# Patient Record
Sex: FEMALE | Race: WHITE | NOT HISPANIC OR LATINO | Employment: OTHER | URBAN - NONMETROPOLITAN AREA
[De-identification: names, ages, dates, MRNs, and addresses within clinical notes are randomized per-mention and may not be internally consistent; named-entity substitution may affect disease eponyms.]

---

## 2017-03-02 DIAGNOSIS — I27.29 OTHER SECONDARY PULMONARY HYPERTENSION (HCC): ICD-10-CM

## 2017-03-02 DIAGNOSIS — I50.30 DIASTOLIC CONGESTIVE HEART FAILURE (HCC): ICD-10-CM

## 2017-03-02 DIAGNOSIS — R06.02 SHORTNESS OF BREATH: ICD-10-CM

## 2017-03-02 DIAGNOSIS — R60.9 EDEMA: ICD-10-CM

## 2017-03-07 ENCOUNTER — HOSPITAL ENCOUNTER (OUTPATIENT)
Dept: RADIOLOGY | Facility: HOSPITAL | Age: 82
Discharge: HOME | DRG: 314 | End: 2017-03-07
Attending: INTERNAL MEDICINE
Payer: MEDICARE

## 2017-03-07 ENCOUNTER — HOSPITAL ENCOUNTER (OUTPATIENT)
Dept: NON INVASIVE DIAGNOSTICS | Facility: HOSPITAL | Age: 82
Discharge: HOME | DRG: 314 | End: 2017-03-07
Payer: MEDICARE

## 2017-03-07 ENCOUNTER — APPOINTMENT (OUTPATIENT)
Dept: LAB | Facility: HOSPITAL | Age: 82
DRG: 314 | End: 2017-03-07
Attending: INTERNAL MEDICINE
Payer: MEDICARE

## 2017-03-07 ENCOUNTER — TRANSCRIBE ORDERS (OUTPATIENT)
Dept: ADMINISTRATIVE | Facility: HOSPITAL | Age: 82
End: 2017-03-07

## 2017-03-07 ENCOUNTER — HOSPITAL ENCOUNTER (OUTPATIENT)
Dept: NON INVASIVE DIAGNOSTICS | Facility: HOSPITAL | Age: 82
Discharge: HOME | DRG: 314 | End: 2017-03-07
Attending: INTERNAL MEDICINE
Payer: MEDICARE

## 2017-03-07 DIAGNOSIS — R06.02 SHORTNESS OF BREATH: ICD-10-CM

## 2017-03-07 DIAGNOSIS — R60.9 EDEMA: ICD-10-CM

## 2017-03-07 DIAGNOSIS — R06.02 SHORTNESS OF BREATH: Primary | ICD-10-CM

## 2017-03-07 LAB
ANION GAP SERPL CALCULATED.3IONS-SCNC: 6 MMOL/L (ref 4–13)
ATRIAL RATE: 75 BPM
BUN SERPL-MCNC: 12 MG/DL (ref 5–25)
CALCIUM SERPL-MCNC: 9.6 MG/DL (ref 8.3–10.1)
CHLORIDE SERPL-SCNC: 93 MMOL/L (ref 100–108)
CO2 SERPL-SCNC: 34 MMOL/L (ref 21–32)
CREAT SERPL-MCNC: 0.63 MG/DL (ref 0.6–1.3)
GFR SERPL CREATININE-BSD FRML MDRD: >60 ML/MIN/1.73SQ M
GLUCOSE SERPL-MCNC: 111 MG/DL (ref 65–140)
NT-PROBNP SERPL-MCNC: 301 PG/ML
P AXIS: 49 DEGREES
POTASSIUM SERPL-SCNC: 3.9 MMOL/L (ref 3.5–5.3)
PR INTERVAL: 144 MS
QRS AXIS: 107 DEGREES
QRSD INTERVAL: 130 MS
QT INTERVAL: 406 MS
QTC INTERVAL: 453 MS
SODIUM SERPL-SCNC: 133 MMOL/L (ref 136–145)
T WAVE AXIS: 10 DEGREES
VENTRICULAR RATE: 75 BPM

## 2017-03-07 PROCEDURE — 80048 BASIC METABOLIC PNL TOTAL CA: CPT

## 2017-03-07 PROCEDURE — 83880 ASSAY OF NATRIURETIC PEPTIDE: CPT

## 2017-03-07 PROCEDURE — 71020 HB CHEST X-RAY 2VW FRONTAL&LATL: CPT

## 2017-03-07 PROCEDURE — 93306 TTE W/DOPPLER COMPLETE: CPT

## 2017-03-07 PROCEDURE — 93005 ELECTROCARDIOGRAM TRACING: CPT

## 2017-03-07 PROCEDURE — 36415 COLL VENOUS BLD VENIPUNCTURE: CPT

## 2017-03-09 ENCOUNTER — HOSPITAL ENCOUNTER (INPATIENT)
Facility: HOSPITAL | Age: 82
LOS: 12 days | Discharge: HOME HEALTH | DRG: 314 | End: 2017-03-21
Attending: EMERGENCY MEDICINE | Admitting: INTERNAL MEDICINE
Payer: MEDICARE

## 2017-03-09 ENCOUNTER — ALLSCRIPTS OFFICE VISIT (OUTPATIENT)
Dept: OTHER | Facility: OTHER | Age: 82
End: 2017-03-09

## 2017-03-09 DIAGNOSIS — R06.02 SHORTNESS OF BREATH: ICD-10-CM

## 2017-03-09 DIAGNOSIS — I50.33 ACUTE ON CHRONIC DIASTOLIC CHF (CONGESTIVE HEART FAILURE) (HCC): ICD-10-CM

## 2017-03-09 DIAGNOSIS — R06.00 ACUTE DYSPNEA: Primary | ICD-10-CM

## 2017-03-09 DIAGNOSIS — J44.1 COPD WITH ACUTE EXACERBATION (HCC): ICD-10-CM

## 2017-03-09 DIAGNOSIS — E87.1 ACUTE HYPONATREMIA: ICD-10-CM

## 2017-03-09 DIAGNOSIS — J90 PLEURAL EFFUSION, BACTERIAL: ICD-10-CM

## 2017-03-09 DIAGNOSIS — I50.32 DIASTOLIC CHF, CHRONIC (HCC): Chronic | ICD-10-CM

## 2017-03-09 DIAGNOSIS — K74.60 HEPATIC CIRRHOSIS (HCC): ICD-10-CM

## 2017-03-09 DIAGNOSIS — I38 VALVULAR HEART DISEASE: ICD-10-CM

## 2017-03-09 LAB
ALBUMIN SERPL BCP-MCNC: 2.6 G/DL (ref 3.5–5)
ALP SERPL-CCNC: 95 U/L (ref 46–116)
ALT SERPL W P-5'-P-CCNC: 31 U/L (ref 12–78)
ANION GAP SERPL CALCULATED.3IONS-SCNC: 12 MMOL/L (ref 4–13)
AST SERPL W P-5'-P-CCNC: 41 U/L (ref 5–45)
BASOPHILS # BLD AUTO: 0.02 THOUSANDS/ΜL (ref 0–0.1)
BASOPHILS NFR BLD AUTO: 0 % (ref 0–1)
BILIRUB SERPL-MCNC: 1.02 MG/DL (ref 0.2–1)
BUN SERPL-MCNC: 12 MG/DL (ref 5–25)
CALCIUM SERPL-MCNC: 9.2 MG/DL (ref 8.3–10.1)
CHLORIDE SERPL-SCNC: 94 MMOL/L (ref 100–108)
CO2 SERPL-SCNC: 23 MMOL/L (ref 21–32)
CREAT SERPL-MCNC: 0.62 MG/DL (ref 0.6–1.3)
EOSINOPHIL # BLD AUTO: 0.1 THOUSAND/ΜL (ref 0–0.61)
EOSINOPHIL NFR BLD AUTO: 2 % (ref 0–6)
ERYTHROCYTE [DISTWIDTH] IN BLOOD BY AUTOMATED COUNT: 13.4 % (ref 11.6–15.1)
GFR SERPL CREATININE-BSD FRML MDRD: >60 ML/MIN/1.73SQ M
GLUCOSE SERPL-MCNC: 121 MG/DL (ref 65–140)
HCT VFR BLD AUTO: 41.4 % (ref 34.8–46.1)
HGB BLD-MCNC: 14 G/DL (ref 11.5–15.4)
LYMPHOCYTES # BLD AUTO: 1.91 THOUSANDS/ΜL (ref 0.6–4.47)
LYMPHOCYTES NFR BLD AUTO: 36 % (ref 14–44)
MCH RBC QN AUTO: 30.6 PG (ref 26.8–34.3)
MCHC RBC AUTO-ENTMCNC: 33.8 G/DL (ref 31.4–37.4)
MCV RBC AUTO: 91 FL (ref 82–98)
MONOCYTES # BLD AUTO: 0.66 THOUSAND/ΜL (ref 0.17–1.22)
MONOCYTES NFR BLD AUTO: 13 % (ref 4–12)
NEUTROPHILS # BLD AUTO: 2.61 THOUSANDS/ΜL (ref 1.85–7.62)
NEUTS SEG NFR BLD AUTO: 49 % (ref 43–75)
NT-PROBNP SERPL-MCNC: 341 PG/ML
PLATELET # BLD AUTO: 188 THOUSANDS/UL (ref 149–390)
PMV BLD AUTO: 10.4 FL (ref 8.9–12.7)
POTASSIUM SERPL-SCNC: 4.2 MMOL/L (ref 3.5–5.3)
PROT SERPL-MCNC: 7.3 G/DL (ref 6.4–8.2)
RBC # BLD AUTO: 4.57 MILLION/UL (ref 3.81–5.12)
SODIUM SERPL-SCNC: 129 MMOL/L (ref 136–145)
TROPONIN I SERPL-MCNC: <0.04 NG/ML
WBC # BLD AUTO: 5.3 THOUSAND/UL (ref 4.31–10.16)

## 2017-03-09 PROCEDURE — 85025 COMPLETE CBC W/AUTO DIFF WBC: CPT | Performed by: EMERGENCY MEDICINE

## 2017-03-09 PROCEDURE — 83880 ASSAY OF NATRIURETIC PEPTIDE: CPT | Performed by: EMERGENCY MEDICINE

## 2017-03-09 PROCEDURE — 96361 HYDRATE IV INFUSION ADD-ON: CPT

## 2017-03-09 PROCEDURE — 80053 COMPREHEN METABOLIC PANEL: CPT | Performed by: EMERGENCY MEDICINE

## 2017-03-09 PROCEDURE — 36415 COLL VENOUS BLD VENIPUNCTURE: CPT | Performed by: EMERGENCY MEDICINE

## 2017-03-09 PROCEDURE — 96360 HYDRATION IV INFUSION INIT: CPT

## 2017-03-09 PROCEDURE — 93005 ELECTROCARDIOGRAM TRACING: CPT | Performed by: EMERGENCY MEDICINE

## 2017-03-09 PROCEDURE — 87798 DETECT AGENT NOS DNA AMP: CPT | Performed by: HOSPITALIST

## 2017-03-09 PROCEDURE — 84484 ASSAY OF TROPONIN QUANT: CPT | Performed by: EMERGENCY MEDICINE

## 2017-03-09 RX ORDER — FUROSEMIDE 10 MG/ML
80 INJECTION INTRAMUSCULAR; INTRAVENOUS ONCE
Status: DISCONTINUED | OUTPATIENT
Start: 2017-03-09 | End: 2017-03-10

## 2017-03-09 RX ORDER — ONDANSETRON 2 MG/ML
INJECTION INTRAMUSCULAR; INTRAVENOUS
Status: DISPENSED
Start: 2017-03-09 | End: 2017-03-10

## 2017-03-09 RX ORDER — ONDANSETRON 2 MG/ML
4 INJECTION INTRAMUSCULAR; INTRAVENOUS ONCE
Status: DISCONTINUED | OUTPATIENT
Start: 2017-03-09 | End: 2017-03-10

## 2017-03-09 RX ORDER — FUROSEMIDE 10 MG/ML
INJECTION INTRAMUSCULAR; INTRAVENOUS
Status: DISPENSED
Start: 2017-03-09 | End: 2017-03-10

## 2017-03-09 RX ORDER — METOCLOPRAMIDE HYDROCHLORIDE 5 MG/ML
INJECTION INTRAMUSCULAR; INTRAVENOUS
Status: DISPENSED
Start: 2017-03-09 | End: 2017-03-10

## 2017-03-09 RX ADMIN — SODIUM CHLORIDE 500 ML: 0.9 INJECTION, SOLUTION INTRAVENOUS at 17:05

## 2017-03-10 ENCOUNTER — APPOINTMENT (INPATIENT)
Dept: CT IMAGING | Facility: HOSPITAL | Age: 82
DRG: 314 | End: 2017-03-10
Payer: MEDICARE

## 2017-03-10 ENCOUNTER — APPOINTMENT (INPATIENT)
Dept: ULTRASOUND IMAGING | Facility: HOSPITAL | Age: 82
DRG: 314 | End: 2017-03-10
Payer: MEDICARE

## 2017-03-10 ENCOUNTER — GENERIC CONVERSION - ENCOUNTER (OUTPATIENT)
Dept: OTHER | Facility: OTHER | Age: 82
End: 2017-03-10

## 2017-03-10 PROBLEM — R06.02 SHORTNESS OF BREATH: Status: ACTIVE | Noted: 2017-03-10

## 2017-03-10 PROBLEM — K21.9 GERD (GASTROESOPHAGEAL REFLUX DISEASE): Chronic | Status: ACTIVE | Noted: 2017-03-10

## 2017-03-10 PROBLEM — I10 HTN (HYPERTENSION): Chronic | Status: ACTIVE | Noted: 2017-03-10

## 2017-03-10 PROBLEM — K29.70 GASTRITIS: Status: ACTIVE | Noted: 2017-03-10

## 2017-03-10 PROBLEM — J90 PLEURAL EFFUSION: Status: ACTIVE | Noted: 2017-03-10

## 2017-03-10 LAB
APPEARANCE FLD: CLEAR
ATRIAL RATE: 74 BPM
COLOR FLD: YELLOW
FLUAV AG SPEC QL: NORMAL
FLUBV AG SPEC QL: NORMAL
GLUCOSE FLD-MCNC: 115 MG/DL
LDH SERPL-CCNC: 63 U/L (ref 81–234)
LYMPHOCYTES NFR BLD AUTO: 95 %
MONOCYTES NFR BLD AUTO: 2 %
NEUTS SEG NFR BLD AUTO: 3 %
P AXIS: 51 DEGREES
PH BODY FLUID: 7.6
PR INTERVAL: 150 MS
PROT SERPL-MCNC: <2 G/DL (ref 6.4–8.2)
QRS AXIS: 78 DEGREES
QRSD INTERVAL: 130 MS
QT INTERVAL: 414 MS
QTC INTERVAL: 459 MS
RSV B RNA SPEC QL NAA+PROBE: NORMAL
SITE: NORMAL
T WAVE AXIS: -1 DEGREES
TOTAL CELLS COUNTED SPEC: 100
VENTRICULAR RATE: 74 BPM
WBC # FLD MANUAL: 438 /UL

## 2017-03-10 PROCEDURE — C9113 INJ PANTOPRAZOLE SODIUM, VIA: HCPCS | Performed by: PHYSICIAN ASSISTANT

## 2017-03-10 PROCEDURE — 99285 EMERGENCY DEPT VISIT HI MDM: CPT

## 2017-03-10 PROCEDURE — 82945 GLUCOSE OTHER FLUID: CPT | Performed by: INTERNAL MEDICINE

## 2017-03-10 PROCEDURE — 0W993ZX DRAINAGE OF RIGHT PLEURAL CAVITY, PERCUTANEOUS APPROACH, DIAGNOSTIC: ICD-10-PCS | Performed by: INTERNAL MEDICINE

## 2017-03-10 PROCEDURE — 83615 LACTATE (LD) (LDH) ENZYME: CPT | Performed by: INTERNAL MEDICINE

## 2017-03-10 PROCEDURE — 76604 US EXAM CHEST: CPT

## 2017-03-10 PROCEDURE — 0W993ZZ DRAINAGE OF RIGHT PLEURAL CAVITY, PERCUTANEOUS APPROACH: ICD-10-PCS | Performed by: INTERNAL MEDICINE

## 2017-03-10 PROCEDURE — 87205 SMEAR GRAM STAIN: CPT | Performed by: INTERNAL MEDICINE

## 2017-03-10 PROCEDURE — 87070 CULTURE OTHR SPECIMN AEROBIC: CPT | Performed by: INTERNAL MEDICINE

## 2017-03-10 PROCEDURE — 89051 BODY FLUID CELL COUNT: CPT | Performed by: INTERNAL MEDICINE

## 2017-03-10 PROCEDURE — 84155 ASSAY OF PROTEIN SERUM: CPT | Performed by: INTERNAL MEDICINE

## 2017-03-10 PROCEDURE — 71275 CT ANGIOGRAPHY CHEST: CPT

## 2017-03-10 PROCEDURE — 83986 ASSAY PH BODY FLUID NOS: CPT | Performed by: INTERNAL MEDICINE

## 2017-03-10 RX ORDER — SODIUM CHLORIDE 9 MG/ML
50 INJECTION, SOLUTION INTRAVENOUS CONTINUOUS
Status: DISCONTINUED | OUTPATIENT
Start: 2017-03-10 | End: 2017-03-11

## 2017-03-10 RX ORDER — PANTOPRAZOLE SODIUM 40 MG/1
40 INJECTION, POWDER, FOR SOLUTION INTRAVENOUS EVERY 12 HOURS SCHEDULED
Status: DISCONTINUED | OUTPATIENT
Start: 2017-03-10 | End: 2017-03-10

## 2017-03-10 RX ORDER — VALSARTAN 80 MG/1
80 TABLET ORAL DAILY
Status: ON HOLD | COMMUNITY
End: 2017-03-21

## 2017-03-10 RX ORDER — POTASSIUM CHLORIDE 1.5 G/1.77G
20 POWDER, FOR SOLUTION ORAL DAILY
COMMUNITY
End: 2018-09-11 | Stop reason: SDUPTHER

## 2017-03-10 RX ORDER — PANTOPRAZOLE SODIUM 40 MG/1
INJECTION, POWDER, FOR SOLUTION INTRAVENOUS
Status: DISPENSED
Start: 2017-03-10 | End: 2017-03-10

## 2017-03-10 RX ORDER — LACTULOSE 10 G/10G
20 SOLUTION ORAL 2 TIMES DAILY
COMMUNITY
End: 2017-03-21 | Stop reason: HOSPADM

## 2017-03-10 RX ORDER — ACETAMINOPHEN 325 MG/1
650 TABLET ORAL EVERY 6 HOURS PRN
Status: DISCONTINUED | OUTPATIENT
Start: 2017-03-10 | End: 2017-03-18

## 2017-03-10 RX ORDER — LACTULOSE 10 G/15ML
20 SOLUTION ORAL 2 TIMES DAILY
Status: DISCONTINUED | OUTPATIENT
Start: 2017-03-10 | End: 2017-03-17

## 2017-03-10 RX ORDER — METOCLOPRAMIDE HYDROCHLORIDE 5 MG/ML
5 INJECTION INTRAMUSCULAR; INTRAVENOUS EVERY 6 HOURS PRN
Status: DISCONTINUED | OUTPATIENT
Start: 2017-03-10 | End: 2017-03-11

## 2017-03-10 RX ORDER — ASPIRIN 81 MG/1
81 TABLET, CHEWABLE ORAL DAILY
Status: DISCONTINUED | OUTPATIENT
Start: 2017-03-10 | End: 2017-03-21 | Stop reason: HOSPADM

## 2017-03-10 RX ORDER — CHOLECALCIFEROL (VITAMIN D3) 25 MCG
1 CAPSULE ORAL DAILY
COMMUNITY

## 2017-03-10 RX ORDER — HEPARIN SODIUM 5000 [USP'U]/ML
INJECTION, SOLUTION INTRAVENOUS; SUBCUTANEOUS
Status: DISPENSED
Start: 2017-03-10 | End: 2017-03-10

## 2017-03-10 RX ORDER — PANTOPRAZOLE SODIUM 20 MG/1
20 TABLET, DELAYED RELEASE ORAL
Status: DISCONTINUED | OUTPATIENT
Start: 2017-03-11 | End: 2017-03-21 | Stop reason: HOSPADM

## 2017-03-10 RX ORDER — HYDROCHLOROTHIAZIDE 25 MG/1
25 TABLET ORAL DAILY
COMMUNITY
End: 2017-03-21 | Stop reason: HOSPADM

## 2017-03-10 RX ORDER — MELATONIN
1000 DAILY
Status: DISCONTINUED | OUTPATIENT
Start: 2017-03-10 | End: 2017-03-21 | Stop reason: HOSPADM

## 2017-03-10 RX ORDER — POLYETHYLENE GLYCOL 3350 17 G/17G
17 POWDER, FOR SOLUTION ORAL DAILY
Status: DISCONTINUED | OUTPATIENT
Start: 2017-03-10 | End: 2017-03-21 | Stop reason: HOSPADM

## 2017-03-10 RX ORDER — VALSARTAN 80 MG/1
80 TABLET ORAL DAILY
Status: DISCONTINUED | OUTPATIENT
Start: 2017-03-10 | End: 2017-03-21 | Stop reason: HOSPADM

## 2017-03-10 RX ORDER — HEPARIN SODIUM 5000 [USP'U]/ML
5000 INJECTION, SOLUTION INTRAVENOUS; SUBCUTANEOUS EVERY 8 HOURS SCHEDULED
Status: DISCONTINUED | OUTPATIENT
Start: 2017-03-10 | End: 2017-03-21 | Stop reason: HOSPADM

## 2017-03-10 RX ORDER — POLYETHYLENE GLYCOL 3350 17 G/17G
17 POWDER, FOR SOLUTION ORAL DAILY
COMMUNITY
End: 2017-03-21 | Stop reason: HOSPADM

## 2017-03-10 RX ORDER — MULTIVITAMIN
1 TABLET ORAL DAILY
COMMUNITY

## 2017-03-10 RX ORDER — MORPHINE SULFATE 2 MG/ML
2 INJECTION, SOLUTION INTRAMUSCULAR; INTRAVENOUS EVERY 6 HOURS PRN
Status: DISCONTINUED | OUTPATIENT
Start: 2017-03-10 | End: 2017-03-11

## 2017-03-10 RX ORDER — ESOMEPRAZOLE MAGNESIUM 40 MG/1
40 CAPSULE, DELAYED RELEASE ORAL
COMMUNITY

## 2017-03-10 RX ADMIN — HEPARIN SODIUM 5000 UNITS: 5000 INJECTION, SOLUTION INTRAVENOUS; SUBCUTANEOUS at 06:09

## 2017-03-10 RX ADMIN — Medication 1 TABLET: at 17:04

## 2017-03-10 RX ADMIN — SODIUM CHLORIDE 50 ML/HR: 0.9 INJECTION, SOLUTION INTRAVENOUS at 19:50

## 2017-03-10 RX ADMIN — HEPARIN SODIUM 5000 UNITS: 5000 INJECTION, SOLUTION INTRAVENOUS; SUBCUTANEOUS at 13:28

## 2017-03-10 RX ADMIN — ASPIRIN 81 MG 81 MG: 81 TABLET ORAL at 17:03

## 2017-03-10 RX ADMIN — FAMOTIDINE 20 MG: 10 INJECTION, SOLUTION INTRAVENOUS at 22:18

## 2017-03-10 RX ADMIN — PANTOPRAZOLE SODIUM 40 MG: 40 INJECTION, POWDER, FOR SOLUTION INTRAVENOUS at 08:41

## 2017-03-10 RX ADMIN — FAMOTIDINE 20 MG: 10 INJECTION, SOLUTION INTRAVENOUS at 01:47

## 2017-03-10 RX ADMIN — LACTULOSE 20 G: 10 SOLUTION ORAL at 17:03

## 2017-03-10 RX ADMIN — HEPARIN SODIUM 5000 UNITS: 5000 INJECTION, SOLUTION INTRAVENOUS; SUBCUTANEOUS at 01:47

## 2017-03-10 RX ADMIN — PANTOPRAZOLE SODIUM 40 MG: 40 INJECTION, POWDER, FOR SOLUTION INTRAVENOUS at 02:23

## 2017-03-10 RX ADMIN — SODIUM CHLORIDE 50 ML/HR: 0.9 INJECTION, SOLUTION INTRAVENOUS at 02:31

## 2017-03-10 RX ADMIN — VITAMIN D, TAB 1000IU (100/BT) 1000 UNITS: 25 TAB at 17:04

## 2017-03-10 RX ADMIN — IOHEXOL 85 ML: 350 INJECTION, SOLUTION INTRAVENOUS at 05:45

## 2017-03-10 RX ADMIN — HEPARIN SODIUM 5000 UNITS: 5000 INJECTION, SOLUTION INTRAVENOUS; SUBCUTANEOUS at 22:17

## 2017-03-10 RX ADMIN — FAMOTIDINE 20 MG: 10 INJECTION, SOLUTION INTRAVENOUS at 08:41

## 2017-03-10 RX ADMIN — VALSARTAN 80 MG: 80 TABLET, FILM COATED ORAL at 17:08

## 2017-03-11 ENCOUNTER — APPOINTMENT (INPATIENT)
Dept: RADIOLOGY | Facility: HOSPITAL | Age: 82
DRG: 314 | End: 2017-03-11
Payer: MEDICARE

## 2017-03-11 PROBLEM — I38 VALVULAR HEART DISEASE: Status: ACTIVE | Noted: 2017-03-11

## 2017-03-11 PROBLEM — I50.32 DIASTOLIC CHF, CHRONIC (HCC): Chronic | Status: ACTIVE | Noted: 2017-03-11

## 2017-03-11 LAB
ALBUMIN SERPL BCP-MCNC: 2.3 G/DL (ref 3.5–5)
ALP SERPL-CCNC: 78 U/L (ref 46–116)
ALT SERPL W P-5'-P-CCNC: 26 U/L (ref 12–78)
ANION GAP SERPL CALCULATED.3IONS-SCNC: 3 MMOL/L (ref 4–13)
AST SERPL W P-5'-P-CCNC: 39 U/L (ref 5–45)
BASOPHILS # BLD AUTO: 0.03 THOUSANDS/ΜL (ref 0–0.1)
BASOPHILS NFR BLD AUTO: 1 % (ref 0–1)
BILIRUB SERPL-MCNC: 1 MG/DL (ref 0.2–1)
BUN SERPL-MCNC: 11 MG/DL (ref 5–25)
CALCIUM SERPL-MCNC: 8.4 MG/DL (ref 8.3–10.1)
CHLORIDE SERPL-SCNC: 98 MMOL/L (ref 100–108)
CO2 SERPL-SCNC: 32 MMOL/L (ref 21–32)
CREAT SERPL-MCNC: 0.64 MG/DL (ref 0.6–1.3)
EOSINOPHIL # BLD AUTO: 0.12 THOUSAND/ΜL (ref 0–0.61)
EOSINOPHIL NFR BLD AUTO: 3 % (ref 0–6)
ERYTHROCYTE [DISTWIDTH] IN BLOOD BY AUTOMATED COUNT: 13.6 % (ref 11.6–15.1)
GFR SERPL CREATININE-BSD FRML MDRD: >60 ML/MIN/1.73SQ M
GLUCOSE SERPL-MCNC: 80 MG/DL (ref 65–140)
HCT VFR BLD AUTO: 39.6 % (ref 34.8–46.1)
HGB BLD-MCNC: 12.9 G/DL (ref 11.5–15.4)
LYMPHOCYTES # BLD AUTO: 1.41 THOUSANDS/ΜL (ref 0.6–4.47)
LYMPHOCYTES NFR BLD AUTO: 32 % (ref 14–44)
MAGNESIUM SERPL-MCNC: 1.8 MG/DL (ref 1.6–2.6)
MCH RBC QN AUTO: 30.4 PG (ref 26.8–34.3)
MCHC RBC AUTO-ENTMCNC: 32.6 G/DL (ref 31.4–37.4)
MCV RBC AUTO: 93 FL (ref 82–98)
MONOCYTES # BLD AUTO: 0.65 THOUSAND/ΜL (ref 0.17–1.22)
MONOCYTES NFR BLD AUTO: 15 % (ref 4–12)
NEUTROPHILS # BLD AUTO: 2.22 THOUSANDS/ΜL (ref 1.85–7.62)
NEUTS SEG NFR BLD AUTO: 49 % (ref 43–75)
PLATELET # BLD AUTO: 166 THOUSANDS/UL (ref 149–390)
PMV BLD AUTO: 11.5 FL (ref 8.9–12.7)
POTASSIUM SERPL-SCNC: 4.5 MMOL/L (ref 3.5–5.3)
PROT SERPL-MCNC: 6.5 G/DL (ref 6.4–8.2)
RBC # BLD AUTO: 4.25 MILLION/UL (ref 3.81–5.12)
SODIUM SERPL-SCNC: 133 MMOL/L (ref 136–145)
WBC # BLD AUTO: 4.43 THOUSAND/UL (ref 4.31–10.16)

## 2017-03-11 PROCEDURE — 71020 HB CHEST X-RAY 2VW FRONTAL&LATL: CPT

## 2017-03-11 PROCEDURE — 88112 CYTOPATH CELL ENHANCE TECH: CPT | Performed by: INTERNAL MEDICINE

## 2017-03-11 PROCEDURE — 85025 COMPLETE CBC W/AUTO DIFF WBC: CPT | Performed by: INTERNAL MEDICINE

## 2017-03-11 PROCEDURE — 80053 COMPREHEN METABOLIC PANEL: CPT | Performed by: INTERNAL MEDICINE

## 2017-03-11 PROCEDURE — 83735 ASSAY OF MAGNESIUM: CPT | Performed by: INTERNAL MEDICINE

## 2017-03-11 PROCEDURE — 94761 N-INVAS EAR/PLS OXIMETRY MLT: CPT

## 2017-03-11 PROCEDURE — 88305 TISSUE EXAM BY PATHOLOGIST: CPT | Performed by: INTERNAL MEDICINE

## 2017-03-11 PROCEDURE — 94760 N-INVAS EAR/PLS OXIMETRY 1: CPT

## 2017-03-11 RX ADMIN — PANTOPRAZOLE SODIUM 20 MG: 20 TABLET, DELAYED RELEASE ORAL at 05:35

## 2017-03-11 RX ADMIN — Medication 1 TABLET: at 09:55

## 2017-03-11 RX ADMIN — ASPIRIN 81 MG 81 MG: 81 TABLET ORAL at 09:53

## 2017-03-11 RX ADMIN — VITAMIN D, TAB 1000IU (100/BT) 1000 UNITS: 25 TAB at 09:53

## 2017-03-11 RX ADMIN — HEPARIN SODIUM 5000 UNITS: 5000 INJECTION, SOLUTION INTRAVENOUS; SUBCUTANEOUS at 13:48

## 2017-03-11 RX ADMIN — VALSARTAN 80 MG: 80 TABLET, FILM COATED ORAL at 10:02

## 2017-03-11 RX ADMIN — HEPARIN SODIUM 5000 UNITS: 5000 INJECTION, SOLUTION INTRAVENOUS; SUBCUTANEOUS at 21:51

## 2017-03-11 RX ADMIN — FAMOTIDINE 20 MG: 10 INJECTION, SOLUTION INTRAVENOUS at 21:50

## 2017-03-11 RX ADMIN — FAMOTIDINE 20 MG: 10 INJECTION, SOLUTION INTRAVENOUS at 09:53

## 2017-03-11 RX ADMIN — HEPARIN SODIUM 5000 UNITS: 5000 INJECTION, SOLUTION INTRAVENOUS; SUBCUTANEOUS at 05:36

## 2017-03-11 RX ADMIN — LACTULOSE 20 G: 10 SOLUTION ORAL at 09:54

## 2017-03-11 RX ADMIN — LACTULOSE 20 G: 10 SOLUTION ORAL at 18:32

## 2017-03-12 LAB
ALBUMIN SERPL BCP-MCNC: 2.2 G/DL (ref 3.5–5)
ALP SERPL-CCNC: 92 U/L (ref 46–116)
ALT SERPL W P-5'-P-CCNC: 27 U/L (ref 12–78)
ANION GAP SERPL CALCULATED.3IONS-SCNC: 4 MMOL/L (ref 4–13)
AST SERPL W P-5'-P-CCNC: 45 U/L (ref 5–45)
BASOPHILS # BLD AUTO: 0.03 THOUSANDS/ΜL (ref 0–0.1)
BASOPHILS NFR BLD AUTO: 1 % (ref 0–1)
BILIRUB SERPL-MCNC: 1 MG/DL (ref 0.2–1)
BUN SERPL-MCNC: 8 MG/DL (ref 5–25)
CALCIUM SERPL-MCNC: 8.5 MG/DL (ref 8.3–10.1)
CHLORIDE SERPL-SCNC: 97 MMOL/L (ref 100–108)
CO2 SERPL-SCNC: 31 MMOL/L (ref 21–32)
CREAT SERPL-MCNC: 0.63 MG/DL (ref 0.6–1.3)
EOSINOPHIL # BLD AUTO: 0.21 THOUSAND/ΜL (ref 0–0.61)
EOSINOPHIL NFR BLD AUTO: 5 % (ref 0–6)
ERYTHROCYTE [DISTWIDTH] IN BLOOD BY AUTOMATED COUNT: 13.2 % (ref 11.6–15.1)
GFR SERPL CREATININE-BSD FRML MDRD: >60 ML/MIN/1.73SQ M
GLUCOSE SERPL-MCNC: 88 MG/DL (ref 65–140)
HCT VFR BLD AUTO: 39.2 % (ref 34.8–46.1)
HGB BLD-MCNC: 12.7 G/DL (ref 11.5–15.4)
INR PPP: 1.31 (ref 0.86–1.16)
LYMPHOCYTES # BLD AUTO: 1.6 THOUSANDS/ΜL (ref 0.6–4.47)
LYMPHOCYTES NFR BLD AUTO: 36 % (ref 14–44)
MAGNESIUM SERPL-MCNC: 1.8 MG/DL (ref 1.6–2.6)
MCH RBC QN AUTO: 30.2 PG (ref 26.8–34.3)
MCHC RBC AUTO-ENTMCNC: 32.4 G/DL (ref 31.4–37.4)
MCV RBC AUTO: 93 FL (ref 82–98)
MONOCYTES # BLD AUTO: 0.69 THOUSAND/ΜL (ref 0.17–1.22)
MONOCYTES NFR BLD AUTO: 15 % (ref 4–12)
NEUTROPHILS # BLD AUTO: 1.95 THOUSANDS/ΜL (ref 1.85–7.62)
NEUTS SEG NFR BLD AUTO: 43 % (ref 43–75)
PHOSPHATE SERPL-MCNC: 2.8 MG/DL (ref 2.3–4.1)
PLATELET # BLD AUTO: 159 THOUSANDS/UL (ref 149–390)
PMV BLD AUTO: 11.2 FL (ref 8.9–12.7)
POTASSIUM SERPL-SCNC: 4.5 MMOL/L (ref 3.5–5.3)
PROT SERPL-MCNC: 6.4 G/DL (ref 6.4–8.2)
PROTHROMBIN TIME: 15.9 SECONDS (ref 12–14.3)
RBC # BLD AUTO: 4.2 MILLION/UL (ref 3.81–5.12)
SODIUM SERPL-SCNC: 132 MMOL/L (ref 136–145)
WBC # BLD AUTO: 4.48 THOUSAND/UL (ref 4.31–10.16)

## 2017-03-12 PROCEDURE — 83735 ASSAY OF MAGNESIUM: CPT | Performed by: INTERNAL MEDICINE

## 2017-03-12 PROCEDURE — 85025 COMPLETE CBC W/AUTO DIFF WBC: CPT | Performed by: INTERNAL MEDICINE

## 2017-03-12 PROCEDURE — 85610 PROTHROMBIN TIME: CPT | Performed by: INTERNAL MEDICINE

## 2017-03-12 PROCEDURE — 84100 ASSAY OF PHOSPHORUS: CPT | Performed by: INTERNAL MEDICINE

## 2017-03-12 PROCEDURE — 80053 COMPREHEN METABOLIC PANEL: CPT | Performed by: INTERNAL MEDICINE

## 2017-03-12 RX ORDER — FUROSEMIDE 10 MG/ML
40 INJECTION INTRAMUSCULAR; INTRAVENOUS
Status: DISCONTINUED | OUTPATIENT
Start: 2017-03-12 | End: 2017-03-15

## 2017-03-12 RX ADMIN — HEPARIN SODIUM 5000 UNITS: 5000 INJECTION, SOLUTION INTRAVENOUS; SUBCUTANEOUS at 05:37

## 2017-03-12 RX ADMIN — FAMOTIDINE 20 MG: 10 INJECTION, SOLUTION INTRAVENOUS at 09:52

## 2017-03-12 RX ADMIN — PANTOPRAZOLE SODIUM 20 MG: 20 TABLET, DELAYED RELEASE ORAL at 05:37

## 2017-03-12 RX ADMIN — LACTULOSE 20 G: 10 SOLUTION ORAL at 09:52

## 2017-03-12 RX ADMIN — VITAMIN D, TAB 1000IU (100/BT) 1000 UNITS: 25 TAB at 09:52

## 2017-03-12 RX ADMIN — Medication 1 TABLET: at 09:52

## 2017-03-12 RX ADMIN — VALSARTAN 80 MG: 80 TABLET, FILM COATED ORAL at 09:52

## 2017-03-12 RX ADMIN — FUROSEMIDE 40 MG: 10 INJECTION, SOLUTION INTRAMUSCULAR; INTRAVENOUS at 14:27

## 2017-03-12 RX ADMIN — LACTULOSE 20 G: 10 SOLUTION ORAL at 18:10

## 2017-03-12 RX ADMIN — ASPIRIN 81 MG 81 MG: 81 TABLET ORAL at 09:52

## 2017-03-12 RX ADMIN — HEPARIN SODIUM 5000 UNITS: 5000 INJECTION, SOLUTION INTRAVENOUS; SUBCUTANEOUS at 21:21

## 2017-03-12 RX ADMIN — HEPARIN SODIUM 5000 UNITS: 5000 INJECTION, SOLUTION INTRAVENOUS; SUBCUTANEOUS at 14:28

## 2017-03-12 RX ADMIN — FAMOTIDINE 20 MG: 10 INJECTION, SOLUTION INTRAVENOUS at 20:03

## 2017-03-13 LAB
ALBUMIN SERPL BCP-MCNC: 2.2 G/DL (ref 3.5–5)
ALP SERPL-CCNC: 90 U/L (ref 46–116)
ALT SERPL W P-5'-P-CCNC: 31 U/L (ref 12–78)
ANION GAP SERPL CALCULATED.3IONS-SCNC: 1 MMOL/L (ref 4–13)
AST SERPL W P-5'-P-CCNC: 41 U/L (ref 5–45)
BASOPHILS # BLD AUTO: 0.02 THOUSANDS/ΜL (ref 0–0.1)
BASOPHILS NFR BLD AUTO: 1 % (ref 0–1)
BILIRUB SERPL-MCNC: 1.1 MG/DL (ref 0.2–1)
BUN SERPL-MCNC: 6 MG/DL (ref 5–25)
CALCIUM SERPL-MCNC: 8.5 MG/DL (ref 8.3–10.1)
CHLORIDE SERPL-SCNC: 98 MMOL/L (ref 100–108)
CO2 SERPL-SCNC: 35 MMOL/L (ref 21–32)
CREAT SERPL-MCNC: 0.65 MG/DL (ref 0.6–1.3)
EOSINOPHIL # BLD AUTO: 0.19 THOUSAND/ΜL (ref 0–0.61)
EOSINOPHIL NFR BLD AUTO: 5 % (ref 0–6)
ERYTHROCYTE [DISTWIDTH] IN BLOOD BY AUTOMATED COUNT: 13.1 % (ref 11.6–15.1)
GFR SERPL CREATININE-BSD FRML MDRD: >60 ML/MIN/1.73SQ M
GLUCOSE SERPL-MCNC: 84 MG/DL (ref 65–140)
HCT VFR BLD AUTO: 42 % (ref 34.8–46.1)
HGB BLD-MCNC: 13.8 G/DL (ref 11.5–15.4)
INR PPP: 1.46 (ref 0.86–1.16)
LYMPHOCYTES # BLD AUTO: 1.71 THOUSANDS/ΜL (ref 0.6–4.47)
LYMPHOCYTES NFR BLD AUTO: 40 % (ref 14–44)
MAGNESIUM SERPL-MCNC: 1.8 MG/DL (ref 1.6–2.6)
MCH RBC QN AUTO: 30.3 PG (ref 26.8–34.3)
MCHC RBC AUTO-ENTMCNC: 32.9 G/DL (ref 31.4–37.4)
MCV RBC AUTO: 92 FL (ref 82–98)
MONOCYTES # BLD AUTO: 0.68 THOUSAND/ΜL (ref 0.17–1.22)
MONOCYTES NFR BLD AUTO: 17 % (ref 4–12)
NEUTROPHILS # BLD AUTO: 1.53 THOUSANDS/ΜL (ref 1.85–7.62)
NEUTS SEG NFR BLD AUTO: 37 % (ref 43–75)
PLATELET # BLD AUTO: 162 THOUSANDS/UL (ref 149–390)
PMV BLD AUTO: 11.3 FL (ref 8.9–12.7)
POTASSIUM SERPL-SCNC: 3.9 MMOL/L (ref 3.5–5.3)
PROT SERPL-MCNC: 6.5 G/DL (ref 6.4–8.2)
PROTHROMBIN TIME: 17.2 SECONDS (ref 12–14.3)
RBC # BLD AUTO: 4.55 MILLION/UL (ref 3.81–5.12)
SODIUM SERPL-SCNC: 134 MMOL/L (ref 136–145)
WBC # BLD AUTO: 4.13 THOUSAND/UL (ref 4.31–10.16)

## 2017-03-13 PROCEDURE — 85025 COMPLETE CBC W/AUTO DIFF WBC: CPT | Performed by: INTERNAL MEDICINE

## 2017-03-13 PROCEDURE — 80053 COMPREHEN METABOLIC PANEL: CPT | Performed by: INTERNAL MEDICINE

## 2017-03-13 PROCEDURE — 83735 ASSAY OF MAGNESIUM: CPT | Performed by: INTERNAL MEDICINE

## 2017-03-13 PROCEDURE — 85610 PROTHROMBIN TIME: CPT | Performed by: INTERNAL MEDICINE

## 2017-03-13 RX ADMIN — VALSARTAN 80 MG: 80 TABLET, FILM COATED ORAL at 09:18

## 2017-03-13 RX ADMIN — ASPIRIN 81 MG 81 MG: 81 TABLET ORAL at 09:18

## 2017-03-13 RX ADMIN — VITAMIN D, TAB 1000IU (100/BT) 1000 UNITS: 25 TAB at 09:18

## 2017-03-13 RX ADMIN — HEPARIN SODIUM 5000 UNITS: 5000 INJECTION, SOLUTION INTRAVENOUS; SUBCUTANEOUS at 14:51

## 2017-03-13 RX ADMIN — LACTULOSE 20 G: 10 SOLUTION ORAL at 09:18

## 2017-03-13 RX ADMIN — FUROSEMIDE 40 MG: 10 INJECTION, SOLUTION INTRAMUSCULAR; INTRAVENOUS at 16:25

## 2017-03-13 RX ADMIN — HEPARIN SODIUM 5000 UNITS: 5000 INJECTION, SOLUTION INTRAVENOUS; SUBCUTANEOUS at 05:07

## 2017-03-13 RX ADMIN — Medication 1 TABLET: at 09:18

## 2017-03-13 RX ADMIN — FUROSEMIDE 40 MG: 10 INJECTION, SOLUTION INTRAMUSCULAR; INTRAVENOUS at 09:18

## 2017-03-13 RX ADMIN — HEPARIN SODIUM 5000 UNITS: 5000 INJECTION, SOLUTION INTRAVENOUS; SUBCUTANEOUS at 22:03

## 2017-03-13 RX ADMIN — FAMOTIDINE 20 MG: 10 INJECTION, SOLUTION INTRAVENOUS at 09:18

## 2017-03-13 RX ADMIN — LACTULOSE 20 G: 10 SOLUTION ORAL at 17:50

## 2017-03-13 RX ADMIN — PANTOPRAZOLE SODIUM 20 MG: 20 TABLET, DELAYED RELEASE ORAL at 05:07

## 2017-03-14 LAB
ALBUMIN SERPL BCP-MCNC: 2 G/DL (ref 3.5–5)
ALP SERPL-CCNC: 84 U/L (ref 46–116)
ALT SERPL W P-5'-P-CCNC: 26 U/L (ref 12–78)
ANION GAP SERPL CALCULATED.3IONS-SCNC: 2 MMOL/L (ref 4–13)
AST SERPL W P-5'-P-CCNC: 34 U/L (ref 5–45)
BACTERIA SPEC BFLD CULT: NO GROWTH
BASOPHILS # BLD AUTO: 0.02 THOUSANDS/ΜL (ref 0–0.1)
BASOPHILS NFR BLD AUTO: 1 % (ref 0–1)
BILIRUB SERPL-MCNC: 1.1 MG/DL (ref 0.2–1)
BUN SERPL-MCNC: 6 MG/DL (ref 5–25)
CALCIUM SERPL-MCNC: 8.3 MG/DL (ref 8.3–10.1)
CHLORIDE SERPL-SCNC: 96 MMOL/L (ref 100–108)
CO2 SERPL-SCNC: 36 MMOL/L (ref 21–32)
CREAT SERPL-MCNC: 0.61 MG/DL (ref 0.6–1.3)
EOSINOPHIL # BLD AUTO: 0.16 THOUSAND/ΜL (ref 0–0.61)
EOSINOPHIL NFR BLD AUTO: 4 % (ref 0–6)
ERYTHROCYTE [DISTWIDTH] IN BLOOD BY AUTOMATED COUNT: 13.1 % (ref 11.6–15.1)
GFR SERPL CREATININE-BSD FRML MDRD: >60 ML/MIN/1.73SQ M
GLUCOSE SERPL-MCNC: 83 MG/DL (ref 65–140)
GRAM STN SPEC: NORMAL
GRAM STN SPEC: NORMAL
HCT VFR BLD AUTO: 41.4 % (ref 34.8–46.1)
HGB BLD-MCNC: 13.7 G/DL (ref 11.5–15.4)
LYMPHOCYTES # BLD AUTO: 1.82 THOUSANDS/ΜL (ref 0.6–4.47)
LYMPHOCYTES NFR BLD AUTO: 44 % (ref 14–44)
MAGNESIUM SERPL-MCNC: 1.5 MG/DL (ref 1.6–2.6)
MCH RBC QN AUTO: 30.3 PG (ref 26.8–34.3)
MCHC RBC AUTO-ENTMCNC: 33.1 G/DL (ref 31.4–37.4)
MCV RBC AUTO: 92 FL (ref 82–98)
MONOCYTES # BLD AUTO: 0.66 THOUSAND/ΜL (ref 0.17–1.22)
MONOCYTES NFR BLD AUTO: 16 % (ref 4–12)
NEUTROPHILS # BLD AUTO: 1.41 THOUSANDS/ΜL (ref 1.85–7.62)
NEUTS SEG NFR BLD AUTO: 35 % (ref 43–75)
PLATELET # BLD AUTO: 155 THOUSANDS/UL (ref 149–390)
PMV BLD AUTO: 10.7 FL (ref 8.9–12.7)
POTASSIUM SERPL-SCNC: 3.4 MMOL/L (ref 3.5–5.3)
PROT SERPL-MCNC: 6 G/DL (ref 6.4–8.2)
RBC # BLD AUTO: 4.52 MILLION/UL (ref 3.81–5.12)
SODIUM SERPL-SCNC: 134 MMOL/L (ref 136–145)
WBC # BLD AUTO: 4.07 THOUSAND/UL (ref 4.31–10.16)

## 2017-03-14 PROCEDURE — 83735 ASSAY OF MAGNESIUM: CPT | Performed by: INTERNAL MEDICINE

## 2017-03-14 PROCEDURE — 80053 COMPREHEN METABOLIC PANEL: CPT | Performed by: INTERNAL MEDICINE

## 2017-03-14 PROCEDURE — 85025 COMPLETE CBC W/AUTO DIFF WBC: CPT | Performed by: INTERNAL MEDICINE

## 2017-03-14 RX ORDER — POTASSIUM CHLORIDE 20 MEQ/1
40 TABLET, EXTENDED RELEASE ORAL ONCE
Status: COMPLETED | OUTPATIENT
Start: 2017-03-14 | End: 2017-03-14

## 2017-03-14 RX ORDER — MIRTAZAPINE 15 MG/1
15 TABLET, FILM COATED ORAL
Status: DISCONTINUED | OUTPATIENT
Start: 2017-03-14 | End: 2017-03-21 | Stop reason: HOSPADM

## 2017-03-14 RX ADMIN — PANTOPRAZOLE SODIUM 20 MG: 20 TABLET, DELAYED RELEASE ORAL at 05:04

## 2017-03-14 RX ADMIN — Medication 400 MG: at 17:58

## 2017-03-14 RX ADMIN — POTASSIUM CHLORIDE 40 MEQ: 1500 TABLET, EXTENDED RELEASE ORAL at 17:58

## 2017-03-14 RX ADMIN — FUROSEMIDE 40 MG: 10 INJECTION, SOLUTION INTRAMUSCULAR; INTRAVENOUS at 09:21

## 2017-03-14 RX ADMIN — LACTULOSE 20 G: 10 SOLUTION ORAL at 17:58

## 2017-03-14 RX ADMIN — VITAMIN D, TAB 1000IU (100/BT) 1000 UNITS: 25 TAB at 09:21

## 2017-03-14 RX ADMIN — HEPARIN SODIUM 5000 UNITS: 5000 INJECTION, SOLUTION INTRAVENOUS; SUBCUTANEOUS at 05:04

## 2017-03-14 RX ADMIN — HEPARIN SODIUM 5000 UNITS: 5000 INJECTION, SOLUTION INTRAVENOUS; SUBCUTANEOUS at 13:48

## 2017-03-14 RX ADMIN — HEPARIN SODIUM 5000 UNITS: 5000 INJECTION, SOLUTION INTRAVENOUS; SUBCUTANEOUS at 22:18

## 2017-03-14 RX ADMIN — FUROSEMIDE 40 MG: 10 INJECTION, SOLUTION INTRAMUSCULAR; INTRAVENOUS at 16:36

## 2017-03-14 RX ADMIN — ASPIRIN 81 MG 81 MG: 81 TABLET ORAL at 09:21

## 2017-03-14 RX ADMIN — VALSARTAN 80 MG: 80 TABLET, FILM COATED ORAL at 09:21

## 2017-03-14 RX ADMIN — LACTULOSE 20 G: 10 SOLUTION ORAL at 09:21

## 2017-03-14 RX ADMIN — MIRTAZAPINE 15 MG: 15 TABLET, FILM COATED ORAL at 22:18

## 2017-03-14 RX ADMIN — Medication 1 TABLET: at 09:21

## 2017-03-15 ENCOUNTER — APPOINTMENT (INPATIENT)
Dept: PHYSICAL THERAPY | Facility: HOSPITAL | Age: 82
DRG: 314 | End: 2017-03-15
Payer: MEDICARE

## 2017-03-15 ENCOUNTER — APPOINTMENT (INPATIENT)
Dept: OCCUPATIONAL THERAPY | Facility: HOSPITAL | Age: 82
DRG: 314 | End: 2017-03-15
Payer: MEDICARE

## 2017-03-15 ENCOUNTER — APPOINTMENT (INPATIENT)
Dept: RADIOLOGY | Facility: HOSPITAL | Age: 82
DRG: 314 | End: 2017-03-15
Payer: MEDICARE

## 2017-03-15 LAB
ALBUMIN SERPL BCP-MCNC: 1.9 G/DL (ref 3.5–5)
ALP SERPL-CCNC: 83 U/L (ref 46–116)
ALT SERPL W P-5'-P-CCNC: 26 U/L (ref 12–78)
ANION GAP SERPL CALCULATED.3IONS-SCNC: 3 MMOL/L (ref 4–13)
AST SERPL W P-5'-P-CCNC: 37 U/L (ref 5–45)
BASOPHILS # BLD AUTO: 0.03 THOUSANDS/ΜL (ref 0–0.1)
BASOPHILS NFR BLD AUTO: 1 % (ref 0–1)
BILIRUB SERPL-MCNC: 1.1 MG/DL (ref 0.2–1)
BUN SERPL-MCNC: 6 MG/DL (ref 5–25)
CALCIUM SERPL-MCNC: 8.4 MG/DL (ref 8.3–10.1)
CHLORIDE SERPL-SCNC: 97 MMOL/L (ref 100–108)
CO2 SERPL-SCNC: 34 MMOL/L (ref 21–32)
CREAT SERPL-MCNC: 0.58 MG/DL (ref 0.6–1.3)
EOSINOPHIL # BLD AUTO: 0.15 THOUSAND/ΜL (ref 0–0.61)
EOSINOPHIL NFR BLD AUTO: 3 % (ref 0–6)
ERYTHROCYTE [DISTWIDTH] IN BLOOD BY AUTOMATED COUNT: 13.3 % (ref 11.6–15.1)
GFR SERPL CREATININE-BSD FRML MDRD: >60 ML/MIN/1.73SQ M
GLUCOSE SERPL-MCNC: 80 MG/DL (ref 65–140)
HCT VFR BLD AUTO: 42.8 % (ref 34.8–46.1)
HGB BLD-MCNC: 14.2 G/DL (ref 11.5–15.4)
INR PPP: 1.43 (ref 0.86–1.16)
LYMPHOCYTES # BLD AUTO: 2.37 THOUSANDS/ΜL (ref 0.6–4.47)
LYMPHOCYTES NFR BLD AUTO: 50 % (ref 14–44)
MAGNESIUM SERPL-MCNC: 1.5 MG/DL (ref 1.6–2.6)
MCH RBC QN AUTO: 30.2 PG (ref 26.8–34.3)
MCHC RBC AUTO-ENTMCNC: 33.2 G/DL (ref 31.4–37.4)
MCV RBC AUTO: 91 FL (ref 82–98)
MONOCYTES # BLD AUTO: 0.64 THOUSAND/ΜL (ref 0.17–1.22)
MONOCYTES NFR BLD AUTO: 14 % (ref 4–12)
NEUTROPHILS # BLD AUTO: 1.47 THOUSANDS/ΜL (ref 1.85–7.62)
NEUTS SEG NFR BLD AUTO: 32 % (ref 43–75)
PLATELET # BLD AUTO: 173 THOUSANDS/UL (ref 149–390)
PMV BLD AUTO: 10.8 FL (ref 8.9–12.7)
POTASSIUM SERPL-SCNC: 3.8 MMOL/L (ref 3.5–5.3)
PROT SERPL-MCNC: 6.2 G/DL (ref 6.4–8.2)
PROTHROMBIN TIME: 16.9 SECONDS (ref 12–14.3)
RBC # BLD AUTO: 4.7 MILLION/UL (ref 3.81–5.12)
SODIUM SERPL-SCNC: 134 MMOL/L (ref 136–145)
WBC # BLD AUTO: 4.66 THOUSAND/UL (ref 4.31–10.16)

## 2017-03-15 PROCEDURE — G8988 SELF CARE GOAL STATUS: HCPCS

## 2017-03-15 PROCEDURE — 97116 GAIT TRAINING THERAPY: CPT

## 2017-03-15 PROCEDURE — G8987 SELF CARE CURRENT STATUS: HCPCS

## 2017-03-15 PROCEDURE — 85025 COMPLETE CBC W/AUTO DIFF WBC: CPT | Performed by: INTERNAL MEDICINE

## 2017-03-15 PROCEDURE — 97162 PT EVAL MOD COMPLEX 30 MIN: CPT | Performed by: PHYSICAL THERAPIST

## 2017-03-15 PROCEDURE — G8979 MOBILITY GOAL STATUS: HCPCS | Performed by: PHYSICAL THERAPIST

## 2017-03-15 PROCEDURE — G8978 MOBILITY CURRENT STATUS: HCPCS | Performed by: PHYSICAL THERAPIST

## 2017-03-15 PROCEDURE — 83735 ASSAY OF MAGNESIUM: CPT | Performed by: INTERNAL MEDICINE

## 2017-03-15 PROCEDURE — 97116 GAIT TRAINING THERAPY: CPT | Performed by: PHYSICAL THERAPIST

## 2017-03-15 PROCEDURE — 97167 OT EVAL HIGH COMPLEX 60 MIN: CPT

## 2017-03-15 PROCEDURE — 97530 THERAPEUTIC ACTIVITIES: CPT

## 2017-03-15 PROCEDURE — 80053 COMPREHEN METABOLIC PANEL: CPT | Performed by: INTERNAL MEDICINE

## 2017-03-15 PROCEDURE — 97535 SELF CARE MNGMENT TRAINING: CPT

## 2017-03-15 PROCEDURE — 85610 PROTHROMBIN TIME: CPT | Performed by: INTERNAL MEDICINE

## 2017-03-15 PROCEDURE — 71020 HB CHEST X-RAY 2VW FRONTAL&LATL: CPT

## 2017-03-15 RX ORDER — FUROSEMIDE 40 MG/1
40 TABLET ORAL DAILY
Status: DISCONTINUED | OUTPATIENT
Start: 2017-03-16 | End: 2017-03-17

## 2017-03-15 RX ORDER — MAGNESIUM SULFATE HEPTAHYDRATE 40 MG/ML
2 INJECTION, SOLUTION INTRAVENOUS ONCE
Status: COMPLETED | OUTPATIENT
Start: 2017-03-15 | End: 2017-03-15

## 2017-03-15 RX ADMIN — Medication 400 MG: at 17:33

## 2017-03-15 RX ADMIN — LACTULOSE 20 G: 10 SOLUTION ORAL at 17:33

## 2017-03-15 RX ADMIN — Medication 1 TABLET: at 09:41

## 2017-03-15 RX ADMIN — Medication 400 MG: at 09:41

## 2017-03-15 RX ADMIN — FUROSEMIDE 40 MG: 10 INJECTION, SOLUTION INTRAMUSCULAR; INTRAVENOUS at 08:03

## 2017-03-15 RX ADMIN — ASPIRIN 81 MG 81 MG: 81 TABLET ORAL at 09:41

## 2017-03-15 RX ADMIN — PANTOPRAZOLE SODIUM 20 MG: 20 TABLET, DELAYED RELEASE ORAL at 05:06

## 2017-03-15 RX ADMIN — VITAMIN D, TAB 1000IU (100/BT) 1000 UNITS: 25 TAB at 09:41

## 2017-03-15 RX ADMIN — MIRTAZAPINE 15 MG: 15 TABLET, FILM COATED ORAL at 21:23

## 2017-03-15 RX ADMIN — MAGNESIUM SULFATE HEPTAHYDRATE 2 G: 40 INJECTION, SOLUTION INTRAVENOUS at 17:34

## 2017-03-15 RX ADMIN — VALSARTAN 80 MG: 80 TABLET, FILM COATED ORAL at 09:41

## 2017-03-15 RX ADMIN — HEPARIN SODIUM 5000 UNITS: 5000 INJECTION, SOLUTION INTRAVENOUS; SUBCUTANEOUS at 13:17

## 2017-03-15 RX ADMIN — HEPARIN SODIUM 5000 UNITS: 5000 INJECTION, SOLUTION INTRAVENOUS; SUBCUTANEOUS at 21:23

## 2017-03-15 RX ADMIN — LACTULOSE 20 G: 10 SOLUTION ORAL at 09:41

## 2017-03-15 RX ADMIN — HEPARIN SODIUM 5000 UNITS: 5000 INJECTION, SOLUTION INTRAVENOUS; SUBCUTANEOUS at 05:08

## 2017-03-16 ENCOUNTER — APPOINTMENT (INPATIENT)
Dept: PHYSICAL THERAPY | Facility: HOSPITAL | Age: 82
DRG: 314 | End: 2017-03-16
Payer: MEDICARE

## 2017-03-16 ENCOUNTER — APPOINTMENT (INPATIENT)
Dept: OCCUPATIONAL THERAPY | Facility: HOSPITAL | Age: 82
DRG: 314 | End: 2017-03-16
Payer: MEDICARE

## 2017-03-16 LAB
ALBUMIN SERPL BCP-MCNC: 2 G/DL (ref 3.5–5)
ALP SERPL-CCNC: 88 U/L (ref 46–116)
ALT SERPL W P-5'-P-CCNC: 25 U/L (ref 12–78)
ANION GAP SERPL CALCULATED.3IONS-SCNC: 3 MMOL/L (ref 4–13)
AST SERPL W P-5'-P-CCNC: 34 U/L (ref 5–45)
BASOPHILS # BLD AUTO: 0.03 THOUSANDS/ΜL (ref 0–0.1)
BASOPHILS NFR BLD AUTO: 1 % (ref 0–1)
BILIRUB SERPL-MCNC: 1.1 MG/DL (ref 0.2–1)
BUN SERPL-MCNC: 8 MG/DL (ref 5–25)
CALCIUM SERPL-MCNC: 8.7 MG/DL (ref 8.3–10.1)
CHLORIDE SERPL-SCNC: 98 MMOL/L (ref 100–108)
CO2 SERPL-SCNC: 35 MMOL/L (ref 21–32)
CREAT SERPL-MCNC: 0.64 MG/DL (ref 0.6–1.3)
EOSINOPHIL # BLD AUTO: 0.17 THOUSAND/ΜL (ref 0–0.61)
EOSINOPHIL NFR BLD AUTO: 4 % (ref 0–6)
ERYTHROCYTE [DISTWIDTH] IN BLOOD BY AUTOMATED COUNT: 13.5 % (ref 11.6–15.1)
GFR SERPL CREATININE-BSD FRML MDRD: >60 ML/MIN/1.73SQ M
GLUCOSE SERPL-MCNC: 86 MG/DL (ref 65–140)
HCT VFR BLD AUTO: 42.6 % (ref 34.8–46.1)
HGB BLD-MCNC: 14.2 G/DL (ref 11.5–15.4)
LYMPHOCYTES # BLD AUTO: 2.43 THOUSANDS/ΜL (ref 0.6–4.47)
LYMPHOCYTES NFR BLD AUTO: 49 % (ref 14–44)
MAGNESIUM SERPL-MCNC: 2 MG/DL (ref 1.6–2.6)
MCH RBC QN AUTO: 30.2 PG (ref 26.8–34.3)
MCHC RBC AUTO-ENTMCNC: 33.3 G/DL (ref 31.4–37.4)
MCV RBC AUTO: 91 FL (ref 82–98)
MONOCYTES # BLD AUTO: 0.68 THOUSAND/ΜL (ref 0.17–1.22)
MONOCYTES NFR BLD AUTO: 14 % (ref 4–12)
NEUTROPHILS # BLD AUTO: 1.54 THOUSANDS/ΜL (ref 1.85–7.62)
NEUTS SEG NFR BLD AUTO: 32 % (ref 43–75)
PLATELET # BLD AUTO: 163 THOUSANDS/UL (ref 149–390)
PMV BLD AUTO: 10.4 FL (ref 8.9–12.7)
POTASSIUM SERPL-SCNC: 3.5 MMOL/L (ref 3.5–5.3)
PROT SERPL-MCNC: 6.2 G/DL (ref 6.4–8.2)
RBC # BLD AUTO: 4.7 MILLION/UL (ref 3.81–5.12)
SODIUM SERPL-SCNC: 136 MMOL/L (ref 136–145)
WBC # BLD AUTO: 4.85 THOUSAND/UL (ref 4.31–10.16)

## 2017-03-16 PROCEDURE — 97535 SELF CARE MNGMENT TRAINING: CPT

## 2017-03-16 PROCEDURE — 83735 ASSAY OF MAGNESIUM: CPT | Performed by: INTERNAL MEDICINE

## 2017-03-16 PROCEDURE — 97116 GAIT TRAINING THERAPY: CPT

## 2017-03-16 PROCEDURE — 97530 THERAPEUTIC ACTIVITIES: CPT

## 2017-03-16 PROCEDURE — 80053 COMPREHEN METABOLIC PANEL: CPT | Performed by: INTERNAL MEDICINE

## 2017-03-16 PROCEDURE — 97110 THERAPEUTIC EXERCISES: CPT

## 2017-03-16 PROCEDURE — 85025 COMPLETE CBC W/AUTO DIFF WBC: CPT | Performed by: INTERNAL MEDICINE

## 2017-03-16 RX ADMIN — ASPIRIN 81 MG 81 MG: 81 TABLET ORAL at 10:13

## 2017-03-16 RX ADMIN — HEPARIN SODIUM 5000 UNITS: 5000 INJECTION, SOLUTION INTRAVENOUS; SUBCUTANEOUS at 13:46

## 2017-03-16 RX ADMIN — FUROSEMIDE 40 MG: 40 TABLET ORAL at 10:14

## 2017-03-16 RX ADMIN — VALSARTAN 80 MG: 80 TABLET, FILM COATED ORAL at 10:13

## 2017-03-16 RX ADMIN — Medication 1 TABLET: at 10:13

## 2017-03-16 RX ADMIN — HEPARIN SODIUM 5000 UNITS: 5000 INJECTION, SOLUTION INTRAVENOUS; SUBCUTANEOUS at 05:18

## 2017-03-16 RX ADMIN — PANTOPRAZOLE SODIUM 20 MG: 20 TABLET, DELAYED RELEASE ORAL at 05:18

## 2017-03-16 RX ADMIN — LACTULOSE 20 G: 10 SOLUTION ORAL at 10:13

## 2017-03-16 RX ADMIN — Medication 400 MG: at 18:17

## 2017-03-16 RX ADMIN — HEPARIN SODIUM 5000 UNITS: 5000 INJECTION, SOLUTION INTRAVENOUS; SUBCUTANEOUS at 21:56

## 2017-03-16 RX ADMIN — LACTULOSE 20 G: 10 SOLUTION ORAL at 18:17

## 2017-03-16 RX ADMIN — Medication 400 MG: at 10:13

## 2017-03-16 RX ADMIN — MIRTAZAPINE 15 MG: 15 TABLET, FILM COATED ORAL at 21:56

## 2017-03-16 RX ADMIN — VITAMIN D, TAB 1000IU (100/BT) 1000 UNITS: 25 TAB at 10:13

## 2017-03-17 ENCOUNTER — APPOINTMENT (INPATIENT)
Dept: PHYSICAL THERAPY | Facility: HOSPITAL | Age: 82
DRG: 314 | End: 2017-03-17
Payer: MEDICARE

## 2017-03-17 ENCOUNTER — APPOINTMENT (INPATIENT)
Dept: RADIOLOGY | Facility: HOSPITAL | Age: 82
DRG: 314 | End: 2017-03-17
Attending: INTERNAL MEDICINE
Payer: MEDICARE

## 2017-03-17 ENCOUNTER — APPOINTMENT (INPATIENT)
Dept: CT IMAGING | Facility: HOSPITAL | Age: 82
DRG: 314 | End: 2017-03-17
Payer: MEDICARE

## 2017-03-17 PROBLEM — G93.40 ACUTE ENCEPHALOPATHY: Status: ACTIVE | Noted: 2017-03-17

## 2017-03-17 LAB
ALBUMIN SERPL BCP-MCNC: 2.1 G/DL (ref 3.5–5)
ALP SERPL-CCNC: 84 U/L (ref 46–116)
ALT SERPL W P-5'-P-CCNC: 30 U/L (ref 12–78)
AMMONIA PLAS-SCNC: 65 UMOL/L (ref 11–35)
ANION GAP SERPL CALCULATED.3IONS-SCNC: 2 MMOL/L (ref 4–13)
ARTERIAL PATENCY WRIST A: YES
AST SERPL W P-5'-P-CCNC: 33 U/L (ref 5–45)
BACTERIA UR QL AUTO: ABNORMAL /HPF
BASE EXCESS BLDA CALC-SCNC: 6.8 MMOL/L
BASOPHILS # BLD AUTO: 0.02 THOUSANDS/ΜL (ref 0–0.1)
BASOPHILS NFR BLD AUTO: 0 % (ref 0–1)
BILIRUB SERPL-MCNC: 1.2 MG/DL (ref 0.2–1)
BILIRUB UR QL STRIP: NEGATIVE
BUN SERPL-MCNC: 9 MG/DL (ref 5–25)
CALCIUM SERPL-MCNC: 8.3 MG/DL (ref 8.3–10.1)
CHLORIDE SERPL-SCNC: 98 MMOL/L (ref 100–108)
CLARITY UR: CLEAR
CO2 SERPL-SCNC: 35 MMOL/L (ref 21–32)
COLOR UR: YELLOW
CREAT SERPL-MCNC: 0.74 MG/DL (ref 0.6–1.3)
EOSINOPHIL # BLD AUTO: 0.1 THOUSAND/ΜL (ref 0–0.61)
EOSINOPHIL NFR BLD AUTO: 2 % (ref 0–6)
ERYTHROCYTE [DISTWIDTH] IN BLOOD BY AUTOMATED COUNT: 13.4 % (ref 11.6–15.1)
GFR SERPL CREATININE-BSD FRML MDRD: >60 ML/MIN/1.73SQ M
GLUCOSE SERPL-MCNC: 92 MG/DL (ref 65–140)
GLUCOSE UR STRIP-MCNC: NEGATIVE MG/DL
HCO3 BLDA-SCNC: 30.2 MMOL/L (ref 22–28)
HCT VFR BLD AUTO: 40.8 % (ref 34.8–46.1)
HGB BLD-MCNC: 13.9 G/DL (ref 11.5–15.4)
HGB UR QL STRIP.AUTO: NORMAL
KETONES UR STRIP-MCNC: NEGATIVE MG/DL
LEUKOCYTE ESTERASE UR QL STRIP: NEGATIVE
LYMPHOCYTES # BLD AUTO: 1.76 THOUSANDS/ΜL (ref 0.6–4.47)
LYMPHOCYTES NFR BLD AUTO: 36 % (ref 14–44)
MAGNESIUM SERPL-MCNC: 1.7 MG/DL (ref 1.6–2.6)
MCH RBC QN AUTO: 30.8 PG (ref 26.8–34.3)
MCHC RBC AUTO-ENTMCNC: 34.1 G/DL (ref 31.4–37.4)
MCV RBC AUTO: 90 FL (ref 82–98)
MONOCYTES # BLD AUTO: 0.62 THOUSAND/ΜL (ref 0.17–1.22)
MONOCYTES NFR BLD AUTO: 13 % (ref 4–12)
NASAL CANNULA: 1
NEUTROPHILS # BLD AUTO: 2.37 THOUSANDS/ΜL (ref 1.85–7.62)
NEUTS SEG NFR BLD AUTO: 49 % (ref 43–75)
NITRITE UR QL STRIP: NEGATIVE
NON-SQ EPI CELLS URNS QL MICRO: ABNORMAL /HPF
O2 CT BLDA-SCNC: 19.5 ML/DL (ref 16–23)
OXYHGB MFR BLDA: 95.9 % (ref 94–97)
PCO2 BLDA: 38.6 MM HG (ref 36–44)
PH BLDA: 7.51 [PH] (ref 7.35–7.45)
PH UR STRIP.AUTO: 7 [PH] (ref 4.5–8)
PHOSPHATE SERPL-MCNC: 3.2 MG/DL (ref 2.3–4.1)
PLATELET # BLD AUTO: 168 THOUSANDS/UL (ref 149–390)
PMV BLD AUTO: 10.9 FL (ref 8.9–12.7)
PO2 BLDA: 96 MM HG (ref 75–129)
POTASSIUM SERPL-SCNC: 3.7 MMOL/L (ref 3.5–5.3)
PROT SERPL-MCNC: 6.1 G/DL (ref 6.4–8.2)
PROT UR STRIP-MCNC: NEGATIVE MG/DL
RBC # BLD AUTO: 4.52 MILLION/UL (ref 3.81–5.12)
RBC #/AREA URNS AUTO: ABNORMAL /HPF
SODIUM SERPL-SCNC: 135 MMOL/L (ref 136–145)
SP GR UR STRIP.AUTO: 1.01 (ref 1–1.03)
SPECIMEN SOURCE: ABNORMAL
UROBILINOGEN UR QL STRIP.AUTO: 0.2 E.U./DL
WBC # BLD AUTO: 4.87 THOUSAND/UL (ref 4.31–10.16)
WBC #/AREA URNS AUTO: ABNORMAL /HPF

## 2017-03-17 PROCEDURE — 82805 BLOOD GASES W/O2 SATURATION: CPT | Performed by: INTERNAL MEDICINE

## 2017-03-17 PROCEDURE — 81001 URINALYSIS AUTO W/SCOPE: CPT | Performed by: PHYSICIAN ASSISTANT

## 2017-03-17 PROCEDURE — 87086 URINE CULTURE/COLONY COUNT: CPT | Performed by: PHYSICIAN ASSISTANT

## 2017-03-17 PROCEDURE — 82140 ASSAY OF AMMONIA: CPT | Performed by: INTERNAL MEDICINE

## 2017-03-17 PROCEDURE — 87040 BLOOD CULTURE FOR BACTERIA: CPT | Performed by: INTERNAL MEDICINE

## 2017-03-17 PROCEDURE — 36600 WITHDRAWAL OF ARTERIAL BLOOD: CPT

## 2017-03-17 PROCEDURE — 83735 ASSAY OF MAGNESIUM: CPT | Performed by: INTERNAL MEDICINE

## 2017-03-17 PROCEDURE — 71010 HB CHEST X-RAY 1 VIEW FRONTAL (PORTABLE): CPT

## 2017-03-17 PROCEDURE — 84100 ASSAY OF PHOSPHORUS: CPT | Performed by: INTERNAL MEDICINE

## 2017-03-17 PROCEDURE — 80053 COMPREHEN METABOLIC PANEL: CPT | Performed by: INTERNAL MEDICINE

## 2017-03-17 PROCEDURE — 71250 CT THORAX DX C-: CPT

## 2017-03-17 PROCEDURE — 85025 COMPLETE CBC W/AUTO DIFF WBC: CPT | Performed by: INTERNAL MEDICINE

## 2017-03-17 RX ORDER — ONDANSETRON 2 MG/ML
4 INJECTION INTRAMUSCULAR; INTRAVENOUS EVERY 6 HOURS PRN
Status: DISCONTINUED | OUTPATIENT
Start: 2017-03-17 | End: 2017-03-21 | Stop reason: HOSPADM

## 2017-03-17 RX ORDER — FUROSEMIDE 10 MG/ML
40 INJECTION INTRAMUSCULAR; INTRAVENOUS
Status: DISCONTINUED | OUTPATIENT
Start: 2017-03-17 | End: 2017-03-21 | Stop reason: HOSPADM

## 2017-03-17 RX ORDER — LACTULOSE 10 G/15ML
30 SOLUTION ORAL 3 TIMES DAILY
Status: DISCONTINUED | OUTPATIENT
Start: 2017-03-17 | End: 2017-03-19

## 2017-03-17 RX ADMIN — PANTOPRAZOLE SODIUM 20 MG: 20 TABLET, DELAYED RELEASE ORAL at 05:00

## 2017-03-17 RX ADMIN — FUROSEMIDE 40 MG: 40 TABLET ORAL at 09:22

## 2017-03-17 RX ADMIN — ASPIRIN 81 MG 81 MG: 81 TABLET ORAL at 09:22

## 2017-03-17 RX ADMIN — HEPARIN SODIUM 5000 UNITS: 5000 INJECTION, SOLUTION INTRAVENOUS; SUBCUTANEOUS at 05:00

## 2017-03-17 RX ADMIN — HEPARIN SODIUM 5000 UNITS: 5000 INJECTION, SOLUTION INTRAVENOUS; SUBCUTANEOUS at 22:29

## 2017-03-17 RX ADMIN — VITAMIN D, TAB 1000IU (100/BT) 1000 UNITS: 25 TAB at 09:22

## 2017-03-17 RX ADMIN — VALSARTAN 80 MG: 80 TABLET, FILM COATED ORAL at 09:22

## 2017-03-17 RX ADMIN — HEPARIN SODIUM 5000 UNITS: 5000 INJECTION, SOLUTION INTRAVENOUS; SUBCUTANEOUS at 14:00

## 2017-03-17 RX ADMIN — Medication 400 MG: at 19:06

## 2017-03-17 RX ADMIN — LACTULOSE 30 G: 10 SOLUTION ORAL at 19:06

## 2017-03-17 RX ADMIN — LACTULOSE 20 G: 10 SOLUTION ORAL at 09:22

## 2017-03-17 RX ADMIN — FUROSEMIDE 40 MG: 10 INJECTION, SOLUTION INTRAMUSCULAR; INTRAVENOUS at 19:04

## 2017-03-17 RX ADMIN — CEFEPIME HYDROCHLORIDE 2000 MG: 2 INJECTION, POWDER, FOR SOLUTION INTRAVENOUS at 19:03

## 2017-03-17 RX ADMIN — Medication 400 MG: at 09:22

## 2017-03-17 RX ADMIN — Medication 1 TABLET: at 09:22

## 2017-03-17 RX ADMIN — MIRTAZAPINE 15 MG: 15 TABLET, FILM COATED ORAL at 22:30

## 2017-03-18 PROBLEM — E80.6 HYPERBILIRUBINEMIA: Status: ACTIVE | Noted: 2017-03-18

## 2017-03-18 PROBLEM — G93.40 ACUTE ENCEPHALOPATHY: Status: RESOLVED | Noted: 2017-03-17 | Resolved: 2017-03-18

## 2017-03-18 PROBLEM — E88.09 HYPOALBUMINEMIA: Status: ACTIVE | Noted: 2017-03-18

## 2017-03-18 PROBLEM — I10 ESSENTIAL HYPERTENSION: Status: ACTIVE | Noted: 2017-03-10

## 2017-03-18 PROBLEM — I35.0 AORTIC STENOSIS: Status: ACTIVE | Noted: 2017-03-18

## 2017-03-18 PROBLEM — I05.0 SEVERE MITRAL VALVE STENOSIS: Status: ACTIVE | Noted: 2017-03-18

## 2017-03-18 PROBLEM — I07.1 TRICUSPID REGURGITATION: Status: ACTIVE | Noted: 2017-03-18

## 2017-03-18 PROBLEM — I27.20 PULMONARY HYPERTENSION (HCC): Status: ACTIVE | Noted: 2017-03-18

## 2017-03-18 PROBLEM — K74.60 LIVER CIRRHOSIS (HCC): Status: ACTIVE | Noted: 2017-03-18

## 2017-03-18 PROBLEM — I50.33 ACUTE ON CHRONIC DIASTOLIC CHF (CONGESTIVE HEART FAILURE) (HCC): Status: ACTIVE | Noted: 2017-03-11

## 2017-03-18 PROBLEM — J18.9 HEALTHCARE-ASSOCIATED PNEUMONIA: Status: ACTIVE | Noted: 2017-03-18

## 2017-03-18 PROBLEM — D72.819 LEUKOPENIA: Status: ACTIVE | Noted: 2017-03-18

## 2017-03-18 PROBLEM — D70.9 NEUTROPENIA (HCC): Status: ACTIVE | Noted: 2017-03-18

## 2017-03-18 PROBLEM — R74.01 TRANSAMINITIS: Status: ACTIVE | Noted: 2017-03-18

## 2017-03-18 PROBLEM — K72.90 HEPATIC ENCEPHALOPATHY (HCC): Status: ACTIVE | Noted: 2017-03-18

## 2017-03-18 LAB
ALBUMIN SERPL BCP-MCNC: 2.1 G/DL (ref 3.5–5)
ALP SERPL-CCNC: 91 U/L (ref 46–116)
ALT SERPL W P-5'-P-CCNC: 31 U/L (ref 12–78)
AMMONIA PLAS-SCNC: 66 UMOL/L (ref 11–35)
ANION GAP SERPL CALCULATED.3IONS-SCNC: 7 MMOL/L (ref 4–13)
AST SERPL W P-5'-P-CCNC: 47 U/L (ref 5–45)
BASOPHILS # BLD AUTO: 0.03 THOUSANDS/ΜL (ref 0–0.1)
BASOPHILS NFR BLD AUTO: 1 % (ref 0–1)
BILIRUB SERPL-MCNC: 1.5 MG/DL (ref 0.2–1)
BUN SERPL-MCNC: 9 MG/DL (ref 5–25)
CALCIUM SERPL-MCNC: 8.8 MG/DL (ref 8.3–10.1)
CHLORIDE SERPL-SCNC: 101 MMOL/L (ref 100–108)
CO2 SERPL-SCNC: 29 MMOL/L (ref 21–32)
CREAT SERPL-MCNC: 0.67 MG/DL (ref 0.6–1.3)
EOSINOPHIL # BLD AUTO: 0.15 THOUSAND/ΜL (ref 0–0.61)
EOSINOPHIL NFR BLD AUTO: 4 % (ref 0–6)
ERYTHROCYTE [DISTWIDTH] IN BLOOD BY AUTOMATED COUNT: 13.7 % (ref 11.6–15.1)
GFR SERPL CREATININE-BSD FRML MDRD: >60 ML/MIN/1.73SQ M
GLUCOSE SERPL-MCNC: 89 MG/DL (ref 65–140)
HCT VFR BLD AUTO: 42.4 % (ref 34.8–46.1)
HGB BLD-MCNC: 14.3 G/DL (ref 11.5–15.4)
INR PPP: 1.35 (ref 0.86–1.16)
LYMPHOCYTES # BLD AUTO: 1.67 THOUSANDS/ΜL (ref 0.6–4.47)
LYMPHOCYTES NFR BLD AUTO: 45 % (ref 14–44)
MAGNESIUM SERPL-MCNC: 1.8 MG/DL (ref 1.6–2.6)
MCH RBC QN AUTO: 30.6 PG (ref 26.8–34.3)
MCHC RBC AUTO-ENTMCNC: 33.7 G/DL (ref 31.4–37.4)
MCV RBC AUTO: 91 FL (ref 82–98)
MONOCYTES # BLD AUTO: 0.58 THOUSAND/ΜL (ref 0.17–1.22)
MONOCYTES NFR BLD AUTO: 15 % (ref 4–12)
NEUTROPHILS # BLD AUTO: 1.33 THOUSANDS/ΜL (ref 1.85–7.62)
NEUTS SEG NFR BLD AUTO: 35 % (ref 43–75)
PLATELET # BLD AUTO: 162 THOUSANDS/UL (ref 149–390)
PMV BLD AUTO: 10.9 FL (ref 8.9–12.7)
POTASSIUM SERPL-SCNC: 3.8 MMOL/L (ref 3.5–5.3)
PROT SERPL-MCNC: 6.7 G/DL (ref 6.4–8.2)
PROTHROMBIN TIME: 16.2 SECONDS (ref 12–14.3)
RBC # BLD AUTO: 4.68 MILLION/UL (ref 3.81–5.12)
SODIUM SERPL-SCNC: 137 MMOL/L (ref 136–145)
WBC # BLD AUTO: 3.76 THOUSAND/UL (ref 4.31–10.16)

## 2017-03-18 PROCEDURE — 83735 ASSAY OF MAGNESIUM: CPT | Performed by: INTERNAL MEDICINE

## 2017-03-18 PROCEDURE — 80053 COMPREHEN METABOLIC PANEL: CPT | Performed by: INTERNAL MEDICINE

## 2017-03-18 PROCEDURE — 85610 PROTHROMBIN TIME: CPT | Performed by: INTERNAL MEDICINE

## 2017-03-18 PROCEDURE — 85025 COMPLETE CBC W/AUTO DIFF WBC: CPT | Performed by: INTERNAL MEDICINE

## 2017-03-18 PROCEDURE — 82140 ASSAY OF AMMONIA: CPT | Performed by: INTERNAL MEDICINE

## 2017-03-18 RX ORDER — ACETAMINOPHEN 325 MG/1
650 TABLET ORAL EVERY 6 HOURS PRN
Status: DISCONTINUED | OUTPATIENT
Start: 2017-03-18 | End: 2017-03-18

## 2017-03-18 RX ADMIN — Medication 400 MG: at 08:03

## 2017-03-18 RX ADMIN — LACTULOSE 30 G: 10 SOLUTION ORAL at 00:35

## 2017-03-18 RX ADMIN — ASPIRIN 81 MG 81 MG: 81 TABLET ORAL at 08:03

## 2017-03-18 RX ADMIN — LACTULOSE 30 G: 10 SOLUTION ORAL at 21:21

## 2017-03-18 RX ADMIN — MIRTAZAPINE 15 MG: 15 TABLET, FILM COATED ORAL at 21:21

## 2017-03-18 RX ADMIN — HEPARIN SODIUM 5000 UNITS: 5000 INJECTION, SOLUTION INTRAVENOUS; SUBCUTANEOUS at 21:27

## 2017-03-18 RX ADMIN — Medication 1 TABLET: at 08:03

## 2017-03-18 RX ADMIN — FUROSEMIDE 40 MG: 10 INJECTION, SOLUTION INTRAMUSCULAR; INTRAVENOUS at 15:38

## 2017-03-18 RX ADMIN — VITAMIN D, TAB 1000IU (100/BT) 1000 UNITS: 25 TAB at 08:04

## 2017-03-18 RX ADMIN — RIFAXIMIN 550 MG: 550 TABLET ORAL at 21:21

## 2017-03-18 RX ADMIN — CEFEPIME HYDROCHLORIDE 2000 MG: 2 INJECTION, POWDER, FOR SOLUTION INTRAVENOUS at 12:57

## 2017-03-18 RX ADMIN — VALSARTAN 80 MG: 80 TABLET, FILM COATED ORAL at 08:03

## 2017-03-18 RX ADMIN — RIFAXIMIN 550 MG: 550 TABLET ORAL at 12:57

## 2017-03-18 RX ADMIN — PANTOPRAZOLE SODIUM 20 MG: 20 TABLET, DELAYED RELEASE ORAL at 06:08

## 2017-03-18 RX ADMIN — FUROSEMIDE 40 MG: 10 INJECTION, SOLUTION INTRAMUSCULAR; INTRAVENOUS at 08:03

## 2017-03-18 RX ADMIN — Medication 400 MG: at 17:10

## 2017-03-18 RX ADMIN — LACTULOSE 30 G: 10 SOLUTION ORAL at 15:39

## 2017-03-18 RX ADMIN — HEPARIN SODIUM 5000 UNITS: 5000 INJECTION, SOLUTION INTRAVENOUS; SUBCUTANEOUS at 12:56

## 2017-03-18 RX ADMIN — LACTULOSE 30 G: 10 SOLUTION ORAL at 08:03

## 2017-03-18 RX ADMIN — HEPARIN SODIUM 5000 UNITS: 5000 INJECTION, SOLUTION INTRAVENOUS; SUBCUTANEOUS at 06:08

## 2017-03-19 ENCOUNTER — APPOINTMENT (INPATIENT)
Dept: RADIOLOGY | Facility: HOSPITAL | Age: 82
DRG: 314 | End: 2017-03-19
Payer: MEDICARE

## 2017-03-19 PROBLEM — K31.89 MUCOSAL ABNORMALITY OF STOMACH: Status: ACTIVE | Noted: 2017-03-19

## 2017-03-19 LAB
25(OH)D3 SERPL-MCNC: 41.8 NG/ML (ref 30–100)
ALBUMIN SERPL BCP-MCNC: 2.1 G/DL (ref 3.5–5)
ALP SERPL-CCNC: 95 U/L (ref 46–116)
ALT SERPL W P-5'-P-CCNC: 33 U/L (ref 12–78)
ANION GAP SERPL CALCULATED.3IONS-SCNC: 5 MMOL/L (ref 4–13)
AST SERPL W P-5'-P-CCNC: 39 U/L (ref 5–45)
BACTERIA UR CULT: NORMAL
BASOPHILS # BLD AUTO: 0.02 THOUSANDS/ΜL (ref 0–0.1)
BASOPHILS NFR BLD AUTO: 0 % (ref 0–1)
BILIRUB SERPL-MCNC: 1.6 MG/DL (ref 0.2–1)
BUN SERPL-MCNC: 10 MG/DL (ref 5–25)
CALCIUM SERPL-MCNC: 8.4 MG/DL (ref 8.3–10.1)
CHLORIDE SERPL-SCNC: 99 MMOL/L (ref 100–108)
CO2 SERPL-SCNC: 32 MMOL/L (ref 21–32)
CREAT SERPL-MCNC: 0.76 MG/DL (ref 0.6–1.3)
EOSINOPHIL # BLD AUTO: 0.18 THOUSAND/ΜL (ref 0–0.61)
EOSINOPHIL NFR BLD AUTO: 3 % (ref 0–6)
ERYTHROCYTE [DISTWIDTH] IN BLOOD BY AUTOMATED COUNT: 13.8 % (ref 11.6–15.1)
GFR SERPL CREATININE-BSD FRML MDRD: >60 ML/MIN/1.73SQ M
GLUCOSE SERPL-MCNC: 92 MG/DL (ref 65–140)
HCT VFR BLD AUTO: 44.8 % (ref 34.8–46.1)
HGB BLD-MCNC: 14.6 G/DL (ref 11.5–15.4)
LACTATE SERPL-SCNC: 1.4 MMOL/L (ref 0.5–2)
LYMPHOCYTES # BLD AUTO: 2.07 THOUSANDS/ΜL (ref 0.6–4.47)
LYMPHOCYTES NFR BLD AUTO: 39 % (ref 14–44)
MAGNESIUM SERPL-MCNC: 1.9 MG/DL (ref 1.6–2.6)
MCH RBC QN AUTO: 29.9 PG (ref 26.8–34.3)
MCHC RBC AUTO-ENTMCNC: 32.6 G/DL (ref 31.4–37.4)
MCV RBC AUTO: 92 FL (ref 82–98)
MONOCYTES # BLD AUTO: 0.99 THOUSAND/ΜL (ref 0.17–1.22)
MONOCYTES NFR BLD AUTO: 19 % (ref 4–12)
NEUTROPHILS # BLD AUTO: 2.06 THOUSANDS/ΜL (ref 1.85–7.62)
NEUTS SEG NFR BLD AUTO: 39 % (ref 43–75)
NT-PROBNP SERPL-MCNC: 190 PG/ML
PHOSPHATE SERPL-MCNC: 3.1 MG/DL (ref 2.3–4.1)
PLATELET # BLD AUTO: 152 THOUSANDS/UL (ref 149–390)
PMV BLD AUTO: 10.6 FL (ref 8.9–12.7)
POTASSIUM SERPL-SCNC: 3.4 MMOL/L (ref 3.5–5.3)
PROT SERPL-MCNC: 6.6 G/DL (ref 6.4–8.2)
RBC # BLD AUTO: 4.88 MILLION/UL (ref 3.81–5.12)
SODIUM SERPL-SCNC: 136 MMOL/L (ref 136–145)
TSH SERPL DL<=0.05 MIU/L-ACNC: 2.67 UIU/ML (ref 0.36–3.74)
WBC # BLD AUTO: 5.32 THOUSAND/UL (ref 4.31–10.16)

## 2017-03-19 PROCEDURE — 84443 ASSAY THYROID STIM HORMONE: CPT | Performed by: INTERNAL MEDICINE

## 2017-03-19 PROCEDURE — 82306 VITAMIN D 25 HYDROXY: CPT | Performed by: INTERNAL MEDICINE

## 2017-03-19 PROCEDURE — 83605 ASSAY OF LACTIC ACID: CPT | Performed by: INTERNAL MEDICINE

## 2017-03-19 PROCEDURE — 84100 ASSAY OF PHOSPHORUS: CPT | Performed by: INTERNAL MEDICINE

## 2017-03-19 PROCEDURE — 94760 N-INVAS EAR/PLS OXIMETRY 1: CPT

## 2017-03-19 PROCEDURE — 80074 ACUTE HEPATITIS PANEL: CPT | Performed by: INTERNAL MEDICINE

## 2017-03-19 PROCEDURE — 85025 COMPLETE CBC W/AUTO DIFF WBC: CPT | Performed by: INTERNAL MEDICINE

## 2017-03-19 PROCEDURE — 94761 N-INVAS EAR/PLS OXIMETRY MLT: CPT

## 2017-03-19 PROCEDURE — 83735 ASSAY OF MAGNESIUM: CPT | Performed by: INTERNAL MEDICINE

## 2017-03-19 PROCEDURE — 71010 HB CHEST X-RAY 1 VIEW FRONTAL (PORTABLE): CPT

## 2017-03-19 PROCEDURE — 80053 COMPREHEN METABOLIC PANEL: CPT | Performed by: INTERNAL MEDICINE

## 2017-03-19 PROCEDURE — 83880 ASSAY OF NATRIURETIC PEPTIDE: CPT | Performed by: INTERNAL MEDICINE

## 2017-03-19 RX ORDER — LACTULOSE 10 G/15ML
20 SOLUTION ORAL 3 TIMES DAILY
Status: DISCONTINUED | OUTPATIENT
Start: 2017-03-19 | End: 2017-03-21 | Stop reason: HOSPADM

## 2017-03-19 RX ORDER — POTASSIUM CHLORIDE 20 MEQ/1
40 TABLET, EXTENDED RELEASE ORAL ONCE
Status: COMPLETED | OUTPATIENT
Start: 2017-03-19 | End: 2017-03-19

## 2017-03-19 RX ADMIN — VALSARTAN 80 MG: 80 TABLET, FILM COATED ORAL at 08:05

## 2017-03-19 RX ADMIN — Medication 1 TABLET: at 08:05

## 2017-03-19 RX ADMIN — CEFEPIME HYDROCHLORIDE 2000 MG: 2 INJECTION, POWDER, FOR SOLUTION INTRAVENOUS at 13:36

## 2017-03-19 RX ADMIN — VITAMIN D, TAB 1000IU (100/BT) 1000 UNITS: 25 TAB at 08:05

## 2017-03-19 RX ADMIN — HEPARIN SODIUM 5000 UNITS: 5000 INJECTION, SOLUTION INTRAVENOUS; SUBCUTANEOUS at 05:40

## 2017-03-19 RX ADMIN — FUROSEMIDE 40 MG: 10 INJECTION, SOLUTION INTRAMUSCULAR; INTRAVENOUS at 17:10

## 2017-03-19 RX ADMIN — PANTOPRAZOLE SODIUM 20 MG: 20 TABLET, DELAYED RELEASE ORAL at 05:40

## 2017-03-19 RX ADMIN — MIRTAZAPINE 15 MG: 15 TABLET, FILM COATED ORAL at 21:30

## 2017-03-19 RX ADMIN — RIFAXIMIN 550 MG: 550 TABLET ORAL at 21:30

## 2017-03-19 RX ADMIN — RIFAXIMIN 550 MG: 550 TABLET ORAL at 08:05

## 2017-03-19 RX ADMIN — POTASSIUM CHLORIDE 40 MEQ: 1500 TABLET, EXTENDED RELEASE ORAL at 11:51

## 2017-03-19 RX ADMIN — LACTULOSE 20 G: 20 SOLUTION ORAL at 21:30

## 2017-03-19 RX ADMIN — HEPARIN SODIUM 5000 UNITS: 5000 INJECTION, SOLUTION INTRAVENOUS; SUBCUTANEOUS at 13:36

## 2017-03-19 RX ADMIN — FUROSEMIDE 40 MG: 10 INJECTION, SOLUTION INTRAMUSCULAR; INTRAVENOUS at 08:06

## 2017-03-19 RX ADMIN — ASPIRIN 81 MG 81 MG: 81 TABLET ORAL at 08:05

## 2017-03-19 RX ADMIN — HEPARIN SODIUM 5000 UNITS: 5000 INJECTION, SOLUTION INTRAVENOUS; SUBCUTANEOUS at 21:30

## 2017-03-19 RX ADMIN — Medication 400 MG: at 17:10

## 2017-03-19 RX ADMIN — Medication 400 MG: at 08:05

## 2017-03-19 RX ADMIN — LACTULOSE 20 G: 20 SOLUTION ORAL at 17:10

## 2017-03-19 RX ADMIN — LACTULOSE 30 G: 10 SOLUTION ORAL at 08:04

## 2017-03-20 ENCOUNTER — APPOINTMENT (INPATIENT)
Dept: OCCUPATIONAL THERAPY | Facility: HOSPITAL | Age: 82
DRG: 314 | End: 2017-03-20
Payer: MEDICARE

## 2017-03-20 ENCOUNTER — APPOINTMENT (INPATIENT)
Dept: PHYSICAL THERAPY | Facility: HOSPITAL | Age: 82
DRG: 314 | End: 2017-03-20
Payer: MEDICARE

## 2017-03-20 PROBLEM — K76.6 PORTAL HYPERTENSION (HCC): Status: ACTIVE | Noted: 2017-03-20

## 2017-03-20 LAB
ALBUMIN SERPL BCP-MCNC: 2 G/DL (ref 3.5–5)
ALP SERPL-CCNC: 87 U/L (ref 46–116)
ALT SERPL W P-5'-P-CCNC: 29 U/L (ref 12–78)
ANION GAP SERPL CALCULATED.3IONS-SCNC: 3 MMOL/L (ref 4–13)
AST SERPL W P-5'-P-CCNC: 38 U/L (ref 5–45)
BASOPHILS # BLD AUTO: 0.03 THOUSANDS/ΜL (ref 0–0.1)
BASOPHILS NFR BLD AUTO: 1 % (ref 0–1)
BILIRUB SERPL-MCNC: 1.8 MG/DL (ref 0.2–1)
BUN SERPL-MCNC: 11 MG/DL (ref 5–25)
CALCIUM SERPL-MCNC: 8.3 MG/DL (ref 8.3–10.1)
CHLORIDE SERPL-SCNC: 97 MMOL/L (ref 100–108)
CO2 SERPL-SCNC: 34 MMOL/L (ref 21–32)
CREAT SERPL-MCNC: 0.76 MG/DL (ref 0.6–1.3)
EOSINOPHIL # BLD AUTO: 0.19 THOUSAND/ΜL (ref 0–0.61)
EOSINOPHIL NFR BLD AUTO: 4 % (ref 0–6)
ERYTHROCYTE [DISTWIDTH] IN BLOOD BY AUTOMATED COUNT: 13.5 % (ref 11.6–15.1)
GFR SERPL CREATININE-BSD FRML MDRD: >60 ML/MIN/1.73SQ M
GLUCOSE SERPL-MCNC: 87 MG/DL (ref 65–140)
HCT VFR BLD AUTO: 42.2 % (ref 34.8–46.1)
HGB BLD-MCNC: 14 G/DL (ref 11.5–15.4)
LYMPHOCYTES # BLD AUTO: 2.3 THOUSANDS/ΜL (ref 0.6–4.47)
LYMPHOCYTES NFR BLD AUTO: 48 % (ref 14–44)
MAGNESIUM SERPL-MCNC: 1.9 MG/DL (ref 1.6–2.6)
MCH RBC QN AUTO: 30.3 PG (ref 26.8–34.3)
MCHC RBC AUTO-ENTMCNC: 33.2 G/DL (ref 31.4–37.4)
MCV RBC AUTO: 91 FL (ref 82–98)
MONOCYTES # BLD AUTO: 0.74 THOUSAND/ΜL (ref 0.17–1.22)
MONOCYTES NFR BLD AUTO: 16 % (ref 4–12)
NEUTROPHILS # BLD AUTO: 1.45 THOUSANDS/ΜL (ref 1.85–7.62)
NEUTS SEG NFR BLD AUTO: 31 % (ref 43–75)
PLATELET # BLD AUTO: 149 THOUSANDS/UL (ref 149–390)
PMV BLD AUTO: 11.4 FL (ref 8.9–12.7)
POTASSIUM SERPL-SCNC: 3.8 MMOL/L (ref 3.5–5.3)
PREALB SERPL-MCNC: 3.6 MG/DL (ref 18–40)
PROT SERPL-MCNC: 6 G/DL (ref 6.4–8.2)
RBC # BLD AUTO: 4.62 MILLION/UL (ref 3.81–5.12)
SODIUM SERPL-SCNC: 134 MMOL/L (ref 136–145)
WBC # BLD AUTO: 4.71 THOUSAND/UL (ref 4.31–10.16)

## 2017-03-20 PROCEDURE — 83735 ASSAY OF MAGNESIUM: CPT | Performed by: INTERNAL MEDICINE

## 2017-03-20 PROCEDURE — 97110 THERAPEUTIC EXERCISES: CPT

## 2017-03-20 PROCEDURE — 80053 COMPREHEN METABOLIC PANEL: CPT | Performed by: INTERNAL MEDICINE

## 2017-03-20 PROCEDURE — 85025 COMPLETE CBC W/AUTO DIFF WBC: CPT | Performed by: INTERNAL MEDICINE

## 2017-03-20 PROCEDURE — 97116 GAIT TRAINING THERAPY: CPT

## 2017-03-20 PROCEDURE — 97535 SELF CARE MNGMENT TRAINING: CPT

## 2017-03-20 PROCEDURE — 97530 THERAPEUTIC ACTIVITIES: CPT

## 2017-03-20 PROCEDURE — 84134 ASSAY OF PREALBUMIN: CPT | Performed by: INTERNAL MEDICINE

## 2017-03-20 RX ORDER — NADOLOL 40 MG/1
40 TABLET ORAL DAILY
Status: DISCONTINUED | OUTPATIENT
Start: 2017-03-20 | End: 2017-03-21 | Stop reason: HOSPADM

## 2017-03-20 RX ADMIN — Medication 400 MG: at 13:22

## 2017-03-20 RX ADMIN — HEPARIN SODIUM 5000 UNITS: 5000 INJECTION, SOLUTION INTRAVENOUS; SUBCUTANEOUS at 21:40

## 2017-03-20 RX ADMIN — LACTULOSE 20 G: 20 SOLUTION ORAL at 17:25

## 2017-03-20 RX ADMIN — VITAMIN D, TAB 1000IU (100/BT) 1000 UNITS: 25 TAB at 08:11

## 2017-03-20 RX ADMIN — HEPARIN SODIUM 5000 UNITS: 5000 INJECTION, SOLUTION INTRAVENOUS; SUBCUTANEOUS at 05:20

## 2017-03-20 RX ADMIN — VALSARTAN 80 MG: 80 TABLET, FILM COATED ORAL at 08:11

## 2017-03-20 RX ADMIN — NADOLOL 40 MG: 40 TABLET ORAL at 13:36

## 2017-03-20 RX ADMIN — LACTULOSE 20 G: 20 SOLUTION ORAL at 21:40

## 2017-03-20 RX ADMIN — Medication 1 TABLET: at 08:11

## 2017-03-20 RX ADMIN — HEPARIN SODIUM 5000 UNITS: 5000 INJECTION, SOLUTION INTRAVENOUS; SUBCUTANEOUS at 13:22

## 2017-03-20 RX ADMIN — FUROSEMIDE 40 MG: 10 INJECTION, SOLUTION INTRAMUSCULAR; INTRAVENOUS at 08:11

## 2017-03-20 RX ADMIN — RIFAXIMIN 550 MG: 550 TABLET ORAL at 21:40

## 2017-03-20 RX ADMIN — RIFAXIMIN 550 MG: 550 TABLET ORAL at 08:11

## 2017-03-20 RX ADMIN — Medication 400 MG: at 17:25

## 2017-03-20 RX ADMIN — PANTOPRAZOLE SODIUM 20 MG: 20 TABLET, DELAYED RELEASE ORAL at 05:20

## 2017-03-20 RX ADMIN — FUROSEMIDE 40 MG: 10 INJECTION, SOLUTION INTRAMUSCULAR; INTRAVENOUS at 17:25

## 2017-03-20 RX ADMIN — CEFEPIME HYDROCHLORIDE 2000 MG: 2 INJECTION, POWDER, FOR SOLUTION INTRAVENOUS at 14:11

## 2017-03-20 RX ADMIN — LACTULOSE 20 G: 20 SOLUTION ORAL at 08:10

## 2017-03-20 RX ADMIN — MIRTAZAPINE 15 MG: 15 TABLET, FILM COATED ORAL at 21:40

## 2017-03-20 RX ADMIN — Medication 400 MG: at 08:11

## 2017-03-20 RX ADMIN — ASPIRIN 81 MG 81 MG: 81 TABLET ORAL at 08:11

## 2017-03-21 ENCOUNTER — APPOINTMENT (INPATIENT)
Dept: OCCUPATIONAL THERAPY | Facility: HOSPITAL | Age: 82
DRG: 314 | End: 2017-03-21
Payer: MEDICARE

## 2017-03-21 ENCOUNTER — APPOINTMENT (INPATIENT)
Dept: PHYSICAL THERAPY | Facility: HOSPITAL | Age: 82
DRG: 314 | End: 2017-03-21
Payer: MEDICARE

## 2017-03-21 VITALS
BODY MASS INDEX: 26.86 KG/M2 | HEIGHT: 62 IN | SYSTOLIC BLOOD PRESSURE: 104 MMHG | HEART RATE: 58 BPM | DIASTOLIC BLOOD PRESSURE: 50 MMHG | TEMPERATURE: 98.4 F | RESPIRATION RATE: 18 BRPM | WEIGHT: 145.94 LBS | OXYGEN SATURATION: 94 %

## 2017-03-21 PROBLEM — E27.8 LEFT ADRENAL MASS (HCC): Status: ACTIVE | Noted: 2017-03-21

## 2017-03-21 LAB
ALBUMIN SERPL BCP-MCNC: 1.9 G/DL (ref 3.5–5)
ALP SERPL-CCNC: 91 U/L (ref 46–116)
ALT SERPL W P-5'-P-CCNC: 30 U/L (ref 12–78)
ANION GAP SERPL CALCULATED.3IONS-SCNC: 3 MMOL/L (ref 4–13)
AST SERPL W P-5'-P-CCNC: 39 U/L (ref 5–45)
BILIRUB SERPL-MCNC: 1.8 MG/DL (ref 0.2–1)
BUN SERPL-MCNC: 13 MG/DL (ref 5–25)
CALCIUM SERPL-MCNC: 8.3 MG/DL (ref 8.3–10.1)
CHLORIDE SERPL-SCNC: 96 MMOL/L (ref 100–108)
CK MB SERPL-MCNC: 0.8 NG/ML (ref 0–5)
CK MB SERPL-MCNC: <1 % (ref 0–2.5)
CK SERPL-CCNC: 181 U/L (ref 26–192)
CO2 SERPL-SCNC: 32 MMOL/L (ref 21–32)
CREAT SERPL-MCNC: 0.78 MG/DL (ref 0.6–1.3)
GFR SERPL CREATININE-BSD FRML MDRD: >60 ML/MIN/1.73SQ M
GLUCOSE SERPL-MCNC: 86 MG/DL (ref 65–140)
HAV IGM SER QL: NORMAL
HBV CORE IGM SER QL: NORMAL
HBV SURFACE AG SER QL: NORMAL
HCV AB SER QL: NORMAL
LACTATE SERPL-SCNC: 0.9 MMOL/L (ref 0.5–2)
MAGNESIUM SERPL-MCNC: 1.9 MG/DL (ref 1.6–2.6)
POTASSIUM SERPL-SCNC: 4.1 MMOL/L (ref 3.5–5.3)
PROT SERPL-MCNC: 6.1 G/DL (ref 6.4–8.2)
SODIUM SERPL-SCNC: 131 MMOL/L (ref 136–145)

## 2017-03-21 PROCEDURE — 82550 ASSAY OF CK (CPK): CPT | Performed by: INTERNAL MEDICINE

## 2017-03-21 PROCEDURE — 97530 THERAPEUTIC ACTIVITIES: CPT

## 2017-03-21 PROCEDURE — 80053 COMPREHEN METABOLIC PANEL: CPT | Performed by: INTERNAL MEDICINE

## 2017-03-21 PROCEDURE — 97116 GAIT TRAINING THERAPY: CPT

## 2017-03-21 PROCEDURE — 83735 ASSAY OF MAGNESIUM: CPT | Performed by: INTERNAL MEDICINE

## 2017-03-21 PROCEDURE — 82553 CREATINE MB FRACTION: CPT | Performed by: INTERNAL MEDICINE

## 2017-03-21 PROCEDURE — 83605 ASSAY OF LACTIC ACID: CPT | Performed by: INTERNAL MEDICINE

## 2017-03-21 PROCEDURE — 97535 SELF CARE MNGMENT TRAINING: CPT

## 2017-03-21 RX ORDER — LEVOFLOXACIN 750 MG/1
750 TABLET ORAL EVERY OTHER DAY
Qty: 2 TABLET | Refills: 0
Start: 2017-03-23 | End: 2017-03-26

## 2017-03-21 RX ORDER — NADOLOL 40 MG/1
40 TABLET ORAL DAILY
Refills: 0
Start: 2017-03-22 | End: 2018-07-09 | Stop reason: SDUPTHER

## 2017-03-21 RX ORDER — LEVOFLOXACIN 750 MG/1
750 TABLET ORAL EVERY OTHER DAY
Status: DISCONTINUED | OUTPATIENT
Start: 2017-03-21 | End: 2017-03-21 | Stop reason: HOSPADM

## 2017-03-21 RX ORDER — LACTULOSE 10 G/15ML
20 SOLUTION ORAL 3 TIMES DAILY
Qty: 2700 ML | Refills: 0
Start: 2017-03-21 | End: 2018-03-06 | Stop reason: SDUPTHER

## 2017-03-21 RX ORDER — FUROSEMIDE 40 MG/1
40 TABLET ORAL DAILY
Refills: 0 | Status: ON HOLD
Start: 2017-03-22 | End: 2018-08-01

## 2017-03-21 RX ORDER — VALSARTAN 80 MG/1
80 TABLET ORAL DAILY
Qty: 30 TABLET | Refills: 0
Start: 2017-03-22 | End: 2018-06-29 | Stop reason: ALTCHOICE

## 2017-03-21 RX ADMIN — FUROSEMIDE 40 MG: 10 INJECTION, SOLUTION INTRAMUSCULAR; INTRAVENOUS at 09:48

## 2017-03-21 RX ADMIN — LACTULOSE 20 G: 20 SOLUTION ORAL at 09:48

## 2017-03-21 RX ADMIN — RIFAXIMIN 550 MG: 550 TABLET ORAL at 09:48

## 2017-03-21 RX ADMIN — Medication 400 MG: at 09:49

## 2017-03-21 RX ADMIN — PANTOPRAZOLE SODIUM 20 MG: 20 TABLET, DELAYED RELEASE ORAL at 05:52

## 2017-03-21 RX ADMIN — VITAMIN D, TAB 1000IU (100/BT) 1000 UNITS: 25 TAB at 09:49

## 2017-03-21 RX ADMIN — Medication 1 TABLET: at 09:49

## 2017-03-21 RX ADMIN — HEPARIN SODIUM 5000 UNITS: 5000 INJECTION, SOLUTION INTRAVENOUS; SUBCUTANEOUS at 05:53

## 2017-03-21 RX ADMIN — LEVOFLOXACIN 750 MG: 750 TABLET, FILM COATED ORAL at 11:35

## 2017-03-21 RX ADMIN — ASPIRIN 81 MG 81 MG: 81 TABLET ORAL at 09:49

## 2017-03-22 LAB
BACTERIA BLD CULT: NORMAL
BACTERIA BLD CULT: NORMAL

## 2017-03-24 ENCOUNTER — GENERIC CONVERSION - ENCOUNTER (OUTPATIENT)
Dept: OTHER | Facility: OTHER | Age: 82
End: 2017-03-24

## 2017-03-24 ENCOUNTER — ALLSCRIPTS OFFICE VISIT (OUTPATIENT)
Dept: OTHER | Facility: OTHER | Age: 82
End: 2017-03-24

## 2017-03-24 ENCOUNTER — GENERIC CONVERSION - ENCOUNTER (OUTPATIENT)
Dept: INTERNAL MEDICINE CLINIC | Facility: CLINIC | Age: 82
End: 2017-03-24

## 2017-04-03 ENCOUNTER — LAB REQUISITION (OUTPATIENT)
Dept: LAB | Facility: HOSPITAL | Age: 82
End: 2017-04-03
Payer: MEDICARE

## 2017-04-03 DIAGNOSIS — I50.9 HEART FAILURE (HCC): ICD-10-CM

## 2017-04-03 LAB
ALBUMIN SERPL BCP-MCNC: 2.5 G/DL (ref 3.5–5)
ALP SERPL-CCNC: 98 U/L (ref 46–116)
ALT SERPL W P-5'-P-CCNC: 30 U/L (ref 12–78)
ANION GAP SERPL CALCULATED.3IONS-SCNC: 3 MMOL/L (ref 4–13)
AST SERPL W P-5'-P-CCNC: 44 U/L (ref 5–45)
BILIRUB SERPL-MCNC: 1.3 MG/DL (ref 0.2–1)
BUN SERPL-MCNC: 15 MG/DL (ref 5–25)
CALCIUM SERPL-MCNC: 9 MG/DL (ref 8.3–10.1)
CHLORIDE SERPL-SCNC: 99 MMOL/L (ref 100–108)
CO2 SERPL-SCNC: 33 MMOL/L (ref 21–32)
CREAT SERPL-MCNC: 0.88 MG/DL (ref 0.6–1.3)
GFR SERPL CREATININE-BSD FRML MDRD: >60 ML/MIN/1.73SQ M
GLUCOSE SERPL-MCNC: 80 MG/DL (ref 65–140)
NT-PROBNP SERPL-MCNC: 1636 PG/ML
POTASSIUM SERPL-SCNC: 4.9 MMOL/L (ref 3.5–5.3)
PROT SERPL-MCNC: 6.7 G/DL (ref 6.4–8.2)
SODIUM SERPL-SCNC: 135 MMOL/L (ref 136–145)

## 2017-04-03 PROCEDURE — 80053 COMPREHEN METABOLIC PANEL: CPT | Performed by: INTERNAL MEDICINE

## 2017-04-03 PROCEDURE — 83880 ASSAY OF NATRIURETIC PEPTIDE: CPT | Performed by: INTERNAL MEDICINE

## 2017-04-04 ENCOUNTER — GENERIC CONVERSION - ENCOUNTER (OUTPATIENT)
Dept: OTHER | Facility: OTHER | Age: 82
End: 2017-04-04

## 2017-04-05 ENCOUNTER — ALLSCRIPTS OFFICE VISIT (OUTPATIENT)
Dept: OTHER | Facility: OTHER | Age: 82
End: 2017-04-05

## 2017-04-11 ENCOUNTER — ALLSCRIPTS OFFICE VISIT (OUTPATIENT)
Dept: OTHER | Facility: OTHER | Age: 82
End: 2017-04-11

## 2017-05-01 ENCOUNTER — ALLSCRIPTS OFFICE VISIT (OUTPATIENT)
Dept: OTHER | Facility: OTHER | Age: 82
End: 2017-05-01

## 2017-05-01 DIAGNOSIS — K74.60 CIRRHOSIS OF LIVER (HCC): ICD-10-CM

## 2017-05-01 DIAGNOSIS — I50.30 DIASTOLIC CONGESTIVE HEART FAILURE (HCC): ICD-10-CM

## 2017-05-01 DIAGNOSIS — I27.29 OTHER SECONDARY PULMONARY HYPERTENSION (HCC): ICD-10-CM

## 2017-05-09 ENCOUNTER — LAB REQUISITION (OUTPATIENT)
Dept: LAB | Facility: HOSPITAL | Age: 82
End: 2017-05-09
Payer: MEDICARE

## 2017-05-09 DIAGNOSIS — I50.9 HEART FAILURE (HCC): ICD-10-CM

## 2017-05-09 LAB
ALBUMIN SERPL BCP-MCNC: 2.4 G/DL (ref 3.5–5)
ALP SERPL-CCNC: 107 U/L (ref 46–116)
ALT SERPL W P-5'-P-CCNC: 27 U/L (ref 12–78)
ANION GAP SERPL CALCULATED.3IONS-SCNC: 6 MMOL/L (ref 4–13)
AST SERPL W P-5'-P-CCNC: 40 U/L (ref 5–45)
BILIRUB SERPL-MCNC: 1.1 MG/DL (ref 0.2–1)
BUN SERPL-MCNC: 13 MG/DL (ref 5–25)
CALCIUM SERPL-MCNC: 8.7 MG/DL (ref 8.3–10.1)
CHLORIDE SERPL-SCNC: 102 MMOL/L (ref 100–108)
CO2 SERPL-SCNC: 28 MMOL/L (ref 21–32)
CREAT SERPL-MCNC: 0.73 MG/DL (ref 0.6–1.3)
GFR SERPL CREATININE-BSD FRML MDRD: >60 ML/MIN/1.73SQ M
GLUCOSE P FAST SERPL-MCNC: 84 MG/DL (ref 65–99)
POTASSIUM SERPL-SCNC: 4.7 MMOL/L (ref 3.5–5.3)
PROT SERPL-MCNC: 6.8 G/DL (ref 6.4–8.2)
SODIUM SERPL-SCNC: 136 MMOL/L (ref 136–145)

## 2017-05-09 PROCEDURE — 80053 COMPREHEN METABOLIC PANEL: CPT | Performed by: INTERNAL MEDICINE

## 2017-05-17 ENCOUNTER — ALLSCRIPTS OFFICE VISIT (OUTPATIENT)
Dept: OTHER | Facility: OTHER | Age: 82
End: 2017-05-17

## 2017-05-23 ENCOUNTER — HOSPITAL ENCOUNTER (OUTPATIENT)
Dept: ULTRASOUND IMAGING | Facility: HOSPITAL | Age: 82
Discharge: HOME | End: 2017-05-23
Attending: INTERNAL MEDICINE
Payer: MEDICARE

## 2017-05-23 DIAGNOSIS — K74.60 CIRRHOSIS OF LIVER (HCC): ICD-10-CM

## 2017-05-23 PROCEDURE — 76705 ECHO EXAM OF ABDOMEN: CPT

## 2017-05-24 ENCOUNTER — TRANSCRIBE ORDERS (OUTPATIENT)
Dept: LAB | Facility: HOSPITAL | Age: 82
End: 2017-05-24

## 2017-05-24 ENCOUNTER — APPOINTMENT (OUTPATIENT)
Dept: LAB | Facility: HOSPITAL | Age: 82
End: 2017-05-24
Attending: INTERNAL MEDICINE
Payer: MEDICARE

## 2017-05-24 DIAGNOSIS — K74.60 CIRRHOSIS OF LIVER (HCC): ICD-10-CM

## 2017-05-24 LAB
INR PPP: 1.26 (ref 0.86–1.16)
PROTHROMBIN TIME: 15.7 SECONDS (ref 12.1–14.4)

## 2017-05-24 PROCEDURE — 82105 ALPHA-FETOPROTEIN SERUM: CPT

## 2017-05-24 PROCEDURE — 85610 PROTHROMBIN TIME: CPT

## 2017-05-24 PROCEDURE — 36415 COLL VENOUS BLD VENIPUNCTURE: CPT

## 2017-05-25 LAB — AFP-TM SERPL-MCNC: 2.7 NG/ML (ref 0–8.3)

## 2017-06-02 ENCOUNTER — GENERIC CONVERSION - ENCOUNTER (OUTPATIENT)
Dept: OTHER | Facility: OTHER | Age: 82
End: 2017-06-02

## 2017-06-22 ENCOUNTER — ALLSCRIPTS OFFICE VISIT (OUTPATIENT)
Dept: OTHER | Facility: OTHER | Age: 82
End: 2017-06-22

## 2017-07-06 ENCOUNTER — ALLSCRIPTS OFFICE VISIT (OUTPATIENT)
Dept: OTHER | Facility: OTHER | Age: 82
End: 2017-07-06

## 2017-07-06 ENCOUNTER — GENERIC CONVERSION - ENCOUNTER (OUTPATIENT)
Dept: OTHER | Facility: OTHER | Age: 82
End: 2017-07-06

## 2017-07-06 ENCOUNTER — GENERIC CONVERSION - ENCOUNTER (OUTPATIENT)
Dept: INTERNAL MEDICINE CLINIC | Facility: CLINIC | Age: 82
End: 2017-07-06

## 2017-07-06 DIAGNOSIS — K74.60 CIRRHOSIS OF LIVER (HCC): ICD-10-CM

## 2017-07-06 DIAGNOSIS — I50.30 DIASTOLIC CONGESTIVE HEART FAILURE (HCC): ICD-10-CM

## 2017-07-06 DIAGNOSIS — K21.9 GASTRO-ESOPHAGEAL REFLUX DISEASE WITHOUT ESOPHAGITIS: ICD-10-CM

## 2017-07-06 DIAGNOSIS — E78.5 HYPERLIPIDEMIA: ICD-10-CM

## 2017-07-06 DIAGNOSIS — R06.09 OTHER FORMS OF DYSPNEA: ICD-10-CM

## 2017-07-06 DIAGNOSIS — I27.29 OTHER SECONDARY PULMONARY HYPERTENSION (HCC): ICD-10-CM

## 2017-07-24 ENCOUNTER — APPOINTMENT (OUTPATIENT)
Dept: LAB | Facility: HOSPITAL | Age: 82
End: 2017-07-24
Attending: INTERNAL MEDICINE
Payer: MEDICARE

## 2017-07-24 ENCOUNTER — TRANSCRIBE ORDERS (OUTPATIENT)
Dept: ADMINISTRATIVE | Facility: HOSPITAL | Age: 82
End: 2017-07-24

## 2017-07-24 DIAGNOSIS — K21.9 GASTRO-ESOPHAGEAL REFLUX DISEASE WITHOUT ESOPHAGITIS: ICD-10-CM

## 2017-07-24 DIAGNOSIS — K74.60 CIRRHOSIS OF LIVER (HCC): ICD-10-CM

## 2017-07-24 DIAGNOSIS — I50.30 DIASTOLIC CONGESTIVE HEART FAILURE (HCC): ICD-10-CM

## 2017-07-24 DIAGNOSIS — I27.29 OTHER SECONDARY PULMONARY HYPERTENSION (HCC): ICD-10-CM

## 2017-07-24 DIAGNOSIS — E78.5 HYPERLIPIDEMIA: ICD-10-CM

## 2017-07-24 DIAGNOSIS — R06.09 OTHER FORMS OF DYSPNEA: ICD-10-CM

## 2017-07-24 LAB
ALBUMIN SERPL BCP-MCNC: 2.6 G/DL (ref 3.5–5)
ALP SERPL-CCNC: 134 U/L (ref 46–116)
ALT SERPL W P-5'-P-CCNC: 29 U/L (ref 12–78)
AMMONIA PLAS-SCNC: 63 UMOL/L (ref 11–35)
ANION GAP SERPL CALCULATED.3IONS-SCNC: 6 MMOL/L (ref 4–13)
AST SERPL W P-5'-P-CCNC: 44 U/L (ref 5–45)
BILIRUB SERPL-MCNC: 0.9 MG/DL (ref 0.2–1)
BUN SERPL-MCNC: 16 MG/DL (ref 5–25)
CALCIUM SERPL-MCNC: 9 MG/DL (ref 8.3–10.1)
CHLORIDE SERPL-SCNC: 99 MMOL/L (ref 100–108)
CO2 SERPL-SCNC: 29 MMOL/L (ref 21–32)
CREAT SERPL-MCNC: 0.68 MG/DL (ref 0.6–1.3)
ERYTHROCYTE [DISTWIDTH] IN BLOOD BY AUTOMATED COUNT: 14 % (ref 11.6–15.1)
GFR SERPL CREATININE-BSD FRML MDRD: 79 ML/MIN/1.73SQ M
GLUCOSE P FAST SERPL-MCNC: 88 MG/DL (ref 65–99)
HCT VFR BLD AUTO: 42.1 % (ref 34.8–46.1)
HGB BLD-MCNC: 13.7 G/DL (ref 11.5–15.4)
LDLC SERPL DIRECT ASSAY-MCNC: 81 MG/DL (ref 0–100)
MCH RBC QN AUTO: 29.9 PG (ref 26.8–34.3)
MCHC RBC AUTO-ENTMCNC: 32.5 G/DL (ref 31.4–37.4)
MCV RBC AUTO: 92 FL (ref 82–98)
PLATELET # BLD AUTO: 161 THOUSANDS/UL (ref 149–390)
PMV BLD AUTO: 11.4 FL (ref 8.9–12.7)
POTASSIUM SERPL-SCNC: 5 MMOL/L (ref 3.5–5.3)
PROT SERPL-MCNC: 7.6 G/DL (ref 6.4–8.2)
RBC # BLD AUTO: 4.58 MILLION/UL (ref 3.81–5.12)
SODIUM SERPL-SCNC: 134 MMOL/L (ref 136–145)
WBC # BLD AUTO: 4.19 THOUSAND/UL (ref 4.31–10.16)

## 2017-07-24 PROCEDURE — 80053 COMPREHEN METABOLIC PANEL: CPT

## 2017-07-24 PROCEDURE — 85027 COMPLETE CBC AUTOMATED: CPT

## 2017-07-24 PROCEDURE — 36415 COLL VENOUS BLD VENIPUNCTURE: CPT

## 2017-07-24 PROCEDURE — 82140 ASSAY OF AMMONIA: CPT

## 2017-07-24 PROCEDURE — 83721 ASSAY OF BLOOD LIPOPROTEIN: CPT

## 2017-08-01 ENCOUNTER — ALLSCRIPTS OFFICE VISIT (OUTPATIENT)
Dept: OTHER | Facility: OTHER | Age: 82
End: 2017-08-01

## 2017-10-05 ENCOUNTER — ALLSCRIPTS OFFICE VISIT (OUTPATIENT)
Dept: OTHER | Facility: OTHER | Age: 82
End: 2017-10-05

## 2017-10-05 DIAGNOSIS — E78.5 HYPERLIPIDEMIA: ICD-10-CM

## 2017-10-05 DIAGNOSIS — I50.30 DIASTOLIC CONGESTIVE HEART FAILURE (HCC): ICD-10-CM

## 2017-10-05 DIAGNOSIS — M81.0 AGE-RELATED OSTEOPOROSIS WITHOUT CURRENT PATHOLOGICAL FRACTURE: ICD-10-CM

## 2017-10-05 DIAGNOSIS — K74.60 CIRRHOSIS OF LIVER (HCC): ICD-10-CM

## 2017-10-05 DIAGNOSIS — L89.151 STAGE I PRESSURE ULCER OF SACRAL REGION: ICD-10-CM

## 2017-10-06 NOTE — PROGRESS NOTES
Assessment  1  Never a smoker   2  Decubitus ulcer of sacral region, stage 1 (707 03,707 21) (L89 151)   3  Diastolic CHF (721 58,037 4) (I50 30)   4  Hepatic cirrhosis (571 5) (K74 60)   5  Hypertension (401 9) (I10)    Plan  Decubitus ulcer of sacral region, stage 1, Diastolic CHF    · (1) CBC/PLT/DIFF; Status:Active; Requested ZBY:[id number];   Diastolic CHF, Hepatic cirrhosis    · (1) AMMONIA; Status:Active; Requested LKV:[id number];    · (1) COMPREHENSIVE METABOLIC PANEL; Status:Active; Requested DIT:[id number];   Diastolic CHF, Wound of right buttock    · Follow-up visit in 1 month Evaluation and Treatment  Follow-up  Status: Complete  Done: 05Oct2017  Hepatic cirrhosis, Need for immunization against influenza    · Fluzone High-Dose 0 5 ML Intramuscular Suspension Prefilled Syringe  Hepatic cirrhosis, Post-menopausal osteoporosis    · (1) VITAMIN D 25-HYDROXY; Status:Active; Requested NNF:[id number];   Hyperlipidemia    · (1) LIPID PANEL, FASTING; Status:Active; Requested GHZ:[id number]; Wound of right buttock    · Calmoseptine 0 44-20 6 % External Ointment; APPLY 1 INCH Twice daily    Discussion/Summary  Discussion Summary:   Calmoseptine to sacral area  Use soft cushion and change positions  Se prn lasiX,chart weights  Rx fbw  Continue present rx  Wound check one month  Medication SE Review and Pt Understands Tx: Possible side effects of new medications were reviewed with the patient/guardian today  The treatment plan was reviewed with the patient/guardian  The patient/guardian understands and agrees with the treatment plan       Self Referrals:   Self Referrals: No      Chief Complaint  Chief Complaint Free Text Note Form: Pt presents for routine f/up exam  Pt feels well today and without complaint  No falls and she does use her cane  Appetite is normal per patient  She would like her flu vaccine today     Chief Complaint Chronic Condition [facility]: Patient is here today for follow up of chronic conditions described in HPI  Advance Directives  Advance Directive [facility]:   The patient is in agreement to receive the [address] - Patient has an advance health care directive  The patient has a living will located  in patient's home  Capacity/Competence: This patient has full decision making capacity for discussion of advance care planning-and-This patient has full decision making competency for discussion of advance care planning  History of Present Illness  HPI: Pt doing ok  She tires more easily  Took extra water pill today due to weight gain  No falls  No cp or sob  Appetite,bowels stable  Review of Systems  Complete-Female:   Constitutional: No fever, no chills, feels well, no tiredness, no recent weight gain or weight loss  Eyes: No complaints of eye pain, no red eyes, no eyesight problems, no discharge, no dry eyes, no itching of eyes  ENT: no complaints of earache, no loss of hearing, no nose bleeds, no nasal discharge, no sore throat, no hoarseness  Cardiovascular: No complaints of slow heart rate, no fast heart rate, no chest pain, no palpitations, no leg claudication, no lower extremity edema  Respiratory: No complaints of shortness of breath, no wheezing, no cough, no SOB on exertion, no orthopnea, no PND  Gastrointestinal: No complaints of abdominal pain, no constipation, no nausea or vomiting, no diarrhea, no bloody stools  Genitourinary: No complaints of dysuria, no incontinence, no pelvic pain, no dysmenorrhea, no vaginal discharge or bleeding  Musculoskeletal: No complaints of arthralgias, no myalgias, no joint swelling or stiffness, no limb pain or swelling  Integumentary: No complaints of skin rash or lesions, no itching, no skin wounds, no breast pain or lump  Neurological: No complaints of headache, no confusion, no convulsions, no numbness, no dizziness or fainting, no tingling, no limb weakness, no difficulty walking     Psychiatric: Not suicidal, no sleep disturbance, no anxiety or depression, no change in personality, no emotional problems  Endocrine: No complaints of proptosis, no hot flashes, no muscle weakness, no deepening of the voice, no feelings of weakness  Hematologic/Lymphatic: No complaints of swollen glands, no swollen glands in the neck, does not bleed easily, does not bruise easily  Active Problems  1  Advance directive discussed with patient (V65 49) (Z71 89)   2  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)   3  Aortic stenosis (424 1) (I35 0)   4  Diastolic CHF (402 46,662 0) (I50 30)   5  Dyspnea on exertion (786 09) (R06 09)   6  Edema (782 3) (R60 9)   7  GERD without esophagitis (530 81) (K21 9)   8  Hepatic cirrhosis (571 5) (K74 60)   9  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)   10  History of mastectomy (V45 71) (Z90 10)   11  Hyperlipidemia (272 4) (E78 5)   12  Hypertension (401 9) (I10)   13  Mitral valve stenosis (394 0) (I05 0)   14  Pancreatic cyst (577 2) (K86 2)   15  Pleural effusion (511 9) (J90)   16  Post-menopausal osteoporosis (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)   17  Pulmonary hypertension (416 8) (I27 20)   18  Pyloric stenosis (537 0) (K31 1)   19  Right shoulder pain (719 41) (M25 511)   20  Shortness of breath on exertion (786 05) (R06 02)   21  Wound of right buttock (877 0) (S31 819A)    Past Medical History  1  History of Breast Cancer (V10 3)   2  Hypertension (401 9) (I10)   3  History of Polyp of sigmoid colon (211 3) (D12 5)  Active Problems And Past Medical History Reviewed: The active problems and past medical history were reviewed and updated today  Surgical History  1  History of Breast Surgery Mastectomy   2  History of Hysterectomy  Surgical History Reviewed: The surgical history was reviewed and updated today  Family History  Son    1  Family history of High blood cholesterol  Sibling    2  Family history of High blood cholesterol  Family History    3   Family history of Coronary Artery Disease (V17 49)   4  Denied: Family history of Drug abuse   5  Family history of depression (V17 0) (Z81 8)   6  Family history of diabetes mellitus (V18 0) (Z83 3)   7  Family history of hypertension (V17 49) (Z82 49)   8  Family history of High blood cholesterol  Family History Reviewed: The family history was reviewed and updated today  Social History   · Dental care, regularly   · Denied: Drug use (305 90) (F19 90)   · Former smoker (V15 82) (Z87 891)   · Good dental hygiene   · Never a smoker   · Non drinker / no alcohol use   · Sun Protection Sunscreen   · Uses Safety Equipment - Seatbelts   ·   Social History Reviewed: The social history was reviewed and updated today  The social history was reviewed and is unchanged  Current Meds   1  Aspirin 81 MG TABS; TAKE 1 TABLET DAILY; Therapy: (Recorded:22Mar2017) to Recorded   2  Claritin 10 MG Oral Tablet; TAKE 1 TABLET DAILY; Therapy: 71CJL2729 to [phone]  Requested for: 99 760684; Last Rx:06Apr2015   Ordered   3  CoQ-10 10 MG CAPS; TAKE 1 CAPSULE 2-3 TIMES DAILY AS DIRECTED; Therapy: 09THS1279 to Recorded   4  Effer-K 20 MEQ Oral Tablet Effervescent; DISSOLVE AND DRINK ONE PACKET IN 8OZ FLUID ONCE A   DAY; Therapy: 69BOL9462 to (Last Rx:08Jun2017)  Requested for: 39EJT0991 Ordered   5  Furosemide 40 MG Oral Tablet; TAKE 1 TABLET DAILY AS DIRECTED; Therapy: (Sho Chamberlain) to Recorded   6  Furosemide 40 MG Oral Tablet; TAKE 1 TABLET DAILY; Therapy: 32FWX2048 to (Evaluate:62Tgo6800)  Requested for: 01Cst9977; Last Rx:54Fme0083   Ordered   7  GNP Loratadine 10 MG Oral Tablet; TAKE ONE TABLET BY MOUTH ONCE DAILY; Therapy: 78CLR7119 to (Last Rx:95Prb1744)  Requested for: 23WGB4069 Ordered   8  Lactulose 10 GM/15ML Oral Solution; TAKE 2 TABLESPOONS (30ML) BY MOUTH TWICE A DAY; Therapy: 58TGV5338 to (Last Rx:08Gth9502)  Requested for: 00Iel2316 Ordered   9   Magnesium Oxide 400 MG Oral Tablet; TAKE 1 TABLET DAILY; Therapy: (Lima Singh) to Recorded   10  Multiple Vitamin TABS; TAKE 1 TABLET DAILY; Therapy: (Lima Singh) to Recorded   11  Nadolol 20 MG Oral Tablet; TAKE 1/2 OF A TABLET DAILY  Requested for: 51Xqr8506; Last    Rx:37Gmb8095 Ordered   12  NexIUM 40 MG Oral Capsule Delayed Release; take 1 capsule once daily; Therapy: 28IQF1657 to (Last Rx:60Pix4797)  Requested for: 21OAM6579 Ordered   13  Potassium Chloride 20 MEQ Oral Packet; DISSOLVE AND DRINK ONE PACKET INSERT 8OZ FLUID    ONCE A DAY; Therapy: 90TWV0908 to (Last Rx:34Ltp5754)  Requested for: 63Kwn1271 Ordered   14  Valsartan 80 MG Oral Tablet; Take 1 tablet once daily; Therapy: 79Qpc2525 to (Evaluate:10Fzj0879)  Requested for: 08HZJ8395; Last Rx:50Hgq1492    Ordered   15  Vitamin D-3 1000 UNIT Oral Capsule; TAKE AS DIRECTED; Therapy: (Lima Singh) to Recorded   16  Xifaxan 550 MG Oral Tablet; 1 TABLET Bid; Therapy: 11MMN2833 to ([phone])  Requested for: 39Zzl5828; Last Rx:05Huy5210    Ordered  Medication List Reviewed: The medication list was reviewed and updated today  Allergies  1  Amoxicillin TABS    Vitals  Vital Signs    Recorded: 44VKA1276 09:19PM Recorded: 99MRX3161 01:54PM   Temperature  97 1 F, Tympanic   Heart Rate  77   Systolic 925    Diastolic 70    Height  5 ft 2 in   Weight  156 lb 7 oz   BMI Calculated  28 61   BSA Calculated  1 72   O2 Saturation  97, RA     Physical Exam    Constitutional   General appearance: No acute distress, well appearing and well nourished  -nad, alert  Eyes   Conjunctiva and lids: No swelling, erythema or discharge  Pupils and irises: Equal, round and reactive to light  Ears, Nose, Mouth, and Throat   External inspection of ears and nose: Normal     Otoscopic examination: Tympanic membranes translucent with normal light reflex  Canals patent without erythema  Nasal mucosa, septum, and turbinates: Normal without edema or erythema  Oropharynx: Normal with no erythema, edema, exudate or lesions  Pulmonary   Respiratory effort: No increased work of breathing or signs of respiratory distress  Auscultation of lungs: Abnormal  -decrease bs  Cardiovascular   Palpation of heart: Normal PMI, no thrills  Auscultation of heart: Normal rate and rhythm, normal S1 and S2, without murmurs  Examination of extremities for edema and/or varicosities: Normal     Carotid pulses: Normal     Abdomen   Abdomen: Non-tender, no masses  Liver and spleen: No hepatomegaly or splenomegaly  Lymphatic   Palpation of lymph nodes in neck: No lymphadenopathy  Musculoskeletal   Gait and station: Normal     Digits and nails: Normal without clubbing or cyanosis  Inspection/palpation of joints, bones, and muscles: Normal     Skin   Skin and subcutaneous tissue: Abnormal  -small stage 1 sacral decub right buttock  Neurologic   Cranial nerves: Cranial nerves 2-12 intact  Reflexes: 2+ and symmetric  Sensation: No sensory loss  Psychiatric   Orientation to person, place, and time: Normal     Mood and affect: Normal          Health Management  Health Maintenance   Medicare Annual Wellness Visit; every 1 year; Last 71AYP3878; Next Due: 48TJL9680;  Active    Future Appointments    Date/Time Provider Specialty Site   11/07/2017 11:30 AM Rakel Munoz DO [facility]     Signatures   Electronically signed by : Hermelindo Watts DO; Oct  5 2017  9:24PM EST                       (Author)

## 2017-11-03 ENCOUNTER — TRANSCRIBE ORDERS (OUTPATIENT)
Dept: ADMINISTRATIVE | Facility: HOSPITAL | Age: 82
End: 2017-11-03

## 2017-11-03 ENCOUNTER — APPOINTMENT (OUTPATIENT)
Dept: LAB | Facility: HOSPITAL | Age: 82
End: 2017-11-03
Attending: INTERNAL MEDICINE
Payer: MEDICARE

## 2017-11-03 ENCOUNTER — TRANSCRIBE ORDERS (OUTPATIENT)
Dept: LAB | Facility: MEDICAL CENTER | Age: 82
End: 2017-11-03

## 2017-11-03 DIAGNOSIS — E78.5 HYPERLIPIDEMIA: ICD-10-CM

## 2017-11-03 DIAGNOSIS — L89.151 STAGE I PRESSURE ULCER OF SACRAL REGION: ICD-10-CM

## 2017-11-03 DIAGNOSIS — K74.60 CIRRHOSIS OF LIVER (HCC): ICD-10-CM

## 2017-11-03 DIAGNOSIS — I50.30 DIASTOLIC CONGESTIVE HEART FAILURE (HCC): ICD-10-CM

## 2017-11-03 DIAGNOSIS — M81.0 AGE-RELATED OSTEOPOROSIS WITHOUT CURRENT PATHOLOGICAL FRACTURE: ICD-10-CM

## 2017-11-03 LAB
25(OH)D3 SERPL-MCNC: 45.5 NG/ML (ref 30–100)
ALBUMIN SERPL BCP-MCNC: 2.6 G/DL (ref 3.5–5)
ALP SERPL-CCNC: 128 U/L (ref 46–116)
ALT SERPL W P-5'-P-CCNC: 28 U/L (ref 12–78)
AMMONIA PLAS-SCNC: 31 UMOL/L (ref 11–35)
ANION GAP SERPL CALCULATED.3IONS-SCNC: 0 MMOL/L (ref 4–13)
AST SERPL W P-5'-P-CCNC: 41 U/L (ref 5–45)
BASOPHILS # BLD AUTO: 0.03 THOUSANDS/ΜL (ref 0–0.1)
BASOPHILS NFR BLD AUTO: 1 % (ref 0–1)
BILIRUB SERPL-MCNC: 1.3 MG/DL (ref 0.2–1)
BUN SERPL-MCNC: 10 MG/DL (ref 5–25)
CALCIUM SERPL-MCNC: 9.5 MG/DL (ref 8.3–10.1)
CHLORIDE SERPL-SCNC: 87 MMOL/L (ref 100–108)
CHOLEST SERPL-MCNC: 130 MG/DL (ref 50–200)
CO2 SERPL-SCNC: 32 MMOL/L (ref 21–32)
CREAT SERPL-MCNC: 0.65 MG/DL (ref 0.6–1.3)
EOSINOPHIL # BLD AUTO: 0.24 THOUSAND/ΜL (ref 0–0.61)
EOSINOPHIL NFR BLD AUTO: 4 % (ref 0–6)
ERYTHROCYTE [DISTWIDTH] IN BLOOD BY AUTOMATED COUNT: 14.1 % (ref 11.6–15.1)
GFR SERPL CREATININE-BSD FRML MDRD: 80 ML/MIN/1.73SQ M
GLUCOSE P FAST SERPL-MCNC: 81 MG/DL (ref 65–99)
HCT VFR BLD AUTO: 42.8 % (ref 34.8–46.1)
HDLC SERPL-MCNC: 42 MG/DL (ref 40–60)
HGB BLD-MCNC: 14.2 G/DL (ref 11.5–15.4)
LDLC SERPL CALC-MCNC: 78 MG/DL (ref 0–100)
LYMPHOCYTES # BLD AUTO: 1.85 THOUSANDS/ΜL (ref 0.6–4.47)
LYMPHOCYTES NFR BLD AUTO: 34 % (ref 14–44)
MCH RBC QN AUTO: 30.2 PG (ref 26.8–34.3)
MCHC RBC AUTO-ENTMCNC: 33.2 G/DL (ref 31.4–37.4)
MCV RBC AUTO: 91 FL (ref 82–98)
MONOCYTES # BLD AUTO: 0.7 THOUSAND/ΜL (ref 0.17–1.22)
MONOCYTES NFR BLD AUTO: 13 % (ref 4–12)
NEUTROPHILS # BLD AUTO: 2.59 THOUSANDS/ΜL (ref 1.85–7.62)
NEUTS SEG NFR BLD AUTO: 48 % (ref 43–75)
PLATELET # BLD AUTO: 201 THOUSANDS/UL (ref 149–390)
PMV BLD AUTO: 11.1 FL (ref 8.9–12.7)
POTASSIUM SERPL-SCNC: 4.2 MMOL/L (ref 3.5–5.3)
PROT SERPL-MCNC: 7.4 G/DL (ref 6.4–8.2)
RBC # BLD AUTO: 4.7 MILLION/UL (ref 3.81–5.12)
SODIUM SERPL-SCNC: 119 MMOL/L (ref 136–145)
TRIGL SERPL-MCNC: 51 MG/DL
WBC # BLD AUTO: 5.41 THOUSAND/UL (ref 4.31–10.16)

## 2017-11-03 PROCEDURE — 85025 COMPLETE CBC W/AUTO DIFF WBC: CPT

## 2017-11-03 PROCEDURE — 82140 ASSAY OF AMMONIA: CPT

## 2017-11-03 PROCEDURE — 80061 LIPID PANEL: CPT

## 2017-11-03 PROCEDURE — 82306 VITAMIN D 25 HYDROXY: CPT

## 2017-11-03 PROCEDURE — 36415 COLL VENOUS BLD VENIPUNCTURE: CPT

## 2017-11-03 PROCEDURE — 80053 COMPREHEN METABOLIC PANEL: CPT

## 2017-11-04 ENCOUNTER — APPOINTMENT (EMERGENCY)
Dept: RADIOLOGY | Facility: HOSPITAL | Age: 82
End: 2017-11-04
Payer: MEDICARE

## 2017-11-04 ENCOUNTER — HOSPITAL ENCOUNTER (OUTPATIENT)
Facility: HOSPITAL | Age: 82
Setting detail: OBSERVATION
Discharge: HOME HEALTH | End: 2017-11-05
Attending: EMERGENCY MEDICINE | Admitting: INTERNAL MEDICINE
Payer: MEDICARE

## 2017-11-04 DIAGNOSIS — J90 PLEURAL EFFUSION: ICD-10-CM

## 2017-11-04 DIAGNOSIS — E87.1 HYPONATREMIA: ICD-10-CM

## 2017-11-04 DIAGNOSIS — R06.02 SHORTNESS OF BREATH: Primary | ICD-10-CM

## 2017-11-04 DIAGNOSIS — I50.33 ACUTE ON CHRONIC DIASTOLIC CONGESTIVE HEART FAILURE (HCC): ICD-10-CM

## 2017-11-04 LAB
ALBUMIN SERPL BCP-MCNC: 2.6 G/DL (ref 3.5–5)
ALP SERPL-CCNC: 124 U/L (ref 46–116)
ALT SERPL W P-5'-P-CCNC: 30 U/L (ref 12–78)
ANION GAP SERPL CALCULATED.3IONS-SCNC: 3 MMOL/L (ref 4–13)
APTT PPP: 44 SECONDS (ref 23–35)
AST SERPL W P-5'-P-CCNC: 49 U/L (ref 5–45)
BACTERIA UR QL AUTO: ABNORMAL /HPF
BASOPHILS # BLD AUTO: 0.02 THOUSANDS/ΜL (ref 0–0.1)
BASOPHILS NFR BLD AUTO: 0 % (ref 0–1)
BILIRUB SERPL-MCNC: 1 MG/DL (ref 0.2–1)
BILIRUB UR QL STRIP: NEGATIVE
BUN SERPL-MCNC: 11 MG/DL (ref 5–25)
CALCIUM SERPL-MCNC: 9.8 MG/DL (ref 8.3–10.1)
CHLORIDE SERPL-SCNC: 98 MMOL/L (ref 100–108)
CLARITY UR: CLEAR
CO2 SERPL-SCNC: 32 MMOL/L (ref 21–32)
COLOR UR: YELLOW
CREAT SERPL-MCNC: 0.6 MG/DL (ref 0.6–1.3)
EOSINOPHIL # BLD AUTO: 0.1 THOUSAND/ΜL (ref 0–0.61)
EOSINOPHIL NFR BLD AUTO: 2 % (ref 0–6)
ERYTHROCYTE [DISTWIDTH] IN BLOOD BY AUTOMATED COUNT: 14.1 % (ref 11.6–15.1)
GFR SERPL CREATININE-BSD FRML MDRD: 82 ML/MIN/1.73SQ M
GLUCOSE SERPL-MCNC: 80 MG/DL (ref 65–140)
GLUCOSE UR STRIP-MCNC: NEGATIVE MG/DL
HCT VFR BLD AUTO: 44 % (ref 34.8–46.1)
HGB BLD-MCNC: 14.5 G/DL (ref 11.5–15.4)
HGB UR QL STRIP.AUTO: NORMAL
INR PPP: 1.22 (ref 0.86–1.16)
KETONES UR STRIP-MCNC: NEGATIVE MG/DL
LEUKOCYTE ESTERASE UR QL STRIP: NEGATIVE
LYMPHOCYTES # BLD AUTO: 1.48 THOUSANDS/ΜL (ref 0.6–4.47)
LYMPHOCYTES NFR BLD AUTO: 29 % (ref 14–44)
MCH RBC QN AUTO: 29.9 PG (ref 26.8–34.3)
MCHC RBC AUTO-ENTMCNC: 33 G/DL (ref 31.4–37.4)
MCV RBC AUTO: 91 FL (ref 82–98)
MONOCYTES # BLD AUTO: 0.73 THOUSAND/ΜL (ref 0.17–1.22)
MONOCYTES NFR BLD AUTO: 14 % (ref 4–12)
NEUTROPHILS # BLD AUTO: 2.73 THOUSANDS/ΜL (ref 1.85–7.62)
NEUTS SEG NFR BLD AUTO: 55 % (ref 43–75)
NITRITE UR QL STRIP: NEGATIVE
NON-SQ EPI CELLS URNS QL MICRO: ABNORMAL /HPF
NT-PROBNP SERPL-MCNC: 490 PG/ML
NT-PROBNP SERPL-MCNC: 664 PG/ML
PH UR STRIP.AUTO: 7.5 [PH] (ref 4.5–8)
PLATELET # BLD AUTO: 195 THOUSANDS/UL (ref 149–390)
PMV BLD AUTO: 11 FL (ref 8.9–12.7)
POTASSIUM SERPL-SCNC: 4.6 MMOL/L (ref 3.5–5.3)
PROT SERPL-MCNC: 8 G/DL (ref 6.4–8.2)
PROT UR STRIP-MCNC: NEGATIVE MG/DL
PROTHROMBIN TIME: 15.3 SECONDS (ref 12.1–14.4)
RBC # BLD AUTO: 4.85 MILLION/UL (ref 3.81–5.12)
RBC #/AREA URNS AUTO: ABNORMAL /HPF
SODIUM SERPL-SCNC: 133 MMOL/L (ref 136–145)
SP GR UR STRIP.AUTO: 1.01 (ref 1–1.03)
TROPONIN I SERPL-MCNC: 0.02 NG/ML
UROBILINOGEN UR QL STRIP.AUTO: 0.2 E.U./DL
WBC # BLD AUTO: 5.06 THOUSAND/UL (ref 4.31–10.16)
WBC #/AREA URNS AUTO: ABNORMAL /HPF

## 2017-11-04 PROCEDURE — 84484 ASSAY OF TROPONIN QUANT: CPT | Performed by: EMERGENCY MEDICINE

## 2017-11-04 PROCEDURE — 85025 COMPLETE CBC W/AUTO DIFF WBC: CPT | Performed by: EMERGENCY MEDICINE

## 2017-11-04 PROCEDURE — 81001 URINALYSIS AUTO W/SCOPE: CPT | Performed by: EMERGENCY MEDICINE

## 2017-11-04 PROCEDURE — 85730 THROMBOPLASTIN TIME PARTIAL: CPT | Performed by: EMERGENCY MEDICINE

## 2017-11-04 PROCEDURE — 93005 ELECTROCARDIOGRAM TRACING: CPT | Performed by: EMERGENCY MEDICINE

## 2017-11-04 PROCEDURE — 36415 COLL VENOUS BLD VENIPUNCTURE: CPT | Performed by: EMERGENCY MEDICINE

## 2017-11-04 PROCEDURE — 85610 PROTHROMBIN TIME: CPT | Performed by: EMERGENCY MEDICINE

## 2017-11-04 PROCEDURE — 99285 EMERGENCY DEPT VISIT HI MDM: CPT

## 2017-11-04 PROCEDURE — 94640 AIRWAY INHALATION TREATMENT: CPT

## 2017-11-04 PROCEDURE — 94760 N-INVAS EAR/PLS OXIMETRY 1: CPT

## 2017-11-04 PROCEDURE — 71020 HB CHEST X-RAY 2VW FRONTAL&LATL: CPT

## 2017-11-04 PROCEDURE — 83880 ASSAY OF NATRIURETIC PEPTIDE: CPT | Performed by: EMERGENCY MEDICINE

## 2017-11-04 PROCEDURE — 80053 COMPREHEN METABOLIC PANEL: CPT | Performed by: EMERGENCY MEDICINE

## 2017-11-04 RX ORDER — MULTIVITAMIN
1 TABLET ORAL DAILY
Status: DISCONTINUED | OUTPATIENT
Start: 2017-11-05 | End: 2017-11-04

## 2017-11-04 RX ORDER — DOCUSATE SODIUM 100 MG/1
100 CAPSULE, LIQUID FILLED ORAL 2 TIMES DAILY
Status: DISCONTINUED | OUTPATIENT
Start: 2017-11-04 | End: 2017-11-05 | Stop reason: HOSPADM

## 2017-11-04 RX ORDER — POTASSIUM CHLORIDE 1.5 G/1.77G
20 POWDER, FOR SOLUTION ORAL DAILY
Status: DISCONTINUED | OUTPATIENT
Start: 2017-11-05 | End: 2017-11-04

## 2017-11-04 RX ORDER — ASPIRIN 81 MG/1
81 TABLET, CHEWABLE ORAL DAILY
Status: DISCONTINUED | OUTPATIENT
Start: 2017-11-05 | End: 2017-11-05 | Stop reason: HOSPADM

## 2017-11-04 RX ORDER — MAGNESIUM HYDROXIDE/ALUMINUM HYDROXICE/SIMETHICONE 120; 1200; 1200 MG/30ML; MG/30ML; MG/30ML
30 SUSPENSION ORAL EVERY 6 HOURS PRN
Status: DISCONTINUED | OUTPATIENT
Start: 2017-11-04 | End: 2017-11-05 | Stop reason: HOSPADM

## 2017-11-04 RX ORDER — NADOLOL 40 MG/1
40 TABLET ORAL DAILY
Status: DISCONTINUED | OUTPATIENT
Start: 2017-11-05 | End: 2017-11-05 | Stop reason: HOSPADM

## 2017-11-04 RX ORDER — VALSARTAN 80 MG/1
80 TABLET ORAL DAILY
Status: DISCONTINUED | OUTPATIENT
Start: 2017-11-05 | End: 2017-11-05 | Stop reason: HOSPADM

## 2017-11-04 RX ORDER — CALCIUM CARBONATE 500(1250)
1 TABLET ORAL
Status: DISCONTINUED | OUTPATIENT
Start: 2017-11-05 | End: 2017-11-05 | Stop reason: HOSPADM

## 2017-11-04 RX ORDER — CHOLECALCIFEROL (VITAMIN D3) 25 MCG
1 CAPSULE ORAL DAILY
Status: DISCONTINUED | OUTPATIENT
Start: 2017-11-05 | End: 2017-11-04

## 2017-11-04 RX ORDER — CALCIUM CARBONATE 200(500)MG
1000 TABLET,CHEWABLE ORAL DAILY PRN
Status: DISCONTINUED | OUTPATIENT
Start: 2017-11-04 | End: 2017-11-05 | Stop reason: HOSPADM

## 2017-11-04 RX ORDER — POTASSIUM CHLORIDE 20 MEQ/1
20 TABLET, EXTENDED RELEASE ORAL DAILY
Status: DISCONTINUED | OUTPATIENT
Start: 2017-11-05 | End: 2017-11-05 | Stop reason: HOSPADM

## 2017-11-04 RX ORDER — LACTULOSE 20 G/30ML
20 SOLUTION ORAL 3 TIMES DAILY
Status: DISCONTINUED | OUTPATIENT
Start: 2017-11-04 | End: 2017-11-05 | Stop reason: HOSPADM

## 2017-11-04 RX ORDER — ONDANSETRON 2 MG/ML
4 INJECTION INTRAMUSCULAR; INTRAVENOUS EVERY 6 HOURS PRN
Status: DISCONTINUED | OUTPATIENT
Start: 2017-11-04 | End: 2017-11-05 | Stop reason: HOSPADM

## 2017-11-04 RX ORDER — LACTULOSE 10 G/15ML
20 SOLUTION ORAL 3 TIMES DAILY
Status: DISCONTINUED | OUTPATIENT
Start: 2017-11-04 | End: 2017-11-04

## 2017-11-04 RX ORDER — PANTOPRAZOLE SODIUM 40 MG/1
40 TABLET, DELAYED RELEASE ORAL
Status: DISCONTINUED | OUTPATIENT
Start: 2017-11-05 | End: 2017-11-05 | Stop reason: HOSPADM

## 2017-11-04 RX ORDER — SODIUM CHLORIDE FOR INHALATION 0.9 %
3 VIAL, NEBULIZER (ML) INHALATION
Status: DISCONTINUED | OUTPATIENT
Start: 2017-11-04 | End: 2017-11-05 | Stop reason: HOSPADM

## 2017-11-04 RX ORDER — SENNOSIDES 8.6 MG
1 TABLET ORAL DAILY
Status: DISCONTINUED | OUTPATIENT
Start: 2017-11-05 | End: 2017-11-05 | Stop reason: HOSPADM

## 2017-11-04 RX ORDER — LEVALBUTEROL 1.25 MG/.5ML
1.25 SOLUTION, CONCENTRATE RESPIRATORY (INHALATION)
Status: DISCONTINUED | OUTPATIENT
Start: 2017-11-04 | End: 2017-11-05 | Stop reason: HOSPADM

## 2017-11-04 RX ORDER — FUROSEMIDE 10 MG/ML
40 INJECTION INTRAMUSCULAR; INTRAVENOUS DAILY
Status: DISCONTINUED | OUTPATIENT
Start: 2017-11-04 | End: 2017-11-05 | Stop reason: HOSPADM

## 2017-11-04 RX ADMIN — ISODIUM CHLORIDE 3 ML: 0.03 SOLUTION RESPIRATORY (INHALATION) at 20:45

## 2017-11-04 RX ADMIN — FUROSEMIDE 40 MG: 10 INJECTION, SOLUTION INTRAMUSCULAR; INTRAVENOUS at 19:16

## 2017-11-04 RX ADMIN — DOCUSATE SODIUM 100 MG: 100 CAPSULE, LIQUID FILLED ORAL at 19:16

## 2017-11-04 RX ADMIN — LACTULOSE 20 G: 20 SOLUTION ORAL at 22:05

## 2017-11-04 RX ADMIN — LEVALBUTEROL 1.25 MG: 1.25 SOLUTION, CONCENTRATE RESPIRATORY (INHALATION) at 20:45

## 2017-11-04 RX ADMIN — RIFAXIMIN 550 MG: 550 TABLET ORAL at 22:05

## 2017-11-04 NOTE — H&P
History and Physical - [facility] Internal Medicine    Patient Information: Sofía Jackman 80 y o  female MRN: [id number]  Unit/Bed#: 943-69 Encounter: [id number]  Admitting Physician: Alisha Heaton MD  PCP: Arun [facility] DO  Date of Admission:  11/04/17    Assessment/Plan:    Hospital Problem List:     Principal Problem:    Hyponatremia  Active Problems:    Bilateral pleural effusion    Essential hypertension    Gastritis    Valvular heart disease    Acute on chronic diastolic congestive heart failure (HCC)    Pulmonary hypertension    Aortic stenosis    Shortness of breath      Plan for the Primary Problem(s):  · Hyponatremia  · Admitted to telemetry  · Vitals as per floor protocol  · Cardiac diet  · Labs:  CBC, BMP, BNP, LFTs, INR, PTT  · Chest x-ray  · Resume home medications  · Switch p o  Lasix to IV Lasix 40 mg daily in the hospital  · Daily weights  · I/O  · Xopenex 1 25 mg inhalation t i d   · DVT prophylaxis:  Lovenox 40 mg subcu daily    Plan for Additional Problems:   · Acute on chronic diastolic congestive heart failure  · Chest x-ray showed vascular congestion  · BNP pending  · Doppler venous ultrasound bilateral lower limbs  · History of valvular disease  · History of pulmonary hypertension  · History of artery stenosis    VTE Prophylaxis: Enoxaparin (Lovenox)  / sequential compression device   Code Status:  Full code  POLST: POLST is not applicable to this patient    Anticipated Length of Stay:  Patient will be admitted on an Observation basis with an anticipated length of stay of  < 2 midnights  Justification for Hospital Stay:  Shortness of breath    Total Time for Visit, including Counseling / Coordination of Care: 45 minutes  Greater than 50% of this total time spent on direct patient counseling and coordination of care      Chief Complaint:   Abnormal lab and shortness of breath    History of Present Illness:    Sofía Jackman is a 80 y o  female who presents with abnormal lab results an shortness of breath  Patient did get lab workup done by her primary care physician where her sodium showed 119 and her primary care refer her to the emergency room  Repeat labs in the emergency room showed a sodium is 131  Patient is asymptomatic  Patient did complain of shortness of breath and a chest x-ray done in the ED showed pulmonary vascular congestion  Patient does have bilateral pedal edema  Patient has a history of chronic diastolic congestive heart failure and is on 40 mg of p o  Lasix at home    Patient denies fever, chills, nausea, vomiting, diarrhea, abdominal pain, chest pain, palpitations  Review of Systems:    Review of Systems   Respiratory: Positive for shortness of breath  Cardiovascular: Positive for leg swelling  All other systems reviewed and are negative  Past Medical and Surgical History:     Past Medical History:   Diagnosis Date    Cancer ([facility] [address] )     breast    GERD (gastroesophageal reflux disease)     Hypertension        Past Surgical History:   Procedure Laterality Date    COMBINED HYSTEROSCOPY DIAGNOSTIC / D&C      HYSTERECTOMY      MASTECTOMY Right     MASTECTOMY         Meds/Allergies:    Prior to Admission medications    Medication Sig Start Date End Date Taking?  Authorizing Provider   aspirin 81 MG tablet Take 81 mg by mouth daily   Yes Historical Provider, MD   Cholecalciferol (VITAMIN D-3) 1000 units CAPS Take 1 capsule by mouth daily   Yes Historical Provider, MD   CORAL CALCIUM PO Take 1 tablet by mouth daily   Yes Historical Provider, MD   esomeprazole (NexIUM) 40 MG capsule Take 40 mg by mouth every morning before breakfast   Yes Historical Provider, MD   furosemide (LASIX) 40 mg tablet Take 1 tablet by mouth daily  Patient taking differently: Take 40 mg by mouth daily Take an extra dose for weight increase  Of 3 lbs   3/22/17  Yes Rico Venegas,    lactulose (CHRONULAC) 10 g/15 mL solution Take 30 mL by mouth 3 (three) times a day 3/21/17  Yes Donna John DO   magnesium oxide (MAG-OX) 400 mg Take 1 tablet by mouth daily 3/21/17  Yes Donna John DO   Multiple Vitamin (MULTIVITAMIN) tablet Take 1 tablet by mouth daily   Yes Historical Provider, MD   potassium chloride (KLOR-CON) 20 mEq packet Take 20 mEq by mouth daily   Yes Historical Provider, MD   rifaximin (XIFAXAN) 550 mg tablet Take 1 tablet by mouth every 12 (twelve) hours 3/21/17  Yes Donna John DO   valsartan (DIOVAN) 80 mg tablet Take 1 tablet by mouth daily 3/22/17  Yes Donna John DO   co-enzyme Q-10 30 MG capsule Take 30 mg by mouth daily    Historical Provider, MD   nadolol (CORGARD) 40 mg tablet Take 1 tablet by mouth daily  Patient taking differently: Take 20 mg by mouth daily   3/22/17   Donna John DO     I have reviewed home medications with patient personally  Allergies: No Known Allergies    Social History:     Marital Status:    Occupation:  Retired  Patient Pre-hospital Living Situation:  Home  Patient Pre-hospital Level of Mobility:  Okay  Patient Pre-hospital Diet Restrictions:  Cardiac diet  Substance Use History:   History   Alcohol Use No     History   Smoking Status    Former Smoker   Smokeless Tobacco    Never Used     History   Drug Use No       Family History:    History reviewed  No pertinent family history  Physical Exam:     Vitals:   Blood Pressure: 140/63 (11/04/17 1740)  Pulse: 91 (11/04/17 1740)  Temperature: 98 5 °F (36 9 °C) (11/04/17 1740)  Temp Source: Temporal (11/04/17 1740)  Respirations: 20 (11/04/17 1740)  Height: 5' 2" (157 5 cm) (11/04/17 1740)  Weight - Scale: 68 4 kg (150 lb 12 7 oz) (11/04/17 1740)  SpO2: (!) 89 % (11/04/17 1740)    Physical Exam   Constitutional: She is oriented to person, place, and time  She appears well-developed and well-nourished  HENT:   Head: Normocephalic and atraumatic  Eyes: Conjunctivae and EOM are normal  Pupils are equal, round, and reactive to light  Neck: Normal range of motion  Neck supple  Cardiovascular: Normal rate, regular rhythm, normal heart sounds and intact distal pulses  Pulmonary/Chest: Effort normal and breath sounds normal    Abdominal: Soft  Bowel sounds are normal    Musculoskeletal: Normal range of motion  Neurological: She is alert and oriented to person, place, and time  She has normal reflexes  Skin: Skin is warm and dry  Nursing note and vitals reviewed  Additional Data:     Lab Results: I have personally reviewed pertinent reports  Results from last 7 days  Lab Units 11/04/17  1419   WBC Thousand/uL 5 06   HEMOGLOBIN g/dL 14 5   HEMATOCRIT % 44 0   PLATELETS Thousands/uL 195   NEUTROS PCT % 55   LYMPHS PCT % 29   MONOS PCT % 14*   EOS PCT % 2       Results from last 7 days  Lab Units 11/04/17  1419   SODIUM mmol/L 133*   POTASSIUM mmol/L 4 6   CHLORIDE mmol/L 98*   CO2 mmol/L 32   BUN mg/dL 11   CREATININE mg/dL 0 60   CALCIUM mg/dL 9 8   TOTAL PROTEIN g/dL 8 0   BILIRUBIN TOTAL mg/dL 1 00   ALK PHOS U/L 124*   ALT U/L 30   AST U/L 49*   GLUCOSE RANDOM mg/dL 80       Results from last 7 days  Lab Units 11/04/17  1419   INR  1 22*       Imaging: I have personally reviewed pertinent reports  Xr Chest 2 Views    Result Date: 11/4/2017  Narrative: CHEST INDICATION:  shortness of breath  History taken directly from the electronic ordering system  COMPARISON:  3/19/2017 VIEWS:  Frontal and lateral projections IMAGES:  2 FINDINGS:     Enlarged cardiac silhouette  Atherosclerotic aorta  Interstitial and vascular congestion with bibasilar airspace opacities and moderate pleural effusions  No pneumothorax  Stable osseous structures  Impression: Interstitial and vascular congestion with bibasilar airspace opacities and moderate pleural effusions   Workstation performed: [id number]       EKG, Pathology, and Other Studies Reviewed on Admission:   · EKG:     Allscripts / Epic Records Reviewed: Yes     ** Please Note: This note has been constructed using a voice recognition system   **

## 2017-11-04 NOTE — PLAN OF CARE
Problem: Prexisting or High Potential for Compromised Skin Integrity  Goal: Skin integrity is maintained or improved  INTERVENTIONS:  - Identify patients at risk for skin breakdown  - Assess and monitor skin integrity  - Assess and monitor nutrition and hydration status  - Monitor labs (i e  albumin)  - Assess for incontinence   - Turn and reposition patient  - Assist with mobility/ambulation  - Relieve pressure over bony prominences  - Avoid friction and shearing  - Provide appropriate hygiene as needed including keeping skin clean and dry  - Evaluate need for skin moisturizer/barrier cream  - Collaborate with interdisciplinary team (i e  Nutrition, Rehabilitation, etc )   - Patient/family teaching   Outcome: Progressing      Problem: Nutrition/Hydration-ADULT  Goal: Nutrient/Hydration intake appropriate for improving, restoring or maintaining nutritional needs  Monitor and assess patient's nutrition/hydration status for malnutrition (ex- brittle hair, bruises, dry skin, pale skin and conjunctiva, muscle wasting, smooth red tongue, and disorientation)  Collaborate with interdisciplinary team and initiate plan and interventions as ordered  Monitor patient's weight and dietary intake as ordered or per policy  Utilize nutrition screening tool and intervene per policy  Determine patient's food preferences and provide high-protein, high-caloric foods as appropriate       INTERVENTIONS:  - Monitor oral intake, urinary output, labs, and treatment plans  - Assess nutrition and hydration status and recommend course of action  - Evaluate amount of meals eaten  - Assist patient with eating if necessary   - Allow adequate time for meals  - Recommend/ encourage appropriate diets, oral nutritional supplements, and vitamin/mineral supplements  - Order, calculate, and assess calorie counts as needed  - Recommend, monitor, and adjust tube feedings and TPN/PPN based on assessed needs  - Assess need for intravenous fluids  - Provide specific nutrition/hydration education as appropriate  - Include patient/family/caregiver in decisions related to nutrition   Outcome: Progressing      Problem: INFECTION - ADULT  Goal: Absence or prevention of progression during hospitalization  INTERVENTIONS:  - Assess and monitor for signs and symptoms of infection  - Monitor lab/diagnostic results  - Monitor all insertion sites, i e  indwelling lines, tubes, and drains  - Monitor endotracheal (as able) and nasal secretions for changes in amount and color  - [place] appropriate cooling/warming therapies per order  - Administer medications as ordered  - Instruct and encourage patient and family to use good hand hygiene technique  - Identify and instruct in appropriate isolation precautions for identified infection/condition  Outcome: Progressing    Goal: Absence of fever/infection during neutropenic period  INTERVENTIONS:  - Monitor WBC  - Implement neutropenic guidelines  Outcome: Progressing      Problem: SAFETY ADULT  Goal: Maintain or return to baseline ADL function  INTERVENTIONS:  -  Assess patient's ability to carry out ADLs; assess patient's baseline for ADL function and identify physical deficits which impact ability to perform ADLs (bathing, care of mouth/teeth, toileting, grooming, dressing, etc )  - Assess/evaluate cause of self-care deficits   - Assess range of motion  - Assess patient's mobility; develop plan if impaired  - Assess patient's need for assistive devices and provide as appropriate  - Encourage maximum independence but intervene and supervise when necessary  ¯ Involve family in performance of ADLs  ¯ Assess for home care needs following discharge   ¯ Request OT consult to assist with ADL evaluation and planning for discharge  ¯ Provide patient education as appropriate  Outcome: Progressing    Goal: Maintain or return mobility status to optimal level  INTERVENTIONS:  - Assess patient's baseline mobility status (ambulation, transfers, stairs, etc )    - Identify cognitive and physical deficits and behaviors that affect mobility  - Identify mobility aids required to assist with transfers and/or ambulation (gait belt, sit-to-stand, lift, walker, cane, etc )  - [place] fall precautions as indicated by assessment  - Record patient progress and toleration of activity level on Mobility SBAR; progress patient to next Phase/Stage  - Instruct patient to call for assistance with activity based on assessment  - Request Rehabilitation consult to assist with strengthening/weightbearing, etc   Outcome: Progressing      Problem: DISCHARGE PLANNING  Goal: Discharge to home or other facility with appropriate resources  INTERVENTIONS:  - Identify barriers to discharge w/patient and caregiver  - Arrange for needed discharge resources and transportation as appropriate  - Identify discharge learning needs (meds, wound care, etc )  - Arrange for interpretive services to assist at discharge as needed  - Refer to Case Management Department for coordinating discharge planning if the patient needs post-hospital services based on physician/advanced practitioner order or complex needs related to functional status, cognitive ability, or social support system  Outcome: Progressing      Problem: Knowledge Deficit  Goal: Patient/family/caregiver demonstrates understanding of disease process, treatment plan, medications, and discharge instructions  Complete learning assessment and assess knowledge base    Interventions:  - Provide teaching at level of understanding  - Provide teaching via preferred learning methods  Outcome: Progressing      Problem: RESPIRATORY - ADULT  Goal: Achieves optimal ventilation and oxygenation  INTERVENTIONS:  - Assess for changes in respiratory status  - Assess for changes in mentation and behavior  - Position to facilitate oxygenation and minimize respiratory effort  - Oxygen administration by appropriate delivery method based on oxygen saturation (per order) or ABGs  - Initiate smoking cessation education as indicated  - Encourage broncho-pulmonary hygiene including cough, deep breathe, Incentive Spirometry  - Assess the need for suctioning and aspirate as needed  - Assess and instruct to report SOB or any respiratory difficulty  - Respiratory Therapy support as indicated  Outcome: Progressing

## 2017-11-05 ENCOUNTER — APPOINTMENT (OUTPATIENT)
Dept: ULTRASOUND IMAGING | Facility: HOSPITAL | Age: 82
End: 2017-11-05
Payer: MEDICARE

## 2017-11-05 VITALS
TEMPERATURE: 98.2 F | WEIGHT: 147.05 LBS | HEIGHT: 62 IN | RESPIRATION RATE: 18 BRPM | HEART RATE: 72 BPM | BODY MASS INDEX: 27.06 KG/M2 | OXYGEN SATURATION: 92 % | SYSTOLIC BLOOD PRESSURE: 114 MMHG | DIASTOLIC BLOOD PRESSURE: 56 MMHG

## 2017-11-05 PROBLEM — E87.1 HYPONATREMIA: Status: RESOLVED | Noted: 2017-11-04 | Resolved: 2017-11-05

## 2017-11-05 PROBLEM — R06.02 SHORTNESS OF BREATH: Status: RESOLVED | Noted: 2017-11-04 | Resolved: 2017-11-05

## 2017-11-05 LAB
ANION GAP SERPL CALCULATED.3IONS-SCNC: 6 MMOL/L (ref 4–13)
APTT PPP: 46 SECONDS (ref 23–35)
BUN SERPL-MCNC: 10 MG/DL (ref 5–25)
CALCIUM SERPL-MCNC: 9.3 MG/DL (ref 8.3–10.1)
CHLORIDE SERPL-SCNC: 100 MMOL/L (ref 100–108)
CO2 SERPL-SCNC: 29 MMOL/L (ref 21–32)
CREAT SERPL-MCNC: 0.61 MG/DL (ref 0.6–1.3)
ERYTHROCYTE [DISTWIDTH] IN BLOOD BY AUTOMATED COUNT: 14.1 % (ref 11.6–15.1)
GFR SERPL CREATININE-BSD FRML MDRD: 82 ML/MIN/1.73SQ M
GLUCOSE SERPL-MCNC: 82 MG/DL (ref 65–140)
HCT VFR BLD AUTO: 39.4 % (ref 34.8–46.1)
HGB BLD-MCNC: 13.2 G/DL (ref 11.5–15.4)
INR PPP: 1.24 (ref 0.86–1.16)
MAGNESIUM SERPL-MCNC: 1.8 MG/DL (ref 1.6–2.6)
MCH RBC QN AUTO: 30.3 PG (ref 26.8–34.3)
MCHC RBC AUTO-ENTMCNC: 33.5 G/DL (ref 31.4–37.4)
MCV RBC AUTO: 90 FL (ref 82–98)
PHOSPHATE SERPL-MCNC: 4.3 MG/DL (ref 2.3–4.1)
PLATELET # BLD AUTO: 193 THOUSANDS/UL (ref 149–390)
PMV BLD AUTO: 10.7 FL (ref 8.9–12.7)
POTASSIUM SERPL-SCNC: 3.6 MMOL/L (ref 3.5–5.3)
PROTHROMBIN TIME: 15.5 SECONDS (ref 12.1–14.4)
RBC # BLD AUTO: 4.36 MILLION/UL (ref 3.81–5.12)
SODIUM SERPL-SCNC: 135 MMOL/L (ref 136–145)
WBC # BLD AUTO: 4.8 THOUSAND/UL (ref 4.31–10.16)

## 2017-11-05 PROCEDURE — 97162 PT EVAL MOD COMPLEX 30 MIN: CPT | Performed by: PHYSICAL THERAPIST

## 2017-11-05 PROCEDURE — 85730 THROMBOPLASTIN TIME PARTIAL: CPT | Performed by: INTERNAL MEDICINE

## 2017-11-05 PROCEDURE — 85027 COMPLETE CBC AUTOMATED: CPT | Performed by: INTERNAL MEDICINE

## 2017-11-05 PROCEDURE — G8979 MOBILITY GOAL STATUS: HCPCS | Performed by: PHYSICAL THERAPIST

## 2017-11-05 PROCEDURE — 84100 ASSAY OF PHOSPHORUS: CPT | Performed by: INTERNAL MEDICINE

## 2017-11-05 PROCEDURE — 93970 EXTREMITY STUDY: CPT

## 2017-11-05 PROCEDURE — 83735 ASSAY OF MAGNESIUM: CPT | Performed by: INTERNAL MEDICINE

## 2017-11-05 PROCEDURE — 85610 PROTHROMBIN TIME: CPT | Performed by: INTERNAL MEDICINE

## 2017-11-05 PROCEDURE — 80048 BASIC METABOLIC PNL TOTAL CA: CPT | Performed by: INTERNAL MEDICINE

## 2017-11-05 PROCEDURE — G8978 MOBILITY CURRENT STATUS: HCPCS | Performed by: PHYSICAL THERAPIST

## 2017-11-05 RX ADMIN — VALSARTAN 80 MG: 80 TABLET, FILM COATED ORAL at 09:32

## 2017-11-05 RX ADMIN — RIFAXIMIN 550 MG: 550 TABLET ORAL at 09:32

## 2017-11-05 RX ADMIN — NADOLOL 40 MG: 40 TABLET ORAL at 09:32

## 2017-11-05 RX ADMIN — Medication 400 MG: at 09:32

## 2017-11-05 RX ADMIN — PANTOPRAZOLE SODIUM 40 MG: 40 TABLET, DELAYED RELEASE ORAL at 05:01

## 2017-11-05 RX ADMIN — ASPIRIN 81 MG CHEWABLE TABLET 81 MG: 81 TABLET CHEWABLE at 09:32

## 2017-11-05 RX ADMIN — SENNOSIDES 8.6 MG: 8.6 TABLET, FILM COATED ORAL at 09:32

## 2017-11-05 RX ADMIN — LACTULOSE 20 G: 20 SOLUTION ORAL at 09:32

## 2017-11-05 RX ADMIN — Medication 30 MG: at 12:04

## 2017-11-05 RX ADMIN — FUROSEMIDE 40 MG: 10 INJECTION, SOLUTION INTRAMUSCULAR; INTRAVENOUS at 09:32

## 2017-11-05 RX ADMIN — POTASSIUM CHLORIDE 20 MEQ: 1500 TABLET, EXTENDED RELEASE ORAL at 09:32

## 2017-11-05 RX ADMIN — Medication 1 TABLET: at 07:15

## 2017-11-05 RX ADMIN — ENOXAPARIN SODIUM 40 MG: 40 INJECTION SUBCUTANEOUS at 09:33

## 2017-11-05 RX ADMIN — DOCUSATE SODIUM 100 MG: 100 CAPSULE, LIQUID FILLED ORAL at 09:32

## 2017-11-05 NOTE — PLAN OF CARE
Problem: DISCHARGE PLANNING - CARE MANAGEMENT  Goal: Discharge to post-acute care or home with appropriate resources  INTERVENTIONS:  - Conduct assessment to determine patient/family and health care team treatment goals, and need for post-acute services based on payer coverage, community resources, and patient preferences, and barriers to discharge  - Address psychosocial, clinical, and financial barriers to discharge as identified in assessment in conjunction with the patient/family and health care team  - Arrange appropriate level of post-acute services according to patient's   needs and preference and payer coverage in collaboration with the physician and health care team  - Communicate with and update the patient/family, physician, and health care team regarding progress on the discharge plan  - Arrange appropriate transportation to post-acute venues  Outcome: Completed Date Met: 11/05/17  Home with [facility]

## 2017-11-05 NOTE — SOCIAL WORK
Cm met with the patient to evaluate the patients prior function and living situation and any barriers to d/c and form a safe d/c plan  Cm also evaluated the patient for any services in the home or needs for services  Cm will follow the patient and address any needs and address all concerns  The patient is at home and she is in a multi story home but she has a first floor set up  She lives with her daughter and son  She has a walker with 2 front wheels and she recently switched to a walker with 4 wheels and a seat  Her bed and bath is on the 1 st floor and she has no steps to enter the home  She is a readmission risk 2nd to CHF and [facility] (advantage) will be resumed on d/c I called and I spoke with the on call nurse and they will accept the patient will need nursing and PT  The  spoke to the patient about sob and fluid and what to do if she is gaining weight and gets SOB  She will need to be followed at home I emailed the heart failure nurse     pcp Dr Catracho Hallman

## 2017-11-05 NOTE — PHYSICAL THERAPY NOTE
PT Initial Evaluation     11/05/17 0851   Note Type   Note type Eval only   Pain Assessment   Pain Assessment No/denies pain   Home Living   Type of [address] to live on main level with bedroom/bathroom   Bathroom Shower/Tub Walk-in shower   Bathroom Equipment Grab bars in shower; Shower chair;Hand-held shower;Grab bars around toilet   [address] [facility]   Prior Function   Level of [place] Needs assistance with IADLs   Lives With Alone   Receives Help From Family   ADL Assistance Needs assistance   IADLs Needs assistance   General   Additional Pertinent History Breast CA, GERD, HTN, Hysterectomy, Mastectomy, B/L Pleural Effusion, valvular heart disease, aortic stenosis   Cognition   Overall Cognitive Status WFL   Arousal/Participation Alert   Orientation Level Oriented X4   RLE Assessment   RLE Assessment WFL   LLE Assessment   LLE Assessment WFL   Bed Mobility   Additional Comments Patient seated in bedside chair upon PT entering room and patient was returned to chair after evaluation  Transfers   Sit to Stand (CGA)   Stand to Sit (CGA)   Stand pivot (CGA)   Ambulation/Elevation   Gait pattern WNL   Gait Assistance (CGA)   Assistive Device 4-wheeled walker   Distance 30'   Balance   Static Sitting Normal   Dynamic Sitting Normal   Static Standing Good   Dynamic Standing Fair +   Ambulatory Good   Endurance Deficit   Endurance Deficit No   Activity Tolerance   Activity Tolerance Patient tolerated treatment well   Assessment   Prognosis Good   Problem List Decreased strength; Impaired balance;Decreased mobility   Assessment Patient presented to hospital following abnormal Lab values showing low sodium levels  Patient demonstrates upon eval with decreased strength, transfers, balance and ambulation limiting functional ability and requiring PT intervention to address      Goals   LTG Expiration Date 11/19/17   Long Term Goal #1 Patient to be Independent with all transfers and bed mobility Long Term Goal #2 Patient to ambulate 80' with rollator walker with Mod I   Plan   Treatment/Interventions ADL retraining;Functional transfer training;LE strengthening/ROM; Elevations; Therapeutic exercise; Endurance training;Patient/family training;Bed mobility;Gait training   PT Frequency (3-5x a week)   Recommendation   Recommendation Home PT   PT - OK to Discharge No

## 2017-11-05 NOTE — DISCHARGE INSTRUCTIONS
Aortic Stenosis   WHAT YOU NEED TO KNOW:   What is aortic stenosis? Aortic stenosis is a condition where the aortic valve in your heart is narrowed  The aortic valve is between the left ventricle and the aorta  The left ventricle is the lower left chamber of your heart  The aorta is a blood vessel that pumps blood to your head and body  The aortic valve opens and closes to direct blood flow through your heart  When the aortic valve is narrowed, blood flow may decrease  Your tissues and organs will not have enough oxygen and nutrients to function properly  What causes aortic stenosis? · Calcium buildup:  As you age, calcium can build up on the aortic valve walls  The calcium stiffens and thickens the valve  · Congenital heart defect:  Some people are born with a damaged aortic valve that leads to narrowing and blockage  · Rheumatic fever: This is fever and inflammation of your joints  It can develop after you have a strep throat infection  Rheumatic fever can cause inflammation and damage to your aortic valve  The walls of your aortic valve may narrow, and may even join together  What are the signs and symptoms of aortic stenosis? · Chest pain or tightness    · Fast, jumpy, or fluttery heartbeat    · Shortness of breath during activity or when you lie down    · Severe tiredness    · Dizziness or feeling faint  How is aortic stenosis diagnosed? Your healthcare provider will ask about your signs and symptoms and listen to your heart  He will ask if you have had strep throat or rheumatic fever in the past  You may need any of the following tests:  · Blood tests: You may need blood taken to give caregivers information about how your body is working  The blood may be taken from your hand, arm, or IV  · Chest x-ray: This is used to check the size of your heart and look for fluid around your heart and lungs  · EKG: This test records the electrical activity of your heart   It is used to check for abnormal heart rhythm caused by aortic stenosis  · An echocardiogram  is a type of ultrasound  Sound waves are used to show the structure and function of your heart  · A stress test  may show the changes that take place in your heart while it is under stress  Stress may be placed on your heart with exercise or medicine  Ask for more information about this test     · Cardiac catheterization: This procedure is done to find and treat heart blockages  A thin, bendable tube is inserted into your arm, neck, or groin and moved into your heart  An x-ray may be used to guide the tube to the right place  Dye may be put into your vein so the pictures show up better on a monitor  Tell the healthcare provider if you have ever had an allergic reaction to contrast dye  How is aortic stenosis treated? · Medicines: You may have the following medicines to improve your symptoms or prevent problems caused by aortic stenosis:    ¨ Cholesterol medicine: This medicine will help decrease the amount of cholesterol in your blood  ¨ Antibiotics: This medicine will help fight or prevent an infection  You may need it if you had rheumatic fever in the past  You may need to take the medicine every day, or once a month  · Procedures:      ¨ Valvotomy: This helps widen your aortic valve and allow blood to flow through easier  A catheter (long thin tube) with a balloon on the tip is inserted through a small incision in your arm or groin  The catheter is guided through a blood vessel and into your left atrium near your aortic valve  When the balloon is inflated, it stretches the valve opening  ¨ Valvuloplasty:  Healthcare providers make an incision in your chest to repair and widen your aortic valve  The valve walls are  or calcium buildup is removed  This helps improve the blood flow through your heart      ¨ Replacement:  Healthcare Providers make an incision in your chest to replace your damaged aortic valve  Part or all of your aortic valve is removed, and a new valve is secured in place  The new valve may be from a donor (another person or animal), or may be a manmade valve  What are the risks of aortic stenosis? Aortic stenosis may cause endocarditis  Endocarditis is an infection of the inner lining of the heart  Aortic stenosis can also cause congestive heart failure (CHF)  This is when the heart cannot pump enough blood for the body  This may cause irregular heartbeats and can lead to cardiac arrest (the heart stops beating)  This can be life-threatening  How can I manage my symptoms? · Eat a variety of healthy foods:  Healthy foods include fruits, vegetables, whole-grain breads, low-fat dairy products, beans, lean meats, and fish  Ask if you need to be on a special diet  · Exercise: This will improve your heart health  Ask your healthcare provider about the best exercise plan for you  · Maintain a healthy weight:  Ask your healthcare provider how much you should weigh  Ask him to help you create a weight loss plan if you are overweight  When should I contact my healthcare provider? · You are bleeding from your nose or gums  · The veins in your neck look swollen or are bulging  · You have a fever  · You have blood in your urine or bowel movements  · You have questions or concerns about your condition or care  When should I seek immediate care or call 911? · Your arm or leg feels warm, tender, and painful  It may look swollen and red  · Your heart is beating faster than normal for you, and you feel fluttering in your chest     · You suddenly feel lightheaded and short of breath  · You have chest pain that feels like squeezing, pressure, fullness, or pain  · You have chest pain that lasts for more than a few minutes or returns  · You are nauseated and have trouble breathing  · You have a severe headache, cold sweats, and feel lightheaded or dizzy      · You have weakness or numbness on one side of your arm, leg, or face  · You are confused and cannot speak clearly  CARE AGREEMENT:   You have the right to help plan your care  Learn about your health condition and how it may be treated  Discuss treatment options with your caregivers to decide what care you want to receive  You always have the right to refuse treatment  The above information is an  only  It is not intended as medical advice for individual conditions or treatments  Talk to your doctor, nurse or pharmacist before following any medical regimen to see if it is safe and effective for you  © 2017 [address] [facility] Information is for End User's use only and may not be sold, redistributed or otherwise used for commercial purposes  All illustrations and images included in CareNotes® are the copyrighted property of [employer]  or Silvio Ramos  Aortic Stenosis   WHAT YOU NEED TO KNOW:   Aortic stenosis is a condition where the aortic valve in your heart is narrowed  The aortic valve is between the left ventricle and the aorta  The left ventricle is the lower left chamber of your heart  The aorta is a blood vessel that pumps blood to your head and body  The aortic valve opens and closes to direct blood flow through your heart  When the aortic valve is narrowed, blood flow may decrease  Your tissues and organs will not have enough oxygen and nutrients to function properly  DISCHARGE INSTRUCTIONS:   Medicines:   · Cholesterol medicine: This medicine will help decrease the amount of cholesterol in your blood  · Antibiotics: This medicine will help fight or prevent an infection  You may need it if you had rheumatic fever in the past  You may need to take the medicine every day, or once a month  · Take your medicine as directed  Contact your healthcare provider if you think your medicine is not helping or if you have side effects   Tell him or her if you are allergic to any medicine  Keep a list of the medicines, vitamins, and herbs you take  Include the amounts, and when and why you take them  Bring the list or the pill bottles to follow-up visits  Carry your medicine list with you in case of an emergency  Follow up with your healthcare provider or cardiologist as directed: You may need to return for more tests to check your heart  Write down your questions so you remember to ask them during your visits  Self-care:   · Eat a variety of healthy foods:  Healthy foods include fruits, vegetables, whole-grain breads, low-fat dairy products, beans, lean meats, and fish  Ask if you need to be on a special diet  · Exercise: This will improve your heart health  Ask your healthcare provider about the best exercise plan for you  · Maintain a healthy weight:  Ask your healthcare provider how much you should weigh  Ask him to help you create a weight loss plan if you are overweight  Contact your healthcare provider or cardiologist if:   · You are bleeding from your nose or gums  · The veins in your neck look swollen or are bulging  · You have a fever  · You have blood in your urine or bowel movements  · You have questions or concerns about your condition or care  Seek care immediately or call 911 if:   · Your arm or leg feels warm, tender, and painful  It may look swollen and red  · Your heart is beating faster than normal for you, and you feel fluttering in your chest     · You suddenly feel lightheaded and short of breath  · You have chest pain that feels like squeezing, pressure, fullness, or pain  · You have chest pain that lasts for more than a few minutes or returns  · You are nauseated and have trouble breathing  · You have a severe headache, cold sweats, and feel lightheaded or dizzy  · You have weakness or numbness on one side of your arm, leg, or face  · You are confused and cannot speak clearly    © 2017 [address] [employer] Information is for End User's use only and may not be sold, redistributed or otherwise used for commercial purposes  All illustrations and images included in CareNotes® are the copyrighted property of [employer]  or Silvio Ramos  The above information is an  only  It is not intended as medical advice for individual conditions or treatments  Talk to your doctor, nurse or pharmacist before following any medical regimen to see if it is safe and effective for you  Chronic Hypertension   WHAT YOU NEED TO KNOW:   Hypertension is high blood pressure (BP)  Your BP is the force of your blood moving against the walls of your arteries  Normal BP is less than 120/80  Prehypertension is between 120/80 and 139/89  Hypertension is 140/90 or higher  Hypertension causes your BP to get so high that your heart has to work much harder than normal  This can damage your heart  Chronic hypertension is a long-term condition that you can control with a healthy lifestyle or medicines  A controlled blood pressure helps protect your organs, such as your heart, lungs, brain, and kidneys  DISCHARGE INSTRUCTIONS:   Call 911 for any of the following:   · You have discomfort in your chest that feels like squeezing, pressure, fullness, or pain  · You become confused or have difficulty speaking  · You suddenly feel lightheaded or have trouble breathing  · You have pain or discomfort in your back, neck, jaw, stomach, or arm  Seek care immediately if:   · You have a severe headache or vision loss  · You have weakness in an arm or leg  Contact your healthcare provider if:   · You feel faint, dizzy, confused, or drowsy  · You have been taking your BP medicine and your BP is still higher than your healthcare provider says it should be  · You have questions or concerns about your condition or care  Medicines: You may need any of the following:  · Medicine  may be used to help lower your BP   You may need more than one type of medicine  Take the medicine exactly as directed  · Diuretics  help decrease extra fluid that collects in your body  This will help lower your BP  You may urinate more often while you take this medicine  · Cholesterol medicine  helps lower your cholesterol level  A low cholesterol level helps prevent heart disease and makes it easier to control your blood pressure  · Take your medicine as directed  Contact your healthcare provider if you think your medicine is not helping or if you have side effects  Tell him or her if you are allergic to any medicine  Keep a list of the medicines, vitamins, and herbs you take  Include the amounts, and when and why you take them  Bring the list or the pill bottles to follow-up visits  Carry your medicine list with you in case of an emergency  Follow up with your healthcare provider as directed: You will need to return to have your blood pressure checked and to have other lab tests done  Write down your questions so you remember to ask them during your visits  Manage chronic hypertension:  Talk with your healthcare provider about these and other ways to manage hypertension:  · Take your BP at home  Sit and rest for 5 minutes before you take your BP  Extend your arm and support it on a flat surface  Your arm should be at the same level as your heart  Follow the directions that came with your BP monitor  If possible, take at least 2 BP readings each time  Take your BP at least twice a day at the same times each day, such as morning and evening  Keep a record of your BP readings and bring it to your follow-up visits  Ask your healthcare provider what your blood pressure should be  · Limit sodium (salt) as directed  Too much sodium can affect your fluid balance  Check labels to find low-sodium or no-salt-added foods  Some low-sodium foods use potassium salts for flavor  Too much potassium can also cause health problems   Your healthcare provider will tell you how much sodium and potassium are safe for you to have in a day  He or she may recommend that you limit sodium to 2,300 mg a day  · Follow the meal plan recommended by your healthcare provider  A dietitian or your provider can give you more information on low-sodium plans or the DASH (Dietary Approaches to Stop Hypertension) eating plan  The DASH plan is low in sodium, unhealthy fats, and total fat  It is high in potassium, calcium, and fiber  · Exercise to maintain a healthy weight  Exercise at least 30 minutes per day, on most days of the week  This will help decrease your blood pressure  Ask about the best exercise plan for you  · Decrease stress  This may help lower your BP  Learn ways to relax, such as deep breathing or listening to music  · Limit alcohol  Women should limit alcohol to 1 drink a day  Men should limit alcohol to 2 drinks a day  A drink of alcohol is 12 ounces of beer, 5 ounces of wine, or 1½ ounces of liquor  · Do not smoke  Nicotine and other chemicals in cigarettes and cigars can increase your BP and also cause lung damage  Ask your healthcare provider for information if you currently smoke and need help to quit  E-cigarettes or smokeless tobacco still contain nicotine  Talk to your healthcare provider before you use these products  © 2017 [address] [employer]  Information is for End User's use only and may not be sold, redistributed or otherwise used for commercial purposes  All illustrations and images included in CareNotes® are the copyrighted property of [employer]  or Silvio Ramos  The above information is an  only  It is not intended as medical advice for individual conditions or treatments  Talk to your doctor, nurse or pharmacist before following any medical regimen to see if it is safe and effective for you      Chronic Hypertension   WHAT YOU NEED TO KNOW:   What is chronic hypertension? Hypertension is high blood pressure (BP)  Your BP is the force of your blood moving against the walls of your arteries  Normal BP is less than 120/80  Prehypertension is between 120/80 and 139/89  Hypertension is 140/90 or higher  Hypertension causes your BP to get so high that your heart has to work much harder than normal  This can damage your heart  Chronic hypertension is a long-term condition that you can control with a healthy lifestyle or medicines  A controlled blood pressure helps protect your organs, such as your heart, lungs, brain, and kidneys  What increases my risk for chronic hypertension? · Age older than 54 years (men)    · Age older than 72 years (women)    · A family history of hypertension or heart disease     · Obesity or lack of exercise     · Eating too much sodium (salt)    · Stress     · Use of tobacco, alcohol, or illegal drugs     · A medical condition, such as diabetes, high cholesterol, or kidney disease    · Medicines, such as steroids or birth control pills  What are the signs and symptoms of chronic hypertension? You may have no signs or symptoms, or you may have any of the following:  · Headache     · Blurred vision    · Chest pain     · Dizziness or weakness     · Trouble breathing     · Nosebleeds  How is chronic hypertension diagnosed? Your healthcare provider will ask about your symptoms and the medicines you take  He will also ask if you have a family history of high blood pressure and about any health conditions you have  He will also check your blood pressure and weight and examine your heart, lungs, and eyes  You may need any of the following tests:  · Blood tests  may help healthcare providers find the cause of your hypertension  Blood tests can also help find other health problems caused by hypertension  · Urine tests  will be done to check your kidneys  How is chronic hypertension treated?   Your healthcare provider will recommend lifestyle changes to lower your BP  You may also need the following medicines:  · Medicine  may be used to help lower your B  You may need more than one type of medicine  Take the medicine exactly as directed  · Diuretics  help decrease extra fluid that collects in your body  This will help lower your BP  You may urinate more often while you take this medicine  · Cholesterol medicine  helps lower your cholesterol level  A low cholesterol level helps prevent heart disease and makes it easier to control your blood pressure  What can I do to manage chronic hypertension? Talk with your healthcare provider about these and other ways to manage chronic hypertension:  · Take your BP at home  Sit and rest for 5 minutes before you take your BP  Extend your arm and support it on a flat surface  Your arm should be at the same level as your heart  Follow the directions that came with your BP monitor  If possible, take at least 2 BP readings each time  Take your BP at least twice a day at the same times each day, such as morning and evening  Keep a record of your BP readings and bring it to your follow-up visits  Ask your healthcare provider what your blood pressure should be  · Limit sodium (salt) as directed  Too much sodium can affect your fluid balance  Check labels to find low-sodium or no-salt-added foods  Some low-sodium foods use potassium salts for flavor  Too much potassium can also cause health problems  Your healthcare provider will tell you how much sodium and potassium are safe for you to have in a day  He or she may recommend that you limit sodium to 2,300 mg a day  · Follow the meal plan recommended by your healthcare provider  A dietitian or your provider can give you more information on low-sodium plans or the DASH (Dietary Approaches to Stop Hypertension) eating plan  The DASH plan is low in sodium, unhealthy fats, and total fat  It is high in potassium, calcium, and fiber             · Exercise to maintain a healthy weight  Exercise at least 30 minutes per day, on most days of the week  This will help decrease your blood pressure  Ask about the best exercise plan for you  · Decrease stress  This may help lower your BP  Learn ways to relax, such as deep breathing or listening to music  · Limit alcohol  Women should limit alcohol to 1 drink a day  Men should limit alcohol to 2 drinks a day  A drink of alcohol is 12 ounces of beer, 5 ounces of wine, or 1½ ounces of liquor  · Do not smoke  Nicotine and other chemicals in cigarettes and cigars can increase your BP and also cause lung damage  Ask your healthcare provider for information if you currently smoke and need help to quit  E-cigarettes or smokeless tobacco still contain nicotine  Talk to your healthcare provider before you use these products  Call 911 for any of the following:   · You have discomfort in your chest that feels like squeezing, pressure, fullness, or pain  · You become confused or have difficulty speaking  · You suddenly feel lightheaded or have trouble breathing  · You have pain or discomfort in your back, neck, jaw, stomach, or arm  When should I seek immediate care? · You have a severe headache or vision loss  · You have weakness in an arm or leg  When should I contact my healthcare provider? · You feel faint, dizzy, confused, or drowsy  · You have been taking your BP medicine and your BP is still higher than your healthcare provider says it should be  · You have questions or concerns about your condition or care  CARE AGREEMENT:   You have the right to help plan your care  Learn about your health condition and how it may be treated  Discuss treatment options with your caregivers to decide what care you want to receive  You always have the right to refuse treatment  The above information is an  only  It is not intended as medical advice for individual conditions or treatments   Talk to your doctor, nurse or pharmacist before following any medical regimen to see if it is safe and effective for you  © 2017 [address] [employer] Information is for End User's use only and may not be sold, redistributed or otherwise used for commercial purposes  All illustrations and images included in CareNotes® are the copyrighted property of [employer]  or Silvio Christianson   WHAT YOU NEED TO KNOW:   What is gastritis? Gastritis is inflammation or irritation of the lining of your stomach  What increases my risk for gastritis? · Infection with bacteria, a virus, or a parasite    · NSAIDs, aspirin, or steroid medicine    · Use of tobacco products or alcohol    · Trauma such as an injury to your stomach or intestine    · Autoimmune disorders such as diabetes, thyroid disease, or Crohn disease    · Stress    · Age older than 60 years    · Illegal drugs, such as cocaine  What are the signs and symptoms of gastritis? · Stomach pain, burning, or tenderness when you press on it    · Stomach fullness or tightness    · Nausea or vomiting    · Loss of appetite, or feeling full quickly when you eat    · Bad breath    · Fatigue or feeling more tired than usual    · Heartburn  How is gastritis diagnosed? Your healthcare provider will ask about your signs and symptoms and examine you  You may need any of the following:  · Blood tests  may be used to show an infection, dehydration, or anemia (low red blood cell levels)  · A bowel movement sample  may be tested for blood or the germ that may be causing your gastritis  · A breath test  may show if H pylori is causing your gastritis  You will be given a liquid to drink  Then you will breathe into a bag  Your healthcare provider will measure the amount of carbon dioxide in your breath  Extra amounts of carbon dioxide may mean you have an H pylori infection      · An endoscopy  may be used to look for irritation or bleeding in your stomach  Your healthcare provider will use an endoscope (tube with a light and camera on the end) during the procedure  He or she may take a sample from your stomach to be tested  How is gastritis treated? Your symptoms may go away without treatment  Treatment will depend on what is causing your gastritis  Your healthcare provider may recommend changes to the medicines you take  Medicines may be given to help treat a bacterial infection or decrease stomach acid  How can I manage or prevent gastritis? · Do not smoke  Nicotine and other chemicals in cigarettes and cigars can make your symptoms worse and cause lung damage  Ask your healthcare provider for information if you currently smoke and need help to quit  E-cigarettes or smokeless tobacco still contain nicotine  Talk to your healthcare provider before you use these products  · Do not drink alcohol  Alcohol can prevent healing and make your gastritis worse  Talk to your healthcare provider if you need help to stop drinking  · Do not take NSAIDs or aspirin unless directed  These and similar medicines can cause irritation of your stomach lining  If your healthcare provider says it is okay to take NSAIDs, take them with food  · Do not eat foods that cause irritation  Foods such as oranges and salsa can cause burning or pain  Eat a variety of healthy foods  Examples include fruits (not citrus), vegetables, low-fat dairy products, beans, whole-grain breads, and lean meats and fish  Try to eat small meals, and drink water with your meals  Do not eat for at least 3 hours before you go to bed  · Find ways to relax and decrease stress  Stress can increase stomach acid and make gastritis worse  Activities such as yoga, meditation, or listening to music can help you relax  Spend time with friends, or do things you enjoy  Call 911 for any of the following:   · You develop chest pain or shortness of breath  When should I seek immediate care? · You vomit blood  · You have black or bloody bowel movements  · You have severe stomach or back pain  When should I contact my healthcare provider? · You have a fever  · You have new or worsening symptoms, even after treatment  · You have questions or concerns about your condition or care  CARE AGREEMENT:   You have the right to help plan your care  Learn about your health condition and how it may be treated  Discuss treatment options with your caregivers to decide what care you want to receive  You always have the right to refuse treatment  The above information is an  only  It is not intended as medical advice for individual conditions or treatments  Talk to your doctor, nurse or pharmacist before following any medical regimen to see if it is safe and effective for you  © 2017 [address]  Information is for End User's use only and may not be sold, redistributed or otherwise used for commercial purposes  All illustrations and images included in CareNotes® are the copyrighted property of [employer]  or Silvio Ramos  Gastritis   WHAT YOU NEED TO KNOW:   Gastritis is inflammation or irritation of the lining of your stomach  DISCHARGE INSTRUCTIONS:   Call 911 for any of the following:   · You develop chest pain or shortness of breath  Seek care immediately if:   · You vomit blood  · You have black or bloody bowel movements  · You have severe stomach or back pain  Contact your healthcare provider if:   · You have a fever  · You have new or worsening symptoms, even after treatment  · You have questions or concerns about your condition or care  Medicines:   · Medicines  may be given to help treat a bacterial infection or decrease stomach acid  · Take your medicine as directed  Contact your healthcare provider if you think your medicine is not helping or if you have side effects   Tell him or her if you are allergic to any medicine  Keep a list of the medicines, vitamins, and herbs you take  Include the amounts, and when and why you take them  Bring the list or the pill bottles to follow-up visits  Carry your medicine list with you in case of an emergency  Manage or prevent gastritis:   · Do not smoke  Nicotine and other chemicals in cigarettes and cigars can make your symptoms worse and cause lung damage  Ask your healthcare provider for information if you currently smoke and need help to quit  E-cigarettes or smokeless tobacco still contain nicotine  Talk to your healthcare provider before you use these products  · Do not drink alcohol  Alcohol can prevent healing and make your gastritis worse  Talk to your healthcare provider if you need help to stop drinking  · Do not take NSAIDs or aspirin unless directed  These and similar medicines can cause irritation  If your healthcare provider says it is okay to take NSAIDs, take them with food  · Do not eat foods that cause irritation  Foods such as oranges and salsa can cause burning or pain  Eat a variety of healthy foods  Examples include fruits (not citrus), vegetables, low-fat dairy products, beans, whole-grain breads, and lean meats and fish  Try to eat small meals, and drink water with your meals  Do not eat for at least 3 hours before you go to bed  · Find ways to relax and decrease stress  Stress can increase stomach acid and make gastritis worse  Activities such as yoga, meditation, or listening to music can help you relax  Spend time with friends, or do things you enjoy  Follow up with your healthcare provider as directed: You may need ongoing tests or treatment, or referral to a gastroenterologist  Write down your questions so you remember to ask them during your visits  © 2017 [facility] Information is for End User's use only and may not be sold, redistributed or otherwise used for commercial purposes   All illustrations and images included in mgMEDIA 605 are the copyrighted property of [employer]  or Silvio Ramos  The above information is an  only  It is not intended as medical advice for individual conditions or treatments  Talk to your doctor, nurse or pharmacist before following any medical regimen to see if it is safe and effective for you  Heart Failure, Ambulatory Care   GENERAL INFORMATION:   Heart failure  happens when your heart has become too weak to pump enough blood to your organs and tissues  Heart failure is often the result of damage or injury to your heart caused by other heart problems and high blood pressure  Heart failure is a long-term condition that tends to get worse over time  Common symptoms include the following:   · Shortness of breath     · Fatigue or lack of energy (often worsened by physical activity)     · Swelling in your ankles, legs, or abdomen    · Coughing or wheezing     · Recent weight gain or weight loss     · Confusion or poor ability to concentrate  Seek immediate care for the following symptoms:   · Squeezing, pressure, or pain in your chest that lasts longer than 5 minutes or returns    · Discomfort or pain in your back, neck, jaw, stomach, or arm     · Trouble breathing    · Nausea or vomiting    · Lightheadedness or a sudden cold sweat, especially with chest pain or trouble breathing    · Gaining 3 or more pounds (1 4 kg) in a day (or more than your healthcare provider says you should)    · Heartbeat that is fast, slow, or uneven all the time  Treatment for heart failure  Treatment for heart failure may include any of the following:  · Heart medicines  help regulate your heart rhythm, lower your blood pressure, and get rid of extra fluids  · Antiplatelets , such as aspirin, help prevent blood clots  Take your antiplatelet medicine exactly as directed  These medicines make it more likely for you to bleed or bruise   If you are told to take aspirin, do not take acetaminophen or ibuprofen instead  · Anticoagulants    are a type of blood thinner medicine that helps prevent clots  Clots can cause strokes, heart attacks, and death  These medicines may cause you to bleed or bruise more easily  ¨ Watch for bleeding from your gums or nose  Watch for blood in your urine and bowel movements  Use a soft washcloth and a soft toothbrush  If you shave, use an electric razor  Avoid activities that can cause bruising or bleeding  ¨ Tell your healthcare provider about all medicines you take because many medicines cannot be used with anticoagulants  Do not start or stop any medicines unless your healthcare provider tells you to  Tell your dentist and other healthcare providers that you take anticoagulants  Wear a bracelet or necklace that says you take this medicine  ¨ You will need regular blood tests so your healthcare provider can decide how much medicine you need  Take anticoagulants exactly as directed  Tell your healthcare provider right away if you forget to take the medicine, or if you take too much  ¨ If you take warfarin, some foods can change how your blood clots  Do not make major changes to your diet while you take warfarin  Warfarin works best when you eat about the same amount of vitamin K every day  Vitamin K is found in green leafy vegetables, broccoli, grapes, and other foods  Ask for more information about what to eat when you take warfarin  · Cardiac rehab  is a program run by specialists who will help you safely strengthen your heart  The program includes exercise, relaxation, stress management, and heart-healthy nutrition  · Oxygen  may help you breathe easier if your oxygen level is lower than normal  A CPAP machine may be used to keep your airway open while you sleep  · Surgery  can be done to implant a pacemaker in your chest to regulate your heart rhythm   Other types of surgery can open blocked heart vessels, replace a damaged heart valve, or remove scar tissue  Manage heart failure:   · Weigh yourself every morning  using the same scale, in the same spot  Do this after you use the bathroom, but before you eat or drink anything  Wear the same type of clothing  Do not wear shoes  Record your weight each day so you will notice any sudden weight gain  Swelling and weight gain are signs of fluid retention  If you are overweight, ask your healthcare provider how to lose weight safely  · Check your blood pressure and heart rate every day  Ask for more information about how to measure your blood pressure and heart rate correctly  Ask what these numbers should be for you  Check your blood sugar as directed if you have diabetes  You will have fewer symptoms if you manage other health conditions such as high blood pressure, COPD, or diabetes  · Get a flu shot every year  You should also get a pneumonia vaccine  Vaccines protect you from these infections, which can be severe for those with heart failure  · Limit the amount of liquids you drink if you retain fluid  Ask your healthcare provider how much liquid to drink each day and which liquids are best for you  Follow up with your healthcare provider as directed:  Write down your questions so you remember to ask them during your visits  CARE AGREEMENT:   You have the right to help plan your care  Learn about your health condition and how it may be treated  Discuss treatment options with your caregivers to decide what care you want to receive  You always have the right to refuse treatment  The above information is an  only  It is not intended as medical advice for individual conditions or treatments  Talk to your doctor, nurse or pharmacist before following any medical regimen to see if it is safe and effective for you  © 2014 [address] is for End User's use only and may not be sold, redistributed or otherwise used for commercial purposes  All illustrations and images included in CareNotes® are the copyrighted property of [employer]  or Silvio Ramos  Heart Failure   WHAT YOU NEED TO KNOW:   Heart failure (HF) is a condition that does not allow your heart to fill or pump properly  Not enough oxygen in your blood gets to your organs and tissues  HF can occur in the right side, the left side, or both lower chambers of your heart  HF is often caused by damage or injury to your heart  The damage may be caused by heart attack, other heart conditions, or high blood pressure  HF is a long-term condition that tends to get worse over time  It is important to manage your health to improve your quality of life  HF can be worsened by heavy alcohol use, smoking, diabetes that is not controlled, or obesity  DISCHARGE INSTRUCTIONS:   Call 911 if:   · You have any of the following signs of a heart attack:      ¨ Squeezing, pressure, or pain in your chest that lasts longer than 5 minutes or returns    ¨ Discomfort or pain in your back, neck, jaw, stomach, or arm     ¨ Trouble breathing    ¨ Nausea or vomiting    ¨ Lightheadedness or a sudden cold sweat, especially with chest pain or trouble breathing    Seek care immediately if:   · You gain 3 or more pounds (1 4 kg) in a day, or more than your healthcare provider says you should  · Your heartbeat is fast, slow, or uneven all the time  Contact your healthcare provider if:   · You have symptoms of worsening HF:      ¨ Shortness of breath at rest, at night, or that is getting worse in any way     ¨ Weight gain of 5 or more pounds (2 2 kg) in a week     ¨ More swelling in your legs or ankles     ¨ Abdominal pain or swelling     ¨ More coughing     ¨ Loss of appetite     ¨ Feeling tired all the time    · You feel hopeless or depressed, or you have lost interest in things you used to enjoy  · You often feel worried or afraid       · You have questions or concerns about your condition or care   Medicines: You may  need any of the following:  · Medicines  may be given to help regulate your heart rhythm  You may also need medicines to lower your blood pressure, and to get rid of extra fluids  · Take your medicine as directed  Contact your healthcare provider if you think your medicine is not helping or if you have side effects  Tell him or her if you are allergic to any medicine  Keep a list of the medicines, vitamins, and herbs you take  Include the amounts, and when and why you take them  Bring the list or the pill bottles to follow-up visits  Carry your medicine list with you in case of an emergency  Follow up with your healthcare provider or cardiologist as directed: You may need to return for other tests  You may need home health care  A healthcare provider will monitor your vital signs, weight, and make sure your medicines are working  Write down your questions so you remember to ask them during your visits  Go to cardiac rehab as directed:  Cardiac rehab is a program run by specialists who will help you safely strengthen your heart  The program includes exercise, relaxation, stress management, and heart-healthy nutrition  Healthcare providers will also make sure your medicines are helping to reduce your symptoms  Manage your HF:  · Do not smoke  Nicotine and other chemicals in cigarettes and cigars can cause lung damage and make HF difficult to manage  Ask your healthcare provider for information if you currently smoke and need help to quit  E-cigarettes or smokeless tobacco still contain nicotine  Talk to your healthcare provider before you use these products  · Do not drink alcohol or take illegal drugs  Alcohol and drugs can worsen your symptoms quickly  · Weigh yourself every morning  Use the same scale, in the same spot  Do this after you use the bathroom, but before you eat or drink anything  Wear the same type of clothing  Do not wear shoes   Record your weight each day so you will notice any sudden weight gain  Swelling and weight gain are signs of fluid retention  If you are overweight, ask how to lose weight safely  · Check your blood pressure and heart rate every day  Ask for more information about how to measure your blood pressure and heart rate correctly  Ask what these numbers should be for you  · Manage any chronic health conditions you have  These include high blood pressure, diabetes, obesity, high cholesterol, metabolic syndrome, and COPD  You will have fewer symptoms if you manage these health conditions  Follow your healthcare provider's recommendations and follow up with him or her regularly  · Eat heart-healthy foods and limit sodium (salt)  An easy way to do this is to eat more fresh fruits and vegetables and fewer canned and processed foods  Replace butter and margarine with heart-healthy oils such as olive oil and canola oil  Other heart-healthy foods include walnuts, whole-grain breads, low-fat dairy products, beans, and lean meats  Fatty fish such as salmon and tuna are also heart healthy  Ask how much salt you can eat each day  Do not use salt substitutes  · Drink liquids as directed  You may need to limit the amount of liquids you drink if you retain fluid  Ask how much liquid to drink each day and which liquids are best for you  · Stay active  If you are not active, your symptoms are likely to worsen quickly  Walking, bicycling, and other types of physical activity help maintain your strength and improve your mood  Physical activity also helps you manage your weight  Work with your healthcare provider to create an exercise plan that is right for you  · Get vaccines as directed  Get a flu shot every year  You may also need the pneumonia vaccine  The flu and pneumonia can be severe for a person who has HF  Vaccines protect you from these infections  Join a support group:  Living with HF can be difficult   It may be helpful to talk with others who have HF  You may learn how to better manage your condition or get emotional support  For more information:   · [address]  [place] , [place]   Phone: 1- 118 - [phone]  Web Address: https://www strong com/  org   © 2017 [address] Information is for End User's use only and may not be sold, redistributed or otherwise used for commercial purposes  All illustrations and images included in CareNotes® are the copyrighted property of [employer]  or Silvio Ramos  The above information is an  only  It is not intended as medical advice for individual conditions or treatments  Talk to your doctor, nurse or pharmacist before following any medical regimen to see if it is safe and effective for you  Hyponatremia   WHAT YOU NEED TO KNOW:   Hyponatremia occurs when the amount of sodium (salt) in your blood is lower than normal  Sodium is an electrolyte (mineral) that helps your muscles, heart, and digestive system work properly  It helps control blood pressure and fluid balance  DISCHARGE INSTRUCTIONS:   Follow up with your healthcare provider as directed: You may need to return for more tests  Write down your questions so you remember to ask them during your visits  Nutrition:  You may need to increase your intake of sodium  Foods that are high in sodium include milk, packaged snacks such as pretzels, or processed meats (mittal, sausage, and ham)  Ask your dietitian to help you create a meal plan that is right for you  Liquids: Follow your healthcare provider's advice if you need to limit the amount of liquid you drink  Ask how much liquid to drink each day and which liquids are best for you  You may be asked to drink liquids that have water, sugar, and salt, such as juices, milk, or sports drinks  These liquids help your body hold in fluid and prevent dehydration     Contact your healthcare provider if: · You have muscle cramps or twitching  · You feel very weak or tired  · You have nausea or are vomiting  · You have questions or concerns about your condition or care  Seek care immediately or call 911 if:   · You have a seizure  · You have an irregular heartbeat  · You have trouble breathing  · You cannot move your arms and legs  · You are confused or cannot think clearly  © 2017 [facility] Information is for End User's use only and may not be sold, redistributed or otherwise used for commercial purposes  All illustrations and images included in CareNotes® are the copyrighted property of [employer] M [employer]  or Silvio Ramos  The above information is an  only  It is not intended as medical advice for individual conditions or treatments  Talk to your doctor, nurse or pharmacist before following any medical regimen to see if it is safe and effective for you  Shortness of Breath   WHAT YOU NEED TO KNOW:   Shortness of breath is a feeling that you cannot get enough air when you breathe in  You may have this feeling only during activity, or all the time  Your symptoms can range from mild to severe  Shortness of breath may be a sign of a serious health condition that needs immediate care  DISCHARGE INSTRUCTIONS:   Seek care immediately if:   · Your signs and symptoms are the same or worse within 24 hours of treatment  · The skin over your ribs or on your neck sinks in when you breathe  · You feel confused or dizzy  Contact your healthcare provider if:   · You have new or worsening symptoms  · You have questions or concerns about your condition or care  Medicines:   · Medicines  may be used to treat the cause of your symptoms  You may need medicine to treat a bacterial infection or reduce anxiety  Other medicines may be used to open your airway, reduce swelling, or remove extra fluid   If you have a heart condition, you may need medicine to help your heart beat more strongly or regularly  · Take your medicine as directed  Contact your healthcare provider if you think your medicine is not helping or if you have side effects  Tell him or her if you are allergic to any medicine  Keep a list of the medicines, vitamins, and herbs you take  Include the amounts, and when and why you take them  Bring the list or the pill bottles to follow-up visits  Carry your medicine list with you in case of an emergency  Manage shortness of breath:   · Create an action plan  You and your healthcare provider can work together to create a plan for how to handle shortness of breath  The plan can include daily activities, treatment changes, and what to do if you have severe breathing problems  · Lean forward on your elbows when you sit  This helps your lungs expand and may make it easier to breathe  · Use pursed-lip breathing any time you feel short of breath  Breathe in through your nose and then slowly breathe out through your mouth with your lips slightly puckered  It should take you twice as long to breathe out as it did to breathe in  · Do not smoke  Nicotine and other chemicals in cigarettes and cigars can cause lung damage and make shortness of breath worse  Ask your healthcare provider for information if you currently smoke and need help to quit  E-cigarettes or smokeless tobacco still contain nicotine  Talk to your healthcare provider before you use these products  · Reach or maintain a healthy weight  Your healthcare provider can help you create a safe weight loss plan if you are overweight  · Exercise as directed  Exercise can help your lungs work more easily  Exercise can also help you lose weight if needed  Try to get at least 30 minutes of exercise most days of the week  Follow up with your healthcare provider or specialist as directed:  Write down your questions so you remember to ask them during your visits    © 2017 [facility] [address] [facility] is for End User's use only and may not be sold, redistributed or otherwise used for commercial purposes  All illustrations and images included in CareNotes® are the copyrighted property of A [employer] M [employer]  or Silvio Ramos  The above information is an  only  It is not intended as medical advice for individual conditions or treatments  Talk to your doctor, nurse or pharmacist before following any medical regimen to see if it is safe and effective for you

## 2017-11-05 NOTE — PROGRESS NOTES
While taking pt to bathroom she c/o slight Chest Pressure, pt did not c/o of any pain, Dr Tonya Horta made aware, pt is currently stable and resting, will continue to monitor

## 2017-11-05 NOTE — DISCHARGE SUMMARY
Discharge Summary - [facility] Internal Medicine    Patient Information: Rebekah Olguin 80 y o  female MRN: [id number]  Unit/Bed#: 528-97 Encounter: [id number]    Discharging Physician / Practitioner: Mary Neal MD  PCP: Sophie Reid DO  Admission Date: 11/4/2017  Discharge Date: 11/05/17    Reason for Admission:  Abnormal labs as an outpatient outpatient and shortness of breath    Discharge Diagnoses:     Principal Problem (Resolved): Hyponatremia  Active Problems:    Bilateral pleural effusion    Essential hypertension    Gastritis    Valvular heart disease    Acute on chronic diastolic congestive heart failure (HCC)    Pulmonary hypertension    Aortic stenosis  Resolved Problems:    Shortness of breath      Consultations During Hospital Stay:  · None    Procedures Performed:     · Doppler venous ultrasound bilateral lower limbs  · Chest x-ray    Significant Findings / Test Results:     · Doppler venous ultrasound bilateral lower limbs:  Negative for DVT  · Chest x-ray:  Interstitial and vascular congestion with bibasilar airspace opacities and moderate pleural effusions    Incidental Findings:   · As above     Test Results Pending at Discharge (will require follow up): · None     Outpatient Tests Requested:  · None    Complications:  None    Hospital Course:     Rebekah Olguin is a 80 y o  female patient who originally presented to the hospital on 11/4/2017 due to abnormal lab results and shortness of breath  Patient did get lab workup done by her primary care physician where her sodium showed 119 and her primary care refer her to the emergency room  Repeat labs in the emergency room showed a sodium is 131  Patient is asymptomatic  Patient did complain of shortness of breath and a chest x-ray done in the ED showed pulmonary vascular congestion  Patient does have bilateral pedal edema  Patient has a history of chronic diastolic congestive heart failure and is on 40 mg of p o   Lasix at home  Patient denies fever, chills, nausea, vomiting, diarrhea, abdominal pain, chest pain, palpitations  Patient was admitted to telemetry  Patient repeat labs sodium was within normal limits at 1:35 a m   IV Lasix 40 mg has helped the patient  Shortness of breath has resolved  Lungs are clear to auscultate bilateral renal the lung fields  Doppler venous ultrasound bilateral lower limbs was done on was negative for DVT  Patient wanted to go home today  Patient wanted home health care physical therapy, occupational therapy to visit her at home  Condition at Discharge: good     Discharge Day Visit / Exam:     Subjective:  Patient seen and examined at bedside  Patient denies fever, chills, nausea, vomiting, diarrhea, abdominal pain, chest pain, palpitations, shortness of breath, headache, dizziness, blurring of vision  Vitals: Blood Pressure: 114/56 (11/05/17 0722)  Pulse: 72 (11/05/17 0722)  Temperature: 98 2 °F (36 8 °C) (11/05/17 0722)  Temp Source: Temporal (11/05/17 0722)  Respirations: 18 (11/05/17 0722)  Height: 5' 2" (157 5 cm) (11/04/17 1740)  Weight - Scale: 66 7 kg (147 lb 0 8 oz) (11/05/17 0548)  SpO2: 92 % (11/05/17 0722)  Exam:   Physical Exam   Constitutional: She is oriented to person, place, and time  She appears well-developed and well-nourished  HENT:   Head: Normocephalic and atraumatic  Eyes: Conjunctivae and EOM are normal  Pupils are equal, round, and reactive to light  Neck: Normal range of motion  Neck supple  Cardiovascular: Normal rate, regular rhythm, normal heart sounds and intact distal pulses  Pulmonary/Chest: Effort normal and breath sounds normal    Abdominal: Soft  Bowel sounds are normal    Musculoskeletal: Normal range of motion  Neurological: She is alert and oriented to person, place, and time  She has normal reflexes  Skin: Skin is warm and dry  Nursing note and vitals reviewed            Discharge instructions/Information to patient and family: See after visit summary for information provided to patient and family  Provisions for Follow-Up Care:  See after visit summary for information related to follow-up care and any pertinent home health orders  Disposition:     Home with VNA Services (Reminder: Complete face to face encounter)    For Discharges to [facility]:   · Not Applicable to this Patient - Not Applicable to this Patient    Planned Readmission:  No     Discharge Statement:  I spent 60 minutes discharging the patient  This time was spent on the day of discharge  I had direct contact with the patient on the day of discharge  Greater than 50% of the total time was spent examining patient, answering all patient questions, arranging and discussing plan of care with patient as well as directly providing post-discharge instructions  Additional time then spent on discharge activities  Discharge Medications:  See after visit summary for reconciled discharge medications provided to patient and family        ** Please Note: This note has been constructed using a voice recognition system **

## 2017-11-05 NOTE — PLAN OF CARE
Problem: Potential for Falls  Goal: Patient will remain free of falls  INTERVENTIONS:  - Assess patient frequently for physical needs  -  Identify cognitive and physical deficits and behaviors that affect risk of falls  -  [place] fall precautions as indicated by assessment   - Educate patient/family on patient safety including physical limitations  - Instruct patient to call for assistance with activity based on assessment  - Modify environment to reduce risk of injury  - Consider OT/PT consult to assist with strengthening/mobility   Outcome: Progressing      Problem: Prexisting or High Potential for Compromised Skin Integrity  Goal: Skin integrity is maintained or improved  INTERVENTIONS:  - Identify patients at risk for skin breakdown  - Assess and monitor skin integrity  - Assess and monitor nutrition and hydration status  - Monitor labs (i e  albumin)  - Assess for incontinence   - Turn and reposition patient  - Assist with mobility/ambulation  - Relieve pressure over bony prominences  - Avoid friction and shearing  - Provide appropriate hygiene as needed including keeping skin clean and dry  - Evaluate need for skin moisturizer/barrier cream  - Collaborate with interdisciplinary team (i e  Nutrition, Rehabilitation, etc )   - Patient/family teaching   Outcome: Progressing      Problem: Nutrition/Hydration-ADULT  Goal: Nutrient/Hydration intake appropriate for improving, restoring or maintaining nutritional needs  Monitor and assess patient's nutrition/hydration status for malnutrition (ex- brittle hair, bruises, dry skin, pale skin and conjunctiva, muscle wasting, smooth red tongue, and disorientation)  Collaborate with interdisciplinary team and initiate plan and interventions as ordered  Monitor patient's weight and dietary intake as ordered or per policy  Utilize nutrition screening tool and intervene per policy   Determine patient's food preferences and provide high-protein, high-caloric foods as appropriate       INTERVENTIONS:  - Monitor oral intake, urinary output, labs, and treatment plans  - Assess nutrition and hydration status and recommend course of action  - Evaluate amount of meals eaten  - Assist patient with eating if necessary   - Allow adequate time for meals  - Recommend/ encourage appropriate diets, oral nutritional supplements, and vitamin/mineral supplements  - Order, calculate, and assess calorie counts as needed  - Recommend, monitor, and adjust tube feedings and TPN/PPN based on assessed needs  - Assess need for intravenous fluids  - Provide specific nutrition/hydration education as appropriate  - Include patient/family/caregiver in decisions related to nutrition   Outcome: Progressing      Problem: INFECTION - ADULT  Goal: Absence or prevention of progression during hospitalization  INTERVENTIONS:  - Assess and monitor for signs and symptoms of infection  - Monitor lab/diagnostic results  - Monitor all insertion sites, i e  indwelling lines, tubes, and drains  - Monitor endotracheal (as able) and nasal secretions for changes in amount and color  - [place] appropriate cooling/warming therapies per order  - Administer medications as ordered  - Instruct and encourage patient and family to use good hand hygiene technique  - Identify and instruct in appropriate isolation precautions for identified infection/condition   Outcome: Progressing    Goal: Absence of fever/infection during neutropenic period  INTERVENTIONS:  - Monitor WBC  - Implement neutropenic guidelines   Outcome: Progressing      Problem: SAFETY ADULT  Goal: Maintain or return to baseline ADL function  INTERVENTIONS:  -  Assess patient's ability to carry out ADLs; assess patient's baseline for ADL function and identify physical deficits which impact ability to perform ADLs (bathing, care of mouth/teeth, toileting, grooming, dressing, etc )  - Assess/evaluate cause of self-care deficits   - Assess range of motion  - Assess patient's mobility; develop plan if impaired  - Assess patient's need for assistive devices and provide as appropriate  - Encourage maximum independence but intervene and supervise when necessary  ¯ Involve family in performance of ADLs  ¯ Assess for home care needs following discharge   ¯ Request OT consult to assist with ADL evaluation and planning for discharge  ¯ Provide patient education as appropriate   Outcome: Progressing    Goal: Maintain or return mobility status to optimal level  INTERVENTIONS:  - Assess patient's baseline mobility status (ambulation, transfers, stairs, etc )    - Identify cognitive and physical deficits and behaviors that affect mobility  - Identify mobility aids required to assist with transfers and/or ambulation (gait belt, sit-to-stand, lift, walker, cane, etc )  - [place] fall precautions as indicated by assessment  - Record patient progress and toleration of activity level on Mobility SBAR; progress patient to next Phase/Stage  - Instruct patient to call for assistance with activity based on assessment  - Request Rehabilitation consult to assist with strengthening/weightbearing, etc    Outcome: Progressing      Problem: DISCHARGE PLANNING  Goal: Discharge to home or other facility with appropriate resources  INTERVENTIONS:  - Identify barriers to discharge w/patient and caregiver  - Arrange for needed discharge resources and transportation as appropriate  - Identify discharge learning needs (meds, wound care, etc )  - Arrange for interpretive services to assist at discharge as needed  - Refer to Case Management Department for coordinating discharge planning if the patient needs post-hospital services based on physician/advanced practitioner order or complex needs related to functional status, cognitive ability, or social support system   Outcome: Progressing      Problem: Knowledge Deficit  Goal: Patient/family/caregiver demonstrates understanding of disease process, treatment plan, medications, and discharge instructions  Complete learning assessment and assess knowledge base    Interventions:  - Provide teaching at level of understanding  - Provide teaching via preferred learning methods   Outcome: Progressing      Problem: RESPIRATORY - ADULT  Goal: Achieves optimal ventilation and oxygenation  INTERVENTIONS:  - Assess for changes in respiratory status  - Assess for changes in mentation and behavior  - Position to facilitate oxygenation and minimize respiratory effort  - Oxygen administration by appropriate delivery method based on oxygen saturation (per order) or ABGs  - Initiate smoking cessation education as indicated  - Encourage broncho-pulmonary hygiene including cough, deep breathe, Incentive Spirometry  - Assess the need for suctioning and aspirate as needed  - Assess and instruct to report SOB or any respiratory difficulty  - Respiratory Therapy support as indicated   Outcome: Progressing

## 2017-11-05 NOTE — NURSING NOTE
Reviewed discharge instructions with patient  Patient is stable for discharge and in full understanding of all discharge instructions  Patient leaving unit via wheelchair accompanied by RN

## 2017-11-05 NOTE — CASE MANAGEMENT
Initial Clinical Review    Admission: Date/Time/Statement:    OBS  ORDER    11/4  @    1544    Orders Placed This Encounter   Procedures    Place in Observation (expected length of stay for this patient is less than two midnights)     Standing Status:   Standing     Number of Occurrences:   1     Order Specific Question:   Admitting Physician     Answer:   Ayana Schafer     Order Specific Question:   Level of Care     Answer:   Med Surg [16]         ED: Date/Time/Mode of Arrival:   ED Arrival Information     Expected Arrival Acuity Means of Arrival Escorted By Service Admission Type    - 11/4/2017 12:31 Urgent Walk-In Family Member General Medicine Urgent    Arrival Complaint    Hyponatremia          Chief Complaint:   Chief Complaint   Patient presents with    Abnormal Lab     Abnormal lab and Sob        History of Illness:    Ashlee Bullock is a 80 y o  female who presents with abnormal lab results an shortness of breath  Patient did get lab workup done by her primary care physician where her sodium showed 119 and her primary care refer her to the emergency room  Repeat labs in the emergency room showed a sodium is 131  Patient is asymptomatic  Patient did complain of shortness of breath and a chest x-ray done in the ED showed pulmonary vascular congestion  Patient does have bilateral pedal edema  Patient has a history of chronic diastolic congestive heart failure and is on 40 mg of p o  Lasix at home      Patient denies fever, chills, nausea, vomiting, diarrhea, abdominal pain, chest pain, palpitations    ED Vital Signs:   ED Triage Vitals [11/04/17 1254]   Temperature Pulse Respirations Blood Pressure SpO2   98 5 °F (36 9 °C) 94 (!) 24 158/67 96 %      Temp Source Heart Rate Source Patient Position - Orthostatic VS BP Location FiO2 (%)   Temporal Monitor Lying Left arm --      Pain Score       No Pain        Wt Readings from Last 1 Encounters:   11/05/17 66 7 kg (147 lb 0 8 oz)       Vital Signs (abnormal):    above    Abnormal Labs/Diagnostic Test Results:    BNP   490  NA  133  NA  135  (  11/5)  Chloride  98  AST  49  Alk phos    124  Albumin   2 6  PT     15 3        INR     1 22       PTT  44  CXR:   Interstitial and vascular congestion with bibasilar airspace opacities and moderate pleural effusions  ED Treatment:   Medication Administration from 11/04/2017 1051 to 11/04/2017 1717     None          Past Medical/Surgical History: Active Ambulatory Problems     Diagnosis Date Noted    Shortness of breath 03/10/2017    Bilateral pleural effusion 03/10/2017    Essential hypertension 03/10/2017    GERD (gastroesophageal reflux disease) 03/10/2017    Gastritis 03/10/2017    Valvular heart disease 03/11/2017    Acute on chronic diastolic congestive heart failure (HCC) 03/11/2017    Tricuspid regurgitation 03/18/2017    Pulmonary hypertension 03/18/2017    Severe mitral valve stenosis 03/18/2017    Aortic stenosis 03/18/2017    Hypoalbuminemia 03/18/2017    Hepatic encephalopathy (HCC) 03/18/2017    Hepatic cirrhosis ([facility]) 03/18/2017    Hyperbilirubinemia 03/18/2017    Leukopenia 03/18/2017    Neutropenia ([facility]) 03/18/2017    Transaminitis 03/18/2017    Healthcare-associated pneumonia 03/18/2017    Mucosal abnormality of stomach 03/19/2017    Portal hypertension ([facility]) 03/20/2017    Left adrenal mass ([facility]) 03/21/2017     Resolved Ambulatory Problems     Diagnosis Date Noted    Acute encephalopathy 03/17/2017     Past Medical History:   Diagnosis Date    Cancer ([facility])     GERD (gastroesophageal reflux disease)     Hypertension        Admitting Diagnosis: Shortness of breath [R06 02]  Hyponatremia [E87 1]  Pleural effusion [J90]    Age/Sex: 80 y o  female    · Assessment/Plan:   Hyponatremia  ? Admitted to telemetry  ? Vitals as per floor protocol  ? Cardiac diet  ? Labs:  CBC, BMP, BNP, LFTs, INR, PTT  ? Chest x-ray  ? Resume home medications  ? Switch p o   Lasix to IV Lasix 40 mg daily in the hospital  ? Daily weights  ? I/O  ? Xopenex 1 25 mg inhalation t i d   ? DVT prophylaxis:  Lovenox 40 mg subcu daily     Plan for Additional Problems:   · Acute on chronic diastolic congestive heart failure  ? Chest x-ray showed vascular congestion  ? BNP pending  ? Doppler venous ultrasound bilateral lower limbs  · History of valvular disease  · History of pulmonary hypertension  History of artery stenosis  Anticipated Length of Stay:  Patient will be admitted on an Observation basis with an anticipated length of stay of  < 2 midnights     Justification for Hospital Stay:  Shortness of breath       Admission Orders:    OBS  ORDER   11/4  @    1544  Scheduled Meds:   aspirin 81 mg Oral Daily   calcium carbonate 1 tablet Oral Daily With Breakfast   co-enzyme Q-10 30 mg Oral Daily   docusate sodium 100 mg Oral BID   enoxaparin 40 mg Subcutaneous Daily   furosemide 40 mg Intravenous Daily   lactulose 20 g Oral TID   levalbuterol 1 25 mg Nebulization TID   magnesium oxide 400 mg Oral Daily   nadolol 40 mg Oral Daily   pantoprazole 40 mg Oral Early Morning   potassium chloride 20 mEq Oral Daily   rifaximin 550 mg Oral Q12H ZACH   senna 1 tablet Oral Daily   sodium chloride 3 mL Nebulization TID   valsartan 80 mg Oral Daily     Continuous Infusions:    PRN Meds:   aluminum-magnesium hydroxide-simethicone    calcium carbonate    ondansetron     Tele  Cardiac  Diet  PT/OT  B/L  VAS

## 2017-11-05 NOTE — PLAN OF CARE
DISCHARGE PLANNING     Discharge to home or other facility with appropriate resources Progressing        INFECTION - ADULT     Absence or prevention of progression during hospitalization Progressing     Absence of fever/infection during neutropenic period Progressing        Knowledge Deficit     Patient/family/caregiver demonstrates understanding of disease process, treatment plan, medications, and discharge instructions Progressing        Nutrition/Hydration-ADULT     Nutrient/Hydration intake appropriate for improving, restoring or maintaining nutritional needs Progressing        Potential for Falls     Patient will remain free of falls Progressing        Prexisting or High Potential for Compromised Skin Integrity     Skin integrity is maintained or improved Progressing        RESPIRATORY - ADULT     Achieves optimal ventilation and oxygenation Progressing        SAFETY ADULT     Maintain or return to baseline ADL function Progressing     Maintain or return mobility status to optimal level Progressing

## 2017-11-06 LAB
ATRIAL RATE: 89 BPM
P AXIS: 55 DEGREES
PR INTERVAL: 138 MS
QRS AXIS: 91 DEGREES
QRSD INTERVAL: 122 MS
QT INTERVAL: 380 MS
QTC INTERVAL: 462 MS
T WAVE AXIS: -8 DEGREES
VENTRICULAR RATE: 89 BPM

## 2017-11-07 ENCOUNTER — ALLSCRIPTS OFFICE VISIT (OUTPATIENT)
Dept: OTHER | Facility: OTHER | Age: 82
End: 2017-11-07

## 2017-11-07 ENCOUNTER — GENERIC CONVERSION - ENCOUNTER (OUTPATIENT)
Dept: OTHER | Facility: OTHER | Age: 82
End: 2017-11-07

## 2017-11-07 DIAGNOSIS — E87.1 HYPO-OSMOLALITY AND HYPONATREMIA (CODE): ICD-10-CM

## 2017-11-07 DIAGNOSIS — I10 ESSENTIAL (PRIMARY) HYPERTENSION: ICD-10-CM

## 2017-11-07 DIAGNOSIS — I50.30 DIASTOLIC CONGESTIVE HEART FAILURE (HCC): ICD-10-CM

## 2017-11-28 ENCOUNTER — APPOINTMENT (EMERGENCY)
Dept: RADIOLOGY | Facility: HOSPITAL | Age: 82
End: 2017-11-28
Payer: MEDICARE

## 2017-11-28 ENCOUNTER — HOSPITAL ENCOUNTER (EMERGENCY)
Facility: HOSPITAL | Age: 82
Discharge: HOME | End: 2017-11-28
Payer: MEDICARE

## 2017-11-28 VITALS
SYSTOLIC BLOOD PRESSURE: 136 MMHG | BODY MASS INDEX: 27.83 KG/M2 | OXYGEN SATURATION: 95 % | WEIGHT: 151.25 LBS | TEMPERATURE: 99.6 F | DIASTOLIC BLOOD PRESSURE: 61 MMHG | HEIGHT: 62 IN | RESPIRATION RATE: 17 BRPM | HEART RATE: 84 BPM

## 2017-11-28 DIAGNOSIS — J40 BRONCHITIS: Primary | ICD-10-CM

## 2017-11-28 PROCEDURE — 71020 HB CHEST X-RAY 2VW FRONTAL&LATL: CPT

## 2017-11-28 PROCEDURE — 99283 EMERGENCY DEPT VISIT LOW MDM: CPT

## 2017-11-28 RX ORDER — LEVOFLOXACIN 500 MG/1
500 TABLET, FILM COATED ORAL EVERY 24 HOURS
Qty: 7 TABLET | Refills: 0 | Status: SHIPPED | OUTPATIENT
Start: 2017-11-28 | End: 2017-12-05

## 2017-11-28 RX ADMIN — LEVOFLOXACIN 750 MG: 500 TABLET, FILM COATED ORAL at 13:20

## 2017-11-28 NOTE — DISCHARGE INSTRUCTIONS
Acute Bronchitis   WHAT YOU NEED TO KNOW:   Acute bronchitis is swelling and irritation in the air passages of your lungs  This irritation may cause you to cough or have other breathing problems  Acute bronchitis often starts because of another illness, such as a cold or the flu  The illness spreads from your nose and throat to your windpipe and airways  Bronchitis is often called a chest cold  Acute bronchitis lasts about 3 to 6 weeks and is usually not a serious illness  Your cough can last for several weeks  DISCHARGE INSTRUCTIONS:   Return to the emergency department if:   · You cough up blood  · Your lips or fingernails turn blue  · You feel like you are not getting enough air when you breathe  Contact your healthcare provider if:   · You have a fever  · Your breathing problems do not go away or get worse  · Your cough does not get better within 4 weeks  · You have questions or concerns about your condition or care  Self-care:   · Get more rest   Rest helps your body to heal  Slowly start to do more each day  Rest when you feel it is needed  · Avoid irritants in the air  Avoid chemicals, fumes, and dust  Wear a face mask if you must work around dust or fumes  Stay inside on days when air pollution levels are high  If you have allergies, stay inside when pollen counts are high  Do not use aerosol products, such as spray-on deodorant, bug spray, and hair spray  · Do not smoke or be around others who smoke  Nicotine and other chemicals in cigarettes and cigars damages the cilia that move mucus out of your lungs  Ask your healthcare provider for information if you currently smoke and need help to quit  E-cigarettes or smokeless tobacco still contain nicotine  Talk to your healthcare provider before you use these products  · Drink liquids as directed  Liquids help keep your air passages moist and help you cough up mucus   You may need to drink more liquids when you have acute bronchitis  Ask how much liquid to drink each day and which liquids are best for you  · Use a humidifier or vaporizer  Use a cool mist humidifier or a vaporizer to increase air moisture in your home  This may make it easier for you to breathe and help decrease your cough  Decrease risk for acute bronchitis:   · Get the vaccinations you need  Ask your healthcare provider if you should get vaccinated against the flu or pneumonia  · Prevent the spread of germs  You can decrease your risk of acute bronchitis and other illnesses by doing the following:     [facility] your hands often with soap and water  Carry germ-killing hand lotion or gel with you  You can use the lotion or gel to clean your hands when soap and water are not available  ¨ Do not touch your eyes, nose, or mouth unless you have washed your hands first     ¨ Always cover your mouth when you cough to prevent the spread of germs  It is best to cough into a tissue or your shirt sleeve instead of into your hand  Ask those around you cover their mouths when they cough  ¨ Try to avoid people who have a cold or the flu  If you are sick, stay away from others as much as possible  Medicines: Your healthcare provider may  give you any of the following:  · Ibuprofen or acetaminophen  are medicines that help lower your fever  They are available without a doctor's order  Ask your healthcare provider which medicine is right for you  Ask how much to take and how often to take it  Follow directions  These medicines can cause stomach bleeding if not taken correctly  Ibuprofen can cause kidney damage  Do not take ibuprofen if you have kidney disease, an ulcer, or allergies to aspirin  Acetaminophen can cause liver damage  Do not take more than 4,000 milligrams in 24 hours  · Decongestants  help loosen mucus in your lungs and make it easier to cough up  This can help you breathe easier  · Cough suppressants  decrease your urge to cough   If your cough produces mucus, do not take a cough suppressant unless your healthcare provider tells you to  Your healthcare provider may suggest that you take a cough suppressant at night so you can rest     · Inhalers  may be given  Your healthcare provider may give you one or more inhalers to help you breathe easier and cough less  An inhaler gives your medicine to open your airways  Ask your healthcare provider to show you how to use your inhaler correctly  · Take your medicine as directed  Contact your healthcare provider if you think your medicine is not helping or if you have side effects  Tell him of her if you are allergic to any medicine  Keep a list of the medicines, vitamins, and herbs you take  Include the amounts, and when and why you take them  Bring the list or the pill bottles to follow-up visits  Carry your medicine list with you in case of an emergency  Follow up with your healthcare provider as directed:  Write down questions you have so you will remember to ask them during your follow-up visits  © 2017 [employer] Information is for End User's use only and may not be sold, redistributed or otherwise used for commercial purposes  All illustrations and images included in CareNotes® are the copyrighted property of [employer]  or [employer]  The above information is an  only  It is not intended as medical advice for individual conditions or treatments  Talk to your doctor, nurse or pharmacist before following any medical regimen to see if it is safe and effective for you

## 2017-11-28 NOTE — ED PROVIDER NOTES
History  Chief Complaint   Patient presents with    Cough     cough ,expectoration  of IVORY mucus  Patient presents to the emergency department today with her son offering a chief complaint of a cough that started yesterday  Patient states she is bring up a whitish to yellowish mucus  She denies chest pain or shortness of breath  She denies leg edema  She denies nasal congestion ear pain sore throat  No abdominal pain  She denies fever  Oxygen saturation upon my evaluation at bedside on room air is 96%            Prior to Admission Medications   Prescriptions Last Dose Informant Patient Reported? Taking?    CORAL CALCIUM PO   Yes Yes   Sig: Take 1 tablet by mouth daily   Cholecalciferol (VITAMIN D-3) 1000 units CAPS   Yes Yes   Sig: Take 1 capsule by mouth daily   Multiple Vitamin (MULTIVITAMIN) tablet   Yes Yes   Sig: Take 1 tablet by mouth daily   aspirin 81 MG tablet   Yes Yes   Sig: Take 81 mg by mouth daily   co-enzyme Q-10 30 MG capsule   Yes Yes   Sig: Take 30 mg by mouth daily   esomeprazole (NexIUM) 40 MG capsule   Yes Yes   Sig: Take 40 mg by mouth every morning before breakfast   furosemide (LASIX) 40 mg tablet   No Yes   Sig: Take 1 tablet by mouth daily   Patient taking differently: Take 40 mg by mouth daily Take an extra dose for weight increase  Of 3 lbs     lactulose (CHRONULAC) 10 g/15 mL solution   No Yes   Sig: Take 30 mL by mouth 3 (three) times a day   magnesium oxide (MAG-OX) 400 mg   No Yes   Sig: Take 1 tablet by mouth daily   nadolol (CORGARD) 40 mg tablet   No Yes   Sig: Take 1 tablet by mouth daily   Patient taking differently: Take 20 mg by mouth daily     potassium chloride (KLOR-CON) 20 mEq packet   Yes Yes   Sig: Take 20 mEq by mouth daily   rifaximin (XIFAXAN) 550 mg tablet   No Yes   Sig: Take 1 tablet by mouth every 12 (twelve) hours   valsartan (DIOVAN) 80 mg tablet   No Yes   Sig: Take 1 tablet by mouth daily      Facility-Administered Medications: None       Past Medical History:   Diagnosis Date    Cancer [facility])     breast    CHF (congestive heart failure) (HCC)     GERD (gastroesophageal reflux disease)     Hypertension        Past Surgical History:   Procedure Laterality Date    COMBINED HYSTEROSCOPY DIAGNOSTIC / D&C      HYSTERECTOMY      MASTECTOMY Right     MASTECTOMY         History reviewed  No pertinent family history  I have reviewed and agree with the history as documented  Social History   Substance Use Topics    Smoking status: Former Smoker    Smokeless tobacco: Never Used    Alcohol use No        Review of Systems   Constitutional: Negative  HENT: Negative  Eyes: Negative  Respiratory: Positive for cough  Negative for apnea, choking and chest tightness  Cardiovascular: Negative  Gastrointestinal: Negative  Endocrine: Negative  Genitourinary: Negative  Musculoskeletal: Negative  Skin: Negative  Allergic/Immunologic: Negative  Hematological: Negative  Psychiatric/Behavioral: Negative  All other systems reviewed and are negative  Physical Exam  ED Triage Vitals [11/28/17 1200]   Temperature Pulse Respirations Blood Pressure SpO2   99 6 °F (37 6 °C) 98 20 142/60 90 %      Temp Source Heart Rate Source Patient Position - Orthostatic VS BP Location FiO2 (%)   Temporal Monitor Sitting Left arm --      Pain Score       --           Orthostatic Vital Signs  Vitals:    11/28/17 1200   BP: 142/60   Pulse: 98   Patient Position - Orthostatic VS: Sitting       Physical Exam   Constitutional: She is oriented to person, place, and time  She appears well-developed and well-nourished  No distress  HENT:   Head: Normocephalic and atraumatic  Right Ear: External ear normal    Left Ear: External ear normal    Nose: Nose normal    Mouth/Throat: Oropharynx is clear and moist  No oropharyngeal exudate  Eyes: EOM are normal  Pupils are equal, round, and reactive to light  Neck: Normal range of motion     Cardiovascular: Normal rate and intact distal pulses  Pulmonary/Chest: Effort normal  She exhibits no tenderness  Patient has wheeze in the right lower lobe with expiration as well as left upper anterior rhonchi noted   Abdominal: Soft  Musculoskeletal: Normal range of motion  She exhibits no edema, tenderness or deformity  Neurological: She is alert and oriented to person, place, and time  Skin: Skin is warm  Capillary refill takes less than 2 seconds  She is not diaphoretic  Psychiatric: She has a normal mood and affect  Vitals reviewed  ED Medications  Medications   levofloxacin (LEVAQUIN) tablet 750 mg (not administered)       Diagnostic Studies  Results Reviewed     None                 XR chest 2 views   ED Interpretation by Lacey Pelayo PA-C (11/28 2881)   Patient has continued bilateral pleural effusions  Extremely questionable consolidative process noted right lower lobe  Will treat empirically                 Procedures  Procedures       Phone Contacts  ED Phone Contact    ED Course  ED Course                                MDM  CritCare Time    Disposition  Final diagnoses:   Bronchitis     Time reflects when diagnosis was documented in both MDM as applicable and the Disposition within this note     Time User Action Codes Description Comment    11/28/2017  1:07 PM Melinda HERRERA Add [J40] Bronchitis       ED Disposition     ED Disposition Condition Comment    Discharge  [address] [facility] discharge to home/self care      Condition at discharge: Good        Follow-up Information     Follow up With Specialties Details Why Contact Info Additional DO [facility] Internal Medicine Schedule an appointment as soon as possible for a visit  [address] [address]       [facility] Emergency Medicine  If symptoms worsen [phone]  [phone] [place] [facility], Lu[address], [place], [phone]        Patient's Medications   Discharge Prescriptions    LEVOFLOXACIN (LEVAQUIN) 500 MG TABLET    Take 1 tablet by mouth every 24 hours for 7 days       Start Date: 11/28/2017End Date: 12/5/2017       Order Dose: 500 mg       Quantity: 7 tablet    Refills: 0     No discharge procedures on file      ED Provider  Electronically Signed by           Jinny Corona PA-C  11/28/17 107 [facility] RACHEL Mcarthur  11/28/17 [id number]

## 2017-12-08 ENCOUNTER — ALLSCRIPTS OFFICE VISIT (OUTPATIENT)
Dept: OTHER | Facility: OTHER | Age: 82
End: 2017-12-08

## 2017-12-09 NOTE — PROGRESS NOTES
Assessment    1  Cough (786 2) (R05)  2  1   3  Acute bronchitis (466 0) (J20 9)1      1 Amended By: Dev Leonard; Dec 08 2017 9:04 PM EST    Plan  Cough    · Start: Medrol 4 MG Oral Tablet Therapy Pack (MethylPREDNISolone); use as directed    Discussion/Summary  Discussion Summary:   Cough syrup at gs  Increase fluids,rest  Add medrol dose pack  Rto prn if sxs persist but pt aware cough may linger for several days longer1     Medication SE Review and Pt Understands Tx: Possible side effects of new medications were reviewed with the patient/guardian today  The treatment plan was reviewed with the patient/guardian  The patient/guardian understands and agrees with the treatment plan    Self Referrals:   Self Referrals: No       1 Amended By: Dev Leonard; Dec 08 2017 9:06 PM EST    Chief Complaint  Chief Complaint Free Text Note Form: Pt presents for f/up exam with her son  States she went to [facility] ER for non prod cough and chest wheeze  Pt still with SOB with exertion and non prod cough  States medication she was started on yesterday is helpful, promethazine and tessalon perles  No falls and she does use her cane  Appetite is fair  Chief Complaint Chronic Condition [facility] [place]: The patient is here for an emergency room follow-up  History of Present Illness  HPI: Pt was in ER dx w bronchitis  Finished antibx  Still w harsh cough  Took cough syrup last pm with some relief of sxs  No chest pain, some soto  No n/v  No falls  Tolerating light foods,liquids  No fever or chills1        1 Amended By: Dev Leonard; Dec 08 2017 9:03 PM EST    Review of Systems  Complete-Female:  Constitutional:1  No fever, no chills, feels well, no tiredness, no recent weight gain or weight loss1   Eyes:1  No complaints of eye pain, no red eyes, no eyesight problems, no discharge, no dry eyes, no itching of eyes1     ENT:1  no complaints of earache, no loss of hearing, no nose bleeds, no nasal discharge, no sore throat, no hoarseness1   Cardiovascular:1  No complaints of slow heart rate, no fast heart rate, no chest pain, no palpitations, no leg claudication, no lower extremity edema1   Respiratory:1  cough1 -- and-- shortness of breath during exertion1   Gastrointestinal:1  No complaints of abdominal pain, no constipation, no nausea or vomiting, no diarrhea, no bloody stools1   Genitourinary:1  No complaints of dysuria, no incontinence, no pelvic pain, no dysmenorrhea, no vaginal discharge or bleeding1   Musculoskeletal:1  myalgias1   Integumentary:1  No complaints of skin rash or lesions, no itching, no skin wounds, no breast pain or lump1   Neurological:1  No complaints of headache, no confusion, no convulsions, no numbness, no dizziness or fainting, no tingling, no limb weakness, no difficulty walking1   Psychiatric:1  sleep disturbances1   Endocrine:1  No complaints of proptosis, no hot flashes, no muscle weakness, no deepening of the voice, no feelings of weakness1   Hematologic/Lymphatic:1  No complaints of swollen glands, no swollen glands in the neck, does not bleed easily, does not bruise easily1         1 Amended By: Carlito Mccormick; Dec 08 2017 9:03 PM EST    Active Problems  1  Advance directive discussed with patient (V65 49) (Z71 89)  2  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)  3  Aortic stenosis (424 1) (I35 0)  4  Cough (786 2) (R05)  5  Decubitus ulcer of sacral region, stage 1 (707 03,707 21) (L89 151)  6  Diastolic CHF (719 73,845 6) (I50 30)  7  Dyspnea on exertion (786 09) (R06 09)  8  Edema (782 3) (R60 9)  9  GERD without esophagitis (530 81) (K21 9)  10  Hepatic cirrhosis (571 5) (K74 60)  11  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)  12  History of mastectomy (V45 71) (Z90 10)  13  Hyperlipidemia (272 4) (E78 5)  14  Hypertension (401 9) (I10)  15  Hyponatremia (276 1) (E87 1)  16  Mitral valve stenosis (394 0) (I05 0)  17   Need for immunization against influenza (V04 81) (Z23)  18  Need for pneumococcal vaccination (V03 82) (Z23)  19  Pancreatic cyst (577 2) (K86 2)  20  Pleural effusion (511 9) (J90)  21  Post-menopausal osteoporosis (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)  22  Pulmonary hypertension (416 8) (I27 20)  23  Pyloric stenosis (537 0) (K31 1)  24  Screening for genitourinary condition (V81 6) (Z13 89)  25  Shortness of breath on exertion (786 05) (R06 02)  26  Wound of right buttock (877 0) (S31 819A)    Past Medical History  1  History of Breast Cancer (V10 3)  2  Hypertension (401 9) (I10)  3  History of Polyp of sigmoid colon (211 3) (D12 5)  Active Problems And Past Medical History Reviewed: The active problems and past medical history were reviewed and updated today  Surgical History  1  History of Breast Surgery Mastectomy  2  History of Hysterectomy  Surgical History Reviewed: The surgical history was reviewed and updated today  Family History  Son   1  Family history of High blood cholesterol  Sibling   2  Family history of High blood cholesterol  Family History   3  Family history of Coronary Artery Disease (V17 49)  4  Denied: Family history of Drug abuse  5  Family history of depression (V17 0) (Z81 8)  6  Family history of diabetes mellitus (V18 0) (Z83 3)  7  Family history of hypertension (V17 49) (Z82 49)  8  Family history of High blood cholesterol  Family History Reviewed: The family history was reviewed and updated today  Social History     · Dental care, regularly   · Denied: Drug use (305 90) (F19 90)   · Former smoker (V15 82) (Z87 891)   · Good dental hygiene   · Never a smoker   · Non drinker / no alcohol use   · Sun Protection Sunscreen   · Uses Safety Equipment - Seatbelts   ·   Social History Reviewed: The social history was reviewed and updated today  The social history was reviewed and is unchanged  Current Meds  1  Aspirin 81 MG TABS; TAKE 1 TABLET DAILY; Therapy: (Recorded:22Mar2017) to Recorded  2   Benzonatate 100 MG Oral Capsule; TAKE 1 CAPSULE 3 TIMES DAILY AS NEEDED; Therapy: 84LKZ6917 to (Evaluate:52Vdg4207)  Requested for: 36MSE9229; Last Rx:84Bmc1239 Ordered  3  Calmoseptine 0 44-20 6 % External Ointment; APPLY 1 INCH Twice daily; Therapy: 60GUW2064 to (Ashleigh hTeron)  Requested for: 26SXS0180; Last Rx:41Tcy0194 Ordered  4  CoQ-10 10 MG CAPS; TAKE 1 CAPSULE 2-3 TIMES DAILY AS DIRECTED; Therapy: 86AIK1978 to Recorded  5  Furosemide 40 MG Oral Tablet; TAKE 1 TABLET DAILY; Therapy: 96MBK3001 to (Evaluate:86Igr4183)  Requested for: 52Djc0519; Last Rx:16Bmq3321 Ordered  6  Lactulose 10 GM/15ML Oral Solution; TAKE 30 ML Three times a day; Therapy: 16CPR8604 to  Requested for: 09CYQ7688 Recorded  7  Magnesium Oxide 400 MG Oral Tablet; TAKE 1 TABLET DAILY; Therapy: (Angel Luis Talleying) to Recorded  8  Multiple Vitamin TABS; TAKE 1 TABLET DAILY; Therapy: (Angel Luis [place]) to Recorded  9  Nadolol 20 MG Oral Tablet; Take 1 tablet daily; Therapy: (Mp Copping) to  Requested for: 90KWK5577 Recorded  10  NexIUM 40 MG Oral Capsule Delayed Release; take 1 capsule once daily; Therapy: 96MZC7127 to (Last Rx:82Nap6676)  Requested for: 62HLF6297 Ordered  11  Potassium Chloride 20 MEQ Oral Packet; DISSOLVE AND DRINK ONE PACKET INSERT 8OZ FLUID  ONCE A DAY; Therapy: 11ZCJ7843 to (Last Rx:17Oct2016)  Requested for: 35Hra5784 Ordered  12  Promethazine-DM 6 25-15 MG/5ML Oral Syrup; TAKE 1 TEASPOON Q 6 HOURS PRN; Therapy: 05CZD2953 to (Evaluate:96Efi0366)  Requested for: 32ASY2656; Last Rx:52Fau7345  Ordered  13  Valsartan 80 MG Oral Tablet; Take 1 tablet once daily; Therapy: 80Wyc5731 to (Evaluate:03Scv5104)  Requested for: 52FDE4468; Last Rx:02Qsc7126  Ordered  14  Vitamin D-3 1000 UNIT Oral Capsule; TAKE AS DIRECTED; Therapy: (Angel Luis Murry) to Recorded  15  Xifaxan 550 MG Oral Tablet; 1 TABLET Bid;   Therapy: 62GTV8321 to 72 470 15 18)  Requested for: (77) 6521 7409; Last Rx:51Qtb3547  Ordered  Medication List Reviewed: The medication list was reviewed and updated today  Allergies  1  Amoxicillin TABS    Vitals  Vital Signs    Recorded: 51KHW0450 01:44PM Recorded: 78FCK0846 01:29PM   Temperature  94 5 F, Tympanic   Heart Rate  119   Systolic 854    Diastolic 78    Height  5 ft 2 in   Weight  150 lb 6 oz   BMI Calculated  27 5   BSA Calculated  1 69   O2 Saturation  88, RA       Physical Exam   Constitutional1   General appearance: No acute distress, well appearing and well nourished  1 -- No distress1   Eyes1   Conjunctiva and lids: No swelling, erythema or discharge  1   Pupils and irises: Equal, round and reactive to light  1   Ears, Nose, Mouth, and Throat1   External inspection of ears and nose: Normal 1   Otoscopic examination: Tympanic membranes translucent with normal light reflex  Canals patent without erythema  1   Nasal mucosa, septum, and turbinates: Normal without edema or erythema  1   Oropharynx: Normal with no erythema, edema, exudate or lesions  1   Pulmonary1   Respiratory effort: No increased work of breathing or signs of respiratory distress  1   Auscultation of lungs: Abnormal  1 -- Faint end exp wheeze1   Cardiovascular1   Palpation of heart: Normal PMI, no thrills  1   Auscultation of heart: Normal rate and rhythm, normal S1 and S2, without murmurs  1   Examination of extremities for edema and/or varicosities: Normal 1   Carotid pulses: Normal 1   Abdomen1   Abdomen: Non-tender, no masses  1   Liver and spleen: No hepatomegaly or splenomegaly  1   Lymphatic1   Palpation of lymph nodes in neck: No lymphadenopathy  1   Musculoskeletal1   Gait and station: Normal 1   Digits and nails: Normal without clubbing or cyanosis  1   Inspection/palpation of joints, bones, and muscles: Normal 1   Skin1   Skin and subcutaneous tissue: Normal without rashes or lesions  1   Neurologic1   Cranial nerves: Cranial nerves 2-12 intact  1   Reflexes: 2+ and symmetric  1   Sensation: No sensory loss  1   Psychiatric1   Orientation to person, place, and time: Normal 1   Mood and affect: Normal 1          1 Amended By: Wesley Aceves; Dec 08 2017 9:04 PM EST    Health Management  Health Maintenance   Medicare Annual Wellness Visit; every 1 year; Last 85GRI0305; Next Due: 67MGK9223;  Active    Future Appointments    Date/Time Provider Specialty Site   01/23/2018 01:00 PM Wesley Aceves DO Internal Medicine  [address] [facility] INTERNAL MEDICINE [facility]   01/16/2018 03:00 PM Freya Dubois MD Gastroenterology Adult  [address] [facility]   Electronically signed by : Bari Meneses DO; Dec  8 2017  1:46PM EST                       (Author)    Electronically signed by : Bari Meneses DO; Dec  8 2017  9:06PM EST                       (Author)

## 2018-01-09 NOTE — RESULT NOTES
Message   her abdominal ultrasound showed stable liver  No liver lesions seen  Verified Results  [address] [place] [address] [id number] 08:18AM Pulido Sides Order Number: [id number]   Performing Comments: [facility] 75  screening   - Patient Instructions: To schedule this appointment, please contact Central Scheduling at [phone]  Test Name Result Flag Reference   US ABDOMEN LIMITED (Report)     RIGHT UPPER QUADRANT ULTRASOUND     INDICATION: Cirrhosis        COMPARISON: Ultrasound 9/29/2015, CT abdomen 2/20/2014     TECHNIQUE:  Real-time ultrasound of the right upper quadrant was performed with a curvilinear transducer with both volumetric sweeps and still imaging techniques  FINDINGS:     PANCREAS: Visualized portions of the pancreas are within normal limits  AORTA AND IVC: Visualized portions are normal for patient age  LIVER:   Size: Within normal range  The liver measures 10 4 cm in the midclavicular line  Contour: Nodular surface contour  Parenchyma: Coarse hepatic echotexture  No evidence of suspicious mass  There is flow below the baseline in the main portal vein consistent with hepatofugal flow  Collateral vessels in the periportal region noted  BILIARY:   The gallbladder is normal in caliber  No wall thickening or pericholecystic fluid  No stones or sludge identified  No sonographic Renee's sign  No intrahepatic biliary dilatation  CBD measures 4 mm  No choledocholithiasis  KIDNEY:    Right kidney measures 10 0 x 4 9 cm  Within normal limits  ASCITES:  None  Right pleural effusion noted  IMPRESSION:     Cirrhotic liver with hepatofugal flow suggesting portal hypertension  No focal hepatic mass  Right pleural effusion         Workstation performed: [id number]     Signed by:   Rebecca Jamison DO   4/4/16

## 2018-01-12 VITALS — SYSTOLIC BLOOD PRESSURE: 124 MMHG | DIASTOLIC BLOOD PRESSURE: 72 MMHG

## 2018-01-13 VITALS
DIASTOLIC BLOOD PRESSURE: 70 MMHG | SYSTOLIC BLOOD PRESSURE: 158 MMHG | HEIGHT: 62 IN | HEART RATE: 68 BPM | WEIGHT: 152 LBS | BODY MASS INDEX: 27.97 KG/M2

## 2018-01-13 VITALS
BODY MASS INDEX: 28.59 KG/M2 | OXYGEN SATURATION: 93 % | SYSTOLIC BLOOD PRESSURE: 126 MMHG | HEIGHT: 62 IN | TEMPERATURE: 98 F | HEART RATE: 56 BPM | DIASTOLIC BLOOD PRESSURE: 72 MMHG | WEIGHT: 155.38 LBS

## 2018-01-13 NOTE — RESULT NOTES
Verified Results  (1) PT WITH INR 55PUL5222 11:00AM Ute Liner   TW Order Number: [id number]     Test Name Result Flag Reference   INR 1 26 H 0 86-1 16   PT 15 7 seconds H 12 1-14 4     (1) AFP, SERUM 95QDG3697 11:00AM Dontinoa Romero Order Number: [id number]     Test Name Result Flag Reference   AFP 2 7 ng/mL  0 0 - 8 3   Normal values apply only to males and to nonpregnant females  These results are not interpretable for pregnant females  Roche ECLIA methodology  Values obtained with different assay methods or kits cannot be  used interchangeably  Results cannot be interpreted as absolute  evidence of the presence or absence of malignant disease  Performed at:  [facility] [place] [address]  [id number]  : Neymar Hernandez MD, Phone:  [phone] [address] [facility] LIVER [id number] 09:57AM [address] [place] [address] & [facility] Order Number: [id number]    - Patient Instructions: To schedule this appointment, please contact Central Scheduling at [phone]  Test Name Result Flag Reference   US LIVER (Report)     RIGHT UPPER QUADRANT ULTRASOUND     INDICATION: Cirrhosis     COMPARISON: 4/4/2016  TECHNIQUE:  Real-time ultrasound of the right upper quadrant was performed with a curvilinear transducer with both volumetric sweeps and still imaging techniques  FINDINGS:     PANCREAS: There is a 1 0 x 1 0 x 0 8 cm cystic lesion within the pancreatic head, more conspicuous on today's study  No peripancreatic fluid collection  AORTA AND IVC: Visualized portions are normal for patient age  LIVER:   Size: Within normal range  The liver measures 10 9 cm in the midclavicular line  Contour: Surface contour is nodular  Parenchyma: There is diffuse coarsened heterogeneous echotexture suggesting underlying cirrhotic changes  No evidence of suspicious mass     Limited imaging of the main portal vein shows it to be patent and hepatopedal       BILIARY:   The gallbladder is normal in caliber  No wall thickening or pericholecystic fluid  No stones or sludge identified  No sonographic Renee's sign  No intrahepatic biliary dilatation  CBD measures 5 mm  No choledocholithiasis  KIDNEY:    Right kidney measures 10 4 x 5 0 cm  Within normal limits  ASCITES:  None  Right pleural effusion incidentally seen  IMPRESSION:       1  Cystic lesion within the pancreatic head  Recommend characterization and establishment of baseline now  Preferred modality is Abdomen MRI with and without IV contrast/MRCP  First alternative is pancreatic protocol abdomen and pelvic CT with    contrast  Second is abdomen MRI without contrast/MRCP  The recommendations regarding pancreatic findings assumes that patient does not have family history of pancreatic cancer nor have any symptoms potentially attributable to pancreatic cystic lesions (hyperamylasemia, recent-onset diabetes, severe    epigastric pain, weight loss, steatorrhea, or jaundice ) If these conditions are not true, then management should be deferred to judgement of specialists such as gastroenterologists or oncologic surgeons  Recommendations are based on recent consensus    statements on management of pancreatic cystic lesions from [address] [place] [address] Gastroenterology Association, [employer] of Radiology, the journal Pancreatology, and our own institutional consensus  2  Hepatic cirrhosis  No suspicious lesions identified  3  Right pleural effusion             ##sigslh##sigslh       Workstation performed: [id number]     Signed by:   Peyman Tinsley MD   5/25/17

## 2018-01-14 VITALS
HEART RATE: 56 BPM | SYSTOLIC BLOOD PRESSURE: 126 MMHG | OXYGEN SATURATION: 93 % | RESPIRATION RATE: 18 BRPM | BODY MASS INDEX: 28.71 KG/M2 | WEIGHT: 156 LBS | DIASTOLIC BLOOD PRESSURE: 74 MMHG | HEIGHT: 62 IN

## 2018-01-14 VITALS
HEIGHT: 62 IN | TEMPERATURE: 95.8 F | BODY MASS INDEX: 28.22 KG/M2 | WEIGHT: 153.38 LBS | HEART RATE: 74 BPM | OXYGEN SATURATION: 95 %

## 2018-01-14 VITALS
TEMPERATURE: 97.1 F | DIASTOLIC BLOOD PRESSURE: 70 MMHG | OXYGEN SATURATION: 97 % | HEIGHT: 62 IN | SYSTOLIC BLOOD PRESSURE: 126 MMHG | BODY MASS INDEX: 28.79 KG/M2 | HEART RATE: 77 BPM | WEIGHT: 156.44 LBS

## 2018-01-14 VITALS
DIASTOLIC BLOOD PRESSURE: 62 MMHG | WEIGHT: 153 LBS | BODY MASS INDEX: 28.16 KG/M2 | HEART RATE: 64 BPM | HEIGHT: 62 IN | SYSTOLIC BLOOD PRESSURE: 132 MMHG

## 2018-01-15 VITALS
SYSTOLIC BLOOD PRESSURE: 156 MMHG | HEIGHT: 62 IN | BODY MASS INDEX: 28.61 KG/M2 | WEIGHT: 155.5 LBS | DIASTOLIC BLOOD PRESSURE: 73 MMHG | HEART RATE: 73 BPM | TEMPERATURE: 97.9 F | OXYGEN SATURATION: 97 %

## 2018-01-15 VITALS
SYSTOLIC BLOOD PRESSURE: 122 MMHG | HEART RATE: 86 BPM | BODY MASS INDEX: 28.06 KG/M2 | DIASTOLIC BLOOD PRESSURE: 76 MMHG | HEIGHT: 62 IN | OXYGEN SATURATION: 91 % | WEIGHT: 152.5 LBS | TEMPERATURE: 98.6 F

## 2018-01-15 VITALS
HEART RATE: 55 BPM | HEIGHT: 62 IN | BODY MASS INDEX: 28.41 KG/M2 | DIASTOLIC BLOOD PRESSURE: 69 MMHG | WEIGHT: 154.38 LBS | SYSTOLIC BLOOD PRESSURE: 156 MMHG

## 2018-01-15 NOTE — PROCEDURES
Procedures by Michelle Carter DO at 3/10/2017   2:57 PM      Author:  Michelle Carter DO Service:  Pulmonology Author Type:  Physician     Filed:  3/10/2017  3:00 PM Date of Service:  3/10/2017  2:57 PM Status:  Signed     :  Michelle Carter DO (Physician)            Thoracentesis Procedure Note    Pre-operative Diagnosis: *Right sided pleural effusion**    Post-operative Diagnosis: same    Indications: Diagnostic    Procedure Details     Consent: Informed consent was obtained  Risks of the procedure were discussed including: infection, bleeding, pain, pneumothorax  Under sterile conditions the patient was positioned  Betadine solution and sterile drapes were utilized  1% buffered lidocaine was used to anesthetize the 10th right posterior rib space  Fluid was obtained without any difficulties and minimal blood loss  A dressing was applied to the wound and wound care instructions were provided  Findings  50 ml of clear pleural fluid was obtained  A sample was sent to Pathology for cytogenetics, flow, and cell counts, as well as for infection analysis  Complications:  None; patient tolerated the procedure well  Condition: stable    Plan  A follow up chest x-ray was ordered  Bed Rest for 0 hours  Tylenol 650 mg  for pain  Attending Attestation: I was present for the entire procedure  Ultrasound was used to sloane the appropriate pocket of fluid prior to the procedure  I performed the entire procedure  One small needle pass was used to obtain the fluid  Chest  x-ray was not ordered                   Received for:Molly Hernandez DO  Mar 10 2017  3:01PM [facility] Standard Time

## 2018-01-16 NOTE — MISCELLANEOUS
Message  GI Reminder Recall Thu Berry:   Date: 04/04/2017   Dear Jeff Comment:     Review of our records shows you are due for the following: Follow Up Visit  Please call the following office to schedule your appointment:   [address] [facility], [place] [address] [phone]  We look forward to hearing from you!      Sincerely,     [facility]      Signatures   Electronically signed by : Garfield Anand, ; Apr 4 2017  2:15PM EST                       (Author)

## 2018-01-17 NOTE — PROGRESS NOTES
Assessment    1  Dyspnea on exertion (786 09) (R06 09)   2  Mitral valve stenosis (394 0) (I05 0)   3  Hepatic cirrhosis (571 5) (K74 60)   4  Hypertension (401 9) (I10)   5  Pleural effusion (511 9) (J90)    Plan  Health Maintenance    · *VB-Depression Screening; Status:Complete;   Done: 92HOD1912 03:44PM    Discussion/Summary  Discussion Summary:   Reviewed echo and studies w patient  Advised pt go to hospital for further treatment  Discussed w pt daughter in the office and pt/family agree to hospital tx  Pt did state would not want surgery but wants to be more comfortable  followup after hospital  call to er with report  Goals and Barriers: The patient has the current Goals: Resolve sob  The patent has the current Barriers: Pt lives alone,advanced age comorbidities  Patient's Capacity to Self-Care: Patient is able to Self-Care  Medication SE Review and Pt Understands Tx: The treatment plan was reviewed with the patient/guardian  The patient/guardian understands and agrees with the treatment plan       Self Referrals:   Self Referrals: No      Chief Complaint  Chief Complaint Free Text Note Form: Pt presents for f/up exam  Pt has c/o SOB with exertion  Appetite is fair, per patient  Pt denies chest pain or cough  Refills done as requested  No falls and she does use her cane  Chief Complaint Chronic Condition [facility]: Patient is here today for follow up of chronic conditions described in HPI  Advance Directives  Advance Directive [facility]:   The patient is in agreement to receive the Advance Care Planning service    YES - Patient has an advance health care directive  Capacity/Competence: This patient has full decision making capacity for discussion of advance care planning and This patient has full decision making competency for discussion of advance care planning  History of Present Illness  HPI: Pt has had soto for weeks,progressive and limiting  No edema  Appetite fair   No uri sxs or cough  No cp or falls  No dizziness  No fever  No n/v      Review of Systems  Complete-Female:   Constitutional: feeling tired  Eyes: No complaints of eye pain, no red eyes, no eyesight problems, no discharge, no dry eyes, no itching of eyes  ENT: no nasal discharge  Cardiovascular: no chest pain, no palpitations and no lower extremity edema  Respiratory: shortness of breath during exertion, but no wheezing  Gastrointestinal: No complaints of abdominal pain, no constipation, no nausea or vomiting, no diarrhea, no bloody stools  Genitourinary: No complaints of dysuria, no incontinence, no pelvic pain, no dysmenorrhea, no vaginal discharge or bleeding  Musculoskeletal: arthralgias  Integumentary: No complaints of skin rash or lesions, no itching, no skin wounds, no breast pain or lump  Neurological: no dizziness and no difficulty walking  Psychiatric: anxiety  Endocrine: No complaints of proptosis, no hot flashes, no muscle weakness, no deepening of the voice, no feelings of weakness  Hematologic/Lymphatic: No complaints of swollen glands, no swollen glands in the neck, does not bleed easily, does not bruise easily  Active Problems    1  Advance directive discussed with patient (V65 49) (Z71 89)   2  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)   3  Edema (782 3) (R60 9)   4  GERD without esophagitis (530 81) (K21 9)   5  Hepatic cirrhosis (571 5) (K74 60)   6  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)   7  History of mastectomy (V45 71) (Z90 10)   8  Hyperlipidemia (272 4) (E78 5)   9  Hypertension (401 9) (I10)   10  Need for influenza vaccination (V04 81) (Z23)   11  Post-menopausal osteoporosis (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)   12  Pyloric stenosis (537 0) (K31 1)   13  Right shoulder pain (719 41) (M25 511)   14  Shortness of breath on exertion (786 05) (R06 02)    Past Medical History    1  History of Acute sinusitis (461 9) (J01 90)   2  History of Breast Cancer (V10 3)   3   History of Encounter for screening colonoscopy (V76 51) (Z12 11)   4  History of Encounter for screening mammogram for malignant neoplasm of breast (V76 12) (Z12 31)   5  History of Encounter for screening mammogram for malignant neoplasm of breast (V76 12) (Z12 31)   6  History of hepatic disease (V12 79) (Z87 19)   7  Hypertension (401 9) (I10)   8  History of Nausea with vomiting (787 01) (R11 2)   9  History of Need for prophylactic vaccination and inoculation against influenza (V04 81) (Z23)   10  History of Polyp of sigmoid colon (211 3) (D12 5)   11  History of Skin lesion (709 9) (L98 9)   12  History of Sore throat (462) (J02 9)   13  History of Sore throat (462) (J02 9)   14  History of Ultrasound Abdominal Liver Nonspecific Abnormality (794 8)   15  History of Upper respiratory infection (465 9) (J06 9)   16  History of Urinary tract infection (599 0) (N39 0)  Active Problems And Past Medical History Reviewed: The active problems and past medical history were reviewed and updated today  Surgical History    1  History of Breast Surgery Mastectomy   2  History of Hysterectomy  Surgical History Reviewed: The surgical history was reviewed and updated today  Family History  Son    1  Family history of High blood cholesterol  Sibling    2  Family history of High blood cholesterol  Family History    3  Family history of Coronary Artery Disease (V17 49)   4  Denied: Family history of Drug abuse   5  Family history of depression (V17 0) (Z81 8)   6  Family history of diabetes mellitus (V18 0) (Z83 3)   7  Family history of hypertension (V17 49) (Z82 49)   8  Family history of High blood cholesterol  Family History Reviewed: The family history was reviewed and updated today         Social History    · Dental care, regularly   · Denied: Drug use (305 90) (F19 90)   · Good dental hygiene   · Never a smoker   · Non drinker / no alcohol use   · Sun Protection Sunscreen   · Uses Safety Equipment - Seatbelts   ·   Social History Reviewed: The social history was reviewed and updated today  The social history was reviewed and is unchanged  Current Meds   1  Aspirin 81 MG TABS Recorded   2  Claritin 10 MG Oral Tablet; TAKE 1 TABLET DAILY; Therapy: 98OVQ4473 to (Jasen Lyle)  Requested for: 99 917042; Last Rx:06Apr2015   Ordered   3  CoQ-10 10 MG CAPS; TAKE 1 CAPSULE 2-3 TIMES DAILY AS DIRECTED; Therapy: 60QMO9686 to Recorded   4  [employer] Vitamin D3 1000 UNIT CAPS Recorded   5  GNP Loratadine 10 MG Oral Tablet; TAKE ONE TABLET BY MOUTH ONCE DAILY; Therapy: 05URJ6573 to (Last Rx:62Smt9628)  Requested for: 72SDM0035 Ordered   6  HydroCHLOROthiazide 25 MG Oral Tablet; take 1 tablet every day; Therapy: 48Zhq0367 to (Evaluate:64Myk7231)  Requested for: 09Jmu6497; Last Rx:55Ytb9312   Ordered   7  Lactulose 10 GM/15ML Oral Solution; TAKE 2 TABLESPOONS (30ML) BY MOUTH TWICE A DAY; Therapy: 06SJZ5558 to (Last Rx:27Jan2017)  Requested for: 35UCK9828 Ordered   8  Multiple Vitamin TABS; Therapy: (Maddie Shock) to Recorded   9  NexIUM 40 MG Oral Capsule Delayed Release; TAKE 1 CAPSULE ONCE DAILY; Therapy: 66LOE4710 to (Lunaalene [place])  Requested for: 28ELL7270; Last Rx:78Xql7578   Ordered   10  Potassium Chloride 20 MEQ Oral Packet; DISSOLVE AND DRINK ONE PACKET INSERT 8OZ FLUID    ONCE A DAY; Therapy: 47ADF6512 to (Last Rx:17Oct2016)  Requested for: 40Yym1792 Ordered   11  Valsartan 80 MG Oral Tablet; Take 1 tablet once daily; Therapy: 40Rzo8600 to (Evaluate:37Gcj2133)  Requested for: 46Dwz1554; Last Rx:53Dxz9867    Ordered   12  Xifaxan 550 MG Oral Tablet; 1 TABLET Bid; Therapy: 79BWW7620 to (Evaluate:06Rnb1549); Last Rx:01Pht1582 Ordered  Medication List Reviewed: The medication list was reviewed and updated today  Allergies    1   Amoxicillin TABS    Vitals  Vital Signs    Recorded: 08KEM6404 08:22PM Recorded: 46CVH6271 03:35PM   Temperature  98 6 F   Heart Rate  86   Systolic 528 Diastolic 76    Height  5 ft 2 in   Weight  152 lb 8 0 oz   BMI Calculated  27 89   BSA Calculated  1 7   O2 Saturation  91   Pain Scale  0     Physical Exam    Constitutional   General appearance: Abnormal   visible sob  Eyes   Conjunctiva and lids: No swelling, erythema or discharge  Pupils and irises: Equal, round and reactive to light  Ears, Nose, Mouth, and Throat   External inspection of ears and nose: Normal     Otoscopic examination: Tympanic membranes translucent with normal light reflex  Canals patent without erythema  Nasal mucosa, septum, and turbinates: Normal without edema or erythema  Oropharynx: Abnormal   dry mucosa  Pulmonary   Respiratory effort: No increased work of breathing or signs of respiratory distress  Auscultation of lungs: Abnormal   crackles at base  Cardiovascular   Palpation of heart: Normal PMI, no thrills  Auscultation of heart: Normal rate and rhythm, normal S1 and S2, without murmurs  Examination of extremities for edema and/or varicosities: Normal     Carotid pulses: Normal     Abdomen   Abdomen: Non-tender, no masses  Liver and spleen: No hepatomegaly or splenomegaly  Lymphatic   Palpation of lymph nodes in neck: No lymphadenopathy  Musculoskeletal   Gait and station: Normal     Digits and nails: Normal without clubbing or cyanosis  Inspection/palpation of joints, bones, and muscles: Normal     Skin   Skin and subcutaneous tissue: Normal without rashes or lesions  Neurologic   Cranial nerves: Cranial nerves 2-12 intact  Reflexes: 2+ and symmetric  Sensation: No sensory loss  Psychiatric   Orientation to person, place, and time: Normal     Mood and affect: Normal          Results/Data  PHQ-2 Adult Depression Screening 71LRI2593 03:44PM User, BAASBOXs     Test Name Result Flag Reference   PHQ-2 Adult Depression Score 0     Over the last two weeks, how often have you been bothered by any of the following problems?   Little interest or pleasure in doing things: Not at all - 0  Feeling down, depressed, or hopeless: Not at all - 0   PHQ-2 Adult Depression Screening Negative       *VB-Depression Screening 04KJM3049 03:44PM Eric Pop     Test Name Result Flag Reference   Depression Scale Result      Depression Screen - Negative For Symptoms       Health Management  Health Maintenance   Medicare Annual Wellness Visit; every 1 year;  Last 77Sde7657; Next Due: 10Xyd2619; Near Due    Future Appointments    Date/Time Provider Specialty Site   04/03/2017 11:00 AM Eric Pop DO Internal Medicine [address]     Signatures   Electronically signed by : Dion Ashton DO; Mar 12 2017  8:28PM EST                       (Author)

## 2018-01-17 NOTE — MISCELLANEOUS
Assessment    1  Hyponatremia (276 1) (E87 1)   2  Diastolic CHF (893 03,285 3) (I50 30)   3  Hepatic cirrhosis (571 5) (K74 60)    Plan   Diastolic CHF, Hypertension, Hyponatremia    · (1) BASIC METABOLIC PROFILE; Status:Active; Requested ZWR:18VSD0263;    Perform:[facility]; KUA:93EKK3069; Ordered; For:Diastolic CHF, Hypertension, Hyponatremia; Ordered By:Lakshmi Huynh; Follow-up visit in 2 months Evaluation and Treatment  Follow-up  Status: Hold For - Scheduling  Requested for: 29VOU8798  Ordered; For: Diastolic CHF, Hepatic cirrhosis; Ordered By: Earnest Echeverria  Performed:   Due: 81TOZ2210  Prevnar 13 Intramuscular Suspension; INJECT 0 5  ML Intramuscular; To Be Done: 80KWL9394; Status: Hold For - Administration;  Ordered  For: Diastolic CHF, Hyponatremia; Ordered By:Lakshmi Huynh; [facility]     Discussion/Summary  Discussion Summary:   Prevnar today  Recheck bmp next week and drink some water daily - may need to adjust lasix if sodium continues to be low  Patient neurologically intact today and looks and feels well  Rto after GI eval in January/prn  Medication SE Review and Pt Understands Tx: Possible side effects of new medications were reviewed with the patient/guardian today  The treatment plan was reviewed with the patient/guardian  The patient/guardian understands and agrees with the treatment plan   Self Referrals:   Self Referrals: No      Chief Complaint  Chief Complaint Free Text Note Form: Pt presents for hospital f/up  Pt states she is feeling ok today  States she is still SOB, about the same as before she went to the hospital she states  Denies chest pain  No falls and she does use her cane  No refills needed today  appetite is normal per patient  Chief Complaint Chronic Condition [facility]: Patient is here today for follow up of chronic conditions described in HPI        History of Present Illness  TCM Communication [facility]: The patient is being contacted for follow-up after hospitalization  Hospital records were reviewed  [facility]  The date of admission: 11/04/2017, date of discharge: 11/05/2017  Diagnosis: abnormal labs /shortness of breath, hyponatremia  She was discharged to home, with home health services  Medications reviewed and updated today  She scheduled a follow up appointment  The patient is currently asymptomatic  Counseling was provided to the patient  Communication performed and completed by RENE   HPI: Patient is feeling well today  Admitted Saturday for abnormal labs, hyponatremia  Pt did not necessarily feel poorly but sodium was 119  She feels well today and drove herself to visit  Review of Systems  Complete-Female:   Constitutional: No fever, no chills, feels well, no tiredness, no recent weight gain or weight loss  Eyes: No complaints of eye pain, no red eyes, no eyesight problems, no discharge, no dry eyes, no itching of eyes  ENT: no complaints of earache, no loss of hearing, no nose bleeds, no nasal discharge, no sore throat, no hoarseness  Cardiovascular: No complaints of slow heart rate, no fast heart rate, no chest pain, no palpitations, no leg claudication, no lower extremity edema  Respiratory: No complaints of shortness of breath, no wheezing, no cough, no SOB on exertion, no orthopnea, no PND  Gastrointestinal: No complaints of abdominal pain, no constipation, no nausea or vomiting, no diarrhea, no bloody stools  Genitourinary: No complaints of dysuria, no incontinence, no pelvic pain, no dysmenorrhea, no vaginal discharge or bleeding  Musculoskeletal: No complaints of arthralgias, no myalgias, no joint swelling or stiffness, no limb pain or swelling  Integumentary: No complaints of skin rash or lesions, no itching, no skin wounds, no breast pain or lump     Neurological: No complaints of headache, no confusion, no convulsions, no numbness, no dizziness or fainting, no tingling, no limb weakness, no difficulty walking  Psychiatric: Not suicidal, no sleep disturbance, no anxiety or depression, no change in personality, no emotional problems  Endocrine: No complaints of proptosis, no hot flashes, no muscle weakness, no deepening of the voice, no feelings of weakness  Hematologic/Lymphatic: No complaints of swollen glands, no swollen glands in the neck, does not bleed easily, does not bruise easily  Active Problems    1  Advance directive discussed with patient (V65 49) (Z71 89)   2  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)   3  Aortic stenosis (424 1) (I35 0)   4  Decubitus ulcer of sacral region, stage 1 (707 03,707 21) (L89 151)   5  Diastolic CHF (748 22,607 2) (I50 30)   6  Dyspnea on exertion (786 09) (R06 09)   7  Edema (782 3) (R60 9)   8  GERD without esophagitis (530 81) (K21 9)   9  Hepatic cirrhosis (571 5) (K74 60)   10  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)   11  History of mastectomy (V45 71) (Z90 10)   12  Hyperlipidemia (272 4) (E78 5)   13  Hypertension (401 9) (I10)   14  Mitral valve stenosis (394 0) (I05 0)   15  Need for immunization against influenza (V04 81) (Z23)   16  Pancreatic cyst (577 2) (K86 2)   17  Pleural effusion (511 9) (J90)   18  Post-menopausal osteoporosis (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)   19  Pulmonary hypertension (416 8) (I27 20)   20  Pyloric stenosis (537 0) (K31 1)   21  Shortness of breath on exertion (786 05) (R06 02)   22  Wound of right buttock (877 0) (S31 819A)    Past Medical History    1  History of Breast Cancer (V10 3)   2  Hypertension (401 9) (I10)   3  History of Polyp of sigmoid colon (211 3) (D12 5)    Surgical History    1  History of Breast Surgery Mastectomy   2  History of Hysterectomy  Surgical History Reviewed: The surgical history was reviewed and updated today  Family History  Son    1  Family history of High blood cholesterol  Sibling    2   Family history of High blood cholesterol  Family History    3  Family history of Coronary Artery Disease (V17 49)   4  Denied: Family history of Drug abuse   5  Family history of depression (V17 0) (Z81 8)   6  Family history of diabetes mellitus (V18 0) (Z83 3)   7  Family history of hypertension (V17 49) (Z82 49)   8  Family history of High blood cholesterol  Family History Reviewed: The family history was reviewed and updated today  Social History    · Dental care, regularly   · Denied: Drug use (305 90) (F19 90)   · Former smoker (V15 82) (Z87 891)   · Good dental hygiene   · Never a smoker   · Non drinker / no alcohol use   · Sun Protection Sunscreen   · Uses Safety Equipment - Seatbelts   ·   Social History Reviewed: The social history was reviewed and updated today  The social history was reviewed and is unchanged  Current Meds   1  Aspirin 81 MG TABS; TAKE 1 TABLET DAILY; Therapy: (Recorded:22Mar2017) to Recorded   2  Calmoseptine 0 44-20 6 % External Ointment; APPLY 1 INCH Twice daily; Therapy: 96HEK7074 to (Serafin Galindo)  Requested for: 36UFH0122; Last   Rx:05Oct2017 Ordered   3  Claritin 10 MG Oral Tablet; TAKE 1 TABLET DAILY; Therapy: 43YWB4600 to ([id number])  Requested for: 99 512942; Last   Rx:06Apr2015 Ordered   4  CoQ-10 10 MG CAPS; TAKE 1 CAPSULE 2-3 TIMES DAILY AS DIRECTED; Therapy: 49KOP4079 to Recorded   5  Effer-K 20 MEQ Oral Tablet Effervescent; DISSOLVE AND DRINK ONE PACKET IN 8OZ   FLUID ONCE A DAY; Therapy: 86EWQ0825 to (Last Rx:08Jun2017)  Requested for: 84TWG6732 Ordered   6  Furosemide 40 MG Oral Tablet; TAKE 1 TABLET DAILY; Therapy: 53OHN7070 to (Evaluate:90Qge6431)  Requested for: 95Obq0681; Last   Rx:10Mhk8420 Ordered   7  GNP Loratadine 10 MG Oral Tablet; TAKE ONE TABLET BY MOUTH ONCE DAILY; Therapy: 60SMR5038 to (Last Rx:69Oeq2786)  Requested for: 85ALJ4925 Ordered   8  Lactulose 10 GM/15ML Oral Solution; TAKE 30 ML Three times a day;    Therapy: 11UHA7247 to  Requested for: 72QRC5212 Recorded   9  Magnesium Oxide 400 MG Oral Tablet; TAKE 1 TABLET DAILY; Therapy: (Luz Paulino) to Recorded   10  Multiple Vitamin TABS; TAKE 1 TABLET DAILY; Therapy: (Luz Paulino) to Recorded   11  Nadolol 20 MG Oral Tablet; Take 1 tablet daily; Therapy: [phone]) to  Requested for: 10FNC9136 Recorded   12  NexIUM 40 MG Oral Capsule Delayed Release; take 1 capsule once daily; Therapy: 04VQX8301 to (Last Rx:65Tgq4244)  Requested for: 66UFP3713 Ordered   13  Potassium Chloride 20 MEQ Oral Packet; DISSOLVE AND DRINK ONE PACKET INSERT    8OZ FLUID ONCE A DAY; Therapy: 52GEN9702 to (Last Rx:61Yni4788)  Requested for: 22Pny0934 Ordered   14  Valsartan 80 MG Oral Tablet; Take 1 tablet once daily; Therapy: 90Hqm5373 to (Evaluate:81Nka7714)  Requested for: 46YYH8830; Last    Rx:51Wbp4760 Ordered   15  Vitamin D-3 1000 UNIT Oral Capsule; TAKE AS DIRECTED; Therapy: (Luz Paulino) to Recorded   16  Xifaxan 550 MG Oral Tablet; 1 TABLET Bid; Therapy: 06EWJ8333 to [phone])  Requested for: [phone] [phone]; Last    Rx:20Dga4802 Ordered  Medication List Reviewed: The medication list was reviewed and updated today  Allergies    1  Amoxicillin TABS    Vitals  Signs   Recorded: 05CWO0820 24:42RJ   Systolic: 615  Diastolic: 72    Physical Exam    Constitutional   General appearance: No acute distress, well appearing and well nourished   looks well  Eyes   Conjunctiva and lids: No swelling, erythema or discharge  Pupils and irises: Equal, round and reactive to light  Ears, Nose, Mouth, and Throat   External inspection of ears and nose: Normal     Otoscopic examination: Tympanic membranes translucent with normal light reflex  Canals patent without erythema  Nasal mucosa, septum, and turbinates: Normal without edema or erythema  Oropharynx: Normal with no erythema, edema, exudate or lesions      Pulmonary   Respiratory effort: No increased work of breathing or signs of respiratory distress  Auscultation of lungs: Clear to auscultation  Cardiovascular   Palpation of heart: Normal PMI, no thrills  Auscultation of heart: Normal rate and rhythm, normal S1 and S2, without murmurs  Examination of extremities for edema and/or varicosities: Normal     Carotid pulses: Normal     Abdomen   Abdomen: Non-tender, no masses  Liver and spleen: No hepatomegaly or splenomegaly  Lymphatic   Palpation of lymph nodes in neck: No lymphadenopathy  Musculoskeletal   Gait and station: Normal     Digits and nails: Normal without clubbing or cyanosis  Inspection/palpation of joints, bones, and muscles: Normal     Skin   Skin and subcutaneous tissue: Normal without rashes or lesions  Neurologic   Cranial nerves: Cranial nerves 2-12 intact  Reflexes: 2+ and symmetric  Sensation: No sensory loss  Psychiatric   Orientation to person, place, and time: Normal     Mood and affect: Normal          Health Management  Health Maintenance   Medicare Annual Wellness Visit; every 1 year; Last 68MUQ4751; Next Due: 06AAF2815;  Active    Future Appointments    Date/Time Provider Specialty Site   01/23/2018 01:00 PM Denise Barry DO Internal Medicine [facility] INTERNAL MEDICINE [facility]   01/16/2018 03:00 PM Camila Liz MD Gastroenterology Adult  [address] [facility]     Signatures   Electronically signed by : Brennen Ortega OM; Nov 7 2017  7:54AM EST                       (Author)    Electronically signed by : Jennifer Bush DO; Nov 7 2017  1:08PM EST                       (Author)

## 2018-01-18 NOTE — MISCELLANEOUS
Date: 03/12/2017   To whom it may concern: This is to certify that: [facility] has been under my care for the following diagnosis:   hepatic encephalopathy, hypertension     I feel that she is unable to [place] [address] [facility] [place] at this time for the above mentioned medical reasons       Sincerely,        Electronically signed by : Liu Chaudhary DO; Mar 12 2017  8:57PM EST                       (Author)

## 2018-01-22 VITALS — SYSTOLIC BLOOD PRESSURE: 128 MMHG | DIASTOLIC BLOOD PRESSURE: 74 MMHG

## 2018-01-22 VITALS
TEMPERATURE: 98.2 F | BODY MASS INDEX: 28.36 KG/M2 | HEART RATE: 76 BPM | WEIGHT: 154.13 LBS | OXYGEN SATURATION: 98 % | HEIGHT: 62 IN

## 2018-01-22 VITALS
WEIGHT: 157.38 LBS | BODY MASS INDEX: 28.96 KG/M2 | HEIGHT: 62 IN | HEART RATE: 85 BPM | TEMPERATURE: 97.5 F | OXYGEN SATURATION: 92 %

## 2018-01-22 VITALS — SYSTOLIC BLOOD PRESSURE: 122 MMHG | DIASTOLIC BLOOD PRESSURE: 74 MMHG

## 2018-01-23 ENCOUNTER — ALLSCRIPTS OFFICE VISIT (OUTPATIENT)
Dept: OTHER | Facility: OTHER | Age: 83
End: 2018-01-23

## 2018-01-23 VITALS
HEART RATE: 100 BPM | TEMPERATURE: 94.5 F | WEIGHT: 150.38 LBS | DIASTOLIC BLOOD PRESSURE: 78 MMHG | OXYGEN SATURATION: 88 % | BODY MASS INDEX: 27.67 KG/M2 | HEIGHT: 62 IN | SYSTOLIC BLOOD PRESSURE: 126 MMHG

## 2018-01-23 DIAGNOSIS — R60.9 EDEMA: ICD-10-CM

## 2018-01-23 NOTE — PROGRESS NOTES
Assessment   1  Non-smoker (V49 89) (Z78 9)  2  1   3  1   4  1   5  1   6  Encounter for preventive health examination (V70 0) (Z00 00)1      1 Amended By: Yuki Salmon; 2016 12:30 PM EST    Plan  Aneurysm of anterior cerebral artery, Hepatic cirrhosis, Hepatic coma/encephalopathy    · (1) CBC/ PLT (NO DIFF); Status:Active; Requested for:2016; Health Maintenance    · Medicare Annual Wellness Visit ; every 1 year; Last 2016; Next 2017;  Status:Active  Hepatic cirrhosis    · (1) AMMONIA; Status:Active; Requested for:2016;   Hepatic cirrhosis, Hepatic coma/encephalopathy, Hypertension    · Follow-up visit in 4 Months Evaluation and Treatment  Follow-up  Status: Hold For -  Scheduling  Requested for: 2016  Hepatic cirrhosis, Hyperlipidemia    · (1) HEPATIC FUNCTION PANEL; Status:Active; Requested for:2016;   Hyperlipidemia    · (1) LIPID PANEL, FASTING; Status:Active; Requested for:2016;   Hypertension    · (1) BASIC METABOLIC PROFILE; Status:Active; Requested for:2016;     Discussion/Summary    Rx for labs added to GI labs upcoming  Continue present Rx 1  Impression:1  Subsequent Annual Wellness Visit1 , with preventive exam as well as age and risk appropriate counseling completed1   Cardiovascular screening and counselin  screening is current1   Diabetes screening and counselin  screening is current1   Colorectal cancer screening and counselin  screening not indicated1  and counseling was given on ways to eat a high fiber diet1   Breast cancer screening and counselin  due for a screening mammogram1   Cervical cancer screening and counselin  screening not indicated1   Osteoporosis screening and counselin  screening is current1   Abdominal aortic aneurysm screening and counselin  screening not indicated1   Glaucoma screening and counselin  screening is current1   HIV screening and counselin  screening not indicated1   Immunizations:1  Influenza vaccine is up to date this year1 , influenza vaccination is recommended annually1 , the lifetime pneumococcal vaccine has been completed1 , hepatitis B vaccination series is not indicated at this time due to the patient's low risk of pam the disease1 , the patient declines the Zostavax vaccine1 , Td vaccination status is unknown1  and Tdap vaccination status is unknown1   Advance Directive Plannin  complete and up to date1   Advice and education were given regarding1  increasing physical activity1  and home exercises encouraged1   Patient Discussion:1  plan discussed with the patient1 , follow-up visit needed in 4 months1 , follow-up as needed1         1 Amended By: Rajani Craig; 2016 12:33 PM EST    Chief Complaint  Pt presents for Well Exam  Pt without complaints today  Pt states she did self refer to see Dr Shayne Cohen and is very pleased with him  Refills done as requested  History of Present Illness  HPI: Patient doing well   Welcome to [employer] and Wellness Visits: The patient is being seen for the  subsequent annual wellness visit1  1   Medicare Screening and Risk Factors1    Hospitalizations:1  she has been previously hospitalizied1  and she has been hospitalized 3 times1   Once per lifetime medicare screening tests:1  ECG1  and AAA screening US has not yet been done1   Medicare Screening Tests Risk Questions   Abdominal aortic aneurysm risk assessment:1  none indicated1   Osteoporosis risk assessment:1  caucasian1 , female gender1  and over 48years of age2   HIV risk assessment:1  none indicated1   Drug and Alcohol Use:1  The patient  has never smoked cigarettes1  1   The patient reports  never drinking alcohol1  and  had drank a couple drinks/day but none in 2 years1  1   Alcohol concern: 1   The patient has no concerns about alcohol abuse1   She  has never used illicit drugs1  1      Diet and Physical Activity:1  Current diet includes  well balanced meals1  and  low salt food choices1  1   She  exercises infrequently1  1   Exercise:1  walking1  30 minutes per day1   Mood Disorder and Cognitive Impairment Screenin    Depression screening1   Negative for symptoms1   She denies feeling down, depressed, or hopeless over the past two weeks1   She denies feeling little interest or pleasure in doing things over the past two weeks1   Cognitive impairment screenin  denies difficulty learning/retaining new information1 , difficulty handling complex tasks1 , denies difficulty with reasoning1 , denies difficulty with spatial ability and orientation1 , denies difficulty with language1  and denies difficulty with behavior1   Functional Ability/Level of Safety:1  Hearing is1  slightly decreased1  and a hearing aid is not used1   She denies hearing difficulties1  The patient is currently1  able to do activities of daily living with limitations1 , able to do instrumental activities of daily living with limitations1 , able to participate in social activities with limitations1  and able to drive with limitations1   Activities of daily living details:1  does not need help using the phone1 , no transportation help needed1 , does not need help shopping1 , no meal preparation help needed1 , does not need help doing housework1 , does not need help doing laundry1 , does not need help managing medications1  and does not need help managing money1   Co-Managers and Medical Equipment/Suppliers: See Patient Care Team   Reviewed Updated [facility]:   Last Medicare Wellness Visit Information was reviewed, patient interviewed, no change since last AWV1   Preventive Quality Program 65 and Older: Falls Risk:1  The patient fell 0 times in the past 12 months  1   The patient is currently asymptomatic1  Symptoms Include: 1  Associated symptoms: 1  No associated symptoms are reported1      The patient currently has no urinary incontinence symptoms1          1 Amended By: Yesenia Her; Apr 11 2016 12:28 PM EST    Patient Care Team    Care Team Member Role Specialty Office Number   Dolly Villegas M D  Specialist Neurosurgery [phone]   [facility] Specialist Internal Medicine [phone]   Yaniv Naidu MD Specialist Cardiology [phone]     Review of Systems    Constitutional:1  negative1   Head and Face:1  negative1   Eyes: negative1   ENT:1  negative1   Cardiovascular:1  negative1   Respiratory:1  negative1   Gastrointestinal:1  negative1   Genitourinary:1  negative1   Musculoskeletal:1  generalized muscle aches1   Integumentary and Breasts:1  negative1   Neurological:1  negative1   Psychiatric:1  negative1   Endocrine:1  negative1   Hematologic and Lymphatic:1  negative1         1 Amended By: Yesenia Her; Apr 11 2016 12:29 PM EST    Active Problems   1  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)  2  Edema (782 3) (R60 9)  3  Encounter for screening colonoscopy (V76 51) (Z12 11)  4  Encounter for screening mammogram for malignant neoplasm of breast (V76 12)   (Z12 31)  5  GERD without esophagitis (530 81) (K21 9)  6  Hepatic cirrhosis (571 5) (K74 60)  7  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)  8  Hyperlipidemia (272 4) (E78 5)  9  Hypertension (401 9) (I10)  10  Need for influenza vaccination (V04 81) (Z23)  11  Post-menopausal osteoporosis (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)  12  Pyloric stenosis (537 0) (K31 1)  13   Right shoulder pain (719 41) (M25 511)    Past Medical History    · History of Breast Cancer (V10 3)   · History of Encounter for screening mammogram for malignant neoplasm of breast  (V76 12) (Z12 31)   · History of hepatic disease (V12 79) (Z87 19)   · Hypertension (401 9) (I10)   · History of Nausea with vomiting (787 01) (R11 2)   · History of Need for prophylactic vaccination and inoculation against influenza (V04 81)  (Z23)   · History of Polyp of sigmoid colon (211 3) (D12 5)   · History of Skin lesion (709  9) (L98 9)   · History of Sore throat (462) (J02 9)   · History of Sore throat (462) (J02 9)   · History of Ultrasound Abdominal Liver Nonspecific Abnormality (794 8)   · History of Upper respiratory infection (465 9) (J06 9)   · History of Urinary tract infection (599 0) (N39 0)    The active problems and past medical history were reviewed and updated today  Surgical History    · History of Breast Surgery Mastectomy   · History of Hysterectomy    The surgical history was reviewed and updated today  Family History    · Family history of High blood cholesterol    · Family history of High blood cholesterol    · Family history of Coronary Artery Disease (V17 49)   · Family history of depression (V17 0) (Z81 8)   · Family history of diabetes mellitus (V18 0) (Z83 3)   · Family history of hypertension (V17 49) (Z82 49)   · Family history of High blood cholesterol    The family history was reviewed and updated today  Social History    · Non drinker / no alcohol use   · Non-smoker (V49 89) (Z78 9)   · Sun Protection Sunscreen   · Uses Safety Equipment - Seatbelts  The social history was reviewed and updated today  The social history was reviewed and is unchanged  Current Meds  1  Aspirin 81 MG Oral Tablet Recorded  2  Claritin 10 MG Oral Tablet; TAKE 1 TABLET DAILY; Therapy: 24PXM5421 to ([phone])  Requested for: 99 641187; Last   Rx:06Apr2015 Ordered  3  CoQ-10 10 MG Oral Capsule; TAKE 1 CAPSULE 2-3 TIMES DAILY AS DIRECTED; Therapy: 16JZG1226 to Recorded  4  [employer] Vitamin D3 1000 UNIT CAPS Recorded  5  Esomeprazole Magnesium 40 MG Oral Capsule Delayed Release; TAKE 1 CAPSULE   ONCE DAILY; Therapy: 50RGH7773 to (Evaluate:31Nbz5645)  Requested for: 11UXN0786; Last   Rx:01Mar2016 Ordered  6  Hydrochlorothiazide 25 MG Oral Tablet; take 1 tablet every day; Therapy: 19Vgt5022 to (Evaluate:17Aug2016)  Requested for: 39Ils2197; Last   Rx:72Wkp7504 Ordered  7   Lactulose 10 GM/15ML Oral Solution; TAKE 2 TABLESPOONS (30ML) BY MOUTH TWICE   A DAY; Therapy: 33GTQ6057 to (Last Rx:46Ywf5656)  Requested for: 66GHV3534 Ordered  8  Multiple Vitamin TABS; Therapy: (Tommie Lora) to Recorded  9  Potassium Chloride 20 MEQ Oral Packet; DISSOLVE AND DRINK ONE PACKET   INSERT 8OZ FLUID ONCE A DAY; Therapy: 97VRA2122 to (Last Rx:58Msa0063)  Requested for: 91QHT3624 Ordered  10  Valsartan 80 MG Oral Tablet; Take 1 tablet once daily; Therapy: 63Twg7437 to (Evaluate:12Ghj5517)  Requested for: 33Zdf9384; Last    Rx:25Tbp4828 Ordered  11  Xifaxan 550 MG Oral Tablet; 1 TABLET Bid; Therapy: 40FGH9112 to (Evaluate:94Kpr0621); Last Rx:18Sve3992 Ordered    The medication list was reviewed and updated today  Allergies   1  Amoxicillin TABS    Immunizations   ** Printed in Appendix #1 below  Vitals  Signs [Data Includes: Current Encounter]    Systolic: 9394   Diastolic: 837    Temperature: 97 9 F  Height: 5 ft 2 in  Weight: 146 lb 6 08 oz  BMI Calculated: 26 77  BSA Calculated: 1 67     1 Amended By: Yesenia Her; 2016 12:29 PM EST    Health Management  Health Maintenance   Medicare Annual Wellness Visit; every 1 year; Last 49Huy0693; Next Due: 08Ejd6988; Active    Signatures   Electronically signed by : Maykel Franks DO;  2016 12:33PM EST                       (Author)    Appendix #1     Patient: Bev Hale "" ; : 10/24/1930; MRN: [id number]      1 2 3 4 5    Influenza  Oct 2011 09Coc2673 06Esi4652 30YSC2374 07WKD2962    Pneumococcal  2004

## 2018-01-24 NOTE — MISCELLANEOUS
Assessment   1  Diastolic CHF (071 14,908 9) (I50 30)1   2  Mitral valve stenosis (394 0) (I05 0)1   3  Dyspnea on exertion (786 09) (R06 09)1   4  Hepatic cirrhosis (571 5) (K74 60)1   5  Pulmonary hypertension (416 8) (I27 2)1      1 Amended By: Regino Talley; Mar 24 2017 9:56 PM EST    Plan  COPD with exacerbation, Diastolic CHF    · Follow-up visit in 6 weeks Evaluation and Treatment  Follow-up  Status: Hold For -  Scheduling  Requested for: 93ZCL3583  Ordered; For: COPD with exacerbation, Diastolic CHF; Ordered By: Regino Talley    Performed:   Due: 48OZV1180  Diastolic CHF    · Start: Furosemide 40 MG Oral Tablet (Lasix); TAKE 1 TABLET DAILY  Rx By: Regino Talley; Dispense: 90 Days ; #:90 Tablet; Refill: 3;For: Diastolic CHF;   JEFF = N; Sent To: [employer] [employer] (Mail Order)   · (Q) B TYPE NATRIURETIC PEPTIDE (BNP); Status:Active; Requested for:24Mar2017;   Perform:[facility]; Due:24Mar2018; Ordered; For:Diastolic CHF; Ordered By:Manisha Huynh;  Diastolic CHF, Pulmonary hypertension    · (1) COMPREHENSIVE METABOLIC PANEL; Status:Active; Requested for:24Mar2017;   Perform:[facility]; RQT:31CKA8263; Ordered; For:Diastolic CHF, Pulmonary   hypertension; Ordered By:Steven Huynh;    Discussion/Summary  Discussion Summary:   Meds reviewed with pt  Continue lasix   rx labs  Appt with ct surgeon upcoming, pt son will go with her to hear options  Continue home PT  Fall precautions  Continue lactulose  Pt has Pulm and cardio followup set, needs to see dr Patrice Ferrer as well  Rto 1 month1    Medication SE Review and Pt Understands Tx: Possible side effects of new medications were reviewed with the patient/guardian today1  The treatment plan was reviewed with the patient/guardian   The patient/guardian understands and agrees with the treatment plan1    Self Referrals:   Self Referrals: No       1 Amended By: Regino Talley; Mar 24 2017 9:59 PM EST    Chief Complaint  Chief Complaint Free Text Note Form: Pt presents for hosp f/up exam with her son today  Pt feeling ok today  States she is having home care see her ([facility] h care), using a walker and cane  Appetite is good per patient  No falls  History of Present Illness  TCM Communication [facility]: The patient is being contacted for follow-up after hospitalization  She was hospitalized at and [address] [facility]  The date of admission: March 9, 2017, date of discharge: march 21,2017  Diagnosis: Pleural effusion, bacterial  Shortness of breath  COPD with acute exacerbation  She was discharged to home, with home health services  Communication performed and completed by   HPI: Pt doing ok at home with homecare  Refused to stay at [facility]  No oxygen  Breathing gas improved w lasix  No chest pain  No falls  Tires easily  Compliant with meds  Has appt w CT surgeon upcoming  No cough  Appetite ok and family is helping with care  No edema, passing urine more  bowels moving well  1        1 Amended By: Cha Renee; Mar 24 2017 9:54 PM EST    Review of Systems  Complete-Female:   Constitutional:1  feeling tired1 , but no fever1  and no chills1   Eyes:1  No complaints of eye pain, no red eyes, no eyesight problems, no discharge, no dry eyes, no itching of eyes1   ENT:1  nasal discharge1   Cardiovascular:1  no chest pain1 , no palpitations1  and no lower extremity edema1   Respiratory:1  shortness of breath during exertion1 , but no cough1  and no wheezing1   Gastrointestinal:1  no abdominal pain1 , no nausea1 , no vomiting1  and no diarrhea1   Genitourinary:1  No complaints of dysuria, no incontinence, no pelvic pain, no dysmenorrhea, no vaginal discharge or bleeding1   Musculoskeletal:1  arthralgias1   Integumentary:1  No complaints of skin rash or lesions, no itching, no skin wounds, no breast pain or lump1   Neurological:1  no headache1 , no confusion1 , no dizziness1  and no difficulty walking1      Psychiatric:1  no sleep disturbances1   Endocrine:1  No complaints of proptosis, no hot flashes, no muscle weakness, no deepening of the voice, no feelings of weakness1   Hematologic/Lymphatic:1  No complaints of swollen glands, no swollen glands in the neck, does not bleed easily, does not bruise easily1         1 Amended By: Bette Hedrick; Mar 24 2017 9:54 PM EST    Active Problems   1  Advance directive discussed with patient (V65 49) (Z71 89)  2  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)  3  Dyspnea on exertion (786 09) (R06 09)  4  Edema (782 3) (R60 9)  5  GERD without esophagitis (530 81) (K21 9)  6  Hepatic cirrhosis (571 5) (K74 60)  7  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)  8  History of mastectomy (V45 71) (Z90 10)  9  Hyperlipidemia (272 4) (E78 5)  10  Hypertension (401 9) (I10)  11  Mitral valve stenosis (394 0) (I05 0)  12  Need for influenza vaccination (V04 81) (Z23)  13  Pleural effusion (511 9) (J90)  14  Post-menopausal osteoporosis (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)  15  Pyloric stenosis (537 0) (K31 1)  16  Right shoulder pain (719 41) (M25 511)  17  Shortness of breath on exertion (786 05) (R06 02)    Past Medical History   1  History of Acute sinusitis (461 9) (J01 90)  2  History of Breast Cancer (V10 3)  3  History of Encounter for screening colonoscopy (V76 51) (Z12 11)  4  History of Encounter for screening mammogram for malignant neoplasm of breast   (V76 12) (Z12 31)  5  History of Encounter for screening mammogram for malignant neoplasm of breast   (V76 12) (Z12 31)  6  History of hepatic disease (V12 79) (Z87 19)  7  Hypertension (401 9) (I10)  8  History of Nausea with vomiting (787 01) (R11 2)  9  History of Need for prophylactic vaccination and inoculation against influenza (V04 81)   (Z23)  10  History of Polyp of sigmoid colon (211 3) (D12 5)  11  History of Skin lesion (709 9) (L98 9)  12  History of Sore throat (462) (J02 9)  13  History of Sore throat (462) (J02 9)  14   History of Ultrasound Abdominal Liver Nonspecific Abnormality (794 8)  15  History of Upper respiratory infection (465 9) (J06 9)  16  History of Urinary tract infection (599 0) (N39 0)    Surgical History   1  History of Breast Surgery Mastectomy  2  History of Hysterectomy  Surgical History Reviewed: The surgical history was reviewed and updated today  Family History  Son   1  Family history of High blood cholesterol  Sibling   2  Family history of High blood cholesterol  Family History   3  Family history of Coronary Artery Disease (V17 49)  4  Denied: Family history of Drug abuse  5  Family history of depression (V17 0) (Z81 8)  6  Family history of diabetes mellitus (V18 0) (Z83 3)  7  Family history of hypertension (V17 49) (Z82 49)  8  Family history of High blood cholesterol  Family History Reviewed: The family history was reviewed and updated today  Social History    · Dental care, regularly   · Denied: Drug use (305 90) (F19 90)   · Good dental hygiene   · Never a smoker   · Non drinker / no alcohol use   · Sun Protection Sunscreen   · Uses Safety Equipment - Seatbelts   ·   Social History Reviewed: The social history was reviewed and updated today  The social history was reviewed and is unchanged  Current Meds  1  Aspirin 81 MG TABS Recorded  2  Claritin 10 MG Oral Tablet; TAKE 1 TABLET DAILY; Therapy: 44BQJ3738 to (Bacilio Stein)  Requested for: 99 574990; Last   Rx:06Apr2015 Ordered  3  CoQ-10 10 MG CAPS; TAKE 1 CAPSULE 2-3 TIMES DAILY AS DIRECTED; Therapy: 36MXD8983 to Recorded  4  [employer] Vitamin D3 1000 UNIT CAPS Recorded  5  GNP Loratadine 10 MG Oral Tablet; TAKE ONE TABLET BY MOUTH ONCE DAILY; Therapy: 83RKP5368 to (Last Rx:24Rhu0628)  Requested for: 63VVQ1156 Ordered  6  HydroCHLOROthiazide 25 MG Oral Tablet; TAKE 1 TABLET EVERY DAY; Therapy: 03Mck2044 to (Evaluate:04Mar2018)  Requested for: 45CQW4890; Last   Rx:09Mar2017 Ordered  7   Lactulose 10 GM/15ML Oral Solution; TAKE 2 TABLESPOONS (30ML) BY MOUTH TWICE   A DAY; Therapy: 70UXR0370 to (Last Rx:09Mar2017)  Requested for: 07JTS9758 Ordered  8  Multiple Vitamin TABS; Therapy: (Gosia Dowd to Recorded  9  NexIUM 40 MG Oral Capsule Delayed Release; TAKE 1 CAPSULE ONCE DAILY; Therapy: 41IMS2206 to (53 583 09 76)  Requested for: 86VOG2825; Last   Rx:09Mar2017 Ordered  10  Potassium Chloride 20 MEQ Oral Packet; DISSOLVE AND DRINK ONE PACKET INSERT    8OZ FLUID ONCE A DAY; Therapy: 80VTU7512 to (Last Rx:17Oct2016)  Requested for: 72Fji9123 Ordered  11  Valsartan 80 MG Oral Tablet; Take 1 tablet once daily; Therapy: 57Omu5389 to (53 583 09 76)  Requested for: 34RBZ7928; Last    Rx:09Mar2017 Ordered  12  Xifaxan 550 MG Oral Tablet; 1 TABLET Bid; Therapy: 38YEP0566 to (Evaluate:94Zwg3948); Last Rx:22Pdt6433 Ordered  Medication List Reviewed: The medication list was reviewed and updated today  Allergies   1  Amoxicillin TABS    Vitals  Signs   Recorded: 55XWC0039 84:47RI    Systolic: 570 1    Diastolic: 74 1   Recorded: 44YLY3417 12:30PM    Temperature: 97 5 F 1    Heart Rate: 85 1    Height: 5 ft 2 in 1    Weight: 157 lb 6 08 oz 1    BMI Calculated: 28 78 1    BSA Calculated: 1 73 1    O2 Saturation: 92 1      1 Amended By: Yuki Salmon; Mar 24 2017 9:54 PM EST    Physical Exam    Constitutional1    General appearance: No acute distress, well appearing and well nourished  1  Much tanisha comfortable than last visit1   Eyes1    Conjunctiva and lids: No swelling, erythema or discharge  1  No icterus1   Pupils and irises: Equal, round and reactive to light  1    Ears, Nose, Mouth, and Throat1    External inspection of ears and nose: Normal 1    Otoscopic examination: Tympanic membranes translucent with normal light reflex  Canals patent without erythema  1    Nasal mucosa, septum, and turbinates: Normal without edema or erythema  1    Oropharynx: Normal with no erythema, edema, exudate or lesions  1 Pulmonary1    Respiratory effort: No increased work of breathing or signs of respiratory distress  1    Auscultation of lungs: Abnormal  1  Decrease bs basilar crackles1   Cardiovascular1    Palpation of heart: Normal PMI, no thrills  1    Auscultation of heart: Abnormal  1    Examination of extremities for edema and/or varicosities: Normal 1    Carotid pulses: Normal 1    Abdomen1    Abdomen: Abnormal  1  Nt mikd distension,soft1   Liver and spleen: No hepatomegaly or splenomegaly  1    Lymphatic1    Palpation of lymph nodes in neck: No lymphadenopathy  1    Musculoskeletal1    Gait and station: Normal 1    Digits and nails: Normal without clubbing or cyanosis  1    Inspection/palpation of joints, bones, and muscles: Abnormal  1  Djd1   Skin1    Skin and subcutaneous tissue: Normal without rashes or lesions  1    Neurologic1    Cranial nerves: Cranial nerves 2-12 intact  1    Reflexes: 2+ and symmetric  1    Sensation: No sensory loss  1    Psychiatric1    Orientation to person, place, and time: Normal 1  Aao x 31   Mood and affect: Normal 1          1 Amended By: Regino Talley; Mar 24 2017 9:56 PM EST    Health Management  Health Maintenance   Medicare Annual Wellness Visit; every 1 year; Last 93Dhw2053; Next Due: 58Otb5091; Near Due    Future Appointments    Date/Time Provider Specialty Site   04/03/2017 11:00 AM Regino Talley DO Internal Medicine [address]   04/21/2017 11:20 AM Duane Pippins, M D   [facility]   04/05/2017 10:00 AM Brian Trotter DO Cardiac Surgery CT SURGERY [facility] OFFICE   04/05/2017 01:30 PM Marybeth Martino DO Pulmonary Medicine [facility] PULMONARY [facility]     Signatures  mca    Electronically signed by : Cody Morris DO; Mar 24 2017  1:00PM EST                       (Author)    Electronically signed by : Cody Morris DO; Mar 24 2017  1:01PM EST                       (Author)    Electronically signed by : Steven Esposito Endy Gant DO; Mar 24 2017  9:59PM EST                       (Author)

## 2018-01-24 NOTE — PROGRESS NOTES
Assessment   1  Encounter for preventive health examination (V70 0) (Z00 00)    Plan  Diastolic CHF, Dyspnea on exertion, Hepatic cirrhosis    · Follow-up visit in 3 months Evaluation and Treatment  Follow-up  Status: Hold For -  Scheduling  Requested for: 12ZLK0694  Diastolic CHF, Hepatic cirrhosis    · (1) AMMONIA; Status:Active; Requested HXW:31FZP4736;   Diastolic CHF, Pulmonary hypertension    · (1) COMPREHENSIVE METABOLIC PANEL; Status:Active; Requested for:85Pds8853;   Dyspnea on exertion, GERD without esophagitis    · (1) CBC/ PLT (NO DIFF); Status:Active; Requested ZMV:98NHD6867; Health Maintenance    · Medicare Annual Wellness Visit ; every 1 year; Last 81BDL0830; Next 17OKR3832;  Status:Active  Hyperlipidemia    · (1) LDL,DIRECT; Status:Active; Requested GIV:88SBR0856;     Discussion/Summary    Patient to continue present rx  Upcoming GI eval  Elevate le at rest  Rx fbw  Rto 3 months/flu shot1      Impression: Subsequent Annual Wellness Visit, with preventive exam as well as age and risk appropriate counseling completed1   Cardiovascular screening and counselin  screening is current1  and counseling was given on maintaining a healthy diet1   Diabetes screening and counselin  screening is current1  and counseling was given on maintaining a healthy diet1   Colorectal cancer screening and counselin  screening not indicated1  and counseling was given on ways to eat a high fiber diet1   Breast cancer screening and counselin  screening is current1   Cervical cancer screening and counselin  screening not indicated1   Osteoporosis screening and counseling: the patient declines screening1  and counseling was given on the importance of regular weightbearing exercise1   Abdominal aortic aneurysm screening and counselin  screening is current1   Glaucoma screening and counselin  screening is current1   HIV screening and counselin  screening not indicated1    Immunizations:1 Influenza vaccine is up to date this year1 , influenza vaccination is recommended annually1 , the lifetime pneumococcal vaccine has been completed1 , hepatitis B vaccination series is not indicated at this time due to the patient's low risk of pam the disease1 , the patient declines the Zostavax vaccine1 , the patient declines the Td vaccine1  and the patient declines the Tdap vaccine1   Advance Directive Plannin  complete and up to date1   Advice and education were given regarding1  fall risk reduction1   Patient Discussion:1  plan discussed with the patient1 , follow-up visit needed in 3 months1   The treatment plan was reviewed with the patient/guardian  The patient/guardian understands and agrees with the treatment plan         Self Referrals: No       1 Amended By: Jeri Antonio; 2017 7:56 PM EST    Chief Complaint  Pt presents for Well Exam today  Pt feels well today and without complaint  No falls  No refills needed today  appetite is normal per patient  Advance Directives  Advance Directive [facility]:   The patient is in agreement to receive the Advance Care Planning service    YES - Patient has an advance health care directive  The patient has a living will located  in patient's home  Capacity/Competence: This patient has full decision making capacity for discussion of advance care planning and This patient has full decision making competency for discussion of advance care planning  History of Present Illness  HPI: Patient doing ok most days  Patient is more limited by her breathing but adapting to limitations1    Welcome to [employer] and Wellness Visits: The patient is being seen for the subsequent annual wellness visit  Drug and Alcohol Use: The patient has never smoked cigarettes  The patient reports never drinking alcohol  She has never used illicit drugs  Diet and Physical Activity: Current diet includes well balanced meals  She exercises infrequently  Exercise: walking  Advance Directives: Advance directives: living will and advance directives  Co-Managers and Medical Equipment/Suppliers: See Patient Care Team   Reviewed Updated [facility]:   Last Medicare Wellness Visit Information was reviewed, patient interviewed, no change since last AWV        1 Amended By: Rachell Sheldon; Jul 06 2017 7:53 PM EST    Patient Care Team    Care Team Member Role Specialty Office Number   Sarah Christian HERRERA  Specialist Neurosurgery [phone]   [facility] Specialist Internal Medicine [phone]   Viviane Gilman MD Specialist Cardiology [phone]   Tanja Arenas Specialist Pulmonary Medicine [phone]   Lionel HERRERA  Specialist Cardiology [phone]   [facility] Specialist Gastroenterology Adult [phone]     Review of Systems    ENT:1  negative1         1 Amended By: Rachell Sheldon; Jul 06 2017 7:53 PM EST    Active Problems   1  Advance directive discussed with patient (V65 49) (Z71 89)  2  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)  3  Aortic stenosis (424 1) (I35 0)  4  Diastolic CHF (257 46,700 6) (I50 30)  5  Dyspnea on exertion (786 09) (R06 09)  6  Edema (782 3) (R60 9)  7  GERD without esophagitis (530 81) (K21 9)  8  Hepatic cirrhosis (571 5) (K74 60)  9  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)  10  History of mastectomy (V45 71) (Z90 10)  11  Hyperlipidemia (272 4) (E78 5)  12  Hypertension (401 9) (I10)  13  Mitral valve stenosis (394 0) (I05 0)  14  Need for influenza vaccination (V04 81) (Z23)  15  Pleural effusion (511 9) (J90)  16  Post-menopausal osteoporosis (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)  17  Pulmonary hypertension (416 8) (I27 2)  18  Pyloric stenosis (537 0) (K31 1)  19  Right shoulder pain (719 41) (M25 511)  20  Shortness of breath on exertion (786 05) (R06 02)  21   Wound of right buttock (877 0) (S38 869A)    Past Medical History    · History of Acute sinusitis (461 9) (J01 90)   · History of Breast Cancer (V10 3)   · History of Encounter for screening colonoscopy (V76 51) (Z12 11)   · History of Encounter for screening mammogram for malignant neoplasm of breast  (V76 12) (Z12 31)   · History of Encounter for screening mammogram for malignant neoplasm of breast  (V76 12) (Z12 31)   · History of hepatic disease (V12 79) (Z87 19)   · Hypertension (401 9) (I10)   · History of Nausea with vomiting (787 01) (R11 2)   · History of Need for prophylactic vaccination and inoculation against influenza (V04 81)  (Z23)   · History of Polyp of sigmoid colon (211 3) (D12 5)   · History of Skin lesion (709 9) (L98 9)   · History of Sore throat (462) (J02 9)   · History of Sore throat (462) (J02 9)   · History of Ultrasound Abdominal Liver Nonspecific Abnormality (794 8)   · History of Upper respiratory infection (465 9) (J06 9)   · History of Urinary tract infection (599 0) (N39 0)    The active problems and past medical history were reviewed and updated today  Surgical History    · History of Breast Surgery Mastectomy   · History of Hysterectomy    The surgical history was reviewed and updated today  Family History  Son    · Family history of High blood cholesterol  Sibling    · Family history of High blood cholesterol  Family History    · Family history of Coronary Artery Disease (V17 49)   · Denied: Family history of Drug abuse   · Family history of depression (V17 0) (Z81 8)   · Family history of diabetes mellitus (V18 0) (Z83 3)   · Family history of hypertension (V17 49) (Z82 49)   · Family history of High blood cholesterol    The family history was reviewed and updated today  Social History    · Dental care, regularly   · Denied: Drug use (305 90) (F19 90)   · Former smoker (V15 82) (Z87 891)   · Good dental hygiene   · Never a smoker   · Non drinker / no alcohol use   · Sun Protection Sunscreen   · Uses Safety Equipment - Seatbelts   ·   The social history was reviewed and updated today   The social history was reviewed and is unchanged  Current Meds  1  Aspirin 81 MG TABS; TAKE 1 TABLET DAILY; Therapy: (Recorded:22Mar2017) to Recorded  2  Claritin 10 MG Oral Tablet; TAKE 1 TABLET DAILY; Therapy: 95EHF6166 to ([facility])  Requested for: 99 797955; Last   Rx:06Apr2015 Ordered  3  CoQ-10 10 MG CAPS; TAKE 1 CAPSULE 2-3 TIMES DAILY AS DIRECTED; Therapy: 21HHE3110 to Recorded  4  Effer-K 20 MEQ Oral Tablet Effervescent; DISSOLVE AND DRINK ONE PACKET IN 8OZ   FLUID ONCE A DAY; Therapy: 97FNB7305 to (Last Rx:08Jun2017)  Requested for: 15ONU4718 Ordered  5  Furosemide 40 MG Oral Tablet; TAKE 1 TABLET DAILY AS DIRECTED; Therapy: (Scarlet Rides) to Recorded  6  Furosemide 40 MG Oral Tablet; TAKE 1 TABLET DAILY; Therapy: 34FNJ8639 to (Evaluate:19Mar2018)  Requested for: 83HDN6264; Last   Rx:24Mar2017 Ordered  7  GNP Loratadine 10 MG Oral Tablet; TAKE ONE TABLET BY MOUTH ONCE DAILY; Therapy: 81SFX2262 to (Last Rx:18May2016)  Requested for: 05UIA4458 Ordered  8  Lactulose 10 GM/15ML Oral Solution; TAKE 2 TABLESPOONS (30ML) BY MOUTH TWICE   A DAY; Therapy: 23DVZ5528 to (Last Rx:09Jun2017)  Requested for: 05VNA5602 Ordered  9  Magnesium Oxide 400 MG Oral Tablet; TAKE 1 TABLET DAILY; Therapy: (Scarlet Rides) to Recorded  10  Multiple Vitamin TABS; TAKE 1 TABLET DAILY; Therapy: (Scarlet Rides) to Recorded  11  Nadolol 20 MG Oral Tablet; TAKE 1/2 OF A TABLET DAILY  Requested for: 42JST8938;    Last Rx:05Jun2017 Ordered  12  NexIUM 40 MG Oral Capsule Delayed Release; TAKE 1 CAPSULE ONCE DAILY; Therapy: 31JGQ0563 to (Brooklyn Mcwilliams)  Requested for: 45GGC5951; Last    Rx:10Reh4332 Ordered  13  Potassium Chloride 20 MEQ Oral Packet; DISSOLVE AND DRINK ONE PACKET    INSERT 8OZ FLUID ONCE A DAY; Therapy: 07SIM2548 to (Last Rx:17Oct2016)  Requested for: 17Oct2016 Ordered  14  Valsartan 80 MG Oral Tablet; Take 1 tablet once daily;     Therapy: 39Nmb2026 to (Marcey Oppenheim)  Requested for: 35XVV0360; Last    Rx:2017 Ordered  15  Vitamin D-3 1000 UNIT Oral Capsule; TAKE AS DIRECTED; Therapy: (Marco Antonio Chiang) to Recorded  16  Xifaxan 550 MG Oral Tablet; 1 TABLET Bid; Therapy: 99JUL8347 to ([phone])  Requested for: 320 ; Last    Rx:2017 Ordered    The medication list was reviewed and updated today  Allergies   1  Amoxicillin TABS    Immunizations   ** Printed in Appendix #1 below  Vitals  Signs    Systolic: 7110   Diastolic: 682      1 Amended By: Latonya Palomino; 2017 7:54 PM EST    Physical Exam    Constitutional1    General appearance: No acute distress, well appearing and well nourished  1  Comfortable,nad1   Head and Face1    Head and face: Normal 1    Palpation of the face and sinuses: No sinus tenderness  1    Psychiatric1    Judgment and insight: Normal 1    Orientation to person, place, and time: Normal 1    Recent and remote memory: Intact  1    Mood and affect: Normal 1        1 Amended By: Latonya Palomino; 2017 7:54 PM EST    Health Management  Health Maintenance   Medicare Annual Wellness Visit; every 1 year; Last 18ZLE1743; Next Due: 21ATY5034;   Active    Future Appointments    Date/Time Provider Specialty Site   2017 01:30 PM Power Daugherty MD Gastroenterology Adult [address]     Signatures   Electronically signed by : Prashant Schilling DO; 2017  7:57PM EST                       (Author)    Appendix #1     Patient: Yonis Robins ; : 10/24/1930; MRN: [id number]      1 2 3 4 5 6    Influenza  Oct 2011 12-Nov-2012 25-Oct-2013 29-Sep-2014 15-Oct-2015 07-Nov-2016    PPSV  2004

## 2018-01-24 NOTE — PROGRESS NOTES
Assessment    1  Cellulitis of left lower leg (682 6) (L03 116)   2  Diastolic CHF (478 54,554 6) (I50 30)   3  Hepatic cirrhosis (571 5) (K74 60)   4  Edema (782 3) (R60 9)    Plan  Cellulitis of left lower leg    · LevoFLOXacin 250 MG Oral Tablet; Take 1 daily  Diastolic CHF, Edema, Hepatic cirrhosis    · Follow-up visit in 6 weeks Evaluation and Treatment  Follow-up  Status: Hold For - Scheduling   Requested for: 23Jan2018  Edema    · VAS UPPER LIMB VENOUS DUPLEX SCAN, UNILATERAL/LIMITED; Unilateral Side:Left; Status:Hold  For - Scheduling; Requested LQP:58VPX2440; Hepatic cirrhosis    · Lactulose 10 GM/15ML Oral Solution; TAKE 2 TABLESPOONS (30ML) BY MOUTH TWICE A  DAY    Discussion/Summary  Discussion Summary:   Doppler left lower leg  Elevate lower legs    Increase water intake  and walk as able within the home  Has followup with Dr Zuleima Ford for foot care  rto 6 weeks  Has upcoming with GI as well  Medication SE Review and Pt Understands Tx: Possible side effects of new medications were reviewed with the patient/guardian today  The treatment plan was reviewed with the patient/guardian  The patient/guardian understands and agrees with the treatment plan       Self Referrals:   Self Referrals: No      Chief Complaint  Chief Complaint Free Text Note Form: Pt presents for routine f/up exam  Pt states she has SOB worse with exertion, not new for her  She also has c/o b/l foot discoloration and edema for the past month  NO falls and she is using her cane and not her walker today  Refills done as requested  Appetite is fair per patient  Chief Complaint Chronic Condition [facility]: Patient is here today for follow up of chronic conditions described in HPI  Advance Directives  Advance Directive [facility]:   The patient is in agreement to receive the Advance Care Planning service    YES - Patient has an advance health care directive  The patient has a living will located  in patient's home  Capacity/Competence: This patient has full decision making capacity for discussion of advance care planning and This patient has full decision making competency for discussion of advance care planning  History of Present Illness  HPI: Patient has had increased swelling an discoloration of lower legs  It has improved with leg elevation and has been sitting more  No fever, chills or trauma  No chest pain or worsening sob  Review of Systems  Complete-Female:   Constitutional: No fever, no chills, feels well, no tiredness, no recent weight gain or weight loss  Eyes: No complaints of eye pain, no red eyes, no eyesight problems, no discharge, no dry eyes, no itching of eyes  ENT: no complaints of earache, no loss of hearing, no nose bleeds, no nasal discharge, no sore throat, no hoarseness  Cardiovascular: No complaints of slow heart rate, no fast heart rate, no chest pain, no palpitations, no leg claudication, no lower extremity edema  Respiratory: shortness of breath during exertion  Gastrointestinal: No complaints of abdominal pain, no constipation, no nausea or vomiting, no diarrhea, no bloody stools  Genitourinary: No complaints of dysuria, no incontinence, no pelvic pain, no dysmenorrhea, no vaginal discharge or bleeding  Musculoskeletal: arthralgias and joint stiffness  Integumentary: skin wound  Neurological: limb weakness and difficulty walking  Psychiatric: Not suicidal, no sleep disturbance, no anxiety or depression, no change in personality, no emotional problems  Endocrine: No complaints of proptosis, no hot flashes, no muscle weakness, no deepening of the voice, no feelings of weakness  Hematologic/Lymphatic: No complaints of swollen glands, no swollen glands in the neck, does not bleed easily, does not bruise easily  Preventive Quality 65 Older:   Preventive Quality 65 and Older: Falls Risk: The patient fell 0 times in the past 12 months   The patient is currently asymptomatic Symptoms Include:  Associated symptoms:  No associated symptoms are reported  The patient currently has no urinary incontinence symptoms  Active Problems    1  Advance directive discussed with patient (V65 49) (Z71 89)   2  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)   3  Aortic stenosis (424 1) (I35 0)   4  Decubitus ulcer of sacral region, stage 1 (707 03,707 21) (L89 151)   5  Diastolic CHF (357 01,395 6) (I50 30)   6  Dyspnea on exertion (786 09) (R06 09)   7  Edema (782 3) (R60 9)   8  GERD without esophagitis (530 81) (K21 9)   9  Hepatic cirrhosis (571 5) (K74 60)   10  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)   11  History of mastectomy (V45 71) (Z98 890,Z90 10)   12  Hyperlipidemia (272 4) (E78 5)   13  Hypertension (401 9) (I10)   14  Hyponatremia (276 1) (E87 1)   15  Mitral valve stenosis (394 0) (I05 0)   16  Pancreatic cyst (577 2) (K86 2)   17  Pleural effusion (511 9) (J90)   18  Post-menopausal osteoporosis (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)   19  Pulmonary hypertension (416 8) (I27 20)   20  Pyloric stenosis (537 0) (K31 1)   21  Screening for genitourinary condition (V81 6) (Z13 89)   22  Shortness of breath on exertion (786 05) (R06 02)   23  Wound of right buttock (877 0) (S31 819A)    Past Medical History    1  History of Breast Cancer (V10 3)   2  Hypertension (401 9) (I10)   3  History of Polyp of sigmoid colon (211 3) (D12 5)  Active Problems And Past Medical History Reviewed: The active problems and past medical history were reviewed and updated today  Surgical History    1  History of Breast Surgery Mastectomy   2  History of Hysterectomy  Surgical History Reviewed: The surgical history was reviewed and updated today  Family History  Son    1  Family history of High blood cholesterol  Sibling    2  Family history of High blood cholesterol  Family History    3  Family history of Coronary Artery Disease (V17 49)   4  Denied: Family history of Drug abuse   5  Family history of depression (V17 0) (Z81 8)   6  Family history of diabetes mellitus (V18 0) (Z83 3)   7  Family history of hypertension (V17 49) (Z82 49)   8  Family history of High blood cholesterol  Family History Reviewed: The family history was reviewed and updated today  Social History    · Dental care, regularly   · Denied: Drug use (305 90) (F19 90)   · Former smoker (V15 82) (Z87 891)   · Good dental hygiene   · Never a smoker   · Non drinker / no alcohol use   · Sun Protection Sunscreen   · Uses Safety Equipment - Seatbelts   ·   Social History Reviewed: The social history was reviewed and updated today  The social history was reviewed and is unchanged  Current Meds   1  Aspirin 81 MG TABS; TAKE 1 TABLET DAILY; Therapy: (Recorded:22Mar2017) to Recorded   2  Calmoseptine 0 44-20 6 % External Ointment; APPLY 1 INCH Twice daily; Therapy: 15ELI2218 to (Ric Ocampo)  Requested for: 02LKH2934; Last Rx:64Ytx7651   Ordered   3  CoQ-10 10 MG CAPS; TAKE 1 CAPSULE 2-3 TIMES DAILY AS DIRECTED; Therapy: 94WSR7765 to Recorded   4  Furosemide 40 MG Oral Tablet; TAKE 1 TABLET DAILY; Therapy: 09STO6875 to (Evaluate:52Qio8740)  Requested for: 12Oia7843; Last Rx:91Int9007   Ordered   5  Lactulose 10 GM/15ML Oral Solution; TAKE 2 TABLESPOONS (30ML) BY MOUTH TWICE A DAY; Therapy: 17CXY4782 to (Last Rx:20Gyw8865)  Requested for: 55PWN3559 Ordered   6  Magnesium Oxide 400 MG Oral Tablet; TAKE 1 TABLET DAILY; Therapy: (Orvis Samanta) to Recorded   7  Medrol 4 MG Oral Tablet Therapy Pack; use as directed; Therapy: 31PIX1305 to (Last Rx:14Dma3682)  Requested for: 63VTM7556 Ordered   8  Multiple Vitamin TABS; TAKE 1 TABLET DAILY; Therapy: (Orvis Samanta) to Recorded   9  Nadolol 20 MG Oral Tablet; Take 1 tablet daily; Therapy: (([phone]) to  Requested for: 61GME9252 Recorded   10  NexIUM 40 MG Oral Capsule Delayed Release; take 1 capsule once daily;     Therapy: 92Uui8084 to (Last Rx:88Cjq4369)  Requested for: 22KXO2859 Ordered   11  Potassium Chloride 20 MEQ Oral Packet; DISSOLVE AND DRINK ONE PACKET INSERT 8OZ FLUID    ONCE A DAY; Therapy: 24XGK9566 to (Last Rx:08Ihj1373)  Requested for: 91RBY4350 Ordered   12  Valsartan 80 MG Oral Tablet; Take 1 tablet once daily; Therapy: 38Icu6084 to (Evaluate:49Vmf4890)  Requested for: 14HDJ5537; Last Rx:24Eoc0541    Ordered   13  Vitamin D-3 1000 UNIT Oral Capsule; TAKE AS DIRECTED; Therapy: (Petey Abdi) to Recorded   14  Xifaxan 550 MG Oral Tablet; 1 TABLET Bid; Therapy: 05WJJ8523 to (Evaluate:10Jan2019)  Requested for: 78NCJ7287; Last Rx:15Jan2018    Ordered  Medication List Reviewed: The medication list was reviewed and updated today  Allergies    1  Amoxicillin TABS    Vitals  Vital Signs    Recorded: 56NBN1176 01:05PM Recorded: 69DSY0855 12:52PM   Temperature  95 7 F, Tympanic   Heart Rate  65   Systolic 778    Diastolic 72    Height  5 ft 2 in   Weight  152 lb 2 oz   BMI Calculated  27 82   BSA Calculated  1 7   O2 Saturation  88, RA     Physical Exam    Constitutional   General appearance: No acute distress, well appearing and well nourished  Eyes   Conjunctiva and lids: No swelling, erythema or discharge  Pupils and irises: Equal, round and reactive to light  Ears, Nose, Mouth, and Throat   External inspection of ears and nose: Normal     Otoscopic examination: Tympanic membranes translucent with normal light reflex  Canals patent without erythema  Nasal mucosa, septum, and turbinates: Normal without edema or erythema  Oropharynx: Normal with no erythema, edema, exudate or lesions  Pulmonary   Respiratory effort: No increased work of breathing or signs of respiratory distress  Auscultation of lungs: Abnormal   decrease bs  Cardiovascular   Palpation of heart: Normal PMI, no thrills  Auscultation of heart: Normal rate and rhythm, normal S1 and S2, without murmurs  Examination of extremities for edema and/or varicosities: Abnormal   left leg edema  Abdomen   Abdomen: Abnormal   mild distension  Liver and spleen: No hepatomegaly or splenomegaly  Lymphatic   Palpation of lymph nodes in neck: No lymphadenopathy  Musculoskeletal   Gait and station: Normal     Digits and nails: Normal without clubbing or cyanosis  Inspection/palpation of joints, bones, and muscles: Abnormal     Skin   Skin and subcutaneous tissue: Abnormal   dry skin no open areas  Examination of the skin for lesions: Abnormal     Neurologic   Cranial nerves: Cranial nerves 2-12 intact  Reflexes: 2+ and symmetric  Sensation: Abnormal   decrease sensation lower extremities  Psychiatric   Orientation to person, place, and time: Normal     Mood and affect: Normal          Health Management  Health Maintenance   Medicare Annual Wellness Visit; every 1 year; Last 26BCY1083; Next Due: 95GDX7870;  Active    Signatures   Electronically signed by : Jaymie Marshall DO; Jan 23 2018  1:15PM EST                       (Author)

## 2018-01-29 ENCOUNTER — TRANSCRIBE ORDERS (OUTPATIENT)
Dept: ADMINISTRATIVE | Facility: HOSPITAL | Age: 83
End: 2018-01-29

## 2018-02-01 DIAGNOSIS — M79.605 LEFT LEG PAIN: Primary | ICD-10-CM

## 2018-02-02 ENCOUNTER — HOSPITAL ENCOUNTER (OUTPATIENT)
Dept: ULTRASOUND IMAGING | Facility: HOSPITAL | Age: 83
Discharge: HOME | End: 2018-02-02
Attending: INTERNAL MEDICINE
Payer: MEDICARE

## 2018-02-02 DIAGNOSIS — L03.116 CELLULITIS AND ABSCESS OF LEFT LOWER EXTREMITY: Primary | ICD-10-CM

## 2018-02-02 DIAGNOSIS — L02.416 CELLULITIS AND ABSCESS OF LEFT LOWER EXTREMITY: Primary | ICD-10-CM

## 2018-02-02 DIAGNOSIS — R60.9 EDEMA: ICD-10-CM

## 2018-02-02 PROCEDURE — 93971 EXTREMITY STUDY: CPT

## 2018-02-02 PROCEDURE — 93971 EXTREMITY STUDY: CPT | Performed by: SURGERY

## 2018-03-06 ENCOUNTER — OFFICE VISIT (OUTPATIENT)
Dept: INTERNAL MEDICINE CLINIC | Facility: CLINIC | Age: 83
End: 2018-03-06
Payer: MEDICARE

## 2018-03-06 VITALS
BODY MASS INDEX: 28.02 KG/M2 | OXYGEN SATURATION: 90 % | WEIGHT: 152.25 LBS | TEMPERATURE: 95.5 F | HEART RATE: 68 BPM | HEIGHT: 62 IN | SYSTOLIC BLOOD PRESSURE: 124 MMHG | DIASTOLIC BLOOD PRESSURE: 70 MMHG

## 2018-03-06 DIAGNOSIS — I50.32 CHRONIC DIASTOLIC CONGESTIVE HEART FAILURE (HCC): ICD-10-CM

## 2018-03-06 DIAGNOSIS — K74.60 CIRRHOSIS OF LIVER WITHOUT ASCITES, UNSPECIFIED HEPATIC CIRRHOSIS TYPE (HCC): Primary | ICD-10-CM

## 2018-03-06 DIAGNOSIS — I10 ESSENTIAL HYPERTENSION: ICD-10-CM

## 2018-03-06 DIAGNOSIS — I27.20 PULMONARY HYPERTENSION (HCC): ICD-10-CM

## 2018-03-06 PROBLEM — I50.30 DIASTOLIC CHF (HCC): Status: ACTIVE | Noted: 2017-03-22

## 2018-03-06 PROBLEM — K86.2 PANCREATIC CYST: Status: ACTIVE | Noted: 2017-08-01

## 2018-03-06 PROBLEM — L89.151 DECUBITUS ULCER OF SACRAL REGION, STAGE 1: Status: ACTIVE | Noted: 2017-10-05

## 2018-03-06 PROBLEM — K72.91 HEPATIC COMA/ENCEPHALOPATHY (HCC): Status: ACTIVE | Noted: 2017-03-18

## 2018-03-06 PROCEDURE — 99214 OFFICE O/P EST MOD 30 MIN: CPT | Performed by: INTERNAL MEDICINE

## 2018-03-06 RX ORDER — LACTULOSE 10 G/15ML
20 SOLUTION ORAL 3 TIMES DAILY
Qty: 2700 ML | Refills: 0
Start: 2018-03-06 | End: 2018-03-28 | Stop reason: SDUPTHER

## 2018-03-06 NOTE — PATIENT INSTRUCTIONS
Cirrhosis   AMBULATORY CARE:   Cirrhosis  is long-term scarring of the liver  The liver makes enzymes and bile that help digest food and gives your body energy  It also removes harmful material from your body, such as alcohol and other chemicals  Cirrhosis is caused by repeated damage to your liver over time  Scar tissue starts to replace healthy liver tissue  The scar tissue prevents the liver from working properly  Common signs and symptoms of cirrhosis:  You may not have any signs or symptoms until your liver damage is severe  You may have any of the following:  · Fatigue    · Bleeding and bruising easily    · Swelling of your feet, legs, or abdomen    · Nausea, loss of appetite, and weight loss    · Itching    · Jaundice (yellowing of your skin or eyes)    · Black bowel movements or dark urine  Seek care immediately if:   · You have pain during a bowel movement and it is black or contains blood  · You have a fast heart rate and fast breathing  · You are dizzy or confused  · You have severe pain in your abdomen  · You have trouble breathing  · Your vomit looks like it has coffee grinds or blood in it  Contact your healthcare provider if:   · You have a fever  · You have red or itchy skin  · You are in pain and feel weak  · You have questions or concerns about your condition or care  Treatment  may include any of the following:  · Medicines  may be used to treat high blood pressure in the portal vein (the vein that goes to your liver)  You may also need medicine to decrease extra fluid that collects in an area such as your legs or abdomen  Medicines may be used to decrease itching, or to treat a bacterial or viral infection  · Surgery  may be used to create a channel inside your liver to increase blood flow  This will help decrease swelling in your abdomen and lower blood pressure in the portal vein  Your risk for bleeding in your esophagus and stomach will also be decreased   You may need a liver transplant if your liver fails  Do not drink alcohol:  Alcohol will cause more damage to your liver  Manage cirrhosis:   · Do not smoke  Nicotine and other chemicals in cigarettes and cigars can cause blood vessel and lung damage  Ask your healthcare provider for information if you currently smoke and need help to quit  E-cigarettes or smokeless tobacco still contain nicotine  Talk to your healthcare provider before you use these products  · Eat a variety of healthy foods  Healthy foods include fruits, vegetables, whole-grain breads, low-fat dairy products, beans, lean meat, and fish  Ask if you need to be on a special diet  · Reach or maintain a healthy weight  You may develop fatty liver disease if you are overweight  Ask your healthcare provider for a healthy weight for you  He can help you create a safe weight loss plan if you are overweight  · Limit sodium (salt)  You may need to decrease the amount of sodium you eat if you have swelling caused by fluid buildup  Sodium is found in table salt and salty foods such as canned foods, frozen foods, and potato chips  · Drink liquids as directed  Ask how much liquid to drink each day and which liquids are best for you  For most people, good liquids to drink are water, juice, and milk  Liquids can help your liver work better  · Ask about vaccines  You may have a hard time fighting infection because of cirrhosis  Vaccines help protect you against viruses that can cause diseases such as the flu or hepatitis  Viral hepatitis is caused by a virus that leads to inflammation of the liver  You may need a hepatitis A or B vaccine  You may also need a pneumonia vaccine  Always get a flu vaccine each year as soon as it becomes available  · Ask about medicines  Some medicines can harm your liver  Acetaminophen is an example  Talk to your healthcare provider about all your medicines   Do not take any over-the-counter medicine or herbal supplements until your healthcare provider says it is okay  Follow up with your healthcare provider as directed:  Write down your questions so you remember to ask them during your visits  © 2017 [employer] Information is for End User's use only and may not be sold, redistributed or otherwise used for commercial purposes  All illustrations and images included in CareNotes® are the copyrighted property of [employer]  or [employer]  The above information is an  only  It is not intended as medical advice for individual conditions or treatments  Talk to your doctor, nurse or pharmacist before following any medical regimen to see if it is safe and effective for you

## 2018-03-06 NOTE — PROGRESS NOTES
Assessment/Plan:     Diagnoses and all orders for this visit:    Cirrhosis of liver without ascites, unspecified hepatic cirrhosis type ([place] [address] )  Rx for fasting labs Continue lactulose Offered nocturnal oxygen testing but patient deferred    Pulmonary hypertension  Patient has cardiology follow up next week  Essential hypertension  No changes today, BP stable    Chronic diastolic congestive heart failure ([place] [address] )  Labs pending, reinforced no added salt, low sodium diet/ she does eat microwavable meals often    Other orders  -     menthol-zinc oxide (CALMOSEPTINE) 0 44-20 6 % OINT; Apply 1 inch topically 2 (two) times a day        Patient ID: Kirill Barreto is a 80 y o  female  HPI   Patient is ok but she is realizing she is slowing down a bit  She has an aide daily in the afternoons which helps but she does not like relying on others  She is no longer driving and agrees that it is not safe at this time  She does get soto at times  No cough or fever  No falls  No chest pain  Her weight at home has been staying around 150 and she has not needed extra water pills  The following portions of the patient's history were reviewed and updated as appropriate:   Past Medical History:   Diagnosis Date    Breast cancer ([place] [address] )     Cancer ([place] [address] )     breast    CHF (congestive heart failure) (HCC)     GERD (gastroesophageal reflux disease)     Hypertension     last assessed 10/5/17    Polyp, sigmoid colon      Allergies   Allergen Reactions    Amoxicillin      Social History     Social History    Marital status:      Spouse name: N/A    Number of children: N/A    Years of education: N/A     Occupational History    Not on file       Social History Main Topics    Smoking status: Former Smoker    Smokeless tobacco: Never Used    Alcohol use No    Drug use: No    Sexual activity: No     Other Topics Concern    Not on file     Social History Narrative    Dental care, regularly    Good dental hygiene    Sun protection sunscreen    Uses safety equipment- seatbelts         Family History   Problem Relation Age of Onset    Hyperlipidemia Son     Hyperlipidemia Family     Coronary artery disease Family     Depression Family     Diabetes Family     Hypertension Family     Hyperlipidemia Other      Review of Systems   Constitutional: Positive for fatigue  HENT: Negative  Eyes: Negative  Respiratory: Positive for shortness of breath  Cardiovascular: Positive for leg swelling  Negative for chest pain and palpitations  Gastrointestinal: Negative for abdominal distention, abdominal pain, anal bleeding, blood in stool, constipation, diarrhea, nausea and rectal pain  Endocrine: Negative  Genitourinary: Negative  Musculoskeletal: Positive for gait problem  Skin: Negative  Allergic/Immunologic: Negative  Neurological: Positive for weakness  Negative for dizziness and syncope  Hematological: Negative  Psychiatric/Behavioral: The patient is nervous/anxious  /70   Pulse 68   Temp (!) 95 5 °F (35 3 °C) (Tympanic)   Ht 5' 2" (1 575 m)   Wt 69 1 kg (152 lb 4 oz)   SpO2 90%   BMI 27 85 kg/m²      Physical Exam   Constitutional: She is oriented to person, place, and time  She appears well-developed and well-nourished  No distress  HENT:   Head: Normocephalic and atraumatic  Right Ear: External ear normal    Left Ear: External ear normal    Nose: Nose normal    Mouth/Throat: Oropharynx is clear and moist  No oropharyngeal exudate  Eyes: Conjunctivae and EOM are normal  Pupils are equal, round, and reactive to light  No scleral icterus  Neck: Normal range of motion  Neck supple  No JVD present  No thyromegaly present  Cardiovascular: Normal rate, regular rhythm, normal heart sounds and intact distal pulses  Pulmonary/Chest: Effort normal  She has no wheezes  She has rales  She exhibits no tenderness  Abdominal: Soft   Bowel sounds are normal  She exhibits no distension and no mass  There is no tenderness  There is no rebound and no guarding  Musculoskeletal: She exhibits edema and deformity  She exhibits no tenderness  One plus edema ankles nontender   Lymphadenopathy:     She has no cervical adenopathy  Neurological: She is alert and oriented to person, place, and time  She displays abnormal reflex  No cranial nerve deficit  She exhibits abnormal muscle tone  Coordination abnormal    Lower extremity weakness  Uses cane  Mild dyspnea with exertion   Skin: Skin is warm and dry  No rash noted  She is not diaphoretic  No erythema  No pallor  Psychiatric: She has a normal mood and affect  Her behavior is normal  Judgment and thought content normal    Nursing note and vitals reviewed

## 2018-03-09 ENCOUNTER — APPOINTMENT (OUTPATIENT)
Dept: LAB | Facility: HOSPITAL | Age: 83
End: 2018-03-09
Attending: INTERNAL MEDICINE
Payer: MEDICARE

## 2018-03-09 DIAGNOSIS — I27.20 PULMONARY HYPERTENSION (HCC): ICD-10-CM

## 2018-03-09 DIAGNOSIS — K74.60 CIRRHOSIS OF LIVER WITHOUT ASCITES, UNSPECIFIED HEPATIC CIRRHOSIS TYPE (HCC): ICD-10-CM

## 2018-03-09 DIAGNOSIS — I10 ESSENTIAL HYPERTENSION: ICD-10-CM

## 2018-03-09 DIAGNOSIS — I50.32 CHRONIC DIASTOLIC CONGESTIVE HEART FAILURE (HCC): ICD-10-CM

## 2018-03-09 LAB
ALBUMIN SERPL BCP-MCNC: 2.3 G/DL (ref 3.5–5)
ALP SERPL-CCNC: 148 U/L (ref 46–116)
ALT SERPL W P-5'-P-CCNC: 27 U/L (ref 12–78)
AMMONIA PLAS-SCNC: 60 UMOL/L (ref 11–35)
ANION GAP SERPL CALCULATED.3IONS-SCNC: 8 MMOL/L (ref 4–13)
AST SERPL W P-5'-P-CCNC: 49 U/L (ref 5–45)
BILIRUB SERPL-MCNC: 1.4 MG/DL (ref 0.2–1)
BUN SERPL-MCNC: 18 MG/DL (ref 5–25)
CALCIUM SERPL-MCNC: 8.9 MG/DL (ref 8.3–10.1)
CHLORIDE SERPL-SCNC: 100 MMOL/L (ref 100–108)
CO2 SERPL-SCNC: 28 MMOL/L (ref 21–32)
CREAT SERPL-MCNC: 0.74 MG/DL (ref 0.6–1.3)
GFR SERPL CREATININE-BSD FRML MDRD: 73 ML/MIN/1.73SQ M
GLUCOSE P FAST SERPL-MCNC: 85 MG/DL (ref 65–99)
LDLC SERPL DIRECT ASSAY-MCNC: 94 MG/DL (ref 0–100)
NT-PROBNP SERPL-MCNC: 800 PG/ML
POTASSIUM SERPL-SCNC: 4.6 MMOL/L (ref 3.5–5.3)
PROT SERPL-MCNC: 7.5 G/DL (ref 6.4–8.2)
SODIUM SERPL-SCNC: 136 MMOL/L (ref 136–145)

## 2018-03-09 PROCEDURE — 36415 COLL VENOUS BLD VENIPUNCTURE: CPT

## 2018-03-09 PROCEDURE — 83721 ASSAY OF BLOOD LIPOPROTEIN: CPT

## 2018-03-09 PROCEDURE — 83880 ASSAY OF NATRIURETIC PEPTIDE: CPT

## 2018-03-09 PROCEDURE — 80053 COMPREHEN METABOLIC PANEL: CPT

## 2018-03-09 PROCEDURE — 82140 ASSAY OF AMMONIA: CPT

## 2018-03-15 ENCOUNTER — OFFICE VISIT (OUTPATIENT)
Dept: CARDIOLOGY CLINIC | Facility: HOSPITAL | Age: 83
End: 2018-03-15
Payer: MEDICARE

## 2018-03-15 VITALS
WEIGHT: 149.8 LBS | HEIGHT: 62 IN | BODY MASS INDEX: 27.57 KG/M2 | HEART RATE: 60 BPM | SYSTOLIC BLOOD PRESSURE: 112 MMHG | DIASTOLIC BLOOD PRESSURE: 64 MMHG

## 2018-03-15 DIAGNOSIS — I10 ESSENTIAL HYPERTENSION: ICD-10-CM

## 2018-03-15 DIAGNOSIS — I27.20 PULMONARY HYPERTENSION (HCC): ICD-10-CM

## 2018-03-15 DIAGNOSIS — I06.0 RHEUMATIC AORTIC STENOSIS: ICD-10-CM

## 2018-03-15 DIAGNOSIS — I05.0 RHEUMATIC MITRAL STENOSIS: Primary | ICD-10-CM

## 2018-03-15 PROCEDURE — 99214 OFFICE O/P EST MOD 30 MIN: CPT | Performed by: INTERNAL MEDICINE

## 2018-03-15 NOTE — PROGRESS NOTES
Cardiology Follow Up    Henrry Adeola  10/24/1930  [id number]  [address] [id number]    1  Rheumatic mitral stenosis     2  Rheumatic aortic stenosis     3  Pulmonary hypertension     4  Essential hypertension           Discussion/Summary: All of her assessed cardiac problems are stable  I have reviewed her medications and made no changes  No cardiac testing is ordered  RTO 1 year  Interval History: She continues to de remarkable well despite having severe Mitral Stenosi, Moderate Aortic Stenosis, and pulmonary HTN  She remains fairly inactive and sleeps a lot  She denies CP, significant SOB  She has chronic 1+ LE edema  Her weight remains around 150 LBS  She takes her daily Lasix and rarely needs to take and extra dose for increased weight      Patient Active Problem List   Diagnosis    Shortness of breath    Pleural effusion    Hypertension    GERD without esophagitis    Gastritis    Valvular heart disease    Acute on chronic diastolic congestive heart failure (HCC)    Tricuspid regurgitation    Pulmonary hypertension    Mitral valve stenosis    Aortic stenosis    Hypoalbuminemia    Hepatic coma/encephalopathy (HCC)    Hepatic cirrhosis (HCC)    Hyperbilirubinemia    Leukopenia    Neutropenia (HCC)    Transaminitis    Healthcare-associated pneumonia    Mucosal abnormality of stomach    Portal hypertension ([address] )    Left adrenal mass ([address] )    Aneurysm of anterior cerebral artery    Decubitus ulcer of sacral region, stage 1    Diastolic CHF (HCC)    Edema    Hyperlipidemia    Pancreatic cyst    Post-menopausal osteoporosis     Past Medical History:   Diagnosis Date    Breast cancer ([address] )     Cancer ([address] )     breast    CHF (congestive heart failure) (HCC)     GERD (gastroesophageal reflux disease)     Hypertension     last assessed 10/5/17    Polyp, sigmoid colon      Social History     Social History    Marital status:      Spouse name: N/A    Number of children: N/A    Years of education: N/A     Occupational History    Not on file       Social History Main Topics    Smoking status: Former Smoker    Smokeless tobacco: Never Used    Alcohol use No    Drug use: No    Sexual activity: No     Other Topics Concern    Not on file     Social History Narrative    Dental care, regularly    Good dental hygiene    Sun protection sunscreen    Uses safety equipment- seatbelts          Family History   Problem Relation Age of Onset    Hyperlipidemia Son     Hyperlipidemia Family     Coronary artery disease Family     Depression Family     Diabetes Family     Hypertension Family     Hyperlipidemia Other      Past Surgical History:   Procedure Laterality Date    COMBINED HYSTEROSCOPY DIAGNOSTIC / D&C      HYSTERECTOMY      unknown    MASTECTOMY Right 1990    MASTECTOMY         Current Outpatient Prescriptions:     aspirin 81 MG tablet, Take 81 mg by mouth daily, Disp: , Rfl:     Cholecalciferol (VITAMIN D-3) 1000 units CAPS, Take 1 capsule by mouth daily, Disp: , Rfl:     co-enzyme Q-10 30 MG capsule, Take 30 mg by mouth daily, Disp: , Rfl:     CORAL CALCIUM PO, Take 1 tablet by mouth daily, Disp: , Rfl:     esomeprazole (NexIUM) 40 MG capsule, Take 40 mg by mouth every morning before breakfast, Disp: , Rfl:     furosemide (LASIX) 40 mg tablet, Take 1 tablet by mouth daily (Patient taking differently: Take 40 mg by mouth daily Take an extra dose for weight increase  Of 3 lbs  ), Disp: , Rfl: 0    lactulose (CHRONULAC) 10 g/15 mL solution, Take 30 mL (20 g total) by mouth 3 (three) times a day, Disp: 2700 mL, Rfl: 0    Multiple Vitamin (MULTIVITAMIN) tablet, Take 1 tablet by mouth daily, Disp: , Rfl:     nadolol (CORGARD) 40 mg tablet, Take 1 tablet by mouth daily (Patient taking differently: Take 20 mg by mouth daily  ), Disp: , Rfl: 0    potassium chloride (KLOR-CON) 20 mEq packet, Take 20 mEq by mouth daily, Disp: , Rfl:     rifaximin (XIFAXAN) 550 mg tablet, Take 1 tablet by mouth every 12 (twelve) hours, Disp: , Rfl: 0    valsartan (DIOVAN) 80 mg tablet, Take 1 tablet by mouth daily, Disp: 30 tablet, Rfl: 0    magnesium oxide (MAG-OX) 400 mg, Take 1 tablet by mouth daily, Disp: 30 tablet, Rfl: 0    menthol-zinc oxide (CALMOSEPTINE) 0 44-20 6 % OINT, Apply 1 inch topically 2 (two) times a day, Disp: , Rfl:   Allergies   Allergen Reactions    Amoxicillin        Labs:  Appointment on 03/09/2018   Component Date Value    Sodium 03/09/2018 136     Potassium 03/09/2018 4 6     Chloride 03/09/2018 100     CO2 03/09/2018 28     Anion Gap 03/09/2018 8     BUN 03/09/2018 18     Creatinine 03/09/2018 0 74     Glucose, Fasting 03/09/2018 85     Calcium 03/09/2018 8 9     AST 03/09/2018 49*    ALT 03/09/2018 27     Alkaline Phosphatase 03/09/2018 148*    Total Protein 03/09/2018 7 5     Albumin 03/09/2018 2 3*    Total Bilirubin 03/09/2018 1 40*    eGFR 03/09/2018 73     Ammonia 03/09/2018 60*    LDL Direct 03/09/2018 94     NT-proBNP 03/09/2018 800*   Admission on 11/04/2017, Discharged on 11/05/2017   Component Date Value    Sodium 11/04/2017 133*    Potassium 11/04/2017 4 6     Chloride 11/04/2017 98*    CO2 11/04/2017 32     Anion Gap 11/04/2017 3*    BUN 11/04/2017 11     Creatinine 11/04/2017 0 60     Glucose 11/04/2017 80     Calcium 11/04/2017 9 8     AST 11/04/2017 49*    ALT 11/04/2017 30     Alkaline Phosphatase 11/04/2017 124*    Total Protein 11/04/2017 8 0     Albumin 11/04/2017 2 6*    Total Bilirubin 11/04/2017 1 00     eGFR 11/04/2017 82     WBC 11/04/2017 5 06     RBC 11/04/2017 4 85     Hemoglobin 11/04/2017 14 5     Hematocrit 11/04/2017 44 0     MCV 11/04/2017 91     MCH 11/04/2017 29 9     MCHC 11/04/2017 33 0     RDW 11/04/2017 14 1     MPV 11/04/2017 11 0     Platelets 30/73/6309 195     Neutrophils Relative 11/04/2017 55     Lymphocytes Relative 11/04/2017 29     Monocytes Relative 11/04/2017 14*    Eosinophils Relative 11/04/2017 2     Basophils Relative 11/04/2017 0     Neutrophils Absolute 11/04/2017 2 73     Lymphocytes Absolute 11/04/2017 1 48     Monocytes Absolute 11/04/2017 0 73     Eosinophils Absolute 11/04/2017 0 10     Basophils Absolute 11/04/2017 0 02     Protime 11/04/2017 15 3*    INR 11/04/2017 1 22*    PTT 11/04/2017 44*    Troponin I 11/04/2017 0 02     Ventricular Rate 11/04/2017 89     Atrial Rate 11/04/2017 89     IA Interval 11/04/2017 138     QRSD Interval 11/04/2017 122     QT Interval 11/04/2017 380     QTC Interval 11/04/2017 462     P Axis 11/04/2017 55     QRS Axis 11/04/2017 91     T Wave Axis 11/04/2017 -8     Color, UA 11/04/2017 Yellow     Clarity, UA 11/04/2017 Clear     Specific Gravity, UA 11/04/2017 1 010     pH, UA 11/04/2017 7 5     Leukocytes, UA 11/04/2017 Negative     Nitrite, UA 11/04/2017 Negative     Protein, UA 11/04/2017 Negative     Glucose, UA 11/04/2017 Negative     Ketones, UA 11/04/2017 Negative     Urobilinogen, UA 11/04/2017 0 2     Bilirubin, UA 11/04/2017 Negative     Blood, UA 11/04/2017 Trace-Intact     RBC, UA 11/04/2017 0-1*    WBC, UA 11/04/2017 None Seen     Epithelial Cells 11/04/2017 Occasional     Bacteria, UA 11/04/2017 None Seen     NT-proBNP 11/04/2017 490*    NT-proBNP 11/04/2017 664*    Protime 11/05/2017 15 5*    INR 11/05/2017 1 24*    PTT 11/05/2017 46*    Sodium 11/05/2017 135*    Potassium 11/05/2017 3 6     Chloride 11/05/2017 100     CO2 11/05/2017 29     Anion Gap 11/05/2017 6     BUN 11/05/2017 10     Creatinine 11/05/2017 0 61     Glucose 11/05/2017 82     Calcium 11/05/2017 9 3     eGFR 11/05/2017 82     Magnesium 11/05/2017 1 8     Phosphorus 11/05/2017 4 3*    WBC 11/05/2017 4 80     RBC 11/05/2017 4 36     Hemoglobin 11/05/2017 13 2     Hematocrit 11/05/2017 39 4     MCV 11/05/2017 90     MCH 11/05/2017 30 3     MCHC 11/05/2017 33 5     RDW 11/05/2017 14 1     Platelets 25/28/4613 193     MPV 11/05/2017 10 7    Appointment on 11/03/2017   Component Date Value    Cholesterol 11/03/2017 130     Triglycerides 11/03/2017 51     HDL, Direct 11/03/2017 42     LDL Calculated 11/03/2017 78     Ammonia 11/03/2017 31     Sodium 11/03/2017 119*    Potassium 11/03/2017 4 2     Chloride 11/03/2017 87*    CO2 11/03/2017 32     Anion Gap 11/03/2017 0*    BUN 11/03/2017 10     Creatinine 11/03/2017 0 65     Glucose, Fasting 11/03/2017 81     Calcium 11/03/2017 9 5     AST 11/03/2017 41     ALT 11/03/2017 28     Alkaline Phosphatase 11/03/2017 128*    Total Protein 11/03/2017 7 4     Albumin 11/03/2017 2 6*    Total Bilirubin 11/03/2017 1 30*    eGFR 11/03/2017 80     Vit D, 25-Hydroxy 11/03/2017 45 5     WBC 11/03/2017 5 41     RBC 11/03/2017 4 70     Hemoglobin 11/03/2017 14 2     Hematocrit 11/03/2017 42 8     MCV 11/03/2017 91     MCH 11/03/2017 30 2     MCHC 11/03/2017 33 2     RDW 11/03/2017 14 1     MPV 11/03/2017 11 1     Platelets 59/32/1560 201     Neutrophils Relative 11/03/2017 48     Lymphocytes Relative 11/03/2017 34     Monocytes Relative 11/03/2017 13*    Eosinophils Relative 11/03/2017 4     Basophils Relative 11/03/2017 1     Neutrophils Absolute 11/03/2017 2 59     Lymphocytes Absolute 11/03/2017 1 85     Monocytes Absolute 11/03/2017 0 70     Eosinophils Absolute 11/03/2017 0 24     Basophils Absolute 11/03/2017 0 03      Imaging: No results found  Review of Systems:  Review of Systems   Constitutional: Negative for diaphoresis, fatigue, fever and unexpected weight change  HENT: Negative  Respiratory: Negative for cough, shortness of breath and wheezing  Cardiovascular: Positive for leg swelling  Negative for chest pain and palpitations     Gastrointestinal: Negative for abdominal pain, diarrhea and nausea  Musculoskeletal: Negative for gait problem and myalgias  Skin: Negative for rash  Neurological: Negative for dizziness and numbness  Psychiatric/Behavioral: Negative  Physical Exam:  Physical Exam   Constitutional: She is oriented to person, place, and time  She appears well-developed and well-nourished  HENT:   Head: Normocephalic and atraumatic  Eyes: Pupils are equal, round, and reactive to light  Neck: Normal range of motion  Neck supple  No JVD present  Cardiovascular: Regular rhythm, S1 normal, S2 normal and normal pulses  Murmur heard  Systolic murmur is present with a grade of 3/6   Pulses:       Carotid pulses are 2+ on the right side, and 2+ on the left side  Pulmonary/Chest: Effort normal and breath sounds normal  She has no wheezes  She has no rales  Abdominal: Soft  Bowel sounds are normal  There is no tenderness  Musculoskeletal: Normal range of motion  She exhibits no edema or tenderness  Neurological: She is alert and oriented to person, place, and time  She has normal reflexes  No cranial nerve deficit  Skin: Skin is warm  Psychiatric: She has a normal mood and affect

## 2018-03-28 DIAGNOSIS — K74.60 CIRRHOSIS OF LIVER WITHOUT ASCITES, UNSPECIFIED HEPATIC CIRRHOSIS TYPE (HCC): ICD-10-CM

## 2018-03-28 RX ORDER — LACTULOSE 10 G/15ML
SOLUTION ORAL
Qty: 946 ML | Refills: 5
Start: 2018-03-28 | End: 2018-04-03 | Stop reason: SDUPTHER

## 2018-04-03 ENCOUNTER — TELEPHONE (OUTPATIENT)
Dept: INTERNAL MEDICINE CLINIC | Facility: CLINIC | Age: 83
End: 2018-04-03

## 2018-04-03 DIAGNOSIS — K74.60 CIRRHOSIS OF LIVER WITHOUT ASCITES, UNSPECIFIED HEPATIC CIRRHOSIS TYPE (HCC): ICD-10-CM

## 2018-04-03 RX ORDER — LACTULOSE 10 G/15ML
SOLUTION ORAL
Qty: 1892 ML | Refills: 3 | Status: CANCELLED
Start: 2018-04-03

## 2018-04-03 RX ORDER — LACTULOSE 10 G/15ML
SOLUTION ORAL
Qty: 1892 ML | Refills: 3
Start: 2018-04-03 | End: 2018-07-16 | Stop reason: SDUPTHER

## 2018-04-03 NOTE — TELEPHONE ENCOUNTER
Yes her last level was elevated so she should go to three times a day if she can tolerate - if bowels get too losse do at least bid

## 2018-05-14 ENCOUNTER — TRANSCRIBE ORDERS (OUTPATIENT)
Dept: INTERNAL MEDICINE CLINIC | Facility: CLINIC | Age: 83
End: 2018-05-14

## 2018-05-14 ENCOUNTER — TELEPHONE (OUTPATIENT)
Dept: INTERNAL MEDICINE CLINIC | Facility: CLINIC | Age: 83
End: 2018-05-14

## 2018-05-14 DIAGNOSIS — S31.000A WOUND OF SACRAL REGION, INITIAL ENCOUNTER: Primary | ICD-10-CM

## 2018-05-14 NOTE — TELEPHONE ENCOUNTER
[id number] Peggy Mercer as long as we have seen in time frame to cover homecare referral - they require face to face to refer   Check with radha what the time frame is

## 2018-05-15 ENCOUNTER — TELEPHONE (OUTPATIENT)
Dept: INTERNAL MEDICINE CLINIC | Facility: CLINIC | Age: 83
End: 2018-05-15

## 2018-05-15 DIAGNOSIS — Z90.11 HISTORY OF RIGHT MASTECTOMY: Primary | ICD-10-CM

## 2018-05-15 NOTE — TELEPHONE ENCOUNTER
Orders sent to [facility] for 6 mastectomy bras and breast prosthesis

## 2018-05-24 DIAGNOSIS — Z90.11 STATUS POST RIGHT MASTECTOMY: Primary | ICD-10-CM

## 2018-05-25 ENCOUNTER — TELEPHONE (OUTPATIENT)
Dept: INTERNAL MEDICINE CLINIC | Facility: CLINIC | Age: 83
End: 2018-05-25

## 2018-05-27 ENCOUNTER — APPOINTMENT (EMERGENCY)
Dept: RADIOLOGY | Facility: HOSPITAL | Age: 83
DRG: 291 | End: 2018-05-27
Payer: MEDICARE

## 2018-05-27 ENCOUNTER — HOSPITAL ENCOUNTER (INPATIENT)
Facility: HOSPITAL | Age: 83
LOS: 3 days | Discharge: HOME HEALTH | DRG: 291 | End: 2018-05-30
Attending: INTERNAL MEDICINE | Admitting: INTERNAL MEDICINE
Payer: MEDICARE

## 2018-05-27 DIAGNOSIS — R06.02 SHORTNESS OF BREATH: ICD-10-CM

## 2018-05-27 DIAGNOSIS — R60.0 BILATERAL LEG EDEMA: Primary | ICD-10-CM

## 2018-05-27 DIAGNOSIS — R09.02 HYPOXIA: ICD-10-CM

## 2018-05-27 DIAGNOSIS — L89.159 SACRAL DECUBITUS ULCER: ICD-10-CM

## 2018-05-27 LAB
ALBUMIN SERPL BCP-MCNC: 2.1 G/DL (ref 3.5–5)
ALP SERPL-CCNC: 153 U/L (ref 46–116)
ALT SERPL W P-5'-P-CCNC: 29 U/L (ref 12–78)
ANION GAP SERPL CALCULATED.3IONS-SCNC: 6 MMOL/L (ref 4–13)
APTT PPP: 43 SECONDS (ref 24–36)
AST SERPL W P-5'-P-CCNC: 42 U/L (ref 5–45)
BASOPHILS # BLD AUTO: 0.02 THOUSANDS/ΜL (ref 0–0.1)
BASOPHILS NFR BLD AUTO: 0 % (ref 0–1)
BILIRUB SERPL-MCNC: 1.4 MG/DL (ref 0.2–1)
BILIRUB UR QL STRIP: NEGATIVE
BUN SERPL-MCNC: 20 MG/DL (ref 5–25)
CALCIUM SERPL-MCNC: 8.6 MG/DL (ref 8.3–10.1)
CHLORIDE SERPL-SCNC: 97 MMOL/L (ref 100–108)
CLARITY UR: CLEAR
CO2 SERPL-SCNC: 29 MMOL/L (ref 21–32)
COLOR UR: NORMAL
CREAT SERPL-MCNC: 0.89 MG/DL (ref 0.6–1.3)
EOSINOPHIL # BLD AUTO: 0.07 THOUSAND/ΜL (ref 0–0.61)
EOSINOPHIL NFR BLD AUTO: 2 % (ref 0–6)
ERYTHROCYTE [DISTWIDTH] IN BLOOD BY AUTOMATED COUNT: 15.5 % (ref 11.6–15.1)
GFR SERPL CREATININE-BSD FRML MDRD: 58 ML/MIN/1.73SQ M
GLUCOSE SERPL-MCNC: 145 MG/DL (ref 65–140)
GLUCOSE UR STRIP-MCNC: NEGATIVE MG/DL
HCT VFR BLD AUTO: 42.5 % (ref 34.8–46.1)
HGB BLD-MCNC: 14.1 G/DL (ref 11.5–15.4)
HGB UR QL STRIP.AUTO: NEGATIVE
INR PPP: 1.23 (ref 0.86–1.17)
KETONES UR STRIP-MCNC: NEGATIVE MG/DL
LACTATE SERPL-SCNC: 2.4 MMOL/L (ref 0.5–2)
LEUKOCYTE ESTERASE UR QL STRIP: NEGATIVE
LYMPHOCYTES # BLD AUTO: 1.06 THOUSANDS/ΜL (ref 0.6–4.47)
LYMPHOCYTES NFR BLD AUTO: 23 % (ref 14–44)
MAGNESIUM SERPL-MCNC: 1.7 MG/DL (ref 1.6–2.6)
MCH RBC QN AUTO: 29.8 PG (ref 26.8–34.3)
MCHC RBC AUTO-ENTMCNC: 33.2 G/DL (ref 31.4–37.4)
MCV RBC AUTO: 90 FL (ref 82–98)
MONOCYTES # BLD AUTO: 0.56 THOUSAND/ΜL (ref 0.17–1.22)
MONOCYTES NFR BLD AUTO: 12 % (ref 4–12)
NEUTROPHILS # BLD AUTO: 2.84 THOUSANDS/ΜL (ref 1.85–7.62)
NEUTS SEG NFR BLD AUTO: 63 % (ref 43–75)
NITRITE UR QL STRIP: NEGATIVE
NT-PROBNP SERPL-MCNC: 585 PG/ML
PH UR STRIP.AUTO: 7.5 [PH] (ref 4.5–8)
PLATELET # BLD AUTO: 197 THOUSANDS/UL (ref 149–390)
PMV BLD AUTO: 10.8 FL (ref 8.9–12.7)
POTASSIUM SERPL-SCNC: 4.1 MMOL/L (ref 3.5–5.3)
PROT SERPL-MCNC: 7.6 G/DL (ref 6.4–8.2)
PROT UR STRIP-MCNC: NEGATIVE MG/DL
PROTHROMBIN TIME: 14.9 SECONDS (ref 11.8–14.2)
RBC # BLD AUTO: 4.73 MILLION/UL (ref 3.81–5.12)
SODIUM SERPL-SCNC: 132 MMOL/L (ref 136–145)
SP GR UR STRIP.AUTO: 1.01 (ref 1–1.03)
TROPONIN I SERPL-MCNC: <0.02 NG/ML
TSH SERPL DL<=0.05 MIU/L-ACNC: 4.82 UIU/ML (ref 0.36–3.74)
UROBILINOGEN UR QL STRIP.AUTO: 0.2 E.U./DL
WBC # BLD AUTO: 4.55 THOUSAND/UL (ref 4.31–10.16)

## 2018-05-27 PROCEDURE — 83605 ASSAY OF LACTIC ACID: CPT | Performed by: PHYSICIAN ASSISTANT

## 2018-05-27 PROCEDURE — 80053 COMPREHEN METABOLIC PANEL: CPT | Performed by: PHYSICIAN ASSISTANT

## 2018-05-27 PROCEDURE — 36415 COLL VENOUS BLD VENIPUNCTURE: CPT | Performed by: PHYSICIAN ASSISTANT

## 2018-05-27 PROCEDURE — 84484 ASSAY OF TROPONIN QUANT: CPT | Performed by: PHYSICIAN ASSISTANT

## 2018-05-27 PROCEDURE — 99285 EMERGENCY DEPT VISIT HI MDM: CPT

## 2018-05-27 PROCEDURE — 84443 ASSAY THYROID STIM HORMONE: CPT | Performed by: PHYSICIAN ASSISTANT

## 2018-05-27 PROCEDURE — 83735 ASSAY OF MAGNESIUM: CPT | Performed by: PHYSICIAN ASSISTANT

## 2018-05-27 PROCEDURE — 81003 URINALYSIS AUTO W/O SCOPE: CPT | Performed by: PHYSICIAN ASSISTANT

## 2018-05-27 PROCEDURE — 85610 PROTHROMBIN TIME: CPT | Performed by: PHYSICIAN ASSISTANT

## 2018-05-27 PROCEDURE — 99223 1ST HOSP IP/OBS HIGH 75: CPT | Performed by: PHYSICIAN ASSISTANT

## 2018-05-27 PROCEDURE — 93005 ELECTROCARDIOGRAM TRACING: CPT

## 2018-05-27 PROCEDURE — 71046 X-RAY EXAM CHEST 2 VIEWS: CPT

## 2018-05-27 PROCEDURE — 96374 THER/PROPH/DIAG INJ IV PUSH: CPT

## 2018-05-27 PROCEDURE — 85025 COMPLETE CBC W/AUTO DIFF WBC: CPT | Performed by: PHYSICIAN ASSISTANT

## 2018-05-27 PROCEDURE — 85730 THROMBOPLASTIN TIME PARTIAL: CPT | Performed by: PHYSICIAN ASSISTANT

## 2018-05-27 PROCEDURE — 83880 ASSAY OF NATRIURETIC PEPTIDE: CPT | Performed by: PHYSICIAN ASSISTANT

## 2018-05-27 RX ORDER — FUROSEMIDE 10 MG/ML
40 INJECTION INTRAMUSCULAR; INTRAVENOUS
Status: DISCONTINUED | OUTPATIENT
Start: 2018-05-27 | End: 2018-05-29

## 2018-05-27 RX ORDER — MELATONIN
1000 DAILY
Status: DISCONTINUED | OUTPATIENT
Start: 2018-05-27 | End: 2018-05-30 | Stop reason: HOSPADM

## 2018-05-27 RX ORDER — VALSARTAN 80 MG/1
80 TABLET ORAL DAILY
Status: DISCONTINUED | OUTPATIENT
Start: 2018-05-27 | End: 2018-05-30 | Stop reason: HOSPADM

## 2018-05-27 RX ORDER — ONDANSETRON 2 MG/ML
4 INJECTION INTRAMUSCULAR; INTRAVENOUS EVERY 6 HOURS PRN
Status: DISCONTINUED | OUTPATIENT
Start: 2018-05-27 | End: 2018-05-30 | Stop reason: HOSPADM

## 2018-05-27 RX ORDER — MAGNESIUM SULFATE HEPTAHYDRATE 40 MG/ML
2 INJECTION, SOLUTION INTRAVENOUS ONCE
Status: COMPLETED | OUTPATIENT
Start: 2018-05-27 | End: 2018-05-27

## 2018-05-27 RX ORDER — PANTOPRAZOLE SODIUM 40 MG/1
40 TABLET, DELAYED RELEASE ORAL
Status: DISCONTINUED | OUTPATIENT
Start: 2018-05-28 | End: 2018-05-30 | Stop reason: HOSPADM

## 2018-05-27 RX ORDER — FUROSEMIDE 10 MG/ML
80 INJECTION INTRAMUSCULAR; INTRAVENOUS ONCE
Status: COMPLETED | OUTPATIENT
Start: 2018-05-27 | End: 2018-05-27

## 2018-05-27 RX ORDER — ACETAMINOPHEN 325 MG/1
650 TABLET ORAL EVERY 6 HOURS PRN
Status: DISCONTINUED | OUTPATIENT
Start: 2018-05-27 | End: 2018-05-30 | Stop reason: HOSPADM

## 2018-05-27 RX ORDER — ACETAMINOPHEN 325 MG/1
650 TABLET ORAL EVERY 6 HOURS PRN
Status: DISCONTINUED | OUTPATIENT
Start: 2018-05-27 | End: 2018-05-27

## 2018-05-27 RX ORDER — LACTULOSE 20 G/30ML
20 SOLUTION ORAL 3 TIMES DAILY
Status: DISCONTINUED | OUTPATIENT
Start: 2018-05-27 | End: 2018-05-30 | Stop reason: HOSPADM

## 2018-05-27 RX ORDER — ASPIRIN 81 MG/1
81 TABLET, CHEWABLE ORAL DAILY
Status: DISCONTINUED | OUTPATIENT
Start: 2018-05-27 | End: 2018-05-30 | Stop reason: HOSPADM

## 2018-05-27 RX ORDER — NADOLOL 40 MG/1
20 TABLET ORAL DAILY
Status: DISCONTINUED | OUTPATIENT
Start: 2018-05-27 | End: 2018-05-30 | Stop reason: HOSPADM

## 2018-05-27 RX ADMIN — FUROSEMIDE 80 MG: 10 INJECTION, SOLUTION INTRAMUSCULAR; INTRAVENOUS at 13:28

## 2018-05-27 RX ADMIN — MAGNESIUM SULFATE HEPTAHYDRATE 2 G: 40 INJECTION, SOLUTION INTRAVENOUS at 18:16

## 2018-05-27 RX ADMIN — ANORECTAL OINTMENT: 15.7; .44; 24; 20.6 OINTMENT TOPICAL at 18:15

## 2018-05-27 RX ADMIN — LACTULOSE 20 G: 20 SOLUTION ORAL at 16:43

## 2018-05-27 RX ADMIN — FUROSEMIDE 40 MG: 10 INJECTION, SOLUTION INTRAMUSCULAR; INTRAVENOUS at 21:23

## 2018-05-27 RX ADMIN — ENOXAPARIN SODIUM 40 MG: 40 INJECTION SUBCUTANEOUS at 21:23

## 2018-05-27 RX ADMIN — RIFAXIMIN 550 MG: 550 TABLET ORAL at 21:22

## 2018-05-27 RX ADMIN — LACTULOSE 20 G: 20 SOLUTION ORAL at 21:22

## 2018-05-27 NOTE — ED PROVIDER NOTES
History  Chief Complaint   Patient presents with    Shortness of Breath     pt reports sob on and off increased for the "past 4-5days" Pt had 5lb weight gain in about a week  Patient presents to the emergency department today via private vehicle with family  Family provides a history and states patient has been short of breath x4 days  Patient states she feels though she cannot catch her breath  She does admit to occasional nonproductive cough without fever chills or sweats  She also admits to a 5 lb weight gain over the last 4 days  Patient typically was taking 60 mg of furosemide daily, primary care increased this to 80 mg daily x2 days  No change in patient's symptoms  She does admit to leg edema  She denies chest pain  No abdominal pain nausea or vomiting  Patient states she feels though she has to urinate more frequently however states there is not much volume that is emptied with urination  She denies dysuria  Denies changes in bowel function  Patient does have a history of congestive heart failure and last diuresis admission was greater than 1 year ago  Prior to Admission Medications   Prescriptions Last Dose Informant Patient Reported? Taking?    CORAL CALCIUM PO   Yes No   Sig: Take 1 tablet by mouth daily   Cholecalciferol (VITAMIN D-3) 1000 units CAPS   Yes No   Sig: Take 1 capsule by mouth daily   Multiple Vitamin (MULTIVITAMIN) tablet   Yes No   Sig: Take 1 tablet by mouth daily   aspirin 81 MG tablet   Yes No   Sig: Take 81 mg by mouth daily   co-enzyme Q-10 30 MG capsule   Yes No   Sig: Take 30 mg by mouth daily   esomeprazole (NexIUM) 40 MG capsule   Yes No   Sig: Take 40 mg by mouth every morning before breakfast   furosemide (LASIX) 40 mg tablet   No No   Sig: Take 1 tablet by mouth daily   Patient taking differently: Take 40 mg by mouth daily Take an extra dose for weight increase  Of 3 lbs     lactulose (CHRONULAC) 10 g/15 mL solution   No No   Sig: Take 30ml po three times daily   magnesium oxide (MAG-OX) 400 mg   No No   Sig: Take 1 tablet by mouth daily   menthol-zinc oxide (CALMOSEPTINE) 0 44-20 6 % OINT   Yes No   Sig: Apply 1 inch topically 2 (two) times a day   nadolol (CORGARD) 40 mg tablet   No No   Sig: Take 1 tablet by mouth daily   Patient taking differently: Take 20 mg by mouth daily     potassium chloride (KLOR-CON) 20 mEq packet   Yes No   Sig: Take 20 mEq by mouth daily   rifaximin (XIFAXAN) 550 mg tablet   No No   Sig: Take 1 tablet by mouth every 12 (twelve) hours   valsartan (DIOVAN) 80 mg tablet   No No   Sig: Take 1 tablet by mouth daily      Facility-Administered Medications: None       Past Medical History:   Diagnosis Date    [facility] cancer ([facility] )     Cancer ([facility] )     breast    CHF (congestive heart failure) (HCC)     GERD (gastroesophageal reflux disease)     Hypertension     last assessed 10/5/17    Polyp, sigmoid colon        Past Surgical History:   Procedure Laterality Date    COMBINED HYSTEROSCOPY DIAGNOSTIC / D&C      HYSTERECTOMY      unknown    MASTECTOMY Right 1990    MASTECTOMY         Family History   Problem Relation Age of Onset    Hyperlipidemia Son     Hyperlipidemia Family     Coronary artery disease Family     Depression Family     Diabetes Family     Hypertension Family     Hyperlipidemia Other      I have reviewed and agree with the history as documented  Social History   Substance Use Topics    Smoking status: Former Smoker    Smokeless tobacco: Never Used    Alcohol use No        Review of Systems   Constitutional: Negative  HENT: Negative  Respiratory: Positive for cough and shortness of breath  Negative for apnea, choking, chest tightness, wheezing and stridor  Cardiovascular: Positive for leg swelling  Negative for chest pain and palpitations  Gastrointestinal: Negative  Endocrine: Negative  Genitourinary: Negative for dysuria, enuresis and flank pain          Urinary frequency with voiding small amounts of urine   Musculoskeletal: Negative  Skin: Negative  Allergic/Immunologic: Negative  Neurological: Negative  Hematological: Negative  Psychiatric/Behavioral: Negative  All other systems reviewed and are negative  Physical Exam  Physical Exam   Constitutional: She is oriented to person, place, and time  She appears well-developed and well-nourished  No distress  HENT:   Head: Normocephalic  Eyes: Pupils are equal, round, and reactive to light  Neck: Normal range of motion  Cardiovascular: Normal rate  Pulmonary/Chest: She has no wheezes  She exhibits no tenderness  Slightly tachypneic  There is crackles noted at the bases right-sided posteriorly   Abdominal: Soft  There is no tenderness  Musculoskeletal: She exhibits edema  She exhibits no tenderness  +3 pitting edema bilateral lower extremities   Neurological: She is alert and oriented to person, place, and time  Skin: Skin is warm  Capillary refill takes less than 2 seconds  She is not diaphoretic  Psychiatric: She has a normal mood and affect  Vitals reviewed        Vital Signs  ED Triage Vitals [05/27/18 1243]   Temperature Pulse Respirations Blood Pressure SpO2   98 °F (36 7 °C) 85 19 131/59 (!) 88 %      Temp Source Heart Rate Source Patient Position - Orthostatic VS BP Location FiO2 (%)   Temporal Monitor Lying Left arm --      Pain Score       No Pain           Vitals:    05/27/18 1243 05/27/18 1406   BP: 131/59 120/59   Pulse: 85 82   Patient Position - Orthostatic VS: Lying Lying       Visual Acuity      ED Medications  Medications   furosemide (LASIX) injection 80 mg (80 mg Intravenous Given 5/27/18 1328)       Diagnostic Studies  Results Reviewed     Procedure Component Value Units Date/Time    Lactic acid, plasma [[id number]]  (Abnormal) Collected:  05/27/18 1345    Lab Status:  Final result Specimen:  Blood from Arm, Left Updated:  05/27/18 1415     LACTIC ACID 2 4 (HH) mmol/L     Narrative: Result may be elevated if tourniquet was used during collection  B-type natriuretic peptide [[id number]]  (Abnormal) Collected:  05/27/18 1345    Lab Status:  Final result Specimen:  Blood from Arm, Left Updated:  05/27/18 1412     NT-proBNP 585 (H) pg/mL     Comprehensive metabolic panel [[id number]]  (Abnormal) Collected:  05/27/18 1345    Lab Status:  Final result Specimen:  Blood from Arm, Left Updated:  05/27/18 1412     Sodium 132 (L) mmol/L      Potassium 4 1 mmol/L      Chloride 97 (L) mmol/L      CO2 29 mmol/L      Anion Gap 6 mmol/L      BUN 20 mg/dL      Creatinine 0 89 mg/dL      Glucose 145 (H) mg/dL      Calcium 8 6 mg/dL      AST 42 U/L      ALT 29 U/L      Alkaline Phosphatase 153 (H) U/L      Total Protein 7 6 g/dL      Albumin 2 1 (L) g/dL      Total Bilirubin 1 40 (H) mg/dL      eGFR 58 ml/min/1 73sq m     Narrative:         National Kidney Disease Education Program recommendations are as follows:  GFR calculation is accurate only with a steady state creatinine  Chronic Kidney disease less than 60 ml/min/1 73 sq  meters  Kidney failure less than 15 ml/min/1 73 sq  meters  Magnesium [[id number]]  (Normal) Collected:  05/27/18 1345    Lab Status:  Final result Specimen:  Blood from Arm, Left Updated:  05/27/18 1412     Magnesium 1 7 mg/dL     TSH [[id number]]  (Abnormal) Collected:  05/27/18 1345    Lab Status:  Final result Specimen:  Blood from Arm, Left Updated:  05/27/18 1412     TSH 3RD GENERATON 4 819 (H) uIU/mL     Narrative:         Patients undergoing fluorescein dye angiography may retain small amounts of fluorescein in the body for 48-72 hours post procedure  Samples containing fluorescein can produce falsely depressed TSH values  If the patient had this procedure,a specimen should be resubmitted post fluorescein clearance            The recommended reference ranges for TSH during pregnancy are as follows:  First trimester 0 1 to 2 5 uIU/mL  Second trimester  0 2 to 3 0 uIU/mL  Third trimester 0 3 to 3 0 uIU/m      Protime-INR [[id number]]  (Abnormal) Collected:  05/27/18 1345    Lab Status:  Final result Specimen:  Blood from Arm, Left Updated:  05/27/18 1407     Protime 14 9 (H) seconds      INR 1 23 (H)    APTT [[id number]]  (Abnormal) Collected:  05/27/18 1345    Lab Status:  Final result Specimen:  Blood from Arm, Left Updated:  05/27/18 1407     PTT 43 (H) seconds     UA w Reflex to Microscopic w Reflex to Culture [[id number]] Collected:  05/27/18 1407    Lab Status:  No result Specimen:  Urine from Urine, Clean Catch     Troponin I [[id number]]  (Normal) Collected:  05/27/18 1345    Lab Status:  Final result Specimen:  Blood from Arm, Left Updated:  05/27/18 1406     Troponin I <0 02 ng/mL     Narrative:         [employer] Chemistry analyzer 99% cutoff is > 0 04 ng/mL in network labs    o cTnI 99% cutoff is useful only when applied to patients in the clinical setting of myocardial ischemia  o cTnI 99% cutoff should be interpreted in the context of clinical history, ECG findings and possibly cardiac imaging to establish correct diagnosis  o cTnI 99% cutoff may be suggestive but clearly not indicative of a coronary event without the clinical setting of myocardial ischemia      CBC and differential [[id number]]  (Abnormal) Collected:  05/27/18 1345    Lab Status:  Final result Specimen:  Blood from Arm, Left Updated:  05/27/18 1350     WBC 4 55 Thousand/uL      RBC 4 73 Million/uL      Hemoglobin 14 1 g/dL      Hematocrit 42 5 %      MCV 90 fL      MCH 29 8 pg      MCHC 33 2 g/dL      RDW 15 5 (H) %      MPV 10 8 fL      Platelets 095 Thousands/uL      Neutrophils Relative 63 %      Lymphocytes Relative 23 %      Monocytes Relative 12 %      Eosinophils Relative 2 %      Basophils Relative 0 %      Neutrophils Absolute 2 84 Thousands/µL      Lymphocytes Absolute 1 06 Thousands/µL      Monocytes Absolute 0 56 Thousand/µL      Eosinophils Absolute 0 07 Thousand/µL      Basophils Absolute 0 02 Thousands/µL XR chest 2 views    (Results Pending)              Procedures  Procedures       Phone Contacts  ED Phone Contact    ED Course  ED Course as of May 27 1428   Sun May 27, 2018   1258 SpO2: (!) 88 %   1258 Respirations: 19   1258 Pulse: 85   1258 Blood Pressure: 131/59   1415 WBC: 4 55   1415 Hemoglobin: 14 1   1415 Platelets: 275   7961 NT-proBNP: (!) 585   1415 Magnesium: 1 7   1417 Troponin I: <0 02   1418 LACTIC ACID: (!!) 2 4         HEART Risk Score      Most Recent Value   History  0 Filed at: 05/27/2018 1426   ECG  0 Filed at: 05/27/2018 1426   Age  2 Filed at: 05/27/2018 1426   Risk Factors  1 Filed at: 05/27/2018 1426   Troponin  0 Filed at: 05/27/2018 1426   Heart Score Risk Calculator   History  0 Filed at: 05/27/2018 1426   ECG  0 Filed at: 05/27/2018 1426   Age  2 Filed at: 05/27/2018 1426   Risk Factors  1 Filed at: 05/27/2018 1426   Troponin  0 Filed at: 05/27/2018 1426   HEART Score  3 Filed at: 05/27/2018 1426   HEART Score  3 Filed at: 05/27/2018 1426                            MDM  CritCare Time    Disposition  Final diagnoses:   Bilateral leg edema   Shortness of breath   Hypoxia     Time reflects when diagnosis was documented in both MDM as applicable and the Disposition within this note     Time User Action Codes Description Comment    5/27/2018  2:17 PM Ondina Lugo D Add [R60 0] Bilateral leg edema     5/27/2018  2:17 PM Ondina Ontiverosl D Add [R06 02] Shortness of breath     5/27/2018  2:18 PM Raysal Gavel D Add [R09 02] Hypoxia       ED Disposition     ED Disposition Condition Comment    Admit  Case was discussed with Dr Greta Sam and the patient's admission status was agreed to be Admission Status: inpatient status to the service of Dr Greta Sam   Follow-up Information    None         Patient's Medications   Discharge Prescriptions    No medications on file     No discharge procedures on file      ED Provider  Electronically Signed by           Vidya Persaud RACHEL  05/27/18 1422

## 2018-05-27 NOTE — ASSESSMENT & PLAN NOTE
-patient has worsening shortness of breath for the last 5 days  -increase of 5 lbs over the last 4 days    -history of severe mitral valve stenosis and moderate aortic stenosis  -admit to med/surg floor and will monitor on telemetry   -she received IV lasix 80 mg in the ER   -initiate IV lasix 40 mg BID  -Troponin's x 2 more sets  -daily weights and strict I&O's    -continue aspirin and beta blocker    -echocardiogram

## 2018-05-27 NOTE — H&P
H&P- Christina Quiñones 10/24/1930, 80 y o  female MRN: [id number]    Unit/Bed#: 403-01 Encounter: [id number]    Primary Care Provider: Brittani Stout DO   Date and time admitted to hospital: 5/27/2018 12:35 PM        * Acute on chronic diastolic congestive heart failure [facility])   Assessment & Plan    -patient has worsening shortness of breath for the last 5 days  -increase of 5 lbs over the last 4 days    -history of severe mitral valve stenosis and moderate aortic stenosis  -admit to med/surg floor and will monitor on telemetry   -she received IV lasix 80 mg in the ER   -initiate IV lasix 40 mg BID  -Troponin's x 2 more sets  -daily weights and strict I&O's    -continue aspirin and beta blocker  -echocardiogram           Hepatic cirrhosis (HCC)   Assessment & Plan    -Continue Lactulose, Xifaxan and Nadolol  Hyperbilirubinemia   Assessment & Plan    -likely due to hepatic cirrhosis  -continue to monitor  Hypertension   Assessment & Plan    -continue Nadolol, Valsartan, and lasix   -continue to follow blood pressure trends  Decubitus ulcer of sacral region, stage 1   Assessment & Plan    -Wound care consult   -Calmoseptine ordered  VTE Prophylaxis: Enoxaparin (Lovenox)  / sequential compression device   Code Status: full code  POLST: There is no POLST form on file for this patient (pre-hospital)    Anticipated Length of Stay:  Patient will be admitted on an Inpatient basis with an anticipated length of stay of  Greater than 2 midnights  Justification for Hospital Stay: treatment of acute CHF with IV lasix and monitoring on telemetry  Total Time for Visit, including Counseling / Coordination of Care: 30 minutes  Greater than 50% of this total time spent on direct patient counseling and coordination of care      Chief Complaint:   Worsening shortness of breath    History of Present Illness:    Christina Quiñones is a 80 y o  female who presents with a complaint of worsening shortness of breath in the last 5 days  The patient states she has mild shortness of breath at baseline and it has been worse in the last 5 days  She has also noticed a 5 lb weight gain and increased swelling in her legs  She called her PCP Dr Devon Ashton who instructed her to increase her PO lasix from 60 mg to 80 mg x 2 days  Despite this the patient continued to have shortness of breath  The patient does not wear oxygen at baseline  In the ER she was noted to have an oxygen saturation of 88% on room air  The patient denies any chest pain, abdominal pain, N/V/D  Review of Systems:    Review of Systems   Constitutional: Positive for unexpected weight change  HENT: Negative  Eyes: Negative  Respiratory: Positive for shortness of breath  Cardiovascular: Positive for leg swelling  Gastrointestinal: Negative  Endocrine: Negative  Genitourinary: Negative  Musculoskeletal: Negative  Skin: Positive for wound  Allergic/Immunologic: Negative  Neurological: Negative  Hematological: Negative  Psychiatric/Behavioral: Negative  Past Medical and Surgical History:     Past Medical History:   Diagnosis Date    Breast cancer ([facility] )     Cancer ([facility] )     breast    CHF (congestive heart failure) (HCC)     GERD (gastroesophageal reflux disease)     Hypertension     last assessed 10/5/17    Polyp, sigmoid colon        Past Surgical History:   Procedure Laterality Date    COMBINED HYSTEROSCOPY DIAGNOSTIC / D&C      HYSTERECTOMY      unknown    MASTECTOMY Right 1990    MASTECTOMY         Meds/Allergies:    Prior to Admission medications    Medication Sig Start Date End Date Taking?  Authorizing Provider   aspirin 81 MG tablet Take 81 mg by mouth daily   Yes Historical Provider, MD   Cholecalciferol (VITAMIN D-3) 1000 units CAPS Take 1 capsule by mouth daily   Yes Historical Provider, MD   esomeprazole (NexIUM) 40 MG capsule Take 40 mg by mouth every morning before breakfast Yes Historical Provider, MD   furosemide (LASIX) 40 mg tablet Take 1 tablet by mouth daily  Patient taking differently: Take 40 mg by mouth daily Take an extra dose for weight increase  Of 3 lbs   3/22/17  Yes Fiorella Bender DO   lactulose (CHRONULAC) 10 g/15 mL solution Take 30ml po three times daily 4/3/18  Yes Jeevan Dillard, DO   magnesium oxide (MAG-OX) 400 mg Take 1 tablet by mouth daily 3/21/17  Yes Fiorella Bender DO   menthol-zinc oxide (CALMOSEPTINE) 0 44-20 6 % OINT Apply 1 inch topically 2 (two) times a day 10/5/17  Yes Historical Provider, MD   Multiple Vitamin (MULTIVITAMIN) tablet Take 1 tablet by mouth daily   Yes Historical Provider, MD   nadolol (CORGARD) 40 mg tablet Take 1 tablet by mouth daily  Patient taking differently: Take 20 mg by mouth daily   3/22/17  Yes Fiorella Bender DO   potassium chloride (KLOR-CON) 20 mEq packet Take 20 mEq by mouth daily   Yes Historical Provider, MD   rifaximin (XIFAXAN) 550 mg tablet Take 1 tablet by mouth every 12 (twelve) hours 3/21/17  Yes Fiorella Bender DO   valsartan (DIOVAN) 80 mg tablet Take 1 tablet by mouth daily 3/22/17  Yes Fiorella Bender DO   co-enzyme Q-10 30 MG capsule Take 30 mg by mouth daily    Historical Provider, MD   CORAL CALCIUM PO Take 1 tablet by mouth daily    Historical Provider, MD     I have reviewed home medications with patient family member  Allergies: Allergies   Allergen Reactions    Amoxicillin        Social History:     Marital Status:     Occupation: unknown  Patient Pre-hospital Living Situation: lives at home  Patient Pre-hospital Level of Mobility: independent  Patient Pre-hospital Diet Restrictions: none  Substance Use History:   History   Alcohol Use No     History   Smoking Status    Former Smoker    Types: Cigarettes   Smokeless Tobacco    Never Used     History   Drug Use No       Family History:    non-contributory    Physical Exam:     Vitals:   Blood Pressure: 142/57 (05/27/18 1451)  Pulse: 81 (05/27/18 1451)  Temperature: 98 6 °F (37 °C) (05/27/18 1451)  Temp Source: Temporal (05/27/18 1451)  Respirations: 18 (05/27/18 1451)  Height: 5' 2" (157 5 cm) (05/27/18 1451)  Weight - Scale: 70 3 kg (154 lb 15 7 oz) (05/27/18 1451)  SpO2: 96 % (05/27/18 1451)    Physical Exam   Constitutional: She is oriented to person, place, and time  Vital signs are normal  She appears well-developed and well-nourished  She is active and cooperative  Nasal cannula in place  HENT:   Head: Normocephalic and atraumatic  Cardiovascular: Normal rate and regular rhythm  Murmur heard  Bilateral lower extremity edema   Pulmonary/Chest: Effort normal  She has no decreased breath sounds  She has no wheezes  She has no rhonchi  She has rales in the right lower field  Abdominal: Soft  Normal appearance and bowel sounds are normal  There is no tenderness  Neurological: She is alert and oriented to person, place, and time  No cranial nerve deficit  Skin: Skin is warm, dry and intact  Psychiatric: She has a normal mood and affect  Her speech is normal and behavior is normal  Thought content normal  Cognition and memory are normal    Nursing note and vitals reviewed  Additional Data:     Lab Results: I have personally reviewed pertinent reports          Results from last 7 days  Lab Units 05/27/18  1345   WBC Thousand/uL 4 55   HEMOGLOBIN g/dL 14 1   HEMATOCRIT % 42 5   PLATELETS Thousands/uL 197   NEUTROS PCT % 63   LYMPHS PCT % 23   MONOS PCT % 12   EOS PCT % 2       Results from last 7 days  Lab Units 05/27/18  1345   SODIUM mmol/L 132*   POTASSIUM mmol/L 4 1   CHLORIDE mmol/L 97*   CO2 mmol/L 29   BUN mg/dL 20   CREATININE mg/dL 0 89   CALCIUM mg/dL 8 6   TOTAL PROTEIN g/dL 7 6   BILIRUBIN TOTAL mg/dL 1 40*   ALK PHOS U/L 153*   ALT U/L 29   AST U/L 42   GLUCOSE RANDOM mg/dL 145*       Results from last 7 days  Lab Units 05/27/18  1345   INR  1 23*       Imaging: I have personally reviewed pertinent reports  No results found  EKG, Pathology, and Other Studies Reviewed on Admission:   · EKG: NSR with rate of 80 bpm    Allscripts / Epic Records Reviewed: Yes     ** Please Note: This note has been constructed using a voice recognition system   **

## 2018-05-27 NOTE — PLAN OF CARE
Problem: Potential for Falls  Goal: Patient will remain free of falls  INTERVENTIONS:  - Assess patient frequently for physical needs  -  Identify cognitive and physical deficits and behaviors that affect risk of falls    -  [place] fall precautions as indicated by assessment   - Educate patient/family on patient safety including physical limitations  - Instruct patient to call for assistance with activity based on assessment  - Modify environment to reduce risk of injury  - Consider OT/PT consult to assist with strengthening/mobility   Outcome: Progressing      Problem: Prexisting or High Potential for Compromised Skin Integrity  Goal: Skin integrity is maintained or improved  INTERVENTIONS:  - Identify patients at risk for skin breakdown  - Assess and monitor skin integrity  - Assess and monitor nutrition and hydration status  - Monitor labs (i e  albumin)  - Assess for incontinence   - Turn and reposition patient  - Assist with mobility/ambulation  - Relieve pressure over bony prominences  - Avoid friction and shearing  - Provide appropriate hygiene as needed including keeping skin clean and dry  - Evaluate need for skin moisturizer/barrier cream  - Collaborate with interdisciplinary team (i e  Nutrition, Rehabilitation, etc )   - Patient/family teaching   Outcome: Progressing      Problem: PAIN - ADULT  Goal: Verbalizes/displays adequate comfort level or baseline comfort level  Interventions:  - Encourage patient to monitor pain and request assistance  - Assess pain using appropriate pain scale  - Administer analgesics based on type and severity of pain and evaluate response  - Implement non-pharmacological measures as appropriate and evaluate response  - Consider cultural and social influences on pain and pain management  - Notify physician/advanced practitioner if interventions unsuccessful or patient reports new pain  Outcome: Progressing      Problem: INFECTION - ADULT  Goal: Absence or prevention of progression during hospitalization  INTERVENTIONS:  - Assess and monitor for signs and symptoms of infection  - Monitor lab/diagnostic results  - Monitor all insertion sites, i e  indwelling lines, tubes, and drains  - Monitor endotracheal (as able) and nasal secretions for changes in amount and color  - [place] appropriate cooling/warming therapies per order  - Administer medications as ordered  - Instruct and encourage patient and family to use good hand hygiene technique  - Identify and instruct in appropriate isolation precautions for identified infection/condition  Outcome: Progressing      Problem: SAFETY ADULT  Goal: Maintain or return to baseline ADL function  INTERVENTIONS:  -  Assess patient's ability to carry out ADLs; assess patient's baseline for ADL function and identify physical deficits which impact ability to perform ADLs (bathing, care of mouth/teeth, toileting, grooming, dressing, etc )  - Assess/evaluate cause of self-care deficits   - Assess range of motion  - Assess patient's mobility; develop plan if impaired  - Assess patient's need for assistive devices and provide as appropriate  - Encourage maximum independence but intervene and supervise when necessary  ¯ Involve family in performance of ADLs  ¯ Assess for home care needs following discharge   ¯ Request OT consult to assist with ADL evaluation and planning for discharge  ¯ Provide patient education as appropriate  Outcome: Progressing    Goal: Maintain or return mobility status to optimal level  INTERVENTIONS:  - Assess patient's baseline mobility status (ambulation, transfers, stairs, etc )    - Identify cognitive and physical deficits and behaviors that affect mobility  - Identify mobility aids required to assist with transfers and/or ambulation (gait belt, sit-to-stand, lift, walker, cane, etc )  - [place] fall precautions as indicated by assessment  - Record patient progress and toleration of activity level on Mobility SBAR; progress patient to next Phase/Stage  - Instruct patient to call for assistance with activity based on assessment  - Request Rehabilitation consult to assist with strengthening/weightbearing, etc   Outcome: Progressing      Problem: DISCHARGE PLANNING  Goal: Discharge to home or other facility with appropriate resources  INTERVENTIONS:  - Identify barriers to discharge w/patient and caregiver  - Arrange for needed discharge resources and transportation as appropriate  - Identify discharge learning needs (meds, wound care, etc )  - Arrange for interpretive services to assist at discharge as needed  - Refer to Case Management Department for coordinating discharge planning if the patient needs post-hospital services based on physician/advanced practitioner order or complex needs related to functional status, cognitive ability, or social support system  Outcome: Progressing      Problem: Knowledge Deficit  Goal: Patient/family/caregiver demonstrates understanding of disease process, treatment plan, medications, and discharge instructions  Complete learning assessment and assess knowledge base    Interventions:  - Provide teaching at level of understanding  - Provide teaching via preferred learning methods  Outcome: Progressing

## 2018-05-27 NOTE — ED PROCEDURE NOTE
Procedure  ECG 12 Lead Documentation  Date/Time: 5/27/2018 2:25 PM  Performed by: Shelbie Desai by: Joel Galeana     Indications / Diagnosis:  Shortness of breath  ECG reviewed by me, the ED Provider: yes    Patient location:  Bedside  Previous ECG:     Previous ECG:  Compared to current    Similarity:  No change    Comparison to cardiac monitor: Yes    Interpretation:     Interpretation: non-specific    Rate:     ECG rate:  80    ECG rate assessment: normal    Rhythm:     Rhythm: sinus rhythm    Ectopy:     Ectopy: PAC    QRS:     QRS axis:  Normal    QRS intervals:  Normal  Conduction:     Conduction: normal    ST segments:     ST segments:  Normal  T waves:     T waves: normal                       Narciso Alas PA-C  05/27/18 1426

## 2018-05-28 LAB
ALBUMIN SERPL BCP-MCNC: 1.8 G/DL (ref 3.5–5)
ALP SERPL-CCNC: 125 U/L (ref 46–116)
ALT SERPL W P-5'-P-CCNC: 25 U/L (ref 12–78)
ANION GAP SERPL CALCULATED.3IONS-SCNC: 5 MMOL/L (ref 4–13)
AST SERPL W P-5'-P-CCNC: 35 U/L (ref 5–45)
ATRIAL RATE: 80 BPM
BILIRUB SERPL-MCNC: 1.2 MG/DL (ref 0.2–1)
BUN SERPL-MCNC: 17 MG/DL (ref 5–25)
CALCIUM SERPL-MCNC: 8.2 MG/DL (ref 8.3–10.1)
CHLORIDE SERPL-SCNC: 99 MMOL/L (ref 100–108)
CO2 SERPL-SCNC: 30 MMOL/L (ref 21–32)
CREAT SERPL-MCNC: 0.78 MG/DL (ref 0.6–1.3)
ERYTHROCYTE [DISTWIDTH] IN BLOOD BY AUTOMATED COUNT: 15.6 % (ref 11.6–15.1)
GFR SERPL CREATININE-BSD FRML MDRD: 69 ML/MIN/1.73SQ M
GLUCOSE SERPL-MCNC: 97 MG/DL (ref 65–140)
HCT VFR BLD AUTO: 38.6 % (ref 34.8–46.1)
HGB BLD-MCNC: 13 G/DL (ref 11.5–15.4)
LACTATE SERPL-SCNC: 1.3 MMOL/L (ref 0.5–2)
MAGNESIUM SERPL-MCNC: 2 MG/DL (ref 1.6–2.6)
MCH RBC QN AUTO: 30.7 PG (ref 26.8–34.3)
MCHC RBC AUTO-ENTMCNC: 33.7 G/DL (ref 31.4–37.4)
MCV RBC AUTO: 91 FL (ref 82–98)
P AXIS: 34 DEGREES
PHOSPHATE SERPL-MCNC: 3.7 MG/DL (ref 2.3–4.1)
PLATELET # BLD AUTO: 187 THOUSANDS/UL (ref 149–390)
PMV BLD AUTO: 11.3 FL (ref 8.9–12.7)
POTASSIUM SERPL-SCNC: 3.6 MMOL/L (ref 3.5–5.3)
PROT SERPL-MCNC: 6.7 G/DL (ref 6.4–8.2)
QRS AXIS: 105 DEGREES
QRSD INTERVAL: 130 MS
QT INTERVAL: 400 MS
QTC INTERVAL: 461 MS
RBC # BLD AUTO: 4.24 MILLION/UL (ref 3.81–5.12)
SODIUM SERPL-SCNC: 134 MMOL/L (ref 136–145)
T WAVE AXIS: -6 DEGREES
T3 SERPL-MCNC: 0.8 NG/ML (ref 0.6–1.8)
T4 FREE SERPL-MCNC: 1.29 NG/DL (ref 0.76–1.46)
VENTRICULAR RATE: 80 BPM
WBC # BLD AUTO: 4.24 THOUSAND/UL (ref 4.31–10.16)

## 2018-05-28 PROCEDURE — 84439 ASSAY OF FREE THYROXINE: CPT | Performed by: PHYSICIAN ASSISTANT

## 2018-05-28 PROCEDURE — 93010 ELECTROCARDIOGRAM REPORT: CPT | Performed by: INTERNAL MEDICINE

## 2018-05-28 PROCEDURE — 84480 ASSAY TRIIODOTHYRONINE (T3): CPT | Performed by: PHYSICIAN ASSISTANT

## 2018-05-28 PROCEDURE — 99232 SBSQ HOSP IP/OBS MODERATE 35: CPT | Performed by: PHYSICIAN ASSISTANT

## 2018-05-28 PROCEDURE — 83735 ASSAY OF MAGNESIUM: CPT | Performed by: PHYSICIAN ASSISTANT

## 2018-05-28 PROCEDURE — 80053 COMPREHEN METABOLIC PANEL: CPT | Performed by: PHYSICIAN ASSISTANT

## 2018-05-28 PROCEDURE — 85027 COMPLETE CBC AUTOMATED: CPT | Performed by: PHYSICIAN ASSISTANT

## 2018-05-28 PROCEDURE — 84100 ASSAY OF PHOSPHORUS: CPT | Performed by: PHYSICIAN ASSISTANT

## 2018-05-28 PROCEDURE — 83605 ASSAY OF LACTIC ACID: CPT | Performed by: PHYSICIAN ASSISTANT

## 2018-05-28 RX ORDER — POTASSIUM CHLORIDE 20 MEQ/1
20 TABLET, EXTENDED RELEASE ORAL 2 TIMES DAILY
Status: DISCONTINUED | OUTPATIENT
Start: 2018-05-28 | End: 2018-05-30 | Stop reason: HOSPADM

## 2018-05-28 RX ADMIN — VALSARTAN 80 MG: 80 TABLET, FILM COATED ORAL at 09:10

## 2018-05-28 RX ADMIN — LACTULOSE 20 G: 20 SOLUTION ORAL at 16:49

## 2018-05-28 RX ADMIN — Medication 400 MG: at 09:10

## 2018-05-28 RX ADMIN — VITAMIN D, TAB 1000IU (100/BT) 1000 UNITS: 25 TAB at 09:10

## 2018-05-28 RX ADMIN — ANORECTAL OINTMENT: 15.7; .44; 24; 20.6 OINTMENT TOPICAL at 20:02

## 2018-05-28 RX ADMIN — NADOLOL 20 MG: 40 TABLET ORAL at 09:10

## 2018-05-28 RX ADMIN — ENOXAPARIN SODIUM 40 MG: 40 INJECTION SUBCUTANEOUS at 20:02

## 2018-05-28 RX ADMIN — RIFAXIMIN 550 MG: 550 TABLET ORAL at 09:09

## 2018-05-28 RX ADMIN — LACTULOSE 20 G: 20 SOLUTION ORAL at 21:42

## 2018-05-28 RX ADMIN — FUROSEMIDE 40 MG: 10 INJECTION, SOLUTION INTRAMUSCULAR; INTRAVENOUS at 09:11

## 2018-05-28 RX ADMIN — ASPIRIN 81 MG 81 MG: 81 TABLET ORAL at 09:09

## 2018-05-28 RX ADMIN — MULTIPLE VITAMINS W/ MINERALS TAB 1 TABLET: TAB at 09:10

## 2018-05-28 RX ADMIN — LACTULOSE 20 G: 20 SOLUTION ORAL at 09:09

## 2018-05-28 RX ADMIN — RIFAXIMIN 550 MG: 550 TABLET ORAL at 21:42

## 2018-05-28 RX ADMIN — POTASSIUM CHLORIDE 20 MEQ: 1500 TABLET, EXTENDED RELEASE ORAL at 11:00

## 2018-05-28 RX ADMIN — PANTOPRAZOLE SODIUM 40 MG: 40 TABLET, DELAYED RELEASE ORAL at 05:39

## 2018-05-28 RX ADMIN — ANORECTAL OINTMENT: 15.7; .44; 24; 20.6 OINTMENT TOPICAL at 09:09

## 2018-05-28 RX ADMIN — POTASSIUM CHLORIDE 20 MEQ: 1500 TABLET, EXTENDED RELEASE ORAL at 20:02

## 2018-05-28 RX ADMIN — FUROSEMIDE 40 MG: 10 INJECTION, SOLUTION INTRAMUSCULAR; INTRAVENOUS at 16:49

## 2018-05-28 NOTE — ASSESSMENT & PLAN NOTE
-patient has worsening shortness of breath for the last 5 days  -patient's weight is trending down   -history of severe mitral valve stenosis and moderate aortic stenosis  -continue telemetry   -she received IV lasix 80 mg in the ER   -continue IV lasix 40 mg BID  -Troponin's negative x 3 sets  -daily weights and strict I&O's    -continue aspirin and beta blocker    -echocardiogram

## 2018-05-28 NOTE — PHYSICIAN ADVISOR
Current patient class: Inpatient  The patient is currently on Hospital Day: 2      The patient was admitted to the hospital at 1426 on 5/27/18 for the following diagnosis:  Shortness of breath [R06 02]  SOB (shortness of breath) [R06 02]  Hypoxia [R09 02]  Bilateral leg edema [R60 0]       There is documentation in the medical record of an expected length of stay of at least 2 midnights  The patient is therefore expected to satisfy the 2 midnight benchmark and given the 2 midnight presumption is appropriate for INPATIENT ADMISSION  Given this expectation of a satisfying stay, CMS instructs us that the patient is most often appropriate for inpatient admission under part A provided medical necessity is documented in the chart  After review of the relevant documentation, labs, vital signs and test results, the patient is appropriate for INPATIENT ADMISSION  Admission to the hospital as an inpatient is a complex decision making process which requires the practitioner to consider the patients presenting complaint, history and physical examination and all relevant testing  With this in mind, in this case, the patient was deemed appropriate for INPATIENT ADMISSION  After review of the documentation and testing available at the time of the admission I concur with this clinical determination of medical necessity  Rationale is as follows: The patient is a 80 yrs old Female who presented to the ED at 5/27/2018 12:35 PM with a chief complaint of Shortness of Breath (pt reports sob on and off increased for the "past 4-5days" Pt had 5lb weight gain in about a week   )     Patient will continue to remain hospitalized for a 2nd midnight for evaluation management of acute CHF exacerbation using IV Lasix  Patient is receiving Lasix IV b i d  Present time the patient is expected to remain hospitalized for additional midnight, for continued acute medical care      The patients vitals on arrival were ED Triage Vitals [05/27/18 1243]   Temperature Pulse Respirations Blood Pressure SpO2   98 °F (36 7 °C) 85 19 131/59 (!) 88 %      Temp Source Heart Rate Source Patient Position - Orthostatic VS BP Location FiO2 (%)   Temporal Monitor Lying Left arm --      Pain Score       No Pain           Past Medical History:   Diagnosis Date    [facility] cancer [facility] [address] )     [facility]     breast    CHF (congestive heart failure) (HCC)     GERD (gastroesophageal reflux disease)     Hypertension     last assessed 10/5/17    Polyp, sigmoid colon      Past Surgical History:   Procedure Laterality Date    COMBINED HYSTEROSCOPY DIAGNOSTIC / D&C      HYSTERECTOMY      unknown    MASTECTOMY Right 1990    MASTECTOMY             Consults have been placed to:   IP CONSULT TO CASE MANAGEMENT  IP CONSULT TO WOUND CARE    Vitals:    05/27/18 2356 05/28/18 0600 05/28/18 0726 05/28/18 1102   BP: 101/53  111/54    BP Location: Left arm  Left arm    Pulse: 65  69    Resp: 18  16    Temp: 97 8 °F (36 6 °C)  98 °F (36 7 °C)    TempSrc: Temporal  Temporal    SpO2: 97%  97% 93%   Weight:  66 6 kg (146 lb 14 4 oz)     Height:           Most recent labs:    Recent Labs      05/27/18   1345   05/27/18   2041  05/28/18   0433  05/28/18   0434   WBC  4 55   --    --    --   4 24*   HGB  14 1   --    --    --   13 0   HCT  42 5   --    --    --   38 6   PLT  197   --    --    --   187   K  4 1   --    --   3 6   --    NA  132*   --    --   134*   --    CALCIUM  8 6   --    --   8 2*   --    BUN  20   --    --   17   --    CREATININE  0 89   --    --   0 78   --    INR  1 23*   --    --    --    --    TROPONINI  <0 02   < >  <0 02   --    --    AST  42   --    --   35   --    ALT  29   --    --   25   --    ALKPHOS  153*   --    --   125*   --    BILITOT  1 40*   --    --   1 20*   --     < > = values in this interval not displayed         Scheduled Meds:  Current Facility-Administered Medications:  acetaminophen 650 mg Oral Q6H PRN Serafin Dillard DO aspirin 81 mg Oral Daily [place] Oudario, RACHEL   cholecalciferol 1,000 Units Oral Daily [place] Alireza, RACHEL   enoxaparin 40 mg Subcutaneous Daily [place] RACHEL Lay   furosemide 40 mg Intravenous BID (diuretic) [place] OuRACHEL bishop   lactulose 20 g Oral TID [place] Oudario, RACHEL   magnesium oxide 400 mg Oral Daily [place] OuRACHEL bishop   menthol-zinc oxide  Topical BID [place] OuRACHEL bishop   multivitamin-minerals 1 tablet Oral Daily Gavin Bonilla PA-C   nadolol 20 mg Oral Daily Gavin Bonilla PA-C   ondansetron 4 mg Intravenous Q6H PRN [place] OuRACHEL bishop   pantoprazole 40 mg Oral Early Morning Gavin Bonilla PA-C   potassium chloride 20 mEq Oral BID [place] OuRACHEL bishop   rifaximin 550 mg Oral Q12H [address] Gavin Bonilla PA-C   valsartan 80 mg Oral Daily Gavin Bonilla PA-C     Continuous Infusions:   PRN Meds:   acetaminophen    ondansetron    Surgical procedures (if appropriate):

## 2018-05-28 NOTE — PROGRESS NOTES
Progress Note - Aguayo Eye 10/24/1930, 80 y o  female MRN: [id number]    Unit/Bed#: 403-01 Encounter: [id number]    Primary Care Provider: Anoop Thornton DO   Date and time admitted to hospital: 5/27/2018 12:35 PM        * Acute on chronic diastolic congestive heart failure [facility])   Assessment & Plan    -patient has worsening shortness of breath for the last 5 days  -patient's weight is trending down   -history of severe mitral valve stenosis and moderate aortic stenosis  -continue telemetry   -she received IV lasix 80 mg in the ER   -continue IV lasix 40 mg BID  -Troponin's negative x 3 sets  -daily weights and strict I&O's    -continue aspirin and beta blocker  -echocardiogram           Hepatic cirrhosis (HCC)   Assessment & Plan    -Continue Lactulose, Xifaxan and Nadolol  Hyperbilirubinemia   Assessment & Plan    -likely due to hepatic cirrhosis  -continue to monitor  Hypertension   Assessment & Plan    -continue Nadolol, Valsartan, and lasix   -continue to follow blood pressure trends  Decubitus ulcer of sacral region, stage 1   Assessment & Plan    -Wound care consult   -Calmoseptine ordered  VTE Pharmacologic Prophylaxis:   Pharmacologic: Enoxaparin (Lovenox)  Mechanical VTE Prophylaxis in Place: Yes    Patient Centered Rounds: I have performed bedside rounds with nursing staff today  Discussions with Specialists or Other Care Team Provider: nursing and attending    Education and Discussions with Family / Patient: n/a    Time Spent for Care: 20 minutes  More than 50% of total time spent on counseling and coordination of care as described above      Current Length of Stay: 1 day(s)    Current Patient Status: Inpatient   Certification Statement: The patient will continue to require additional inpatient hospital stay due to continued need for IV diuretics    Discharge Plan: likely in the next 24-48 hours    Code Status: Level 1 - Full Code      Subjective: The patient was seen and examined  The patient states her breathing has improved  Objective:     Vitals:   Temp (24hrs), Av 1 °F (36 7 °C), Min:97 8 °F (36 6 °C), Max:98 6 °F (37 °C)    HR:  [65-82] 69  Resp:  [16-18] 16  BP: (101-142)/(53-59) 111/54  SpO2:  [93 %-98 %] 93 %  Body mass index is 26 87 kg/m²  Input and Output Summary (last 24 hours): Intake/Output Summary (Last 24 hours) at 18 1257  Last data filed at 18 1213   Gross per 24 hour   Intake              760 ml   Output              675 ml   Net               85 ml       Physical Exam:     Physical Exam   Constitutional: She is oriented to person, place, and time  Vital signs are normal  She appears well-developed and well-nourished  She is active and cooperative  Cardiovascular: Normal rate and regular rhythm  Trace edema bilateral lower extremities  Pulmonary/Chest: Effort normal  She has no wheezes  She has no rhonchi  She has rales in the right lower field  Abdominal: Soft  Normal appearance  She exhibits distension  There is no tenderness  Neurological: She is alert and oriented to person, place, and time  No cranial nerve deficit  Skin: Skin is warm, dry and intact  Nursing note and vitals reviewed          Additional Data:     Labs:      Results from last 7 days  Lab Units 18  0434 18  1345   WBC Thousand/uL 4 24* 4 55   HEMOGLOBIN g/dL 13 0 14 1   HEMATOCRIT % 38 6 42 5   PLATELETS Thousands/uL 187 197   NEUTROS PCT %  --  63   LYMPHS PCT %  --  23   MONOS PCT %  --  12   EOS PCT %  --  2       Results from last 7 days  Lab Units 18  0433   SODIUM mmol/L 134*   POTASSIUM mmol/L 3 6   CHLORIDE mmol/L 99*   CO2 mmol/L 30   BUN mg/dL 17   CREATININE mg/dL 0 78   CALCIUM mg/dL 8 2*   TOTAL PROTEIN g/dL 6 7   BILIRUBIN TOTAL mg/dL 1 20*   ALK PHOS U/L 125*   ALT U/L 25   AST U/L 35   GLUCOSE RANDOM mg/dL 97       Results from last 7 days  Lab Units 18  1345   INR  1 23* * I Have Reviewed All Lab Data Listed Above  * Additional Pertinent Lab Tests Reviewed: All Labs Within Last 24 Hours Reviewed    Imaging:    Imaging Reports Reviewed Today Include: none  Imaging Personally Reviewed by Myself Includes:  none    Recent Cultures (last 7 days):           Last 24 Hours Medication List:     Current Facility-Administered Medications:  acetaminophen 650 mg Oral Q6H PRN Vera Denia, DO   aspirin 81 mg Oral Daily Sinda Fleeting, PA-C   cholecalciferol 1,000 Units Oral Daily Gavin Bonilla, PA-C   enoxaparin 40 mg Subcutaneous Daily Sinda Fleeting, PA-C   furosemide 40 mg Intravenous BID (diuretic) Sinda Fleeting, PA-C   lactulose 20 g Oral TID Sinda Fleeting, PA-C   magnesium oxide 400 mg Oral Daily Sinda Fleeting, PA-C   menthol-zinc oxide  Topical BID Sinda Fleeting, PA-C   multivitamin-minerals 1 tablet Oral Daily Gavin Bonilla, PA-C   nadolol 20 mg Oral Daily Gavin Bonilla, PA-C   ondansetron 4 mg Intravenous Q6H PRN Sinda Fleeting, PA-C   pantoprazole 40 mg Oral Early Morning Gavin Bonilla, PA-ALESSIA   potassium chloride 20 mEq Oral BID Sinda Fleeting, PA-C   rifaximin 550 mg Oral Q12H [facility] Gavin Bonilla, PA-ALESSIA   valsartan 80 mg Oral Daily Sinda Flepatricio, RACHEL        Today, Patient Was Seen By: Jose Espino PA-C    ** Please Note: Dictation voice to text software may have been used in the creation of this document   **

## 2018-05-28 NOTE — DISCHARGE INSTR - APPOINTMENTS
Follow up with outpatient wound care post discharge  Call [facility] wound center for appointment at [phone]

## 2018-05-28 NOTE — CONSULTS
Progress Note - Wound   Ashlee Bullock 80 y o  female MRN: [id number]  Unit/Bed#: 403-01 Encounter: [id number]      Assessment:   Stage 3 pressure injury left buttock present on admission-shallow-slough present in base of ulcer  Continent of urine and bowel-briefed per patient request in case "of an accident"  Ambulatory to bathroom with assist x 1  Jared-17-low risk for pressure injury  Albumin-1 8    Plan:   1  Triad paste to left buttock stage 3 pressure injury  Apply pea sized amount daily  2   Accumax air mattress with alternating air pump  3   Soft care cushion when OOB to chair  Encourage weight shift q 15 minutes  4   Encourage turn and reposition q 2 hours  5   Elevate heels off bed  6   Notify wound care for decline in skin/wound status  7   Weekly follow up with wound care nurse  8   Follow up with outpatient wound care post discharge  Objective:    Vitals: Blood pressure 111/54, pulse 69, temperature 98 °F (36 7 °C), temperature source Temporal, resp  rate 16, height 5' 2" (1 575 m), weight 66 6 kg (146 lb 14 4 oz), SpO2 93 %  ,Body mass index is 26 87 kg/m²  Pressure Ulcer 05/28/18 Buttocks Left stage 3 (Active)   Staging Stage III 5/28/2018 11:02 AM   Wound Description Granulation tissue;Slough 5/28/2018 11:02 AM   [place]-wound Assessment Pink 5/28/2018 11:02 AM   Shape round 5/28/2018 11:02 AM   Wound Length (cm) 0 5 cm 5/28/2018 11:02 AM   Wound Width (cm) 0 7 cm 5/28/2018 11:02 AM   Wound Depth (cm) 0 1 5/28/2018 11:02 AM   Calculated Wound Area (cm^2) 0 35 cm^2 5/28/2018 11:02 AM   Calculated Wound Volume (cm^3) 0 04 cm^3 5/28/2018 11:02 AM   Drainage Amount Small 5/28/2018 11:02 AM   Drainage Description Serosanguineous 5/28/2018 11:02 AM   Treatment Turn & reposition 5/28/2018 11:02 AM   Dressing Other (Comment) 5/28/2018 11:02 AM   Dressing Changed New dressing applied 5/28/2018 11:02 AM     Lennox Hutch RN   CWOCN  5/28/2018 11:42

## 2018-05-28 NOTE — CASE MANAGEMENT
Initial Clinical Review    Admission: Date/Time/Statement:INPT  5/27/18 @ 1426     Orders Placed This Encounter   Procedures    Inpatient Admission     Standing Status:   Standing     Number of Occurrences:   1     Order Specific Question:   Admitting Physician     Answer:   Delmi Sotomayor     Order Specific Question:   Level of Care     Answer:   Med Surg [16]     Order Specific Question:   Estimated length of stay     Answer:   More than 2 Midnights     Order Specific Question:   Certification     Answer:   I certify that inpatient services are medically necessary for this patient for a duration of greater than two midnights  See H&P and MD Progress Notes for additional information about the patient's course of treatment   Inpatient Admission (expected length of stay for this patient is greater than two midnights)     Standing Status:   Standing     Number of Occurrences:   1     Order Specific Question:   Admitting Physician     Answer:   Delmi Stoomayor     Order Specific Question:   Level of Care     Answer:   Med Surg [16]     Order Specific Question:   Estimated length of stay     Answer:   More than 2 Midnights     Order Specific Question:   Certification     Answer:   I certify that inpatient services are medically necessary for this patient for a duration of greater than two midnights  See H&P and MD Progress Notes for additional information about the patient's course of treatment  ED: Date/Time/Mode of Arrival:   ED Arrival Information     Expected Arrival Acuity Means of Arrival Escorted By Service Admission Type    - 5/27/2018 12:34 Emergent [address]  [facility]    Arrival Complaint    SHORTNESS OF BREATH          Chief Complaint:   Chief Complaint   Patient presents with    Shortness of Breath     pt reports sob on and off increased for the "past 4-5days" Pt had 5lb weight gain in about a week           History of Illness:   Kristy Curran Ena Wagoner is a 80 y o  female who presents with a complaint of worsening shortness of breath in the last 5 days  The patient states she has mild shortness of breath at baseline and it has been worse in the last 5 days  She has also noticed a 5 lb weight gain and increased swelling in her legs  She called her PCP Dr Tania Stanton who instructed her to increase her PO lasix from 60 mg to 80 mg x 2 days  Despite this the patient continued to have shortness of breath  The patient does not wear oxygen at baseline  In the ER she was noted to have an oxygen saturation of 88% on room air     ED Vital Signs:   ED Triage Vitals [05/27/18 1243]   Temperature Pulse Respirations Blood Pressure SpO2   98 °F (36 7 °C) 85 19 131/59 (!) 88 %      Temp Source Heart Rate Source Patient Position - Orthostatic VS BP Location FiO2 (%)   Temporal Monitor Lying Left arm --      Pain Score       No Pain        Wt Readings from Last 1 Encounters:   05/28/18 66 6 kg (146 lb 14 4 oz)       Vital Signs (abnormal):   05/27/18 1451  98 6 °F (37 °C)  81  18  142/57  71  96 %  Nasal cannula  Lying   05/27/18 1406  --  82  18  120/59  --  98 %  Nasal cannula  Lying   05/27/18 1243  98 °F (36 7 °C)  85  19  131/59  --   88 %  None (Room air)  Lying   SpO2: Pt placed on 2L NC and increased to 96% at 05/27/18 1243         Abnormal Labs/Diagnostic Test Results:   Lab Units 05/27/18  1345   WBC Thousand/uL 4 55   HEMOGLOBIN g/dL 14 1   HEMATOCRIT % 42 5   PLATELETS Thousands/uL 197   NEUTROS PCT % 63   LYMPHS PCT % 23   MONOS PCT % 12   EOS PCT % 2         Results from last 7 days  Lab Units 05/27/18  1345   SODIUM mmol/L 132*   POTASSIUM mmol/L 4 1   CHLORIDE mmol/L 97*   CO2 mmol/L 29   BUN mg/dL 20   CREATININE mg/dL 0 89   CALCIUM mg/dL 8 6   TOTAL PROTEIN g/dL 7 6   BILIRUBIN TOTAL mg/dL 1 40*   ALK PHOS U/L 153*   ALT U/L 29   AST U/L 42   GLUCOSE RANDOM mg/dL 145*         Results from last 7 days  Lab Units 05/27/18  1345   INR   1 23*    PT 14 9  PTT 37    NT- PROBNP 585  LA 2 4    TSH 3RD GENERATION 4819       Imaging: I have personally reviewed pertinent reports        No results found      EKG, Pathology, and Other Studies Reviewed on Admission:   · EKG: NSR with rate of 80 bpm    CXR: CHF with pulmonary edema and pleural effusions    VAS LOWER LIMB VENOUS DUPLEX: PENDING    ED Treatment:   Medication Administration from 05/27/2018 1234 to 05/27/2018 1446       Date/Time Order Dose Route Action Action by Comments     05/27/2018 1328 furosemide (LASIX) injection 80 mg 80 mg Intravenous Given Shima Enrique RN           Past Medical/Surgical History:    Active Ambulatory Problems     Diagnosis Date Noted    Shortness of breath 03/10/2017    Pleural effusion 03/10/2017    Hypertension 03/10/2017    GERD without esophagitis 03/10/2017    Gastritis 03/10/2017    Valvular heart disease 03/11/2017    Acute on chronic diastolic congestive heart failure (HCC) 03/11/2017    Tricuspid regurgitation 03/18/2017    Pulmonary hypertension ([address] ) 03/18/2017    Mitral valve stenosis 03/18/2017    Aortic stenosis 03/18/2017    Hypoalbuminemia 03/18/2017    Hepatic coma/encephalopathy ([address] ) 03/18/2017    Hepatic cirrhosis ([address] ) 03/18/2017    Hyperbilirubinemia 03/18/2017    Leukopenia 03/18/2017    Neutropenia ([address] ) 03/18/2017    Transaminitis 03/18/2017    Healthcare-associated pneumonia 03/18/2017    Mucosal abnormality of stomach 03/19/2017    Portal hypertension ([address] ) 03/20/2017    Left adrenal mass ([address] ) 03/21/2017    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    Decubitus ulcer of sacral region, stage 1 24/15/3013    Diastolic CHF ([address] ) 63/29/4062    Edema 07/26/2012    Hyperlipidemia 07/25/2012    Pancreatic cyst 08/01/2017    Post-menopausal osteoporosis 07/25/2012     Resolved Ambulatory Problems     Diagnosis Date Noted    Acute encephalopathy 03/17/2017    Hyponatremia 11/04/2017    Shortness of breath 11/04/2017     Past Medical History:   Diagnosis Date    Breast cancer ([facility] )     Cancer ([facility] )     CHF (congestive heart failure) (HCC)     GERD (gastroesophageal reflux disease)     Hypertension     Polyp, sigmoid colon        Admitting Diagnosis: Shortness of breath [R06 02]  SOB (shortness of breath) [R06 02]  Hypoxia [R09 02]  Bilateral leg edema [R60 0]    Age/Sex: 80 y o  female    Assessment/Plan:   * Acute on chronic diastolic congestive heart failure (HCC)   Assessment & Plan     -patient has worsening shortness of breath for the last 5 days  -increase of 5 lbs over the last 4 days    -history of severe mitral valve stenosis and moderate aortic stenosis  -admit to med/surg floor and will monitor on telemetry   -she received IV lasix 80 mg in the ER   -initiate IV lasix 40 mg BID  -Troponin's x 2 more sets  -daily weights and strict I&O's    -continue aspirin and beta blocker  -echocardiogram              Hepatic cirrhosis (HCC)   Assessment & Plan     -Continue Lactulose, Xifaxan and Nadolol            Hyperbilirubinemia   Assessment & Plan     -likely due to hepatic cirrhosis  -continue to monitor           Hypertension   Assessment & Plan     -continue Nadolol, Valsartan, and lasix   -continue to follow blood pressure trends           Decubitus ulcer of sacral region, stage 1   Assessment & Plan     -Wound care consult   -Calmoseptine ordered                       VTE Prophylaxis: Enoxaparin (Lovenox)  / sequential compression device   Code Status: full code  POLST: There is no POLST form on file for this patient (pre-hospital)     Anticipated Length of Stay:  Patient will be admitted on an Inpatient basis with an anticipated length of stay of  Greater than 2 midnights     Justification for Hospital Stay: treatment of acute CHF with IV lasix and monitoring on telemetry         Admission Orders:  INPT  TELE  PT/OT  CARDIAC DIET  DAILY WEIGHTS  OOB  CONSULT WOUND CARE  SEQ COMP DEVICE  ASPIRATION PRECAUTIONS    Scheduled Meds:   Current Facility-Administered Medications:  acetaminophen 650 mg Oral [facility]6H PRN [facility],    aspirin 81 mg Oral Daily Shabbir Little PA-C   cholecalciferol 1,000 Units Oral Daily Gavin Bonilla PA-C   enoxaparin 40 mg Subcutaneous Daily Shabbir Little PA-C   furosemide 40 mg Intravenous BID (diuretic) Shabbir Little PA-C   lactulose 20 g Oral TID Shabbir Little PA-C   magnesium oxide 400 mg Oral Daily Shabbir Little PA-C   menthol-zinc oxide  Topical BID Shabbir Little PA-C   multivitamin-minerals 1 tablet Oral Daily Gavin Bonilla PA-C   nadolol 20 mg Oral Daily Gavin Bonilla PA-C   ondansetron 4 mg Intravenous Q6H PRN Shabbir Little PA-C   pantoprazole 40 mg Oral Early Morning Gavin Bonilla PA-C   potassium chloride 20 mEq Oral BID Shabbir Little PA-C   rifaximin 550 mg Oral Q12H [address] Gavin Bonilla PA-C   valsartan 80 mg Oral Daily Gavin Bonilla PA-C     Continuous Infusions:    PRN Meds:   acetaminophen    ondansetron

## 2018-05-29 ENCOUNTER — APPOINTMENT (INPATIENT)
Dept: NON INVASIVE DIAGNOSTICS | Facility: HOSPITAL | Age: 83
DRG: 291 | End: 2018-05-29
Payer: MEDICARE

## 2018-05-29 PROBLEM — L89.323 PRESSURE ULCER OF LEFT BUTTOCK, STAGE 3 (HCC): Status: ACTIVE | Noted: 2017-10-05

## 2018-05-29 LAB
ALBUMIN SERPL BCP-MCNC: 2 G/DL (ref 3.5–5)
ALP SERPL-CCNC: 128 U/L (ref 46–116)
ALT SERPL W P-5'-P-CCNC: 27 U/L (ref 12–78)
ANION GAP SERPL CALCULATED.3IONS-SCNC: 4 MMOL/L (ref 4–13)
AST SERPL W P-5'-P-CCNC: 43 U/L (ref 5–45)
BASOPHILS # BLD AUTO: 0.04 THOUSANDS/ΜL (ref 0–0.1)
BASOPHILS NFR BLD AUTO: 1 % (ref 0–1)
BILIRUB SERPL-MCNC: 1.6 MG/DL (ref 0.2–1)
BUN SERPL-MCNC: 15 MG/DL (ref 5–25)
CALCIUM SERPL-MCNC: 8.9 MG/DL (ref 8.3–10.1)
CHLORIDE SERPL-SCNC: 95 MMOL/L (ref 100–108)
CO2 SERPL-SCNC: 31 MMOL/L (ref 21–32)
CREAT SERPL-MCNC: 0.86 MG/DL (ref 0.6–1.3)
EOSINOPHIL # BLD AUTO: 0.27 THOUSAND/ΜL (ref 0–0.61)
EOSINOPHIL NFR BLD AUTO: 6 % (ref 0–6)
ERYTHROCYTE [DISTWIDTH] IN BLOOD BY AUTOMATED COUNT: 15.4 % (ref 11.6–15.1)
GFR SERPL CREATININE-BSD FRML MDRD: 61 ML/MIN/1.73SQ M
GLUCOSE SERPL-MCNC: 77 MG/DL (ref 65–140)
HCT VFR BLD AUTO: 41.7 % (ref 34.8–46.1)
HGB BLD-MCNC: 13.9 G/DL (ref 11.5–15.4)
LYMPHOCYTES # BLD AUTO: 2.02 THOUSANDS/ΜL (ref 0.6–4.47)
LYMPHOCYTES NFR BLD AUTO: 43 % (ref 14–44)
MCH RBC QN AUTO: 30.1 PG (ref 26.8–34.3)
MCHC RBC AUTO-ENTMCNC: 33.3 G/DL (ref 31.4–37.4)
MCV RBC AUTO: 90 FL (ref 82–98)
MONOCYTES # BLD AUTO: 0.7 THOUSAND/ΜL (ref 0.17–1.22)
MONOCYTES NFR BLD AUTO: 15 % (ref 4–12)
NEUTROPHILS # BLD AUTO: 1.64 THOUSANDS/ΜL (ref 1.85–7.62)
NEUTS SEG NFR BLD AUTO: 35 % (ref 43–75)
PLATELET # BLD AUTO: 185 THOUSANDS/UL (ref 149–390)
PMV BLD AUTO: 11.5 FL (ref 8.9–12.7)
POTASSIUM SERPL-SCNC: 4.3 MMOL/L (ref 3.5–5.3)
PROT SERPL-MCNC: 7 G/DL (ref 6.4–8.2)
RBC # BLD AUTO: 4.62 MILLION/UL (ref 3.81–5.12)
SODIUM SERPL-SCNC: 130 MMOL/L (ref 136–145)
WBC # BLD AUTO: 4.67 THOUSAND/UL (ref 4.31–10.16)

## 2018-05-29 PROCEDURE — 80053 COMPREHEN METABOLIC PANEL: CPT | Performed by: PHYSICIAN ASSISTANT

## 2018-05-29 PROCEDURE — 94760 N-INVAS EAR/PLS OXIMETRY 1: CPT

## 2018-05-29 PROCEDURE — 97163 PT EVAL HIGH COMPLEX 45 MIN: CPT | Performed by: PHYSICAL THERAPIST

## 2018-05-29 PROCEDURE — 99232 SBSQ HOSP IP/OBS MODERATE 35: CPT | Performed by: PHYSICIAN ASSISTANT

## 2018-05-29 PROCEDURE — 94761 N-INVAS EAR/PLS OXIMETRY MLT: CPT

## 2018-05-29 PROCEDURE — 93306 TTE W/DOPPLER COMPLETE: CPT

## 2018-05-29 PROCEDURE — G8979 MOBILITY GOAL STATUS: HCPCS | Performed by: PHYSICAL THERAPIST

## 2018-05-29 PROCEDURE — G8978 MOBILITY CURRENT STATUS: HCPCS | Performed by: PHYSICAL THERAPIST

## 2018-05-29 PROCEDURE — 93306 TTE W/DOPPLER COMPLETE: CPT | Performed by: INTERNAL MEDICINE

## 2018-05-29 PROCEDURE — 85025 COMPLETE CBC W/AUTO DIFF WBC: CPT | Performed by: PHYSICIAN ASSISTANT

## 2018-05-29 RX ORDER — FUROSEMIDE 10 MG/ML
40 INJECTION INTRAMUSCULAR; INTRAVENOUS DAILY
Status: DISCONTINUED | OUTPATIENT
Start: 2018-05-30 | End: 2018-05-30 | Stop reason: HOSPADM

## 2018-05-29 RX ADMIN — POTASSIUM CHLORIDE 20 MEQ: 1500 TABLET, EXTENDED RELEASE ORAL at 17:10

## 2018-05-29 RX ADMIN — ANORECTAL OINTMENT: 15.7; .44; 24; 20.6 OINTMENT TOPICAL at 17:13

## 2018-05-29 RX ADMIN — LACTULOSE 20 G: 20 SOLUTION ORAL at 17:10

## 2018-05-29 RX ADMIN — VALSARTAN 80 MG: 80 TABLET, FILM COATED ORAL at 08:24

## 2018-05-29 RX ADMIN — VITAMIN D, TAB 1000IU (100/BT) 1000 UNITS: 25 TAB at 08:23

## 2018-05-29 RX ADMIN — ENOXAPARIN SODIUM 40 MG: 40 INJECTION SUBCUTANEOUS at 21:44

## 2018-05-29 RX ADMIN — MULTIPLE VITAMINS W/ MINERALS TAB 1 TABLET: TAB at 08:23

## 2018-05-29 RX ADMIN — RIFAXIMIN 550 MG: 550 TABLET ORAL at 21:45

## 2018-05-29 RX ADMIN — RIFAXIMIN 550 MG: 550 TABLET ORAL at 08:24

## 2018-05-29 RX ADMIN — ANORECTAL OINTMENT 1 APPLICATION: 15.7; .44; 24; 20.6 OINTMENT TOPICAL at 14:06

## 2018-05-29 RX ADMIN — POTASSIUM CHLORIDE 20 MEQ: 1500 TABLET, EXTENDED RELEASE ORAL at 08:23

## 2018-05-29 RX ADMIN — LACTULOSE 20 G: 20 SOLUTION ORAL at 08:23

## 2018-05-29 RX ADMIN — Medication 400 MG: at 08:23

## 2018-05-29 RX ADMIN — FUROSEMIDE 40 MG: 10 INJECTION, SOLUTION INTRAMUSCULAR; INTRAVENOUS at 08:13

## 2018-05-29 RX ADMIN — PANTOPRAZOLE SODIUM 40 MG: 40 TABLET, DELAYED RELEASE ORAL at 05:16

## 2018-05-29 RX ADMIN — LACTULOSE 20 G: 20 SOLUTION ORAL at 21:45

## 2018-05-29 RX ADMIN — ASPIRIN 81 MG 81 MG: 81 TABLET ORAL at 08:23

## 2018-05-29 RX ADMIN — NADOLOL 20 MG: 40 TABLET ORAL at 08:24

## 2018-05-29 NOTE — ASSESSMENT & PLAN NOTE
-patient has worsening shortness of breath for the last 5 days  -patient's weight is trending down   -history of severe mitral valve stenosis and moderate aortic stenosis  -continue telemetry   -she received IV lasix 80 mg in the ER  -decrease to  IV lasix 40 mg daily today for likely discharge tomorrow   -Troponin's negative x 3 sets  -daily weights and strict I&O's    -continue aspirin and beta blocker  -echocardiogram pending

## 2018-05-29 NOTE — PLAN OF CARE
Problem: DISCHARGE PLANNING - CARE MANAGEMENT  Goal: Discharge to post-acute care or home with appropriate resources  INTERVENTIONS:  - Conduct assessment to determine patient/family and health care team treatment goals, and need for post-acute services based on payer coverage, community resources, and patient preferences, and barriers to discharge  - Address psychosocial, clinical, and financial barriers to discharge as identified in assessment in conjunction with the patient/family and health care team  - Arrange appropriate level of post-acute services according to patient's   needs and preference and payer coverage in collaboration with the physician and health care team  - Communicate with and update the patient/family, physician, and health care team regarding progress on the discharge plan  - Arrange appropriate transportation to post-acute venues  Outcome: Progressing  -outpatient follow up  -active [facility]

## 2018-05-29 NOTE — PHYSICAL THERAPY NOTE
PT Evaluation     05/29/18 1516   Note Type   Note type Eval/Treat   Pain Assessment   Pain Score No Pain   Home Living   Type of [address] One level;Performs ADLs on one level; Able to live on main level with bedroom/bathroom  (no MICHAEL)   Bathroom Shower/Tub Walk-in shower   Bathroom Toilet Raised   Bathroom Equipment Shower chair;Grab bars in shower;Grab bars around toilet   [address]  (rollator walker)   Prior Function   Level of [place] Independent with ADLs and functional mobility  ((I) ambulation with SPC)   Lives With Alone   Receives Help From Family   ADL Assistance Independent   IADLs Independent  (relative comes 2 hours daily to assist with IADL's)   Comments family drives   Restrictions/Precautions   Other Precautions Chair Alarm;Telemetry; Fall Risk   General   Family/Caregiver Present No   Cognition   Arousal/Participation Alert   Orientation Level Oriented X4   Following Commands Follows all commands and directions without difficulty   RLE Assessment   RLE Assessment WFL  (4 to 4+/5)   LLE Assessment   LLE Assessment WFL  (4 to 4+/5)   Coordination   Sensation WFL   Light Touch   RLE Light Touch Grossly intact   LLE Light Touch Grossly intact   Bed Mobility   Additional Comments pt seated OOB in chair when PT entered, Returned to sitting in chair with bell in reach and alarm on  Transfers   Sit to Stand 5  Supervision   Additional items Armrests   Stand to Sit 5  Supervision   Additional items Armrests   Stand pivot 5  Supervision   Additional items Verbal cues  (with SPC)   Additional Comments pt on room air with SpO2 at rest 90% HR 54 during ambulation 88% HR 59 and after ambulation 88% HR 59  Pt with minimal complaint of SOB  pt with fair to good stability during ambulation with [facility]   Ambulation/Elevation   Gait pattern Wide VESTA; Short stride   Gait Assistance 5  Supervision   Assistive Device Straight cane   Distance 200ft with SPC Supervision   Balance   Static Sitting Good   Dynamic Sitting Good   Static Standing Fair +   Dynamic Standing Fair   Ambulatory Fair  (with SPC)   Endurance Deficit   Endurance Deficit Yes   Endurance Deficit Description limited ambulation tolerance due to fatigue and mild SOB   Activity Tolerance   Activity Tolerance Patient limited by fatigue   Assessment   Prognosis Good   Problem List Decreased strength;Decreased endurance; Impaired balance;Decreased mobility   Assessment Pt is an 80year old female presenting with fair to good strength balance endurance and functional mobility performing all transfers and ambulation at a Supervision level however demonstrates decreased ambulatory endurance with drop in SpO2 from 90 to 88% during ambulation  Pt would benefit from continued activity in PT to improve strength balance endurance and ambulation  Goals   Patient Goals To feel better and return home   STG Expiration Date 06/05/18   Short Term Goal #1 (I) with all bed mobility and transfers with [facility]   Short Term Goal #2 Pt will ambulate 300ft with SPC modified Independent no drop in SpO2 below 90%   Plan   Treatment/Interventions Functional transfer training;LE strengthening/ROM; Therapeutic exercise; Endurance training;Patient/family training;Bed mobility;Gait training   PT Frequency (3-5x/wk)   Recommendation   Recommendation Home with family support   PT - OK to Discharge Yes  (when medically stable for discharge)   Pt with SCD's on when PT entered room  SCD's reapplied and turned on with pt seated in chair at bedside with call bell in reach and chair alarm on

## 2018-05-29 NOTE — MALNUTRITION/BMI
This medical record reflects one or more clinical indicators suggestive of malnutrition  Malnutrition Findings:   Malnutrition type: Chronic illness  Degree of Malnutrition: Malnutrition of moderate degree  Malnutrition Characteristics: Inadequate energy, Other (comment) (Moderate PCM r/t prolong inadequate po intake <75% of energy needs with delay wound healing of stage III ulcer)    BMI Findings: Body mass index is 27 26 kg/m²  See Nutrition note dated 5/29/2018for additional details  Completed nutrition assessment is viewable in the nutrition documentation

## 2018-05-29 NOTE — RESPIRATORY THERAPY NOTE
Home Oxygen Qualifying Test       Patient name: Erick Mendez        : 10/24/1930   Date of Test:  May 29, 2018  Diagnosis:Acute on Chronic Diastolic CHF     Home Oxygen Test:    **Medicare Guidelines require item(s) 1-5 on all ambulatory patients or 1 and 2 on on-ambulatory patients  1   Baseline SPO2 on Room Air at rest 94 %  If = or < 88% on room air add O2 via NC and titrate patient  Patient must be ambulated with O2 and titrated to > 88% with exertion  2   SPO2 on Oxygen at rest 98 %   2 l/m    3  SPO2 during exercise on Room Air 88 %  HR 65    4  SPO2 during exercise on Oxygen  94 % at a liter flow of 2 lpm     5   Exercise performed:    [x] Walking     [] Stairs     [x] Duration 10 (min )     [x] Distance 350 (ft )       [x]  Supplemental Home Oxygen is indicated  []  Client does not qualify for home oxygen        Ashley Hands, RT

## 2018-05-29 NOTE — SOCIAL WORK
Cm met with the patient to evaluate the patients prior function and living situation and any barriers to d/c and form a safe d/c plan  Cm also evaluated the patient for any services in the home or needs for services  Pt resides at home alone and states her cousin who also lives in [facility] comes in 2 hours a day/7 days a week to assist her  0 MICHAEL then pt is on one floor at home Pt is active with [facility] (RN)  Re: DME uses her walker  Pt states her cousin drives her to appointments  PCP is Liu Chaudhary and pharmacy is [facility]  Pt plans on returning home on dc with family support, resumption of [facility], and outpatient follow up   Cm will continue to follow and assist in dc planning

## 2018-05-29 NOTE — PROGRESS NOTES
Progress Note - Dewayne Schmidt 10/24/1930, 80 y o  female MRN: [id number]    Unit/Bed#: 403-01 Encounter: [id number]    Primary Care Provider: Yesika Portillo DO   Date and time admitted to hospital: 5/27/2018 12:35 PM        * Acute on chronic diastolic congestive heart failure [facility])   Assessment & Plan    -patient has worsening shortness of breath for the last 5 days  -patient's weight is trending down   -history of severe mitral valve stenosis and moderate aortic stenosis  -continue telemetry   -she received IV lasix 80 mg in the ER  -decrease to  IV lasix 40 mg daily today for likely discharge tomorrow   -Troponin's negative x 3 sets  -daily weights and strict I&O's    -continue aspirin and beta blocker  -echocardiogram pending  Hepatic cirrhosis (HCC)   Assessment & Plan    -Continue Lactulose, Xifaxan and Nadolol  Hyperbilirubinemia   Assessment & Plan    -likely due to hepatic cirrhosis  -continue to monitor  Hypertension   Assessment & Plan    -continue Nadolol, Valsartan, and lasix   -continue to follow blood pressure trends  Pressure ulcer of left buttock, stage 3 (HCC)   Assessment & Plan    -Wound care consult   -Calmoseptine ordered  VTE Pharmacologic Prophylaxis:   Pharmacologic: Enoxaparin (Lovenox)  Mechanical VTE Prophylaxis in Place: Yes    Patient Centered Rounds: I have performed bedside rounds with nursing staff today  Discussions with Specialists or Other Care Team Provider: nursing, CM, and attending    Education and Discussions with Family / Patient: n/a    Time Spent for Care: 30 minutes  More than 50% of total time spent on counseling and coordination of care as described above      Current Length of Stay: 2 day(s)    Current Patient Status: Inpatient   Certification Statement: The patient will continue to require additional inpatient hospital stay due to continued need for IV lasix    Discharge Plan: likely tomorrow 18    Code Status: Level 1 - Full Code      Subjective: The patient was seen and examined  The patient states her breathing has improved  Objective:     Vitals:   Temp (24hrs), Av 8 °F (36 6 °C), Min:97 3 °F (36 3 °C), Max:98 3 °F (36 8 °C)    HR:  [56-66] 56  Resp:  [18] 18  BP: ()/(35-72) 92/35  SpO2:  [88 %-94 %] 93 %  Body mass index is 27 26 kg/m²  Input and Output Summary (last 24 hours): Intake/Output Summary (Last 24 hours) at 18 1651  Last data filed at 18 1443   Gross per 24 hour   Intake              720 ml   Output             1050 ml   Net             -330 ml       Physical Exam:     Physical Exam   Constitutional: She is oriented to person, place, and time  Vital signs are normal  She appears well-developed and well-nourished  She is active and cooperative  Cardiovascular: Normal rate and regular rhythm  Pulmonary/Chest: Effort normal  She has no decreased breath sounds  She has no wheezes  She has no rhonchi  She has rales in the right lower field  Abdominal: Soft  Normal appearance and bowel sounds are normal  There is no tenderness  Neurological: She is alert and oriented to person, place, and time  No cranial nerve deficit  Skin: Skin is warm, dry and intact  Nursing note and vitals reviewed          Additional Data:     Labs:      Results from last 7 days  Lab Units 18  0438   WBC Thousand/uL 4 67   HEMOGLOBIN g/dL 13 9   HEMATOCRIT % 41 7   PLATELETS Thousands/uL 185   NEUTROS PCT % 35*   LYMPHS PCT % 43   MONOS PCT % 15*   EOS PCT % 6       Results from last 7 days  Lab Units 18  0438   SODIUM mmol/L 130*   POTASSIUM mmol/L 4 3   CHLORIDE mmol/L 95*   CO2 mmol/L 31   BUN mg/dL 15   CREATININE mg/dL 0 86   CALCIUM mg/dL 8 9   TOTAL PROTEIN g/dL 7 0   BILIRUBIN TOTAL mg/dL 1 60*   ALK PHOS U/L 128*   ALT U/L 27   AST U/L 43   GLUCOSE RANDOM mg/dL 77       Results from last 7 days  Lab Units 18  1345   INR  1 23* * I Have Reviewed All Lab Data Listed Above  * Additional Pertinent Lab Tests Reviewed: All Labs Within Last 24 Hours Reviewed    Imaging:    Imaging Reports Reviewed Today Include: none  Imaging Personally Reviewed by Myself Includes:  none    Recent Cultures (last 7 days):           Last 24 Hours Medication List:     Current Facility-Administered Medications:  acetaminophen 650 mg Oral Q6H PRN Kristie Rubio DO   aspirin 81 mg Oral Daily Edgar Rdz PA-C   cholecalciferol 1,000 Units Oral Daily Gavin Bonilla PA-C   enoxaparin 40 mg Subcutaneous Daily Edgar Rdz PA-C   [START ON 5/30/2018] furosemide 40 mg Intravenous Daily Gavin Bonilla PA-C   lactulose 20 g Oral TID Edgar Rdz PA-C   magnesium oxide 400 mg Oral Daily Edgar Rdz PA-C   menthol-zinc oxide  Topical BID Edgar Rdz PA-C   multivitamin-minerals 1 tablet Oral Daily Gavin Bonilla PA-C   nadolol 20 mg Oral Daily Gavin Bonilla PA-C   ondansetron 4 mg Intravenous Q6H PRN Edgar Rdz PA-C   pantoprazole 40 mg Oral Early Morning Gavin Bonilla PA-C   potassium chloride 20 mEq Oral BID Edgar Rdz PA-C   rifaximin 550 mg Oral Q12H [address] Gavin Bonilla PA-C   valsartan 80 mg Oral Daily Edgar Rdz PA-C        Today, Patient Was Seen By: Edgar Rdz PA-C    ** Please Note: Dictation voice to text software may have been used in the creation of this document   **

## 2018-05-30 ENCOUNTER — TRANSITIONAL CARE MANAGEMENT (OUTPATIENT)
Dept: INTERNAL MEDICINE CLINIC | Facility: CLINIC | Age: 83
End: 2018-05-30

## 2018-05-30 VITALS
OXYGEN SATURATION: 91 % | TEMPERATURE: 98.4 F | WEIGHT: 149.25 LBS | RESPIRATION RATE: 20 BRPM | HEIGHT: 62 IN | SYSTOLIC BLOOD PRESSURE: 90 MMHG | HEART RATE: 55 BPM | BODY MASS INDEX: 27.47 KG/M2 | DIASTOLIC BLOOD PRESSURE: 48 MMHG

## 2018-05-30 LAB
ALBUMIN SERPL BCP-MCNC: 1.8 G/DL (ref 3.5–5)
ALP SERPL-CCNC: 139 U/L (ref 46–116)
ALT SERPL W P-5'-P-CCNC: 26 U/L (ref 12–78)
ANION GAP SERPL CALCULATED.3IONS-SCNC: 4 MMOL/L (ref 4–13)
AST SERPL W P-5'-P-CCNC: 40 U/L (ref 5–45)
BASOPHILS # BLD AUTO: 0.05 THOUSANDS/ΜL (ref 0–0.1)
BASOPHILS NFR BLD AUTO: 1 % (ref 0–1)
BILIRUB SERPL-MCNC: 1.3 MG/DL (ref 0.2–1)
BUN SERPL-MCNC: 15 MG/DL (ref 5–25)
CALCIUM SERPL-MCNC: 8.5 MG/DL (ref 8.3–10.1)
CHLORIDE SERPL-SCNC: 96 MMOL/L (ref 100–108)
CO2 SERPL-SCNC: 30 MMOL/L (ref 21–32)
CREAT SERPL-MCNC: 0.8 MG/DL (ref 0.6–1.3)
EOSINOPHIL # BLD AUTO: 0.23 THOUSAND/ΜL (ref 0–0.61)
EOSINOPHIL NFR BLD AUTO: 5 % (ref 0–6)
ERYTHROCYTE [DISTWIDTH] IN BLOOD BY AUTOMATED COUNT: 15.4 % (ref 11.6–15.1)
GFR SERPL CREATININE-BSD FRML MDRD: 67 ML/MIN/1.73SQ M
GLUCOSE SERPL-MCNC: 83 MG/DL (ref 65–140)
HCT VFR BLD AUTO: 38.7 % (ref 34.8–46.1)
HGB BLD-MCNC: 13.2 G/DL (ref 11.5–15.4)
LYMPHOCYTES # BLD AUTO: 1.69 THOUSANDS/ΜL (ref 0.6–4.47)
LYMPHOCYTES NFR BLD AUTO: 38 % (ref 14–44)
MCH RBC QN AUTO: 30.9 PG (ref 26.8–34.3)
MCHC RBC AUTO-ENTMCNC: 34.1 G/DL (ref 31.4–37.4)
MCV RBC AUTO: 91 FL (ref 82–98)
MONOCYTES # BLD AUTO: 0.84 THOUSAND/ΜL (ref 0.17–1.22)
MONOCYTES NFR BLD AUTO: 19 % (ref 4–12)
NEUTROPHILS # BLD AUTO: 1.61 THOUSANDS/ΜL (ref 1.85–7.62)
NEUTS SEG NFR BLD AUTO: 37 % (ref 43–75)
PLATELET # BLD AUTO: 186 THOUSANDS/UL (ref 149–390)
PMV BLD AUTO: 12.4 FL (ref 8.9–12.7)
POTASSIUM SERPL-SCNC: 4.7 MMOL/L (ref 3.5–5.3)
PROT SERPL-MCNC: 6.5 G/DL (ref 6.4–8.2)
RBC # BLD AUTO: 4.27 MILLION/UL (ref 3.81–5.12)
SODIUM SERPL-SCNC: 130 MMOL/L (ref 136–145)
WBC # BLD AUTO: 4.42 THOUSAND/UL (ref 4.31–10.16)

## 2018-05-30 PROCEDURE — 94761 N-INVAS EAR/PLS OXIMETRY MLT: CPT

## 2018-05-30 PROCEDURE — 97167 OT EVAL HIGH COMPLEX 60 MIN: CPT

## 2018-05-30 PROCEDURE — 99238 HOSP IP/OBS DSCHRG MGMT 30/<: CPT | Performed by: PHYSICIAN ASSISTANT

## 2018-05-30 PROCEDURE — G8988 SELF CARE GOAL STATUS: HCPCS

## 2018-05-30 PROCEDURE — 94760 N-INVAS EAR/PLS OXIMETRY 1: CPT

## 2018-05-30 PROCEDURE — 97116 GAIT TRAINING THERAPY: CPT

## 2018-05-30 PROCEDURE — 80053 COMPREHEN METABOLIC PANEL: CPT | Performed by: PHYSICIAN ASSISTANT

## 2018-05-30 PROCEDURE — G8987 SELF CARE CURRENT STATUS: HCPCS

## 2018-05-30 PROCEDURE — 85025 COMPLETE CBC W/AUTO DIFF WBC: CPT | Performed by: PHYSICIAN ASSISTANT

## 2018-05-30 RX ADMIN — ANORECTAL OINTMENT: 15.7; .44; 24; 20.6 OINTMENT TOPICAL at 08:59

## 2018-05-30 RX ADMIN — RIFAXIMIN 550 MG: 550 TABLET ORAL at 08:58

## 2018-05-30 RX ADMIN — POTASSIUM CHLORIDE 20 MEQ: 1500 TABLET, EXTENDED RELEASE ORAL at 08:58

## 2018-05-30 RX ADMIN — MULTIPLE VITAMINS W/ MINERALS TAB 1 TABLET: TAB at 08:58

## 2018-05-30 RX ADMIN — LACTULOSE 20 G: 20 SOLUTION ORAL at 08:58

## 2018-05-30 RX ADMIN — ASPIRIN 81 MG 81 MG: 81 TABLET ORAL at 08:58

## 2018-05-30 RX ADMIN — VITAMIN D, TAB 1000IU (100/BT) 1000 UNITS: 25 TAB at 08:58

## 2018-05-30 RX ADMIN — Medication 400 MG: at 08:58

## 2018-05-30 RX ADMIN — PANTOPRAZOLE SODIUM 40 MG: 40 TABLET, DELAYED RELEASE ORAL at 05:28

## 2018-05-30 NOTE — OCCUPATIONAL THERAPY NOTE
Occupational Therapy Evaluation     Patient Name: Karly Cotto  YBRCN'F Date: 5/30/2018  Problem List  Patient Active Problem List   Diagnosis    Shortness of breath    Pleural effusion    Hypertension    GERD without esophagitis    Gastritis    Valvular heart disease    Acute on chronic diastolic congestive heart failure (HCC)    Tricuspid regurgitation    Pulmonary hypertension (HCC)    Mitral valve stenosis    Aortic stenosis    Hypoalbuminemia    Hepatic coma/encephalopathy (HCC)    Hepatic cirrhosis (HCC)    Hyperbilirubinemia    Leukopenia    Neutropenia (HCC)    Transaminitis    Healthcare-associated pneumonia    Mucosal abnormality of stomach    [facility]    Left adrenal mass ([facility]    Aneurysm of anterior cerebral artery    Pressure ulcer of left buttock, stage 3 ([facility] )    Diastolic CHF ([facility]    Edema    Hyperlipidemia    Pancreatic cyst    Post-menopausal osteoporosis     Past Medical History  Past Medical History:   Diagnosis Date    Breast cancer ([facility]     Cancer ([facility] )     breast    CHF (congestive heart failure) (HCC)     GERD (gastroesophageal reflux disease)     Hypertension     last assessed 10/5/17    Polyp, sigmoid colon      Past Surgical History  Past Surgical History:   Procedure Laterality Date    COMBINED HYSTEROSCOPY DIAGNOSTIC / D&C      HYSTERECTOMY      unknown    MASTECTOMY Right 1990    MASTECTOMY               05/30/18 0938   Note Type   Note type Eval/Treat   Restrictions/Precautions   Weight Bearing Precautions Per Order No   Other Precautions Bed Alarm;Multiple lines;Telemetry; Fall Risk   Pain Assessment   Pain Assessment No/denies pain   Home Living   Additional Comments see PT evaluation for details    Prior Function   Comments see PT evaluation for details    Psychosocial   Psychosocial (WDL) WDL   ADL   Where Assessed Standing at sink  (and toilet )   Grooming Assistance 5  Supervision/Setup   LB Dressing Assistance 5 Supervision/Setup   Toileting Assistance  5  Supervision/Setup   Additional Comments (I) adrian hygiene   Bed Mobility   Additional Comments pt seated EOB at start of session   Transfers   Sit to Stand 5  Supervision   Additional items Verbal cues  (SPC)   Stand to Sit 5  Supervision   Additional items Verbal cues  [facility])   Additional Comments pt on room at SpO2 at 91% prior to bathroom tasks; SpO2 was 87% prior to FM c [address]   Functional Mobility   Functional Mobility 5  Supervision   Additional Comments pt performed FM in hallway with SPC and at times hand held (A) on other UE; pt's SpO2 ranged 88-90% throughout session during FM   Additional items SPC; Hand hold assistance   Balance   Static Sitting Good   Dynamic Sitting Good   Static Standing Fair +   Dynamic Standing Fair   Ambulatory Fair   Activity Tolerance   Activity Tolerance Patient limited by fatigue   RUE Assessment   RUE Assessment WFL   LUE Assessment   LUE Assessment WFL   Hand Function   Gross Motor Coordination Functional   Fine Motor Coordination Functional   Sensation   Light Touch No apparent deficits   Sharp/Dull No apparent deficits   Cognition   Overall Cognitive Status WFL   Arousal/Participation Alert   Attention Within functional limits   Orientation Level Oriented X4   Memory Within functional limits   Following Commands Follows all commands and directions without difficulty   Assessment   Limitation Decreased ADL status; Decreased UE strength;Decreased self-care trans;Decreased high-level ADLs   Assessment pt presents at evaluation performing at overall (S) level with use of SPC durnig FM; pt's SpO2 and endurance are limited pt at this time; pt will benefit from continued OT intervention, use of RW recommended, and home health on d/c    Goals   Short Term Goal  pt will perform UE strengthening exercises    Long Term Goal #1 pt will perform UB/ LB bathing and grooming tasks at (I) level    Long Term Goal #2 pt will perform FM c RW at mod (I) level    Long Term Goal pt will perform toilet transfers and adrian hygiene at (I) level    Plan   Treatment Interventions ADL retraining;Functional transfer training;UE strengthening/ROM; Endurance training;Patient/family training; Activityengagement   Goal Expiration Date 06/13/18   OT Frequency 3-5x/wk   Recommendation   OT Discharge Recommendation Home Health Agency   OT - OK to Discharge No   Barthel Index   Feeding 10   Bathing 0   Grooming Score 0   Dressing Score 5   Bladder Score 10   Bowels Score 10   Toilet Use Score 5   Transfers (Bed/Chair) Score 10   Mobility (Level Surface) Score 10   Stairs Score 0   Barthel Index Score 60     Pt will benefit from continued OT services in order to maximize (I) c ADL performance, FM c RW, and improve overall endurance/strength required to complete functional tasks in preparation for d/c  Pt left supine in bed at end of session; all needs within reach; all lines intact; scds connected and turned on

## 2018-05-30 NOTE — PLAN OF CARE
Problem: DISCHARGE PLANNING - CARE MANAGEMENT  Goal: Discharge to post-acute care or home with appropriate resources  INTERVENTIONS:  - Conduct assessment to determine patient/family and health care team treatment goals, and need for post-acute services based on payer coverage, community resources, and patient preferences, and barriers to discharge  - Address psychosocial, clinical, and financial barriers to discharge as identified in assessment in conjunction with the patient/family and health care team  - Arrange appropriate level of post-acute services according to patient's   needs and preference and payer coverage in collaboration with the physician and health care team  - Communicate with and update the patient/family, physician, and health care team regarding progress on the discharge plan  - Arrange appropriate transportation to post-acute venues   Outcome: Completed Date Met: 05/30/18  -resumption of [facility]  -02 arranged through [address] [employer]

## 2018-05-30 NOTE — RESPIRATORY THERAPY NOTE
Home Oxygen Qualifying Test       Patient name: Kassandra Aranda        : 10/24/1930   Date of Test:  May 30, 2018  Diagnosis: Acute On Chronic DiastolicCHF     Home Oxygen Test:    **Medicare Guidelines require item(s) 1-5 on all ambulatory patients or 1 and 2 on on-ambulatory patients  1   Baseline SPO2 on Room Air at rest 92 %  If = or < 88% on room air add O2 via NC and titrate patient  Patient must be ambulated with O2 and titrated to > 88% with exertion  2   SPO2 on Oxygen at rest 98 %   2 l/m    3  SPO2 during exercise on Room Air 86 %  HR 81    4  SPO2 during exercise on Oxygen  91% at a liter flow of 2 lpm     5   Exercise performed:    [x] Walking     [] Stairs     [x] [id number] (min )     [x] Distance 450 (ft )  Total with one rest       [x]  Supplemental Home Oxygen is indicated  []  Client does not qualify for home oxygen        Letty Alva, RT

## 2018-05-30 NOTE — SOCIAL WORK
Discussed with patient preferences on discharge;understanding how to manage health at home; purpose of taking medications; importance of follow up care/appointments; and symptoms to watch out for once discharged home  Pt is being dc'd home on this date  Qualifies for 02 with exertion on dc  Arrangements being made with [employer]  Portable 02 provided to pt on dc  [employer] will be delivering concentrator to pt's home today  Cm also notified Kenrick Thornton at [facility]-pt is active with them  AVS to be sent over  Pt states she and her family will make follow up appointments

## 2018-05-30 NOTE — DISCHARGE SUMMARY
Discharge- Sharon Dietz 10/24/1930, 80 y o  female MRN: [id number]    Unit/Bed#: 403-01 Encounter: [id number]    Primary Care Provider: Molly Aguilar DO   Date and time admitted to hospital: 5/27/2018 12:35 PM        * Acute on chronic diastolic congestive heart failure [facility])   Assessment & Plan    The patient presented to the ER due to worsening shortness of breath for the last 5 days  The patient had reported a 5 lb weight gain  Chest x-ray showed CHF with pulmonary edema and pleural effusions  The patient does have a history of severe mitral valve stenosis and moderate aortic stenosis  In the ER she received IV lasix 80 mg  On admission she was monitored on telemetry  She was started on IV lasix 40 mg BID  Troponin's negative x 3 sets  She was continued on her aspirin and beta blocker  Daily weights and strict I&O's were checked  The patient's breathing improved and she was discharged home with instructions to follow up with her Cardiologist Dr Lashae Bonilla and her PCP  [facility]   Assessment & Plan    The patient was continued on her home dose of Lactulose, Xifaxan and Nadolol  Hyperbilirubinemia   Assessment & Plan    On admission the patient was noted to have hyperbilirubinemia which was likely due to hepatic cirrhosis  Hypertension   Assessment & Plan    On admission the patient was continued on her home dose of  Nadolol and Valsartan  Pressure ulcer of left buttock, stage 3 (HCC)   Assessment & Plan    -Wound care consult   -Calmoseptine ordered                        Resolved Problems  Date Reviewed: 5/30/2018    None          Admission Date:   Admission Orders     Ordered        05/27/18 1428  Inpatient Admission (expected length of stay for this patient is greater than two midnights)  Once         05/27/18 1426  Inpatient Admission  Once               Admitting Diagnosis: Shortness of breath [R06 02]  SOB (shortness of breath) [R06 02]  Hypoxia [R09 02]  Bilateral leg edema [R60 0]    HPI: Marizol Mckeon is a 80 y o  female who presents with a complaint of worsening shortness of breath in the last 5 days  The patient states she has mild shortness of breath at baseline and it has been worse in the last 5 days  She has also noticed a 5 lb weight gain and increased swelling in her legs  She called her PCP Dr Randa Singh who instructed her to increase her PO lasix from 60 mg to 80 mg x 2 days  Despite this the patient continued to have shortness of breath  The patient does not wear oxygen at baseline  In the ER she was noted to have an oxygen saturation of 88% on room air  The patient denies any chest pain, abdominal pain, N/V/D  Procedures Performed:   Orders Placed This Encounter   Procedures    ED ECG Documentation Only       Summary of Hospital Course: please see above assessment and plan    Significant Findings, Care, Treatment and Services Provided: lactic acid 2 4, total bilirubin 6 65    Complications: none    Condition at Discharge: good         Discharge instructions/Information to patient and family:   See after visit summary for information provided to patient and family  Provisions for Follow-Up Care:  See after visit summary for information related to follow-up care and any pertinent home health orders  PCP: Hiram Landaverde DO    Disposition: Home    Planned Readmission: No    Discharge Statement   I spent 25 minutes discharging the patient  This time was spent on the day of discharge  I had direct contact with the patient on the day of discharge  Additional documentation is required if more than 30 minutes were spent on discharge  Discharge Medications:  See after visit summary for reconciled discharge medications provided to patient and family

## 2018-05-30 NOTE — PHYSICAL THERAPY NOTE
PT treatment note      05/30/18 8203   Restrictions/Precautions   Other Precautions (Bed Alarm;Multiple lines;Telemetry; Fall Risk)   Transfers   Sit to Stand 5  Supervision   Additional items Verbal cues   Stand to Sit 5  Supervision   Additional items Verbal cues   Stand pivot 5  Supervision   Ambulation/Elevation   Gait pattern (Wide VESTA; Short stride)   Gait Assistance 5  Supervision   Assistive Device Straight cane   Distance 90'  SpO2 91% at rest  90-88% with ambulation  RA, mild SOB   Balance   Static Sitting Good   Dynamic Sitting Good   Static Standing Fair +   Dynamic Standing Fair   Ambulatory Fair   Endurance Deficit   Endurance Deficit Yes   Endurance Deficit Description limited ambulation tolerance due to fatigue and mild SOB   Activity Tolerance   Activity Tolerance Patient limited by fatigue   Assessment   Prognosis Good   Problem List Decreased strength;Decreased endurance; Impaired balance;Decreased mobility   Assessment Performs mobility at (S) level with SPC  SpO2 dropped as above RA  Plan   Treatment/Interventions Functional transfer training;LE strengthening/ROM; Therapeutic exercise; Endurance training;Bed mobility;Gait training   Progress Progressing toward goals   Recommendation   Recommendation Home independently   Pt  OOB with call bell within reach, scd's connected and turned on and alarm on at end of PT session

## 2018-05-30 NOTE — ASSESSMENT & PLAN NOTE
On admission the patient was noted to have hyperbilirubinemia which was likely due to hepatic cirrhosis

## 2018-05-30 NOTE — ASSESSMENT & PLAN NOTE
The patient presented to the ER due to worsening shortness of breath for the last 5 days  The patient had reported a 5 lb weight gain  Chest x-ray showed CHF with pulmonary edema and pleural effusions  The patient does have a history of severe mitral valve stenosis and moderate aortic stenosis  In the ER she received IV lasix 80 mg  On admission she was monitored on telemetry  She was started on IV lasix 40 mg BID  Troponin's negative x 3 sets  She was continued on her aspirin and beta blocker  Daily weights and strict I&O's were checked  The patient's breathing improved and she was discharged home with instructions to follow up with her Cardiologist Dr Rolando Ramírez and her PCP

## 2018-05-31 ENCOUNTER — TELEPHONE (OUTPATIENT)
Dept: INTERNAL MEDICINE CLINIC | Facility: CLINIC | Age: 83
End: 2018-05-31

## 2018-05-31 NOTE — TELEPHONE ENCOUNTER
As long as they can monitor and stays within that range should be ok'based on those readings 40 would be too much

## 2018-06-03 ENCOUNTER — APPOINTMENT (EMERGENCY)
Dept: CT IMAGING | Facility: HOSPITAL | Age: 83
DRG: 177 | End: 2018-06-03
Payer: MEDICARE

## 2018-06-03 ENCOUNTER — HOSPITAL ENCOUNTER (INPATIENT)
Facility: HOSPITAL | Age: 83
LOS: 2 days | Discharge: NURSING, REHAB OR LONG-TERM CARE | DRG: 177 | End: 2018-06-06
Attending: EMERGENCY MEDICINE | Admitting: INTERNAL MEDICINE
Payer: MEDICARE

## 2018-06-03 ENCOUNTER — APPOINTMENT (EMERGENCY)
Dept: RADIOLOGY | Facility: HOSPITAL | Age: 83
DRG: 177 | End: 2018-06-03
Payer: MEDICARE

## 2018-06-03 DIAGNOSIS — R79.89 INCREASED AMMONIA LEVEL: ICD-10-CM

## 2018-06-03 DIAGNOSIS — J18.9 HEALTHCARE-ASSOCIATED PNEUMONIA: ICD-10-CM

## 2018-06-03 DIAGNOSIS — N39.0 UTI (URINARY TRACT INFECTION): ICD-10-CM

## 2018-06-03 DIAGNOSIS — J18.9 PNEUMONIA: Primary | ICD-10-CM

## 2018-06-03 LAB
ALBUMIN SERPL BCP-MCNC: 2.2 G/DL (ref 3.5–5)
ALP SERPL-CCNC: 143 U/L (ref 46–116)
ALT SERPL W P-5'-P-CCNC: 31 U/L (ref 12–78)
AMMONIA PLAS-SCNC: 46 UMOL/L (ref 11–35)
ANION GAP SERPL CALCULATED.3IONS-SCNC: 3 MMOL/L (ref 4–13)
AST SERPL W P-5'-P-CCNC: 35 U/L (ref 5–45)
BACTERIA UR QL AUTO: ABNORMAL /HPF
BASOPHILS # BLD AUTO: 0.06 THOUSANDS/ΜL (ref 0–0.1)
BASOPHILS NFR BLD AUTO: 1 % (ref 0–1)
BILIRUB SERPL-MCNC: 1.3 MG/DL (ref 0.2–1)
BILIRUB UR QL STRIP: NEGATIVE
BUN SERPL-MCNC: 18 MG/DL (ref 5–25)
CALCIUM SERPL-MCNC: 9.4 MG/DL (ref 8.3–10.1)
CHLORIDE SERPL-SCNC: 98 MMOL/L (ref 100–108)
CLARITY UR: CLEAR
CO2 SERPL-SCNC: 33 MMOL/L (ref 21–32)
COLOR UR: YELLOW
CREAT SERPL-MCNC: 0.71 MG/DL (ref 0.6–1.3)
EOSINOPHIL # BLD AUTO: 0.08 THOUSAND/ΜL (ref 0–0.61)
EOSINOPHIL NFR BLD AUTO: 2 % (ref 0–6)
ERYTHROCYTE [DISTWIDTH] IN BLOOD BY AUTOMATED COUNT: 15.4 % (ref 11.6–15.1)
GFR SERPL CREATININE-BSD FRML MDRD: 77 ML/MIN/1.73SQ M
GLUCOSE SERPL-MCNC: 110 MG/DL (ref 65–140)
GLUCOSE UR STRIP-MCNC: NEGATIVE MG/DL
HCT VFR BLD AUTO: 42.1 % (ref 34.8–46.1)
HGB BLD-MCNC: 14 G/DL (ref 11.5–15.4)
HGB UR QL STRIP.AUTO: NEGATIVE
KETONES UR STRIP-MCNC: NEGATIVE MG/DL
LEUKOCYTE ESTERASE UR QL STRIP: ABNORMAL
LIPASE SERPL-CCNC: 513 U/L (ref 73–393)
LYMPHOCYTES # BLD AUTO: 1.4 THOUSANDS/ΜL (ref 0.6–4.47)
LYMPHOCYTES NFR BLD AUTO: 28 % (ref 14–44)
MAGNESIUM SERPL-MCNC: 1.9 MG/DL (ref 1.6–2.6)
MCH RBC QN AUTO: 30.4 PG (ref 26.8–34.3)
MCHC RBC AUTO-ENTMCNC: 33.3 G/DL (ref 31.4–37.4)
MCV RBC AUTO: 92 FL (ref 82–98)
MONOCYTES # BLD AUTO: 1.07 THOUSAND/ΜL (ref 0.17–1.22)
MONOCYTES NFR BLD AUTO: 21 % (ref 4–12)
NEUTROPHILS # BLD AUTO: 2.41 THOUSANDS/ΜL (ref 1.85–7.62)
NEUTS SEG NFR BLD AUTO: 48 % (ref 43–75)
NITRITE UR QL STRIP: NEGATIVE
NON-SQ EPI CELLS URNS QL MICRO: ABNORMAL /HPF
NT-PROBNP SERPL-MCNC: 709 PG/ML
PH UR STRIP.AUTO: 8.5 [PH] (ref 4.5–8)
PLATELET # BLD AUTO: 204 THOUSANDS/UL (ref 149–390)
PMV BLD AUTO: 11.2 FL (ref 8.9–12.7)
POTASSIUM SERPL-SCNC: 4.4 MMOL/L (ref 3.5–5.3)
PROT SERPL-MCNC: 7.4 G/DL (ref 6.4–8.2)
PROT UR STRIP-MCNC: NEGATIVE MG/DL
RBC # BLD AUTO: 4.6 MILLION/UL (ref 3.81–5.12)
RBC #/AREA URNS AUTO: ABNORMAL /HPF
SODIUM SERPL-SCNC: 134 MMOL/L (ref 136–145)
SP GR UR STRIP.AUTO: 1.01 (ref 1–1.03)
TROPONIN I SERPL-MCNC: <0.02 NG/ML
UROBILINOGEN UR QL STRIP.AUTO: 0.2 E.U./DL
WBC # BLD AUTO: 5.02 THOUSAND/UL (ref 4.31–10.16)
WBC #/AREA URNS AUTO: ABNORMAL /HPF

## 2018-06-03 PROCEDURE — 71045 X-RAY EXAM CHEST 1 VIEW: CPT

## 2018-06-03 PROCEDURE — 93005 ELECTROCARDIOGRAM TRACING: CPT

## 2018-06-03 PROCEDURE — 83735 ASSAY OF MAGNESIUM: CPT | Performed by: EMERGENCY MEDICINE

## 2018-06-03 PROCEDURE — 70450 CT HEAD/BRAIN W/O DYE: CPT

## 2018-06-03 PROCEDURE — 85025 COMPLETE CBC W/AUTO DIFF WBC: CPT | Performed by: EMERGENCY MEDICINE

## 2018-06-03 PROCEDURE — 82140 ASSAY OF AMMONIA: CPT | Performed by: EMERGENCY MEDICINE

## 2018-06-03 PROCEDURE — 71275 CT ANGIOGRAPHY CHEST: CPT

## 2018-06-03 PROCEDURE — 81001 URINALYSIS AUTO W/SCOPE: CPT | Performed by: EMERGENCY MEDICINE

## 2018-06-03 PROCEDURE — 87040 BLOOD CULTURE FOR BACTERIA: CPT | Performed by: EMERGENCY MEDICINE

## 2018-06-03 PROCEDURE — 36415 COLL VENOUS BLD VENIPUNCTURE: CPT | Performed by: EMERGENCY MEDICINE

## 2018-06-03 PROCEDURE — 83690 ASSAY OF LIPASE: CPT | Performed by: EMERGENCY MEDICINE

## 2018-06-03 PROCEDURE — 80053 COMPREHEN METABOLIC PANEL: CPT | Performed by: EMERGENCY MEDICINE

## 2018-06-03 PROCEDURE — 84484 ASSAY OF TROPONIN QUANT: CPT | Performed by: EMERGENCY MEDICINE

## 2018-06-03 PROCEDURE — 83880 ASSAY OF NATRIURETIC PEPTIDE: CPT | Performed by: EMERGENCY MEDICINE

## 2018-06-03 PROCEDURE — 74177 CT ABD & PELVIS W/CONTRAST: CPT

## 2018-06-03 RX ADMIN — IOHEXOL 100 ML: 350 INJECTION, SOLUTION INTRAVENOUS at 22:59

## 2018-06-04 PROBLEM — R41.82 ALTERED MENTAL STATUS: Status: ACTIVE | Noted: 2018-06-04

## 2018-06-04 PROBLEM — J18.9 PNEUMONIA: Status: ACTIVE | Noted: 2018-06-04

## 2018-06-04 PROBLEM — R79.89 INCREASED AMMONIA LEVEL: Status: ACTIVE | Noted: 2018-06-04

## 2018-06-04 LAB
ALBUMIN SERPL BCP-MCNC: 2 G/DL (ref 3.5–5)
ALP SERPL-CCNC: 124 U/L (ref 46–116)
ALT SERPL W P-5'-P-CCNC: 29 U/L (ref 12–78)
ANION GAP SERPL CALCULATED.3IONS-SCNC: 3 MMOL/L (ref 4–13)
AST SERPL W P-5'-P-CCNC: 34 U/L (ref 5–45)
ATRIAL RATE: 96 BPM
BILIRUB SERPL-MCNC: 1.3 MG/DL (ref 0.2–1)
BUN SERPL-MCNC: 16 MG/DL (ref 5–25)
CALCIUM SERPL-MCNC: 9.3 MG/DL (ref 8.3–10.1)
CHLORIDE SERPL-SCNC: 100 MMOL/L (ref 100–108)
CO2 SERPL-SCNC: 31 MMOL/L (ref 21–32)
CREAT SERPL-MCNC: 0.72 MG/DL (ref 0.6–1.3)
ERYTHROCYTE [DISTWIDTH] IN BLOOD BY AUTOMATED COUNT: 15.4 % (ref 11.6–15.1)
GFR SERPL CREATININE-BSD FRML MDRD: 76 ML/MIN/1.73SQ M
GLUCOSE SERPL-MCNC: 95 MG/DL (ref 65–140)
HCT VFR BLD AUTO: 42.4 % (ref 34.8–46.1)
HGB BLD-MCNC: 14.1 G/DL (ref 11.5–15.4)
L PNEUMO1 AG UR QL IA.RAPID: NEGATIVE
MAGNESIUM SERPL-MCNC: 1.8 MG/DL (ref 1.6–2.6)
MCH RBC QN AUTO: 30.3 PG (ref 26.8–34.3)
MCHC RBC AUTO-ENTMCNC: 33.3 G/DL (ref 31.4–37.4)
MCV RBC AUTO: 91 FL (ref 82–98)
P AXIS: 34 DEGREES
PLATELET # BLD AUTO: 196 THOUSANDS/UL (ref 149–390)
PMV BLD AUTO: 11.2 FL (ref 8.9–12.7)
POTASSIUM SERPL-SCNC: 4.6 MMOL/L (ref 3.5–5.3)
PR INTERVAL: 158 MS
PROT SERPL-MCNC: 7.2 G/DL (ref 6.4–8.2)
QRS AXIS: 105 DEGREES
QRSD INTERVAL: 128 MS
QT INTERVAL: 370 MS
QTC INTERVAL: 467 MS
RBC # BLD AUTO: 4.65 MILLION/UL (ref 3.81–5.12)
S PNEUM AG UR QL: NEGATIVE
SODIUM SERPL-SCNC: 134 MMOL/L (ref 136–145)
T WAVE AXIS: -10 DEGREES
VENTRICULAR RATE: 96 BPM
WBC # BLD AUTO: 4.91 THOUSAND/UL (ref 4.31–10.16)

## 2018-06-04 PROCEDURE — 87449 NOS EACH ORGANISM AG IA: CPT | Performed by: INTERNAL MEDICINE

## 2018-06-04 PROCEDURE — 85027 COMPLETE CBC AUTOMATED: CPT | Performed by: INTERNAL MEDICINE

## 2018-06-04 PROCEDURE — 99285 EMERGENCY DEPT VISIT HI MDM: CPT

## 2018-06-04 PROCEDURE — 87081 CULTURE SCREEN ONLY: CPT | Performed by: PHYSICIAN ASSISTANT

## 2018-06-04 PROCEDURE — 80053 COMPREHEN METABOLIC PANEL: CPT | Performed by: INTERNAL MEDICINE

## 2018-06-04 PROCEDURE — 94760 N-INVAS EAR/PLS OXIMETRY 1: CPT

## 2018-06-04 PROCEDURE — 83735 ASSAY OF MAGNESIUM: CPT | Performed by: INTERNAL MEDICINE

## 2018-06-04 PROCEDURE — 92610 EVALUATE SWALLOWING FUNCTION: CPT | Performed by: SPEECH-LANGUAGE PATHOLOGIST

## 2018-06-04 PROCEDURE — 93010 ELECTROCARDIOGRAM REPORT: CPT | Performed by: INTERNAL MEDICINE

## 2018-06-04 PROCEDURE — 99222 1ST HOSP IP/OBS MODERATE 55: CPT | Performed by: INTERNAL MEDICINE

## 2018-06-04 RX ORDER — PANTOPRAZOLE SODIUM 40 MG/1
40 TABLET, DELAYED RELEASE ORAL
Status: DISCONTINUED | OUTPATIENT
Start: 2018-06-04 | End: 2018-06-06 | Stop reason: HOSPADM

## 2018-06-04 RX ORDER — ASPIRIN 81 MG/1
81 TABLET, CHEWABLE ORAL DAILY
Status: DISCONTINUED | OUTPATIENT
Start: 2018-06-04 | End: 2018-06-06 | Stop reason: HOSPADM

## 2018-06-04 RX ORDER — NADOLOL 40 MG/1
20 TABLET ORAL DAILY
Status: DISCONTINUED | OUTPATIENT
Start: 2018-06-04 | End: 2018-06-06 | Stop reason: HOSPADM

## 2018-06-04 RX ORDER — VALSARTAN 80 MG/1
80 TABLET ORAL DAILY
Status: DISCONTINUED | OUTPATIENT
Start: 2018-06-04 | End: 2018-06-06 | Stop reason: HOSPADM

## 2018-06-04 RX ORDER — FUROSEMIDE 40 MG/1
40 TABLET ORAL DAILY
Status: DISCONTINUED | OUTPATIENT
Start: 2018-06-04 | End: 2018-06-06 | Stop reason: HOSPADM

## 2018-06-04 RX ORDER — LACTULOSE 20 G/30ML
20 SOLUTION ORAL 3 TIMES DAILY
Status: DISCONTINUED | OUTPATIENT
Start: 2018-06-04 | End: 2018-06-06 | Stop reason: HOSPADM

## 2018-06-04 RX ORDER — VANCOMYCIN HYDROCHLORIDE 1 G/200ML
15 INJECTION, SOLUTION INTRAVENOUS ONCE
Status: COMPLETED | OUTPATIENT
Start: 2018-06-04 | End: 2018-06-04

## 2018-06-04 RX ADMIN — LACTULOSE 20 G: 10 SOLUTION ORAL at 08:07

## 2018-06-04 RX ADMIN — RIFAXIMIN 550 MG: 550 TABLET ORAL at 08:06

## 2018-06-04 RX ADMIN — VALSARTAN 80 MG: 80 TABLET, FILM COATED ORAL at 08:06

## 2018-06-04 RX ADMIN — CEFEPIME HYDROCHLORIDE 2000 MG: 2 INJECTION, SOLUTION INTRAVENOUS at 01:26

## 2018-06-04 RX ADMIN — NADOLOL 20 MG: 40 TABLET ORAL at 08:06

## 2018-06-04 RX ADMIN — CEFEPIME HYDROCHLORIDE 1000 MG: 1 INJECTION, SOLUTION INTRAVENOUS at 14:25

## 2018-06-04 RX ADMIN — PANTOPRAZOLE SODIUM 40 MG: 40 TABLET, DELAYED RELEASE ORAL at 05:39

## 2018-06-04 RX ADMIN — VANCOMYCIN HYDROCHLORIDE 500 MG: 500 INJECTION, POWDER, LYOPHILIZED, FOR SOLUTION INTRAVENOUS at 12:37

## 2018-06-04 RX ADMIN — VANCOMYCIN HYDROCHLORIDE 1000 MG: 1 INJECTION, SOLUTION INTRAVENOUS at 02:19

## 2018-06-04 RX ADMIN — Medication 30 MG: at 12:35

## 2018-06-04 RX ADMIN — Medication 400 MG: at 08:06

## 2018-06-04 RX ADMIN — RIFAXIMIN 550 MG: 550 TABLET ORAL at 02:47

## 2018-06-04 RX ADMIN — ASPIRIN 81 MG 81 MG: 81 TABLET ORAL at 08:07

## 2018-06-04 RX ADMIN — ENOXAPARIN SODIUM 40 MG: 40 INJECTION, SOLUTION INTRAVENOUS; SUBCUTANEOUS at 08:07

## 2018-06-04 RX ADMIN — LACTULOSE 20 G: 10 SOLUTION ORAL at 17:47

## 2018-06-04 RX ADMIN — FUROSEMIDE 40 MG: 40 TABLET ORAL at 08:07

## 2018-06-04 RX ADMIN — RIFAXIMIN 550 MG: 550 TABLET ORAL at 21:06

## 2018-06-04 RX ADMIN — LACTULOSE 20 G: 10 SOLUTION ORAL at 21:06

## 2018-06-04 NOTE — CASE MANAGEMENT
Initial Clinical Review    Admission: Date/Time/Statement: 6/4/18 @ 0113     Orders Placed This Encounter   Procedures    Inpatient Admission (expected length of stay for this patient is greater than two midnights)     Standing Status:   Standing     Number of Occurrences:   1     Order Specific Question:   Admitting Physician     Answer:   Zachary Monk     Order Specific Question:   Level of Care     Answer:   Med Surg [16]     Order Specific Question:   Estimated length of stay     Answer:   More than 2 Midnights     Order Specific Question:   Certification     Answer:   I certify that inpatient services are medically necessary for this patient for a duration of greater than two midnights  See H&P and MD Progress Notes for additional information about the patient's course of treatment  ED: Date/Time/Mode of Arrival:   ED Arrival Information     Expected Arrival Acuity Means of Arrival Escorted By Service Admission Type    - 6/3/2018 19:04 Emergent Wheelchair Family Member General Medicine Emergency    Arrival Complaint    alt mental status          Chief Complaint:   Chief Complaint   Patient presents with    Altered Mental Status     Daughter states pt was behaving normally yesterday- was alert and oriented, and ambulatory - today is confused, has been incointinent of stool and unsteady on her feet - discharged from hospital on Wednesday dx: CHF       History of Illness: 80 y o  female with a history of CHF with recent admission for exacerbation and cirrhosis who presents with altered mental status  Per her family she is more confused than usual and appears more weak  She is compliant with her meds, except she missed one days worth of lactulose due to feeling weak  There was a concern for increased urinary frequency by the family, but the patient denies any symptoms    She understands that she is being admitted due to being confused, but is now able to tell me where she is, who she is, and that she no longer feels confused  She denied any other complaints  Her family notes that she was coughing w/ breakfast          ED Vital Signs:   ED Triage Vitals   Temperature Pulse Respirations Blood Pressure SpO2   18   98 3 °F (36 8 °C) 86 20 137/60 94 %      Temp Source Heart Rate Source Patient Position - Orthostatic VS BP Location FiO2 (%)   18 --   Temporal Monitor Sitting Right arm       Pain Score       18       No Pain        Wt Readings from Last 1 Encounters:   18 67 kg (147 lb 11 3 oz)       Vital Signs (abnormal): WNL    Abnormal Labs/Diagnostic Test Results: Na 134, CL 98, CO2 33, Tl bili 1 30, Alk phos 143,     Color, UA  Yellow    Clarity, UA  Clear    Specific [place], UA 1 003 - 1 030 1 010    pH, UA 4 5 - 8 0 8 5     Leukocytes, UA Negative Small     Nitrite, UA Negative Negative    Protein, UA Negative mg/dl Negative    Glucose, UA Negative mg/dl Negative    Ketones, UA Negative mg/dl Negative    Urobilinogen, UA 0 2, 1 0 E U /dl E U /dl 0 2    Bilirubin, UA Negative Negative    Blood, UA Negative, Trace-Intact Negative      CXR -- Unchanged pulmonary edema and moderate bilateral pleural effusions  Ct head -- No acute intracranial abnormality  EK BPM NSR, RBBB, no significant change compared to prior       ED Treatment:   Medication Administration from 2018 1904 to 2018 2421       Date/Time Order Dose Route Action Action by Comments     2018 3078 iohexol (OMNIPAQUE) 350 MG/ML injection (MULTI-DOSE) 100 mL 100 mL Intravenous Given Nick Hazel      2018 0126 cefepime (MAXIPIME) IVPB (premix) 2,000 mg 2,000 mg Intravenous New Bag Kat Lopez RN           Past Medical/Surgical History:    Active Ambulatory Problems     Diagnosis Date Noted    Shortness of breath 03/10/2017    Pleural effusion 03/10/2017    Hypertension 03/10/2017    GERD without esophagitis 03/10/2017    Gastritis 03/10/2017    Valvular heart disease 03/11/2017    Acute on chronic diastolic congestive heart failure (HCC) 03/11/2017    Tricuspid regurgitation 03/18/2017    [facility] 03/18/2017    Mitral valve stenosis 03/18/2017    Aortic stenosis 03/18/2017    Hypoalbuminemia 03/18/2017    Hepatic coma[facility]encephalopathy [facility] 03/18/2017    Hepatic cirrhosis ([facility] ) 03/18/2017    Hyperbilirubinemia 03/18/2017    Leukopenia 03/18/2017    Neutropenia (HCC) 03/18/2017    Transaminitis 03/18/2017    Healthcare-associated pneumonia 03/18/2017    Mucosal abnormality of stomach 03/19/2017    Portal hypertension [facility] 03/20/2017    Left adrenal mass (HCC) 03/21/2017    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    Pressure ulcer of left buttock, stage 3 ([facility] ) 38/52/8567    Diastolic CHF ([facility] ) 84/13/8656    Edema 07/26/2012    Hyperlipidemia 07/25/2012    Pancreatic cyst 08/01/2017    Post-menopausal osteoporosis 07/25/2012    Pneumonia 06/04/2018     Past Medical History:   Diagnosis Date    Breast cancer ([facility] )     Cancer ([place] [address] )     [facility]estive [facility] failure) [facility]     Cirrhosis of liver ([facility]     GERD (gastroesophageal reflux disease)     Hepatic encephalopathy (HCC)     Hypertension     Polyp, sigmoid colon        Admitting Diagnosis: UTI (urinary tract infection) [N39 0]  Altered mental status [R41 82]  Pneumonia [J18 9]  Increased ammonia level [R79 89]    Age/Sex: 80 y o  female    Assessment/Plan:   Hospital Problem List:    Principal Problem:    Altered mental status  Active Problems:    Hypertension    Hepatic cirrhosis ([facility] )    Healthcare-associated pneumonia    Diastolic CHF ([facility] )    Increased ammonia level        Plan for the Primary Problem(s):   PNA w/ recent hospitalization for CHF    - empiric coverage with vanc + cefepime  - pan culture  - check urinary antigens   - speech and swallow eval     Encephalopathy   - likely multifactorial - due to delirium from infection and missing lactulose  - resume lactulose     CHF  - BNP elevated compared to prior  - CXR w/ unchanged pulmonary edema moderate pleural effusions  - diuresis     VTE Prophylaxis: Enoxaparin (Lovenox)  / sequential compression device   Code Status: DNR/DNI  POLST: POLST form is not discussed and not completed at this time      Anticipated Length of Stay:  Patient will be admitted on an Inpatient basis with an anticipated length of stay of  > 2 midnights     Justification for Hospital Stay: AMS      Admission Orders:  M/S/Tele unit  Telem  Respiratory protocol  Npo  Elevate HOB degrees or greater   Speech eval  Up with assistance  IS q 1h  Monitor I&O's  SCD's  PT/OT evals    Scheduled Meds:   Current Facility-Administered Medications:  aspirin 81 mg Oral Daily James S Dhesi, DO   cefepime 1,000 mg Intravenous Q12H James S Dhesi, DO   co-enzyme Q-10 30 mg Oral Daily James S Dhesi, DO   enoxaparin 40 mg Subcutaneous Daily James S Dhesi, DO   furosemide 40 mg Oral Daily James S Dhesi, DO   lactulose 20 g Oral TID James S Dhesi, DO   magnesium oxide 400 mg Oral Daily James S Dhesi, DO   nadolol 20 mg Oral Daily James S Dhesi, DO   pantoprazole 40 mg Oral Early Morning James S Dhesi, DO   rifaximin 550 mg Oral Q12H [facility] James S Dhesi, DO   valsartan 80 mg Oral Daily James S Dhesi, DO     Continuous Infusions:    PRN Meds:

## 2018-06-04 NOTE — SPEECH THERAPY NOTE
Speech Language/Pathology    [facility] for 6/5/18 at 11:00 am

## 2018-06-04 NOTE — ED PROVIDER NOTES
History  Chief Complaint   Patient presents with    Altered Mental Status     Daughter states pt was behaving normally yesterday- was alert and oriented, and ambulatory - today is confused, has been incointinent of stool and unsteady on her feet - discharged from hospital on Wednesday dx: CHF     77-year-old female presents with family due to increasing confusion  Yesterday she seemed in her usual state of health she did have an episode at breakfast where she seem to aspirate but was able to clear the cough she has had no cough since then no fever or chills  She feels or shortness of breath is at baseline her only complaint is that she feels tired  Today she is increasingly confused she has been incontinent of bowel and needs to and has urinary urgency needing to use the bathroom more frequently she is denying some dysuria her appetite is diminished  On she has not taken in her of her medicines today she needs help getting up and is more wobbly there is no history of anticoagulant use no falls or trauma no slurred speech no headache or visual disturbance no unilateral weakness  She typically does live independently and is able to dress herself  She was admitted from 5/27/18-5/30/18 for acute on chronic CHF  The patient had was started on Lasix at 40 b i d  on and currently she is at her baseline weight  She she was discharged on oxygen which she is reluctant to use  She does have a history of hepatic cirrhosis she is can't she was continued on her home doses of lactulose, xifaxan  an and nadolol  Prior to Admission Medications   Prescriptions Last Dose Informant Patient Reported? Taking?    CORAL CALCIUM PO 6/4/2018  Yes Yes   Sig: Take 1 tablet by mouth daily   Cholecalciferol (VITAMIN D-3) 1000 units CAPS 6/4/2018  Yes Yes   Sig: Take 1 capsule by mouth daily   Multiple Vitamin (MULTIVITAMIN) tablet 6/4/2018  Yes Yes   Sig: Take 1 tablet by mouth daily   aspirin 81 MG tablet 6/4/2018  Yes Yes Sig: Take 81 mg by mouth daily   co-enzyme Q-10 30 MG capsule 6/4/2018  Yes Yes   Sig: Take 30 mg by mouth daily   esomeprazole (NexIUM) 40 MG capsule 6/4/2018  Yes Yes   Sig: Take 40 mg by mouth every morning before breakfast   furosemide (LASIX) 40 mg tablet 6/4/2018  No Yes   Sig: Take 1 tablet by mouth daily   Patient taking differently: Take 40 mg by mouth daily Take an extra dose for weight increase  Of 3 lbs     lactulose (CHRONULAC) 10 g/15 mL solution 6/4/2018  No Yes   Sig: Take 30ml po three times daily   magnesium oxide (MAG-OX) 400 mg 6/4/2018  No Yes   Sig: Take 1 tablet by mouth daily   menthol-zinc oxide (CALMOSEPTINE) 0 44-20 6 % OINT 6/4/2018  Yes Yes   Sig: Apply 1 inch topically 2 (two) times a day   nadolol (CORGARD) 40 mg tablet 6/4/2018  No Yes   Sig: Take 1 tablet by mouth daily   Patient taking differently: Take 20 mg by mouth daily     potassium chloride (KLOR-CON) 20 mEq packet 6/4/2018  Yes Yes   Sig: Take 20 mEq by mouth daily   rifaximin (XIFAXAN) 550 mg tablet 6/4/2018  No Yes   Sig: Take 1 tablet by mouth every 12 (twelve) hours   valsartan (DIOVAN) 80 mg tablet 6/4/2018  No Yes   Sig: Take 1 tablet by mouth daily      Facility-Administered Medications: None       Past Medical History:   Diagnosis Date    Breast cancer ([place] [address] )     Cancer ([place] [address] )     breast    CHF (congestive heart failure) (HCC)     Cirrhosis of liver ([place] [address] )     GERD (gastroesophageal reflux disease)     Hepatic encephalopathy ([place] [address] )     Hypertension     last assessed 10/5/17    Polyp, sigmoid colon        Past Surgical History:   Procedure Laterality Date    COMBINED HYSTEROSCOPY DIAGNOSTIC / D&C      HYSTERECTOMY      unknown    MASTECTOMY Right 1990    MASTECTOMY         Family History   Problem Relation Age of Onset    Hyperlipidemia Son     Hyperlipidemia Family     Coronary artery disease Family     Depression Family     Diabetes Family     Hypertension Family     Hyperlipidemia Other      I have reviewed and agree with the history as documented  Social History   Substance Use Topics    Smoking status: Former Smoker     Types: Cigarettes    Smokeless tobacco: Never Used    Alcohol use No        Review of Systems   Constitutional: Positive for activity change, appetite change, chills and fatigue  Negative for fever  HENT: Negative for congestion, ear pain, rhinorrhea, sneezing and sore throat  Eyes: Negative for discharge and visual disturbance  Respiratory: Positive for cough (episode at yesterdays breakfast)  Negative for choking, shortness of breath, wheezing and stridor  Cardiovascular: Negative for chest pain and leg swelling  Gastrointestinal: Negative for abdominal pain, blood in stool, diarrhea, nausea and vomiting  Endocrine: Negative for polyuria  Genitourinary: Negative for dysuria, frequency and urgency  Musculoskeletal: Negative for back pain and myalgias  Skin: Negative for rash  Neurological: Negative for dizziness, weakness, numbness and headaches  Hematological: Negative for adenopathy  Psychiatric/Behavioral: Negative for confusion  All other systems reviewed and are negative  Physical Exam  Physical Exam   Constitutional: She appears well-developed  No distress  Appears younger than stated age   HENT:   Head: Normocephalic and atraumatic  Right Ear: External ear normal    Left Ear: External ear normal    Nose: Nose normal    Mouth/Throat: No oropharyngeal exudate  Dry oropharynx   Eyes: Conjunctivae and EOM are normal  Pupils are equal, round, and reactive to light  Right eye exhibits no discharge  Left eye exhibits no discharge  Neck: Normal range of motion  Neck supple  No midline or paraspinous tenderness   Cardiovascular: Normal rate, regular rhythm, normal heart sounds and intact distal pulses  Pulmonary/Chest: Effort normal  No respiratory distress  Distant  sats 90%   Abdominal: Soft   Bowel sounds are normal  She exhibits no distension and no mass  There is no tenderness  There is no rebound and no guarding  Back no midline or CVA tenderness   Musculoskeletal: Normal range of motion  She exhibits no edema, tenderness or deformity  Lymphadenopathy:     She has no cervical adenopathy  Neurological: She is alert  She displays normal reflexes  No cranial nerve deficit or sensory deficit  She exhibits normal muscle tone  Coordination normal    Alert to self family and hosptial   Skin: Skin is warm and dry  She is not diaphoretic  Psychiatric:   flat   Vitals reviewed        Vital Signs  ED Triage Vitals   Temperature Pulse Respirations Blood Pressure SpO2   06/1933 06/03/18 1930 06/03/18 1930 06/03/18 1930 06/03/18 1930   98 3 °F (36 8 °C) 86 20 137/60 94 %      Temp Source Heart Rate Source Patient Position - Orthostatic VS BP Location FiO2 (%)   06/03/18 1930 06/03/18 1930 06/03/18 1930 06/03/18 1930 --   Temporal Monitor Sitting Right arm       Pain Score       06/03/18 1930       No Pain           Vitals:    06/04/18 0145 06/04/18 0218 06/04/18 0719 06/04/18 1535   BP:  116/56 147/66 112/70   Pulse: 97 95 84 60   Patient Position - Orthostatic VS:  Lying Lying Lying       Visual Acuity  Visual Acuity      Most Recent Value   L Pupil Size (mm)  3   R Pupil Size (mm)  3          ED Medications  Medications   enoxaparin (LOVENOX) subcutaneous injection 40 mg (40 mg Subcutaneous Given 6/4/18 0807)   aspirin chewable tablet 81 mg (81 mg Oral Given 6/4/18 0807)   co-enzyme Q-10 capsule 30 mg (30 mg Oral Given 6/4/18 1235)   pantoprazole (PROTONIX) EC tablet 40 mg (40 mg Oral Given 6/4/18 0539)   magnesium oxide (MAG-OX) tablet 400 mg (400 mg Oral Given 6/4/18 0806)   valsartan (DIOVAN) tablet 80 mg (80 mg Oral Given 6/4/18 0806)   rifaximin (XIFAXAN) tablet 550 mg (550 mg Oral Given 6/4/18 0806)   nadolol (CORGARD) tablet 20 mg (20 mg Oral Given 6/4/18 0806)   furosemide (LASIX) tablet 40 mg (40 mg Oral Given 6/4/18 7691) lactulose 20 g/30 mL oral solution 20 g (20 g Oral Given 6/4/18 0807)   cefepime (MAXIPIME) IVPB (premix) 1,000 mg (1,000 mg Intravenous New Bag 6/4/18 1425)   vancomycin (VANCOCIN) 500 mg in sodium chloride 0 9 % 100 mL IVPB (500 mg Intravenous New Bag 6/4/18 1237)   iohexol (OMNIPAQUE) 350 MG/ML injection (MULTI-DOSE) 100 mL (100 mL Intravenous Given 6/3/18 2259)   vancomycin (VANCOCIN) IVPB (premix) 1,000 mg (1,000 mg Intravenous New Bag 6/4/18 0219)   cefepime (MAXIPIME) IVPB (premix) 2,000 mg (2,000 mg Intravenous New Bag 6/4/18 0126)       Diagnostic Studies  Results Reviewed     Procedure Component Value Units Date/Time    Urine Microscopic [[id number]]  (Abnormal) Collected:  06/03/18 2212    Lab Status:  Final result Specimen:  Urine from Urine, Clean Catch Updated:  06/03/18 2222     RBC, UA 0-1 (A) /hpf      WBC, UA 4-10 (A) /hpf      Epithelial Cells Occasional /hpf      Bacteria, UA Occasional /hpf     UA w Reflex to Microscopic w Reflex to Culture [[id number]]  (Abnormal) Collected:  06/03/18 2212    Lab Status:  Final result Specimen:  Urine from Urine, Clean Catch Updated:  06/03/18 2216     Color, UA Yellow     Clarity, UA Clear     Specific Gravity, UA 1 010     pH, UA 8 5 (H)     Leukocytes, UA Small (A)     Nitrite, UA Negative     Protein, UA Negative mg/dl      Glucose, UA Negative mg/dl      Ketones, UA Negative mg/dl      Urobilinogen, UA 0 2 E U /dl      Bilirubin, UA Negative     Blood, UA Negative    Lipase [[id number]]  (Abnormal) Collected:  06/03/18 2101    Lab Status:  Final result Specimen:  Blood from Hand, Left Updated:  06/03/18 2145     Lipase 513 (H) u/L     B-type natriuretic peptide [[id number]]  (Abnormal) Collected:  06/03/18 2101    Lab Status:  Final result Specimen:  Blood from Hand, Left Updated:  06/03/18 2145     NT-proBNP 709 (H) pg/mL     Comprehensive metabolic panel [[id number]]  (Abnormal) Collected:  06/03/18 2101    Lab Status:  Final result Specimen:  Blood from Hand, Left Updated:  06/03/18 2145     Sodium 134 (L) mmol/L      Potassium 4 4 mmol/L      Chloride 98 (L) mmol/L      CO2 33 (H) mmol/L      Anion Gap 3 (L) mmol/L      BUN 18 mg/dL      Creatinine 0 71 mg/dL      Glucose 110 mg/dL      Calcium 9 4 mg/dL      AST 35 U/L      ALT 31 U/L      Alkaline Phosphatase 143 (H) U/L      Total Protein 7 4 g/dL      Albumin 2 2 (L) g/dL      Total Bilirubin 1 30 (H) mg/dL      eGFR 77 ml/min/1 73sq m     Narrative:         National Kidney Disease Education Program recommendations are as follows:  GFR calculation is accurate only with a steady state creatinine  Chronic Kidney disease less than 60 ml/min/1 73 sq  meters  Kidney failure less than 15 ml/min/1 73 sq  meters  Magnesium [[id number]]  (Normal) Collected:  06/03/18 2101    Lab Status:  Final result Specimen:  Blood from Hand, Left Updated:  06/03/18 2144     Magnesium 1 9 mg/dL     Troponin I [[id number]]  (Normal) Collected:  06/03/18 2101    Lab Status:  Final result Specimen:  Blood from Arm, Left Updated:  06/03/18 2140     Troponin I <0 02 ng/mL     Narrative:         [employer] Chemistry analyzer 99% cutoff is > 0 04 ng/mL in network labs    o cTnI 99% cutoff is useful only when applied to patients in the clinical setting of myocardial ischemia  o cTnI 99% cutoff should be interpreted in the context of clinical history, ECG findings and possibly cardiac imaging to establish correct diagnosis  o cTnI 99% cutoff may be suggestive but clearly not indicative of a coronary event without the clinical setting of myocardial ischemia      Ammonia [[id number]]  (Abnormal) Collected:  06/03/18 2101    Lab Status:  Final result Specimen:  Blood from Arm, Left Updated:  06/03/18 2119     Ammonia 46 (H) umol/L     CBC and differential [[id number]]  (Abnormal) Collected:  06/03/18 2101    Lab Status:  Final result Specimen:  Blood from Hand, Left Updated:  06/03/18 2108     WBC 5 02 Thousand/uL      RBC 4 60 Million/uL      Hemoglobin 14 0 g/dL      Hematocrit 42 1 %      MCV 92 fL      MCH 30 4 pg      MCHC 33 3 g/dL      RDW 15 4 (H) %      MPV 11 2 fL      Platelets 810 Thousands/uL      Neutrophils Relative 48 %      Lymphocytes Relative 28 %      Monocytes Relative 21 (H) %      Eosinophils Relative 2 %      Basophils Relative 1 %      Neutrophils Absolute 2 41 Thousands/µL      Lymphocytes Absolute 1 40 Thousands/µL      Monocytes Absolute 1 07 Thousand/µL      Eosinophils Absolute 0 08 Thousand/µL      Basophils Absolute 0 06 Thousands/µL     Blood culture #2 [[id number]] Collected:  06/03/18 2102    Lab Status: In process Specimen:  Blood from Arm, Left Updated:  06/03/18 2105    Blood culture #1 [[id number]] Collected:  06/03/18 2102    Lab Status: In process Specimen:  Blood from Arm, Left Updated:  06/03/18 2105                 RADIOLOGY RESULTS   Final Result by  (06/04 2059)      PE Study with CT Abdomen and Pelvis with contrast   ED Interpretation by Karoline Mariscal MD (06/04 2157)   [facility] Dr Blanc Enter - Unchanged areas of consolidation of the both lower lobes and right middle lobes concerning for pneumonia small loculated bilateral pleural effusion slightly increased in size on the left and slightly decreased in size on the right some mediastinal lymph nodes  No evidence of PE    Irregular thickening of the stomach in the gastric fundus malignancy not excluded appears unchanged from prior study there is mild fat stranding in the small bowel mesentery nonspecific early 3rd spacing secondary to hepatic cirrhosis and portal hypertension infectious or inflammatory enteritis is also in the differential hepatic cirrhosis 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 is visualized stable consolidation of both lungs concerning for pneumonia small loculated bilateral pleural effusions slightly decreased on the right and increased on the left mild sigmoid diverticulosis but no evidence of acute diverticulitis Final Result by Ophelia Cline MD (06/04 6713)      CTA CHEST:      No pulmonary embolus  Grossly stable bibasilar and right middle lobe rounded atelectasis  Mild progression of similar airspace disease in the lingula  Minimal new sequela of postinflammatory or postinfectious process right upper lobe  Small to moderate bilateral pleural effusions partially loculated  This has mildly enlarged and the left and decreased on the right  Mild right subcarinal adenopathy 1 5 cm grossly unchanged  CT abdomen and pelvis:      Trace free fluid in the dependent pelvis is nonspecific given presence of chronic hepatic cirrhosis  Severe nonspecific thickening of the proximal stomach  Moderate gas fluid level in the distal stomach  Somewhat abrupt transition immediately proximal and distal fluid-filled duodenal bulb  This is likely muscular contraction/transient peristalsis  If    there is clinical concern for low-grade proximal duodenal obstruction, consider follow-up with direct visualization or repeat CT with enteric contrast administration  1 x 0 9 x 0 9 cm cyst in the body of the pancreas  For simple cyst(s) less than 1 5 cm, recommend followup every 2 year for 2 times  After 4 years, no more followups  Preferred imaging modality: abdomen MRI and MRCP with and without IV contrast, or    triple phase abdomen CT with IV contrast, or abdomen MRI and MRCP without IV contrast       The recommendations regarding pancreatic findings assumes that patient does not have family history of pancreatic cancer nor have any symptoms potentially attributable to pancreatic cystic lesions (hyperamylasemia, recent-onset diabetes, severe    epigastric pain, weight loss, steatorrhea, or jaundice ) If these conditions are not true, then management should be deferred to judgement of specialists such as gastroenterologists or oncologic surgeons   Recommendations are based on recent consensus    statements on management of pancreatic cystic lesions from [employer] Association, [employer], the journal Pancreatology, and our own institutional consensus  No other significant interval change  The major findings are in agreement with the preliminary report provided by [facility] which was provided shortly after completion of the exam  The additional finding of  new simple cyst in the body of the pancreas will now be communicated with    patient's clinical team by our radiology liaison  Workstation performed: [id number]         XR chest 1 view portable   ED Interpretation by Sabrina Castillo MD (06/03 2217)   Read by me; Radiologist to provide formal interpretation  Vs  Prior 5/27/18 bibaseilar effusion with ? Round pneumonia rt base       Final Result by Rodolfo Mauro MD (06/03 2238)      Unchanged pulmonary edema and moderate bilateral pleural effusions  Workstation performed: [id number]         CT head without contrast   Final Result by Rodolfo Mauro MD (06/03 2125)      No acute intracranial abnormality                    Workstation performed: [id number]         FL barium swallow video w speech    (Results Pending)              Procedures  ECG 12 Lead Documentation  Date/Time: 6/3/2018 8:42 PM  Performed by: Sin Wheat  Authorized by: Sin Wheat     Indications / Diagnosis:  Confusion  ECG reviewed by me, the ED Provider: yes    Patient location:  ED  Previous ECG:     Previous ECG:  Compared to current    Comparison ECG info:  5/27/18     Similarity:  No change  Rate:     ECG rate:  96    ECG rate assessment: normal    Rhythm:     Rhythm: sinus rhythm    QRS:     QRS axis:  Normal  Comments:      RBBB no acute ischemic changes           Phone Contacts  ED Phone Contact    ED Course  ED Course as of Jun 04 1722   Sun Jun 03, 2018 2003 Prior labs reviewed LFTs normal  Tbili max 1 6 5/30 bun/cr 15/0 8 HB 13 2 thyroid studies normal    2214 Upadated family CXR shows increased organizaton rt base will CTA chest with a/p secondary to elevated lipase    Mon Jun 04, 2018   0039 Reviewed CTA chest a/p with Dr Chkaa London to clarify prior study 3/17/2017    0113 Case reviewed with Dr Kait Serrano recommend van/cefimpime full admit                                MDM  Number of Diagnoses or Management Options  Diagnosis management comments: mdm: nonfocal neuro exam will eval for infectious cause eg  pneumoina, uti; hepatic encephalopathy, electrolyte abnormality, acs    CritCare Time    Disposition  Final diagnoses:   Pneumonia   UTI (urinary tract infection)   Increased ammonia level     Time reflects when diagnosis was documented in both MDM as applicable and the Disposition within this note     Time User Action Codes Description Comment    6/4/2018  1:10 AM Libby Pineda Add [J18 9] Pneumonia     6/4/2018  1:10 AM Lazara Claros Add [N39 0] UTI (urinary tract infection)     6/4/2018  1:11 AM [place], [facility] [R79 89] Increased ammonia level       ED Disposition     ED Disposition Condition Comment    Admit  Case was discussed with Dr Kait Serrano  and the patient's admission status was agreed to be Admission Status: inpatient status to the service of Dr Kait Serrano           Follow-up Information    None         Current Discharge Medication List      CONTINUE these medications which have NOT CHANGED    Details   aspirin 81 MG tablet Take 81 mg by mouth daily      Cholecalciferol (VITAMIN D-3) 1000 units CAPS Take 1 capsule by mouth daily      co-enzyme Q-10 30 MG capsule Take 30 mg by mouth daily      CORAL CALCIUM PO Take 1 tablet by mouth daily      esomeprazole (NexIUM) 40 MG capsule Take 40 mg by mouth every morning before breakfast      furosemide (LASIX) 40 mg tablet Take 1 tablet by mouth daily  Refills: 0      lactulose (CHRONULAC) 10 g/15 mL solution Take 30ml po three times daily  Qty: 1892 mL, Refills: 3 Comments: Goal 2-3 soft bowel movements per day  Please notify the physician if this goal is not met, so he/she can adjust the dose  Associated Diagnoses: Cirrhosis of liver without ascites, unspecified hepatic cirrhosis type (HCC)      magnesium oxide (MAG-OX) 400 mg Take 1 tablet by mouth daily  Qty: 30 tablet, Refills: 0    Comments: Monitor the patient's magnesium level while on this supplementation      menthol-zinc oxide (CALMOSEPTINE) 0 44-20 6 % OINT Apply 1 inch topically 2 (two) times a day      Multiple Vitamin (MULTIVITAMIN) tablet Take 1 tablet by mouth daily      nadolol (CORGARD) 40 mg tablet Take 1 tablet by mouth daily  Refills: 0    Comments: Hold for HR<60 or SBP<100      potassium chloride (KLOR-CON) 20 mEq packet Take 20 mEq by mouth daily      rifaximin (XIFAXAN) 550 mg tablet Take 1 tablet by mouth every 12 (twelve) hours  Refills: 0      valsartan (DIOVAN) 80 mg tablet Take 1 tablet by mouth daily  Qty: 30 tablet, Refills: 0    Comments: Hold for SBP<100           No discharge procedures on file      ED Provider  Electronically Signed by           Alfredia Nageotte, MD  06/04/18 9274

## 2018-06-04 NOTE — PHYSICIAN ADVISOR
Current patient class: Inpatient  The patient is currently on Hospital Day: 2      The patient was admitted to the hospital  on 6/4/18 at 0113 for the following diagnosis:  UTI (urinary tract infection) [N39 0]  Altered mental status [R41 82]  Pneumonia [J18 9]  Increased ammonia level [R79 89]       There is documentation in the medical record of an expected length of stay of at least 2 midnights  The patient is therefore expected to satisfy the 2 midnight benchmark and given the 2 midnight presumption is appropriate for INPATIENT ADMISSION  Given this expectation of a satisfying stay, CMS instructs us that the patient is most often appropriate for inpatient admission under part A provided medical necessity is documented in the chart  After review of the relevant documentation, labs, vital signs and test results, the patient is appropriate for INPATIENT ADMISSION  Admission to the hospital as an inpatient is a complex decision making process which requires the practitioner to consider the patients presenting complaint, history and physical examination and all relevant testing  With this in mind, in this case, the patient was deemed appropriate for INPATIENT ADMISSION  After review of the documentation and testing available at the time of the admission I concur with this clinical determination of medical necessity  The patient does have an inpatient admission within the previous 30 days  The patient was admitted on 5/27/18 and discharged on 5/30/18 as an inpatient  The patient therefore required readmission review  At that time, the patient was admitted for Acute on chronic diastolic congestive heart failure, treated with IV lasix and discharged in stable condition  In this case the patient should be considered a rekated INPATIENT ADMISSION  Although the patient was discharged in stable condition with a completed care plan, she returned within 4 days with encephalopathy and infection         Rationale is as follows: The patient is a 80 yrs female who presented to the ED with acute confusion  Pt was recently discharged 3 days prior after treatment for acute CHF exacerbation  She does have a history of hepatic cirrhosis as well and concern for noncompliance with lactulose  The patient was found to have dry mucous membranes and bibasilar rales  CXR shows pulmonary edema and moderate bilateral pleural effusions  The patient was found to have an elevated ammonia of 46 as well as NT-proBNP of 709 and urinalysis positive for leukocytes and WBC  The patient was treated with IV antibiotics of vancomycin and cefepime and admitted for further treatment and evaluation  The plan of care upon admission includes continuation of IV abx as well as pending blood and urine cultures for narrowing of treatment  The patient will also be treated with lactulose given elevated ammonia level and diuresis for CHF  The patient has pending repeat ammonia levels as well as pending cultures  Given her need for continued IV abx and monitoring,she is appropriate for INPATIENT ADMISSION  Given the patient was discharged within one week and returns with concern for pneumonia and encephalopathy post admission, the current admission should be considered RELATED      The patients vitals on arrival were ED Triage Vitals   Temperature Pulse Respirations Blood Pressure SpO2   [birth year]/1933 06/03/18 1930 06/03/18 1930 06/03/18 1930 06/03/18 1930   98 3 °F (36 8 °C) 86 20 137/60 94 %      Temp Source Heart Rate Source Patient Position - Orthostatic VS BP Location FiO2 (%)   06/03/18 1930 06/03/18 1930 06/03/18 1930 06/03/18 1930 --   Temporal Monitor Sitting Right arm       Pain Score       06/03/18 1930       No Pain           Past Medical History:   Diagnosis Date    [facility] [address] )     Cancer ([address] )     breast    CHF (congestive heart failure) (HCC)     Cirrhosis of liver (HCC)     GERD (gastroesophageal reflux disease)     Hepatic encephalopathy ([facility] [address] )     Hypertension     last assessed 10/5/17    Polyp, sigmoid colon      Past Surgical History:   Procedure Laterality Date    COMBINED HYSTEROSCOPY DIAGNOSTIC / D&C      HYSTERECTOMY      unknown    MASTECTOMY Right 1990    MASTECTOMY             Consults have been placed to:   IP CONSULT TO CASE MANAGEMENT    Vitals:    06/04/18 0145 06/04/18 0218 06/04/18 0458 06/04/18 0719   BP:  116/56  147/66   BP Location:  Left arm  Left arm   Pulse: 97 95  84   Resp: 18 18 18   Temp:  98 3 °F (36 8 °C)  97 7 °F (36 5 °C)   TempSrc:  Temporal  Temporal   SpO2: 95% 92% 96% 100%   Weight:  67 kg (147 lb 11 3 oz)     Height:  5' 2" (1 575 m)         Most recent labs:    Recent Labs      06/03/18   2101  06/04/18   0517   WBC  5 02  4 91   HGB  14 0  14 1   HCT  42 1  42 4   PLT  204  196   K  4 4  4 6   NA  134*  134*   CALCIUM  9 4  9 3   BUN  18  16   CREATININE  0 71  0 72   LIPASE  513*   --    TROPONINI  <0 02   --    AST  35  34   ALT  31  29   ALKPHOS  143*  124*   BILITOT  1 30*  1 30*       Scheduled Meds:  Current Facility-Administered Medications:  aspirin 81 mg Oral Daily James S Dhesi, DO   cefepime 1,000 mg Intravenous Q12H James S Dhesi, DO   co-enzyme Q-10 30 mg Oral Daily James S Dhesi, DO   enoxaparin 40 mg Subcutaneous Daily James S Dhesi, DO   furosemide 40 mg Oral Daily James S Dhesi, DO   lactulose 20 g Oral TID James S Dhesi, DO   magnesium oxide 400 mg Oral Daily James S Dhesi, DO   nadolol 20 mg Oral Daily James S Dhesi, DO   pantoprazole 40 mg Oral Early Morning James S Dhesi, DO   rifaximin 550 mg Oral Q12H [facility] James S Dhesi, DO   valsartan 80 mg Oral Daily James S Dhesi, DO   vancomycin 500 mg Intravenous Q12H Gavin Bonilla PA-C     Continuous Infusions:   PRN Meds:      Surgical procedures (if appropriate):

## 2018-06-04 NOTE — PROGRESS NOTES
Progress Note - Christina Quiñones 10/24/1930, 80 y o  female MRN: [id number]    Unit/Bed#: 401-01 Encounter: [id number]    Primary Care Provider: Brittani Stout DO   Date and time admitted to hospital: 6/3/2018  7:35 PM        * Acute encephalopathy   Assessment & Plan    -Improving, likely multifactorial in the setting of Pneumonia and hepatic encephalopathy with missing lactulose and elevated ammonia level  -CT head negative for acute findings  Healthcare-associated pneumonia   Assessment & Plan    -admitted here from 5/27/18 to 5/30/18 for CHF  -Continue IV Vancomycin and Cefepime   -Check MRSA nasal culture, will discontinue IV Vancomycin if negative   -Respiratory protocol   -oxygen supplementation  Diastolic CHF ([address] )   Assessment & Plan    -continue PO lasix  Hepatic cirrhosis (HCC)   Assessment & Plan    -Continue lactulose  -repeat ammonia level in am           Hypertension   Assessment & Plan    - continue Diovan, Corgard, and lasix  Increased ammonia level   Assessment & Plan    -patient held lactulose at home due to feeling weak  -Lactulose restarted on admission  -repeat ammonia level in am             VTE Pharmacologic Prophylaxis:   Pharmacologic: Enoxaparin (Lovenox)  Mechanical VTE Prophylaxis in Place: Yes    Patient Centered Rounds: I have performed bedside rounds with nursing staff today  Discussions with Specialists or Other Care Team Provider: nursing, CM, and attending    Education and Discussions with Family / Patient: n/a    Time Spent for Care: 20 minutes  More than 50% of total time spent on counseling and coordination of care as described above  Current Length of Stay: 0 day(s)    Current Patient Status: Inpatient   Certification Statement: The patient will continue to require additional inpatient hospital stay due to continued need for IV antibiotics  Code Status: Level 3 - DNAR and DNI      Subjective:    The patient was seen and examined  The patient states her confusion is improving but she still doesn't feel herself  Objective:     Vitals:   Temp (24hrs), Av 1 °F (36 7 °C), Min:97 7 °F (36 5 °C), Max:98 3 °F (36 8 °C)    HR:  [] 84  Resp:  [18-21] 18  BP: (114-148)/() 147/66  SpO2:  [92 %-100 %] 100 %  Body mass index is 27 02 kg/m²  Input and Output Summary (last 24 hours): Intake/Output Summary (Last 24 hours) at 18 1126  Last data filed at 18 0711   Gross per 24 hour   Intake                0 ml   Output              230 ml   Net             -230 ml       Physical Exam:     Physical Exam   Constitutional: She is oriented to person, place, and time  Vital signs are normal  She appears well-developed and well-nourished  She is active and cooperative  Cardiovascular: Normal rate, regular rhythm and normal heart sounds  Pulmonary/Chest: Effort normal  She has rales in the right lower field and the left lower field  Abdominal: Soft  Normal appearance and bowel sounds are normal  There is no tenderness  Neurological: She is alert and oriented to person, place, and time  No cranial nerve deficit  Skin: Skin is warm, dry and intact  Nursing note and vitals reviewed  Additional Data:     Labs:      Results from last 7 days  Lab Units 18  0517 18  2101   WBC Thousand/uL 4 91 5 02   HEMOGLOBIN g/dL 14 1 14 0   HEMATOCRIT % 42 4 42 1   PLATELETS Thousands/uL 196 204   NEUTROS PCT %  --  48   LYMPHS PCT %  --  28   MONOS PCT %  --  21*   EOS PCT %  --  2       Results from last 7 days  Lab Units 18  0517   SODIUM mmol/L 134*   POTASSIUM mmol/L 4 6   CHLORIDE mmol/L 100   CO2 mmol/L 31   BUN mg/dL 16   CREATININE mg/dL 0 72   CALCIUM mg/dL 9 3   TOTAL PROTEIN g/dL 7 2   BILIRUBIN TOTAL mg/dL 1 30*   ALK PHOS U/L 124*   ALT U/L 29   AST U/L 34   GLUCOSE RANDOM mg/dL 95                     * I Have Reviewed All Lab Data Listed Above    * Additional Pertinent Lab Tests Reviewed: All Labs Within Last 24 Hours Reviewed    Imaging:    Imaging Reports Reviewed Today Include: none  Imaging Personally Reviewed by Myself Includes:  none    Recent Cultures (last 7 days):           Last 24 Hours Medication List:     Current Facility-Administered Medications:  aspirin 81 mg Oral Daily James S Dhesi, DO   cefepime 1,000 mg Intravenous Q12H James S Dhesi, DO   co-enzyme Q-10 30 mg Oral Daily James S Dhesi, DO   enoxaparin 40 mg Subcutaneous Daily James S Dhesi, DO   furosemide 40 mg Oral Daily James S Dhesi, DO   lactulose 20 g Oral TID James S Dhesi, DO   magnesium oxide 400 mg Oral Daily James S Dhesi, DO   nadolol 20 mg Oral Daily James S Dhesi, DO   pantoprazole 40 mg Oral Early Morning James S Dhesi, DO   rifaximin 550 mg Oral Q12H [address] James S Dhesi, DO   valsartan 80 mg Oral Daily James S Dhesi, DO        Today, Patient Was Seen By: Radha Archuleta PA-C    ** Please Note: Dictation voice to text software may have been used in the creation of this document   **

## 2018-06-04 NOTE — ASSESSMENT & PLAN NOTE
-Improving, likely multifactorial in the setting of Pneumonia and hepatic encephalopathy with missing lactulose and elevated ammonia level  -CT head negative for acute findings

## 2018-06-04 NOTE — PLAN OF CARE

## 2018-06-04 NOTE — SOCIAL WORK
Cm met with the patient to evaluate the patients prior function and living situation and any barriers to d/c and form a safe d/c plan  Cm also evaluated the patient for any services in the home or needs for services  Pt resides at home alone in a house in [facility]  States her cousin who also lives in [place] [facility] comes in 2 hours a day every day to help her  Pt has 0 MICHAEL then is on one floor  Pt recently dc'd from [facility] was here from 5/27-5/30/18  [facility] with [facility]  Re: DME was set up with 02 with exertion on dc from [address] [facility]  Pt's cousin does the driving  PCP is Molly Aguilar and pharmacy is [facility]   Pt interested in rehab on dc and requested a referral to the 5th floor  Referral made, Cm will f/u

## 2018-06-04 NOTE — RESPIRATORY THERAPY NOTE
RT Protocol Note  Bella Bello 80 y o  female MRN: [id number]  Unit/Bed#: 401-01 Encounter: [id number]    Assessment    Principal Problem:    Altered mental status  Active Problems:    Hypertension    Hepatic cirrhosis ([place] [address] )    Healthcare-associated pneumonia    Diastolic CHF ([place] [address] )    Increased ammonia level      Home Pulmonary Medications:  none    Past Medical History:   Diagnosis Date    Breast cancer ([place] [address] )     Cancer ([place] [address] )     breast    CHF (congestive heart failure) (HCC)     Cirrhosis of liver (HCC)     GERD (gastroesophageal reflux disease)     Hepatic encephalopathy ([place] [address] )     Hypertension     last assessed 10/5/17    Polyp, sigmoid colon      Social History     Social History    Marital status:      Spouse name: N/A    Number of children: N/A    Years of education: N/A     Social History Main Topics    Smoking status: Former Smoker     Types: Cigarettes    Smokeless tobacco: Never Used    Alcohol use No    Drug use: No    Sexual activity: No     Other Topics Concern    None     Social History Narrative    Dental care, regularly    Good dental hygiene    Sun protection sunscreen    Uses safety equipment- seatbelts         Objective  Resting quietly, free from any resp distress or S&S of SOB  Physical Exam:   Resting quietly with eyes closed,  Sat=96% @ 3 lpm n/c  BS= D/ESS CLEAR B/L      Vitals:  Blood pressure 116/56, pulse 95, temperature 98 3 °F (36 8 °C), temperature source Temporal, resp  rate 18, height 5' 2" (1 575 m), weight 67 kg (147 lb 11 3 oz), SpO2 92 %  Imaging and other studies:     CHEST      INDICATION:   confusion      COMPARISON:  Chest x-ray from 5/27/2018      EXAM PERFORMED/VIEWS:  XR CHEST PORTABLE        FINDINGS:     Cardiomediastinal silhouette appears unremarkable      Unchanged pulmonary edema and moderate bilateral pleural effusions    No pneumothorax      Osseous structures appear within normal limits for patient age      IMPRESSION:     Unchanged pulmonary edema and moderate bilateral pleural effusions         Plan  1  CONT RT PROTOCOL W/ [id number]  ALBUTEROL NEB=WHEEZING  2  EVAL prn PER PT STATUS  3  TURN TO NSG SERVICES TO CONTINUE MONITORING

## 2018-06-04 NOTE — SOCIAL WORK
Spoke with [facility] on the 5th floor who stated able to accept pt for when she is medically ready for dc

## 2018-06-04 NOTE — H&P
History and Physical - [facility]    Patient Information: Clarice Caba 80 y o  female MRN: [id number]  Unit/Bed#: 401-01 Encounter: [id number]  Admitting Physician: Bee Zayas DO  PCP: Reyes Cowing, DO  Date of Admission:  06/04/18    Assessment/Plan:    Hospital Problem List:     Principal Problem:    Altered mental status  Active Problems:    Hypertension    Hepatic cirrhosis ([facility] [address] )    Healthcare-associated pneumonia    Di[facility] ([facility] )    Increased ammonia level      Plan for the Primary Problem(s):    PNA w/ recent hospitalization for CHF    - empiric coverage with vanc + cefepime  - pan culture  - check urinary antigens   - speech and swallow eval    Encephalopathy   - likely multifactorial - due to delirium from infection and missing lactulose  - resume lactulose    CHF  - BNP elevated compared to prior  - CXR w/ unchanged pulmonary edema moderate pleural effusions  - diuresis    VTE Prophylaxis: Enoxaparin (Lovenox)  / sequential compression device   Code Status: DNR/DNI  POLST: POLST form is not discussed and not completed at this time  Anticipated Length of Stay:  Patient will be admitted on an Inpatient basis with an anticipated length of stay of  > 2 midnights  Justification for Hospital Stay: AMS    Total Time for Visit, including Counseling / Coordination of Care: 1 hour  Greater than 50% of this total time spent on direct patient counseling and coordination of care  Chief Complaint:   AMS    History of Present Illness:    Clarice Caba is a 80 y o  female with a history of CHF with recent admission for exacerbation and cirrhosis who presents with altered mental status  Per her family she is more confused than usual and appears more weak  She is compliant with her meds, except she missed one days worth of lactulose due to feeling weak  There was a concern for increased urinary frequency by the family, but the patient denies any symptoms    She understands that she is being admitted due to being confused, but is now able to tell me where she is, who she is, and that she no longer feels confused  She denied any other complaints  Her family notes that she was coughing w/ breakfast           Review of Systems:    Review of Systems   Reason unable to perform ROS: ROS limited due to dementia  Constitutional: Negative for activity change, appetite change, chills, diaphoresis, fatigue, fever and unexpected weight change  HENT: Negative for congestion, dental problem, drooling, facial swelling, postnasal drip, rhinorrhea, sneezing, sore throat and trouble swallowing  Eyes: Negative for photophobia, pain, discharge and visual disturbance  Respiratory: Negative for cough, choking, chest tightness, shortness of breath and wheezing  Cardiovascular: Negative for chest pain, palpitations and leg swelling  Gastrointestinal: Negative for abdominal distention, abdominal pain, blood in stool, diarrhea, nausea and vomiting  Endocrine: Negative for polydipsia, polyphagia and polyuria  Genitourinary: Negative for difficulty urinating, dysuria, flank pain, frequency, hematuria and urgency  Musculoskeletal: Negative for arthralgias, back pain, myalgias, neck pain and neck stiffness  Skin: Negative for pallor, rash and wound  Allergic/Immunologic: Negative  Neurological: Negative for dizziness, syncope, speech difficulty, weakness, numbness and headaches  Hematological: Negative  Psychiatric/Behavioral: Positive for confusion  Negative for agitation, behavioral problems and decreased concentration         Past Medical and Surgical History:     Past Medical History:   Diagnosis Date    Breast cancer ([facility] )     Cancer ([place] [id number] )     breast    CHF (congestive heart failure) (HCC)     Cirrhosis of liver (HCC)     GERD (gastroesophageal reflux disease)     Hepatic encephalopathy ([facility] )     Hypertension     last assessed 10/5/17    Polyp, sigmoid colon        Past Surgical History:   Procedure Laterality Date    COMBINED HYSTEROSCOPY DIAGNOSTIC / D&C      HYSTERECTOMY      unknown    MASTECTOMY Right 1990    MASTECTOMY         Meds/Allergies:    Prior to Admission medications    Medication Sig Start Date End Date Taking? Authorizing Provider   aspirin 81 MG tablet Take 81 mg by mouth daily    Historical Provider, MD   Cholecalciferol (VITAMIN D-3) 1000 units CAPS Take 1 capsule by mouth daily    Historical Provider, MD   co-enzyme Q-10 30 MG capsule Take 30 mg by mouth daily    Historical Provider, MD   CORAL CALCIUM PO Take 1 tablet by mouth daily    Historical Provider, MD   esomeprazole (NexIUM) 40 MG capsule Take 40 mg by mouth every morning before breakfast    Historical Provider, MD   furosemide (LASIX) 40 mg tablet Take 1 tablet by mouth daily  Patient taking differently: Take 40 mg by mouth daily Take an extra dose for weight increase  Of 3 lbs   3/22/17   [place] Fresh, DO   lactulose (CHRONULAC) 10 g/15 mL solution Take 30ml po three times daily 4/3/18   Yesika Portillo, DO   magnesium oxide (MAG-OX) 400 mg Take 1 tablet by mouth daily 3/21/17   [place] Fresh, DO   menthol-zinc oxide (CALMOSEPTINE) 0 44-20 6 % OINT Apply 1 inch topically 2 (two) times a day 10/5/17   Historical Provider, MD   Multiple Vitamin (MULTIVITAMIN) tablet Take 1 tablet by mouth daily    Historical Provider, MD   nadolol (CORGARD) 40 mg tablet Take 1 tablet by mouth daily  Patient taking differently: Take 20 mg by mouth daily   3/22/17   Dylan Fresh, DO   potassium chloride (KLOR-CON) 20 mEq packet Take 20 mEq by mouth daily    Historical Provider, MD   rifaximin (XIFAXAN) 550 mg tablet Take 1 tablet by mouth every 12 (twelve) hours 3/21/17   Dylan Fresh, DO   valsartan (DIOVAN) 80 mg tablet Take 1 tablet by mouth daily 3/22/17   Dylan Fresh, DO     I have reviewed home medications using allscripts  Allergies:    Allergies   Allergen Reactions    Amoxicillin Social History:     Marital Status:      Substance Use History:   History   Alcohol Use No     History   Smoking Status    Former Smoker    Types: Cigarettes   Smokeless Tobacco    Never Used     History   Drug Use No       Family History:    Family History   Problem Relation Age of Onset    Hyperlipidemia Son     Hyperlipidemia Family     Coronary artery disease Family     Depression Family     Diabetes Family     Hypertension Family     Hyperlipidemia Other        Physical Exam:     Vitals:   Blood Pressure: 116/56 (06/04/18 0218)  Pulse: 95 (06/04/18 0218)  Temperature: 98 3 °F (36 8 °C) (06/04/18 0218)  Temp Source: Temporal (06/04/18 0218)  Respirations: 18 (06/04/18 0218)  Height: 5' 2" (157 5 cm) (06/04/18 0218)  Weight - Scale: 67 kg (147 lb 11 3 oz) (06/04/18 0218)  SpO2: 92 % (06/04/18 0218)    Physical Exam   Constitutional: She appears well-developed and well-nourished  No distress  HENT:   Head: Normocephalic and atraumatic  Right Ear: External ear normal    Left Ear: External ear normal    Dry mucous membranes   Eyes: Conjunctivae and EOM are normal  Pupils are equal, round, and reactive to light  Right eye exhibits no discharge  Left eye exhibits no discharge  No scleral icterus  Neck: Normal range of motion  Neck supple  No JVD present  No tracheal deviation present  No thyromegaly present  Cardiovascular: Normal rate, regular rhythm, normal heart sounds and intact distal pulses  Exam reveals no gallop and no friction rub  No murmur heard  Pulmonary/Chest: Effort normal  No stridor  No respiratory distress  She has no wheezes  She has rales  She exhibits no tenderness  Bibasilar rales   Abdominal: Soft  Bowel sounds are normal  She exhibits no distension and no mass  There is no tenderness  There is no rebound and no guarding  Musculoskeletal: Normal range of motion  She exhibits no edema, tenderness or deformity     Lymphadenopathy:     She has no cervical adenopathy  Neurological: She is alert  She exhibits normal muscle tone  Coordination normal    Oriented x2   Skin: Skin is warm  No rash noted  She is not diaphoretic  No erythema  No pallor  Psychiatric: She has a normal mood and affect  Her behavior is normal  Thought content normal        Additional Data:     Lab Results: I have personally reviewed pertinent reports  Results from last 7 days  Lab Units 06/03/18  2101   WBC Thousand/uL 5 02   HEMOGLOBIN g/dL 14 0   HEMATOCRIT % 42 1   PLATELETS Thousands/uL 204   NEUTROS PCT % 48   LYMPHS PCT % 28   MONOS PCT % 21*   EOS PCT % 2       Results from last 7 days  Lab Units 06/03/18  2101   SODIUM mmol/L 134*   POTASSIUM mmol/L 4 4   CHLORIDE mmol/L 98*   CO2 mmol/L 33*   BUN mg/dL 18   CREATININE mg/dL 0 71   CALCIUM mg/dL 9 4   TOTAL PROTEIN g/dL 7 4   BILIRUBIN TOTAL mg/dL 1 30*   ALK PHOS U/L 143*   ALT U/L 31   AST U/L 35   GLUCOSE RANDOM mg/dL 110           Imaging: I have personally reviewed pertinent reports  Xr Chest 1 View Portable    Result Date: 6/3/2018  Narrative: CHEST INDICATION:   confusion  COMPARISON:  Chest x-ray from 5/27/2018  EXAM PERFORMED/VIEWS:  XR CHEST PORTABLE FINDINGS: Cardiomediastinal silhouette appears unremarkable  Unchanged pulmonary edema and moderate bilateral pleural effusions  No pneumothorax  Osseous structures appear within normal limits for patient age  Impression: Unchanged pulmonary edema and moderate bilateral pleural effusions  Workstation performed: [id number]     Xr Chest 2 Views    Result Date: 5/27/2018  Narrative: CHEST INDICATION:   shortness of breath  COMPARISON:  11/28/2017 EXAM PERFORMED/VIEWS:  XR CHEST PA & LATERAL FINDINGS: Cardiomediastinal silhouette is mildly prominent  Moderate pleural effusions are noted  These are increased from prior  There is perihilar and bibasilar lung opacity probably representing edema  There is no pneumothorax   Osseous structures appear within normal limits for patient age  Impression: CHF with pulmonary edema and pleural effusions Workstation performed: [id number]     Ct Head Without Contrast    Result Date: 6/3/2018  Narrative: CT BRAIN - WITHOUT CONTRAST INDICATION:   confusion  COMPARISON:  Brain CT from 2014  TECHNIQUE:  CT examination of the brain was performed  In addition to axial images, coronal 2D reformatted images were created and submitted for interpretation  Radiation dose length product (DLP) for this visit:  989 74 mGy-cm   This examination, like all CT scans performed in the [facility], was performed utilizing techniques to minimize radiation dose exposure, including the use of iterative  reconstruction and automated exposure control  IMAGE QUALITY:  Diagnostic  FINDINGS: PARENCHYMA:  No intracranial mass, mass effect or midline shift  No CT signs of acute infarction  No acute parenchymal hemorrhage  VENTRICLES AND EXTRA-AXIAL SPACES:  Normal for the patient's age  VISUALIZED ORBITS AND PARANASAL SINUSES:  Unremarkable  CALVARIUM AND EXTRACRANIAL SOFT TISSUES:  Normal      Impression: No acute intracranial abnormality  Workstation performed: [id number]       EKG, Pathology, and Other Studies Reviewed on Admission:   · EK BPM NSR, RBBB, no significant change compared to prior     Allscripts / Epic Records Reviewed: Yes     ** Please Note: This note has been constructed using a voice recognition system   **

## 2018-06-04 NOTE — RESPIRATORY THERAPY NOTE
Rt note:  WAS GOING TO START INCENTIVE SPIROMETRY FOR NSG SERVICES, HOWEVER, PT WAS UNABLE TO KEEP HER EYES OPEN TO FOLLOW SIMPLE COMMAND WHEN PERFORMING RT PROTOCOL, THEREFORE, WILL ALLOW NSG TO ATTEMPT INITIATION ON DAY SHIFT, ONCE PT HAS HAD A CHANCE TO REST A FEW HRS

## 2018-06-04 NOTE — ASSESSMENT & PLAN NOTE
-patient held lactulose at home due to feeling weak  -Lactulose restarted on admission    -repeat ammonia level in am

## 2018-06-04 NOTE — SPEECH THERAPY NOTE
Speech Language/Pathology  Speech/Language Pathology  Assessment    Patient Name: Mary Arana  PTSLK'Y Date: 6/4/2018     Problem List  Patient Active Problem List   Diagnosis    Shortness of breath    Pleural effusion    Hypertension    GERD without esophagitis    Gastritis    Valvular heart disease    Acute on chronic diastolic congestive heart failure (HCC)    Acute encephalopathy    Tricuspid regurgitation    Pulmonary hypertension (HCC)    Mitral valve stenosis    Aortic stenosis    Hypoalbuminemia    Hepatic coma/encephalopathy (HCC)    Hepatic cirrhosis (HCC)    Hyperbilirubinemia    Leukopenia    Neutropenia (HCC)    Transaminitis    Healthcare-associated pneumonia    Mucosal abnormality of stomach    Portal hypertension (HCC)    Left adrenal mass ([facility] [address] )    Aneurysm of anterior cerebral artery    Pressure ulcer of left buttock, stage 3 (HCC)    Diastolic CHF ([facility] [address] [facility] )    Edema    Hyperlipidemia    Pancreatic cyst    Post-menopausal osteoporosis    Pneumonia    Altered mental status    Increased ammonia level     Past Medical History  Past Medical History:   Diagnosis Date    Breast cancer ([facility] [address] )     Cancer ([facility] [address] )     breast    CHF (congestive heart failure) ([facility] [address] )     Cirrhosis of liver ([facility] [address] )     GERD (gastroesophageal reflux disease)     [facility]     Hypertension     last assessed 10/5/17    Polyp, sigmoid colon      Past Surgical History  Past Surgical History:   Procedure Laterality Date    COMBINED HYSTEROSCOPY DIAGNOSTIC / D&C      HYSTERECTOMY      unknown    MASTECTOMY Right 1990    MASTECTOMY          06/04/18 1235   Swallow Information   Current Risks for Dysphagia & Aspiration Mental status change   Current Symptoms/Concerns Current Pneumonia; Decreased oral intake   Current Diet NPO x medications   Baseline Diet Regular; Thin liquids  (son states pt drinks ensure/boost; limited food)   Baseline Assessment   Behavior/Cognition Confused; Cooperative; Alert; Interactive   Speech/Language Status Speech is clear and intelligible; reduced recall for history  Patient Positioning Upright in bed   Swallow Mechanism Exam   Labial Symmetry WFL   Labial Strength WFL   Labial ROM WFL   Labial Sensation WFL   Facial Symmetry WFL   Facial Strength WFL   Facial ROM WFL   Facial Sensation WFL   Lingual Symmetry WFL   Lingual Strength Mild reduced   Lingual ROM WFL   Lingual Sensation WFL   Velum WFL   Gag WFL   Mandible WFL   Dentition Adequate   Volitional Cough Strong   Consistencies Assessed and Performance   Materials Admnistered Puree/Level 1;Regular/Solid; Thin liquid;Pills; Whole; With thin liquid   Materials Adminstered Comment Nursing gave small whole pill with thin with SLP  Patient states she has hard time with large pills getting stuck at home  Oral Stage WFL   Phargngeal Stage Mild impaired;Aspiration risk   Pharyngeal Stage Comment Patient had one time throat clear out of 10 trials with thin liquids  No difficulty with small pill whole with thin water  Swallow Mechanics WFL; Aspiration risk;Mild delayed   Esophageal Concerns Hx GERDS;C/O food dysphagia  (son/pt report h/o esophageal dilation 5-6 years ago in [facility])   Summary   Swallow Summary Patient is seen for dysphagia evaluation at NPO status after episode of mental status change and dx of pnemonia  Patient is easily awakened by name and cooperative for eval  Son is present to help with history; as patient is mildly reduced with recall of events/status  Son states the patient had an esophageal dilation 5-6 years ago in [place]  He reports she does not eat a lot of solid food; and drinks a lot of Ensure/Boost at home  She will eat mostly soft foods; and this has been since her dilation  Patient states she does not eat steak, it is too dry; but she likes chicken (as far as meat)  She states she eats fruit, pancakes, pasta, potatoes   Patient does not have h/o oral or pharyngeal dysphagia; just esophageal, as she also has GERD  Patient's oral motor exam was [facility]  She was able to receive tsp of puree and take bites of regular solid for eval; and these were without oral or pharyngeal dysphagia  The patient was able to receive thin juice and water via straw  She had one time out of ten trials of throat clear; but no cough or change in voice quality  Patient does also state that big pills "potassium and one other one" "get stuck" and her daughter started to break them in half  At bedside she was given a small pill by RN with thin water, and this was without difficulty  Patient was in agreement for Mechanical soft diet and thin liquid recommendation; and she was in agreement for VBS tomorrow to further assess function and r/o aspiration  The son was in agreement also  Recommendations   Risk for Aspiration Mild   Recommendations Consider oral diet;Consider Video/Barium Swallow Study   Diet Solid Recommendation Level 2 Dysphagia/ mechanical soft/altered   Diet Liquid Recommendation Thin liquid   Recommended Form of Meds Crushed; With puree   General Precautions Aspiration precautions; Feed only when alert;Upright as possible for all oral intake;Remain upright for 45 mins after meals;Assist with feeding  (Reflux precautions)   Compensatory Swallowing Strategies Alternate solids and liquids   Further Evaluations Gastroenterology; Dietician   Results Reviewed with RN;PT/Family/Caregiver   Treatment Recommendations   Duration of treatment one week   Follow up treatments Continue clinical assessment; Assure diet tolerance; Patient/family education;Strategy training   Dysphagia Goals Patient will tolerate recommended diet without observed clinical signs of oral/pharngeal dysphagia; Patient will tolerate advanced diet with no signs of oral or pharngeal dysphagia   Speech Therapy Prognosis   Prognosis Guarded   Prognosis Considerations Co-Morbidities

## 2018-06-04 NOTE — ASSESSMENT & PLAN NOTE
-admitted here from 5/27/18 to 5/30/18 for CHF  -Continue IV Vancomycin and Cefepime   -Check MRSA nasal culture, will discontinue IV Vancomycin if negative   -Respiratory protocol   -oxygen supplementation

## 2018-06-04 NOTE — PLAN OF CARE
Problem: SLP ADULT - SWALLOWING, IMPAIRED  Goal: Initial SLP swallow eval performed  Outcome: Completed Date Met: 06/04/18

## 2018-06-05 ENCOUNTER — APPOINTMENT (INPATIENT)
Dept: RADIOLOGY | Facility: HOSPITAL | Age: 83
DRG: 177 | End: 2018-06-05
Payer: MEDICARE

## 2018-06-05 LAB
ALBUMIN SERPL BCP-MCNC: 1.9 G/DL (ref 3.5–5)
ALP SERPL-CCNC: 121 U/L (ref 46–116)
ALT SERPL W P-5'-P-CCNC: 30 U/L (ref 12–78)
AMMONIA PLAS-SCNC: 37 UMOL/L (ref 11–35)
ANION GAP SERPL CALCULATED.3IONS-SCNC: 4 MMOL/L (ref 4–13)
AST SERPL W P-5'-P-CCNC: 35 U/L (ref 5–45)
BASOPHILS # BLD AUTO: 0.04 THOUSANDS/ΜL (ref 0–0.1)
BASOPHILS NFR BLD AUTO: 1 % (ref 0–1)
BILIRUB SERPL-MCNC: 1.1 MG/DL (ref 0.2–1)
BUN SERPL-MCNC: 11 MG/DL (ref 5–25)
CALCIUM SERPL-MCNC: 8.6 MG/DL (ref 8.3–10.1)
CHLORIDE SERPL-SCNC: 102 MMOL/L (ref 100–108)
CO2 SERPL-SCNC: 32 MMOL/L (ref 21–32)
CREAT SERPL-MCNC: 0.73 MG/DL (ref 0.6–1.3)
EOSINOPHIL # BLD AUTO: 0.18 THOUSAND/ΜL (ref 0–0.61)
EOSINOPHIL NFR BLD AUTO: 5 % (ref 0–6)
ERYTHROCYTE [DISTWIDTH] IN BLOOD BY AUTOMATED COUNT: 15.7 % (ref 11.6–15.1)
GFR SERPL CREATININE-BSD FRML MDRD: 74 ML/MIN/1.73SQ M
GLUCOSE SERPL-MCNC: 90 MG/DL (ref 65–140)
HCT VFR BLD AUTO: 41.1 % (ref 34.8–46.1)
HGB BLD-MCNC: 13.4 G/DL (ref 11.5–15.4)
LYMPHOCYTES # BLD AUTO: 1.16 THOUSANDS/ΜL (ref 0.6–4.47)
LYMPHOCYTES NFR BLD AUTO: 30 % (ref 14–44)
MCH RBC QN AUTO: 30.5 PG (ref 26.8–34.3)
MCHC RBC AUTO-ENTMCNC: 32.6 G/DL (ref 31.4–37.4)
MCV RBC AUTO: 94 FL (ref 82–98)
MONOCYTES # BLD AUTO: 0.72 THOUSAND/ΜL (ref 0.17–1.22)
MONOCYTES NFR BLD AUTO: 19 % (ref 4–12)
NEUTROPHILS # BLD AUTO: 1.75 THOUSANDS/ΜL (ref 1.85–7.62)
NEUTS SEG NFR BLD AUTO: 46 % (ref 43–75)
PLATELET # BLD AUTO: 170 THOUSANDS/UL (ref 149–390)
PMV BLD AUTO: 11.4 FL (ref 8.9–12.7)
POTASSIUM SERPL-SCNC: 4.6 MMOL/L (ref 3.5–5.3)
PROT SERPL-MCNC: 6.8 G/DL (ref 6.4–8.2)
RBC # BLD AUTO: 4.39 MILLION/UL (ref 3.81–5.12)
SODIUM SERPL-SCNC: 138 MMOL/L (ref 136–145)
WBC # BLD AUTO: 3.85 THOUSAND/UL (ref 4.31–10.16)

## 2018-06-05 PROCEDURE — 92611 MOTION FLUOROSCOPY/SWALLOW: CPT | Performed by: SPEECH-LANGUAGE PATHOLOGIST

## 2018-06-05 PROCEDURE — G8997 SWALLOW GOAL STATUS: HCPCS | Performed by: SPEECH-LANGUAGE PATHOLOGIST

## 2018-06-05 PROCEDURE — G8996 SWALLOW CURRENT STATUS: HCPCS | Performed by: SPEECH-LANGUAGE PATHOLOGIST

## 2018-06-05 PROCEDURE — 97110 THERAPEUTIC EXERCISES: CPT

## 2018-06-05 PROCEDURE — 85025 COMPLETE CBC W/AUTO DIFF WBC: CPT | Performed by: PHYSICIAN ASSISTANT

## 2018-06-05 PROCEDURE — 97116 GAIT TRAINING THERAPY: CPT

## 2018-06-05 PROCEDURE — 74230 X-RAY XM SWLNG FUNCJ C+: CPT

## 2018-06-05 PROCEDURE — G8979 MOBILITY GOAL STATUS: HCPCS

## 2018-06-05 PROCEDURE — 82140 ASSAY OF AMMONIA: CPT | Performed by: PHYSICIAN ASSISTANT

## 2018-06-05 PROCEDURE — G8998 SWALLOW D/C STATUS: HCPCS | Performed by: SPEECH-LANGUAGE PATHOLOGIST

## 2018-06-05 PROCEDURE — G8978 MOBILITY CURRENT STATUS: HCPCS

## 2018-06-05 PROCEDURE — 97162 PT EVAL MOD COMPLEX 30 MIN: CPT

## 2018-06-05 PROCEDURE — 80053 COMPREHEN METABOLIC PANEL: CPT | Performed by: PHYSICIAN ASSISTANT

## 2018-06-05 PROCEDURE — 99232 SBSQ HOSP IP/OBS MODERATE 35: CPT | Performed by: PHYSICIAN ASSISTANT

## 2018-06-05 RX ADMIN — PANTOPRAZOLE SODIUM 40 MG: 40 TABLET, DELAYED RELEASE ORAL at 05:40

## 2018-06-05 RX ADMIN — LACTULOSE 20 G: 10 SOLUTION ORAL at 20:56

## 2018-06-05 RX ADMIN — Medication 30 MG: at 10:32

## 2018-06-05 RX ADMIN — CEFEPIME HYDROCHLORIDE 1000 MG: 1 INJECTION, SOLUTION INTRAVENOUS at 14:43

## 2018-06-05 RX ADMIN — LACTULOSE 20 G: 10 SOLUTION ORAL at 16:55

## 2018-06-05 RX ADMIN — RIFAXIMIN 550 MG: 550 TABLET ORAL at 20:56

## 2018-06-05 RX ADMIN — VANCOMYCIN HYDROCHLORIDE 500 MG: 500 INJECTION, POWDER, LYOPHILIZED, FOR SOLUTION INTRAVENOUS at 12:25

## 2018-06-05 RX ADMIN — NADOLOL 20 MG: 40 TABLET ORAL at 10:33

## 2018-06-05 RX ADMIN — CEFEPIME HYDROCHLORIDE 1000 MG: 1 INJECTION, SOLUTION INTRAVENOUS at 02:31

## 2018-06-05 RX ADMIN — Medication 400 MG: at 10:33

## 2018-06-05 RX ADMIN — LACTULOSE 20 G: 10 SOLUTION ORAL at 10:33

## 2018-06-05 RX ADMIN — FUROSEMIDE 40 MG: 40 TABLET ORAL at 10:32

## 2018-06-05 RX ADMIN — ASPIRIN 81 MG 81 MG: 81 TABLET ORAL at 10:32

## 2018-06-05 RX ADMIN — VALSARTAN 80 MG: 80 TABLET, FILM COATED ORAL at 10:32

## 2018-06-05 RX ADMIN — VANCOMYCIN HYDROCHLORIDE 500 MG: 500 INJECTION, POWDER, LYOPHILIZED, FOR SOLUTION INTRAVENOUS at 00:09

## 2018-06-05 RX ADMIN — RIFAXIMIN 550 MG: 550 TABLET ORAL at 10:32

## 2018-06-05 RX ADMIN — ENOXAPARIN SODIUM 40 MG: 40 INJECTION, SOLUTION INTRAVENOUS; SUBCUTANEOUS at 10:32

## 2018-06-05 NOTE — ASSESSMENT & PLAN NOTE
-patient held lactulose at home due to feeling weak  -Lactulose restarted on admission  -repeat ammonia level is trending down

## 2018-06-05 NOTE — SPEECH THERAPY NOTE
Speech Language/Pathology    Patient's VBS completed today  Recommend diet level upgrade to Level 3 Soft diet and thin liquids  Will continue with ST for diet level tolerance

## 2018-06-05 NOTE — PROGRESS NOTES
Progress Note - Ashlee Bullock 10/24/1930, 80 y o  female MRN: [id number]    Unit/Bed#: 401-01 Encounter: [id number]    Primary Care Provider: Quiana Henry DO   Date and time admitted to hospital: 6/3/2018  7:35 PM        * Acute encephalopathy   Assessment & Plan    -Resolved, likely multifactorial in the setting of Pneumonia and hepatic encephalopathy with missing lactulose and elevated ammonia level  -CT head negative for acute findings    -Continue lactulose  Healthcare-associated pneumonia   Assessment & Plan    -Healthcare associated due to her hospitalization here from 5/27/18 to 5/30/18 for CHF, likely gram-negative pneumonia   -Continue IV Vancomycin and Cefepime  -MRSA nasal culture pending, will discontinue IV Vancomycin if negative   -Respiratory protocol   -oxygen supplementation, will attempt to wean off  Diastolic CHF ([address] )   Assessment & Plan    -continue PO lasix  Hepatic cirrhosis (HCC)   Assessment & Plan    -Continue lactulose   -ammonia level trending down, encephalopathy resolved  Hypertension   Assessment & Plan    - continue Diovan, Corgard, and lasix  Increased ammonia level   Assessment & Plan    -patient held lactulose at home due to feeling weak  -Lactulose restarted on admission  -repeat ammonia level is trending down  VTE Pharmacologic Prophylaxis:   Pharmacologic: Enoxaparin (Lovenox)  Mechanical VTE Prophylaxis in Place: Yes    Patient Centered Rounds: I have performed bedside rounds with nursing staff today  Discussions with Specialists or Other Care Team Provider: nursing, CM, and attending    Education and Discussions with Family / Patient: n/a    Time Spent for Care: 20 minutes  More than 50% of total time spent on counseling and coordination of care as described above      Current Length of Stay: 1 day(s)    Current Patient Status: Inpatient   Certification Statement: The patient will continue to require additional inpatient hospital stay due to continued need for IV antibiotics  Code Status: Level 3 - DNAR and DNI      Subjective: The patient was seen and examined  The patient states her confusion has resolved  She is still feeling tired  Objective:     Vitals:   Temp (24hrs), Av 7 °F (36 5 °C), Min:97 5 °F (36 4 °C), Max:98 °F (36 7 °C)    HR:  [60-63] 60  Resp:  [18] 18  BP: (112-129)/(60-70) 129/66  SpO2:  [97 %-99 %] 99 %  Body mass index is 27 02 kg/m²  Input and Output Summary (last 24 hours): Intake/Output Summary (Last 24 hours) at 18 1226  Last data filed at 18 0900   Gross per 24 hour   Intake              360 ml   Output              300 ml   Net               60 ml       Physical Exam:     Physical Exam   Constitutional: She is oriented to person, place, and time  Vital signs are normal  She appears well-developed and well-nourished  She is active and cooperative  Cardiovascular: Normal rate and regular rhythm  Pulmonary/Chest: Effort normal and breath sounds normal  She has no wheezes  She has no rhonchi  She has no rales  Abdominal: Soft  Normal appearance and bowel sounds are normal  There is no tenderness  Neurological: She is alert and oriented to person, place, and time  No cranial nerve deficit  Skin: Skin is warm, dry and intact  Nursing note and vitals reviewed        Additional Data:     Labs:      Results from last 7 days  Lab Units 18  0453   WBC Thousand/uL 3 85*   HEMOGLOBIN g/dL 13 4   HEMATOCRIT % 41 1   PLATELETS Thousands/uL 170   NEUTROS PCT % 46   LYMPHS PCT % 30   MONOS PCT % 19*   EOS PCT % 5       Results from last 7 days  Lab Units 18  0453   SODIUM mmol/L 138   POTASSIUM mmol/L 4 6   CHLORIDE mmol/L 102   CO2 mmol/L 32   BUN mg/dL 11   CREATININE mg/dL 0 73   CALCIUM mg/dL 8 6   TOTAL PROTEIN g/dL 6 8   BILIRUBIN TOTAL mg/dL 1 10*   ALK PHOS U/L 121*   ALT U/L 30   AST U/L 35   GLUCOSE RANDOM mg/dL 90 * I Have Reviewed All Lab Data Listed Above  * Additional Pertinent Lab Tests Reviewed: All Labs Within Last 24 Hours Reviewed    Imaging:    Imaging Reports Reviewed Today Include: none  Imaging Personally Reviewed by Myself Includes:  none    Recent Cultures (last 7 days):       Results from last 7 days  Lab Units 06/04/18  0709 06/03/18 2102   BLOOD CULTURE   --  No Growth at 24 hrs  No Growth at 24 hrs  LEGIONELLA URINARY ANTIGEN  Negative  --        Last 24 Hours Medication List:     Current Facility-Administered Medications:  aspirin 81 mg Oral Daily James S Dhesi, DO    cefepime 1,000 mg Intravenous Q12H James S Dhesi, DO Last Rate: 1,000 mg (06/05/18 0231)   co-enzyme Q-10 30 mg Oral Daily James S Dhesi, DO    enoxaparin 40 mg Subcutaneous Daily James S Dhesi, DO    furosemide 40 mg Oral Daily James S Dhesi, DO    lactulose 20 g Oral TID James S Dhesi, DO    magnesium oxide 400 mg Oral Daily James S Dhesi, DO    nadolol 20 mg Oral Daily James S Dhesi, DO    pantoprazole 40 mg Oral Early Morning James S Dhesi, DO    rifaximin 550 mg Oral Q12H [address] James S Dhesi, DO    valsartan 80 mg Oral Daily James S Dhesi, DO    vancomycin 500 mg Intravenous Q12H Skye Kenny PA-C Last Rate: 500 mg (06/05/18 1225)        Today, Patient Was Seen By: Skye Kenny PA-C    ** Please Note: Dictation voice to text software may have been used in the creation of this document   **

## 2018-06-05 NOTE — ASSESSMENT & PLAN NOTE
-Healthcare associated due to her hospitalization here from 5/27/18 to 5/30/18 for CHF, likely gram-negative pneumonia   -Continue IV Vancomycin and Cefepime  -MRSA nasal culture pending, will discontinue IV Vancomycin if negative   -Respiratory protocol   -oxygen supplementation, will attempt to wean off

## 2018-06-05 NOTE — ASSESSMENT & PLAN NOTE
-Resolved, likely multifactorial in the setting of Pneumonia and hepatic encephalopathy with missing lactulose and elevated ammonia level  -CT head negative for acute findings    -Continue lactulose

## 2018-06-05 NOTE — PLAN OF CARE
Problem: SLP ADULT - SWALLOWING, IMPAIRED  Goal: Advance to least restrictive diet without signs or symptoms of aspiration for planned discharge setting  See evaluation for individualized goals  Patient will:    1  Tolerate Mechanical soft diet and thin liquids with no S/S of  oral/pharyngeal dysphagia across meals  2  Obtain VBS to r/o aspiration  3  Tolerate Level 3 soft diet and thin liquids with no s/s oral/pharyngeal dysphagia across meals         Outcome: Progressing

## 2018-06-05 NOTE — PROCEDURES
Video Swallow Study      Patient Name: Luis Patrick  DSUKA'F Date: 6/5/2018        Past Medical History  Past Medical History:   Diagnosis Date    Breast cancer ([facility] )     Cancer ([facility] )     breast    CHF (congestive heart failure) (HCC)     Cirrhosis of liver ([facility] )     GERD (gastroesophageal reflux disease)     Hepatic encephalopathy ([place] [address] )     Hypertension     last assessed 10/5/17    Polyp, sigmoid colon         Past Surgical History  Past Surgical History:   Procedure Laterality Date    COMBINED HYSTEROSCOPY DIAGNOSTIC / D&C      HYSTERECTOMY      unknown    MASTECTOMY Right 1990    MASTECTOMY           General Information:  General Information  Ordering Physician: Dr Meseret Shafer  Date of Order: 06/04/18  Date of Evaluation: 06/05/18  Type of Study: Initial MBS  Diet Prior to this Study: Mechanical soft (as of 6/4/18); at home regular/thin; but patient states she does mostly eat soft foods and drinks Ensure/Boost   Past Medical History: SOB, GERD, gastritis, mucosal abnormality of the stomach, diastolic CHF, valvular heart disease, pulmonary HTN, aortic stenosis, mitral valve stenosis, hepatic cirrhosis, leukopenia, neutropenia, left adrenal mass  Additional History: Recently admitted with altered mental status, increased ammonia level, pnemonia  Patient c/o difficulty taking large pills  Also should be noted per son; the patient had an esophageal dilation 5-6 years ago in [place]  Limitations: Limited view of esophagus  Positioning: Upright in hausted chair; lateral view  Materials Adminstered: Puree, Soft/minced, Soft solid food, Hard solid food, Mixed food/liquid, Thin liquid, Pills with thin liquid, Whole pills      Oral Stage:  Oral Stage   Oral Stage: Within Functional Limits             Pharyngeal Stage:        Pharnygeal Stage  Pharyngeal Stage Performance: Mild impaired  Aspiration Response: No aspiration observed    Swallow Mechanics  Swallowing Initiation was[de-identified] Prompt  Hyloaryngeal Excursion was[de-identified] Adequate  Epiglottic Inversion was[de-identified] Present  Tongue Drive was[de-identified] Mildly weak  Pharyngeal Constriction was[de-identified] Adequate  Cricopharyngeal Opening/Relaxation was[de-identified] Adequate         Esophageal Stage:  Esophageal Stage  Esophageal Stage: Impaired  Delayed Clearance : Distal esophagus  Concerns: Stricture present       Assessment Summary:  Assessment Summary  Assessment Summary[de-identified] Patient presents for videoswallow study alert and oriented  She is a pleasant 80year old female with recent admission with mental status change and pnemonia  The patient was never seen here by the speech department; and patient and son report no history of difficulty chewing or swallowing; but she has a h/o GERD and esophageal dilation (about 5-6 years ago)  She is able to follow commands and feed herself  She has upper and lower dentures/partial  The patient is receptive to cup and straw sips of thin liquids using single sips and consecutive sips  These were negative for oropharyngeal dysphagia  She then took whole barium pill, since this is one of her concerns at times; and this was WNL  First trial of solids was tsp of puree with double swallow used, and WFL  Second solid trial was mechanical soft via tsp was with the same result as puree trial  Patient had soft solid trial with mild residue in the valleculae secondary to reduced TBR, which was cleared with second swallow  Hard, dry regular solid trial was with mild vallecular residue  This was cleared with sip of thin liquid  All trials were with prompt swallow initiation and adequate hyolaryngeal excursion  Noted reduced clearing of bolus in thoracic esophagus at the end of the study  Possible stricture or reduced motility  Poor view of distal esophagus  No reflux observed  No penetration or aspiration observed      Diagnosis/Prognosis:  Diagnosis/Prognosis  Dysphagia Diagnosis: Mild pharyngeal stage dysphagia  Prognosis for Safe Diet Advancement: Good  Barriers to Reach Goals: Age  Individuals Consulted  Results/Reviewed with Recommendations: Patient, MD      Recommendations:  Recommendations/Treat  Primary Recommendations: Oral feeding  Diet Texture: Dysphagia 3 advanced  Liquid Consistency:  Thin  Liquid Administration: Cup, Straw  Medication Administration: As desired, As tolerated  Suggested Postioning: Upright/ 90 degrees  Suggested Precautions: Aspiration precautions, Feed fully upright position, Reflux precautions, Small frequent meals  Strategies: Double swallow, Small bites/sips, Alternate food/liquid  Dysphagia Treatment Recommendations: No  Further Evaluation by: GI

## 2018-06-05 NOTE — PHYSICAL THERAPY NOTE
PT EVAL     06/05/18 1351   Note Type   Note type Eval/Treat   Pain Assessment   Pain Assessment No/denies pain   Pain Score No Pain   Home Living   Type of [facility] One level   Bathroom Toilet Standard   Bathroom Accessibility Accessible   Home Equipment Walker   Additional Comments Has assist for 2 hrs a day, Mon-Fri   Prior Function   Level of [place] Independent with ADLs and functional mobility; Needs assistance with IADLs   Lives With Alone   Receives Help From Family;Home health   ADL Assistance Independent   IADLs Needs assistance   Comments Dtr and son-in-law stay for the weekend  Pt walks with RW  On home O2   Restrictions/Precautions   Weight Bearing Precautions Per Order No   Other Precautions Contact/isolation; Chair Alarm;O2;Fall Risk   Cognition   Overall Cognitive Status WFL   Arousal/Participation Alert   Orientation Level Oriented X4   Memory Within functional limits   Following Commands Follows all commands and directions without difficulty   RUE Assessment   RUE Assessment (See OT eval for details)   LUE Assessment   LUE Assessment (See OT eval for details)   RLE Assessment   RLE Assessment WFL   Strength RLE   RLE Overall Strength 4/5   LLE Assessment   LLE Assessment WFL   Strength LLE   LLE Overall Strength 4/5   Coordination   Movements are Fluid and Coordinated 1   Bed Mobility   Additional Comments Pt OOB in chair   Transfers   Sit to Stand 5  Supervision   Stand to Sit 5  Supervision   Ambulation/Elevation   Gait pattern (Reciprocal, decreased step length)   Gait Assistance (CGA)   Assistive Device Rolling walker   Distance 250'   Activity Tolerance   Activity Tolerance Patient limited by fatigue   Assessment   Prognosis Good   Problem List Decreased strength;Decreased endurance; Impaired balance;Decreased mobility   Assessment Pt is an 79 yo female admitted due to pnuemonia, acute encephalopathy  Pt was recently hospitalized from 5/27-5/30/18    Pt was sent home with home O2  Pt reports a high degree of anxiety related to O2 tubing  Pt lives alone during the week and reports that she does not feel strong enough to return home alone  Pt on 3L of O2 with sats 95-96%  Trialed room air while pt completed seated hip hikes, LAQs, APs 2 x 10  Sats decreased to 87-88%  Pt placed on 2L of O2 with sats returning to 93%  Pt gait trained 250' with RW  Pt was slow with gait  Required CGA for balance  Intermittent assist for safely maneuvering RW  After gait, O2 sats 88% on 2L, but increased into 90's with rest  Pt left on 3L of O2  Pt will benefit from continued PT to progress gait, transfers, balance, strengthening, monitor O2 sats, and increase safety in order to maximize function  Goals   Patient Goals To get stronger and hopefully not need O2 at home  LTG Expiration Date 06/19/18   Long Term Goal #1 Transfers - Mod I   Long Term Goal #2 Gait - 250' with RW with mod I with sats >92% on least amt of O2  Plan   Treatment/Interventions Functional transfer training;Elevations; Therapeutic exercise; Endurance training;Equipment eval/education; Bed mobility; Patient/family training;Gait training   PT Frequency (3-5 days per week, 1-2 times per day)   Recommendation   Recommendation Short-term skilled PT   PT - OK to Discharge No   Pt OOB in chair  Chair alarm in place  SCDs donned and functioning

## 2018-06-06 ENCOUNTER — HOSPITAL ENCOUNTER (INPATIENT)
Facility: HOSPITAL | Age: 83
LOS: 17 days | Discharge: HOME HEALTH | DRG: 092 | End: 2018-06-23
Attending: INTERNAL MEDICINE | Admitting: INTERNAL MEDICINE
Payer: MEDICARE

## 2018-06-06 VITALS
WEIGHT: 147.71 LBS | TEMPERATURE: 98 F | SYSTOLIC BLOOD PRESSURE: 110 MMHG | BODY MASS INDEX: 27.18 KG/M2 | DIASTOLIC BLOOD PRESSURE: 51 MMHG | HEIGHT: 62 IN | RESPIRATION RATE: 22 BRPM | OXYGEN SATURATION: 90 % | HEART RATE: 63 BPM

## 2018-06-06 LAB
ANION GAP SERPL CALCULATED.3IONS-SCNC: 1 MMOL/L (ref 4–13)
BASOPHILS # BLD AUTO: 0.04 THOUSANDS/ΜL (ref 0–0.1)
BASOPHILS NFR BLD AUTO: 1 % (ref 0–1)
BUN SERPL-MCNC: 8 MG/DL (ref 5–25)
CALCIUM SERPL-MCNC: 8.3 MG/DL (ref 8.3–10.1)
CHLORIDE SERPL-SCNC: 101 MMOL/L (ref 100–108)
CO2 SERPL-SCNC: 34 MMOL/L (ref 21–32)
CREAT SERPL-MCNC: 0.73 MG/DL (ref 0.6–1.3)
EOSINOPHIL # BLD AUTO: 0.23 THOUSAND/ΜL (ref 0–0.61)
EOSINOPHIL NFR BLD AUTO: 6 % (ref 0–6)
ERYTHROCYTE [DISTWIDTH] IN BLOOD BY AUTOMATED COUNT: 15.2 % (ref 11.6–15.1)
GFR SERPL CREATININE-BSD FRML MDRD: 74 ML/MIN/1.73SQ M
GLUCOSE SERPL-MCNC: 86 MG/DL (ref 65–140)
HCT VFR BLD AUTO: 40.8 % (ref 34.8–46.1)
HGB BLD-MCNC: 13.2 G/DL (ref 11.5–15.4)
LYMPHOCYTES # BLD AUTO: 1.3 THOUSANDS/ΜL (ref 0.6–4.47)
LYMPHOCYTES NFR BLD AUTO: 32 % (ref 14–44)
MCH RBC QN AUTO: 30.3 PG (ref 26.8–34.3)
MCHC RBC AUTO-ENTMCNC: 32.4 G/DL (ref 31.4–37.4)
MCV RBC AUTO: 94 FL (ref 82–98)
MONOCYTES # BLD AUTO: 0.77 THOUSAND/ΜL (ref 0.17–1.22)
MONOCYTES NFR BLD AUTO: 19 % (ref 4–12)
MRSA NOSE QL CULT: NORMAL
NEUTROPHILS # BLD AUTO: 1.67 THOUSANDS/ΜL (ref 1.85–7.62)
NEUTS SEG NFR BLD AUTO: 42 % (ref 43–75)
PLATELET # BLD AUTO: 161 THOUSANDS/UL (ref 149–390)
PLATELET # BLD AUTO: 170 THOUSANDS/UL (ref 149–390)
PMV BLD AUTO: 11.3 FL (ref 8.9–12.7)
PMV BLD AUTO: 11.4 FL (ref 8.9–12.7)
POTASSIUM SERPL-SCNC: 4 MMOL/L (ref 3.5–5.3)
RBC # BLD AUTO: 4.35 MILLION/UL (ref 3.81–5.12)
SODIUM SERPL-SCNC: 136 MMOL/L (ref 136–145)
VANCOMYCIN TROUGH SERPL-MCNC: 9 UG/ML (ref 10–20)
WBC # BLD AUTO: 4.01 THOUSAND/UL (ref 4.31–10.16)

## 2018-06-06 PROCEDURE — 80048 BASIC METABOLIC PNL TOTAL CA: CPT | Performed by: PHYSICIAN ASSISTANT

## 2018-06-06 PROCEDURE — G8987 SELF CARE CURRENT STATUS: HCPCS

## 2018-06-06 PROCEDURE — 97162 PT EVAL MOD COMPLEX 30 MIN: CPT

## 2018-06-06 PROCEDURE — 85049 AUTOMATED PLATELET COUNT: CPT | Performed by: INTERNAL MEDICINE

## 2018-06-06 PROCEDURE — 85025 COMPLETE CBC W/AUTO DIFF WBC: CPT | Performed by: PHYSICIAN ASSISTANT

## 2018-06-06 PROCEDURE — 97530 THERAPEUTIC ACTIVITIES: CPT

## 2018-06-06 PROCEDURE — 80202 ASSAY OF VANCOMYCIN: CPT | Performed by: PHYSICIAN ASSISTANT

## 2018-06-06 PROCEDURE — 92526 ORAL FUNCTION THERAPY: CPT | Performed by: SPEECH-LANGUAGE PATHOLOGIST

## 2018-06-06 PROCEDURE — 97116 GAIT TRAINING THERAPY: CPT

## 2018-06-06 PROCEDURE — 97167 OT EVAL HIGH COMPLEX 60 MIN: CPT

## 2018-06-06 PROCEDURE — G8988 SELF CARE GOAL STATUS: HCPCS

## 2018-06-06 PROCEDURE — 99238 HOSP IP/OBS DSCHRG MGMT 30/<: CPT | Performed by: PHYSICIAN ASSISTANT

## 2018-06-06 RX ORDER — CEFDINIR 300 MG/1
300 CAPSULE ORAL EVERY 12 HOURS SCHEDULED
Qty: 10 CAPSULE | Refills: 0 | Status: SHIPPED | OUTPATIENT
Start: 2018-06-06 | End: 2018-06-11

## 2018-06-06 RX ADMIN — LACTULOSE 20 G: 10 SOLUTION ORAL at 08:44

## 2018-06-06 RX ADMIN — PANTOPRAZOLE SODIUM 40 MG: 40 TABLET, DELAYED RELEASE ORAL at 05:53

## 2018-06-06 RX ADMIN — FUROSEMIDE 40 MG: 40 TABLET ORAL at 08:42

## 2018-06-06 RX ADMIN — VANCOMYCIN HYDROCHLORIDE 500 MG: 500 INJECTION, POWDER, LYOPHILIZED, FOR SOLUTION INTRAVENOUS at 00:19

## 2018-06-06 RX ADMIN — ENOXAPARIN SODIUM 40 MG: 40 INJECTION, SOLUTION INTRAVENOUS; SUBCUTANEOUS at 08:44

## 2018-06-06 RX ADMIN — Medication 30 MG: at 08:42

## 2018-06-06 RX ADMIN — Medication 400 MG: at 08:45

## 2018-06-06 RX ADMIN — VALSARTAN 80 MG: 80 TABLET, FILM COATED ORAL at 08:44

## 2018-06-06 RX ADMIN — ASPIRIN 81 MG 81 MG: 81 TABLET ORAL at 08:42

## 2018-06-06 RX ADMIN — RIFAXIMIN 550 MG: 550 TABLET ORAL at 08:44

## 2018-06-06 RX ADMIN — NADOLOL 20 MG: 40 TABLET ORAL at 08:41

## 2018-06-06 RX ADMIN — CEFEPIME HYDROCHLORIDE 1000 MG: 1 INJECTION, SOLUTION INTRAVENOUS at 02:21

## 2018-06-06 NOTE — ASSESSMENT & PLAN NOTE
-patient held lactulose at home due to feeling weak  -Lactulose restarted on admission  -repeat ammonia level noted to be trending down

## 2018-06-06 NOTE — DISCHARGE SUMMARY
Discharge- Mag Serra 10/24/1930, 80 y o  female MRN: [id number]    Unit/Bed#: 401-01 Encounter: [id number]    Primary Care Provider: Bari Meneses DO   Date and time admitted to hospital: 6/3/2018  7:35 PM        * Acute encephalopathy   Assessment & Plan    -Resolved, likely multifactorial in the setting of Pneumonia and hepatic encephalopathy with missing lactulose and elevated ammonia level  -CT head negative for acute findings    -Continue lactulose  Healthcare-associated pneumonia   Assessment & Plan    -Healthcare associated due to her hospitalization here from 5/27/18 to 5/30/18 for CHF, likely gram-negative pneumonia   -On admission the patient was started on IV Vancomycin and Cefepime  -MRSA nasal culture was negative and IV Vancomycin was discontinued   -Patient initially required 3L of oxygen continuously on admission, on the day of discharge the patient only required oxygen with activity  This is the patient's baseline   -the patient was discharged on Omnicef 300 mg PO BID to complete a 8 day course of antibiotics  -the patient will need to follow up with her PCP for repeat Chest x-ray in 1 month to ensure resolution of pneumonia  Diastolic CHF ([address] )   Assessment & Plan    -continue PO lasix  Hepatic cirrhosis ([facility] )   Assessment & Plan    -On admission the patient was noted to have acute encephalopathy which was likely due to her not taking her lactulose   -ammonia level was noted to be elevated and began trending down and encephalopathy resolved with restarting her lactulose  Hypertension   Assessment & Plan    - continue Diovan, Corgard, and lasix  Increased ammonia level   Assessment & Plan    -patient held lactulose at home due to feeling weak  -Lactulose restarted on admission  -repeat ammonia level noted to be trending down                      Resolved Problems  Date Reviewed: 6/6/2018    None          Admission Date:   Admission Orders Ordered        06/04/18 0113  Inpatient Admission (expected length of stay for this patient is greater than two midnights)  Once               Admitting Diagnosis: UTI (urinary tract infection) [N39 0]  Altered mental status [R41 82]  Pneumonia [J18 9]  Increased ammonia level [R79 89]    HPI: Carol Turcios is a 80 y o  female with a history of CHF with recent admission for exacerbation and cirrhosis who presents with altered mental status  Per her family she is more confused than usual and appears more weak       She is compliant with her meds, except she missed one days worth of lactulose due to feeling weak  There was a concern for increased urinary frequency by the family, but the patient denies any symptoms  She understands that she is being admitted due to being confused, but is now able to tell me where she is, who she is, and that she no longer feels confused  She denied any other complaints  Her family notes that she was coughing w/ breakfast      Procedures Performed:   Orders Placed This Encounter   Procedures    ED ECG Documentation Only       Summary of Hospital Course: Please see above assessment and plan    Significant Findings, Care, Treatment and Services Provided: ammonia 46    Complications: none    Condition at Discharge: good         Discharge instructions/Information to patient and family:   See after visit summary for information provided to patient and family  Provisions for Follow-Up Care:  See after visit summary for information related to follow-up care and any pertinent home health orders  PCP: Caleb De La Garza, DO    Disposition: Short-term rehab at [address] [place] [address] 5th floor  Planned Readmission: No    Discharge Statement   I spent 25 minutes discharging the patient  This time was spent on the day of discharge  I had direct contact with the patient on the day of discharge  Additional documentation is required if more than 30 minutes were spent on discharge  Discharge Medications:  See after visit summary for reconciled discharge medications provided to patient and family

## 2018-06-06 NOTE — PLAN OF CARE

## 2018-06-06 NOTE — ASSESSMENT & PLAN NOTE
-On admission the patient was noted to have acute encephalopathy which was likely due to her not taking her lactulose   -ammonia level was noted to be elevated and began trending down and encephalopathy resolved with restarting her lactulose

## 2018-06-06 NOTE — PHYSICAL THERAPY NOTE
PT treatment note      06/06/18 0810   Restrictions/Precautions   Other Precautions (Chair Alarm;O2;Fall Risk)   Subjective   Subjective Tired  Agreeable to therapy  Transfers   Sit to Stand 5  Supervision   Stand to Sit 5  Supervision   Ambulation/Elevation   Gait pattern Short stride   Gait Assistance (CGA)   Assistive Device Rolling walker   Distance 125' x 2 with standing rest break  (SpO2 91% during ambulation, mild SOB with 3 L)   Endurance Deficit   Endurance Deficit Yes   Activity Tolerance   Activity Tolerance Patient limited by fatigue   Assessment   Prognosis Good   Problem List Decreased strength;Decreased endurance; Impaired balance;Decreased mobility   Assessment Performing functional mobility at [facility]) level of function with RW  Decreased endurance requiring standing rest break  Mild SOB  Fair stabiltiy with RW  Pt will benefit from continued PT to progress gait, transfers, balance, strengthening, monitor O2 sats, and increase safety in order to maximize function  Plan   Treatment/Interventions Functional transfer training;LE strengthening/ROM; Therapeutic exercise; Endurance training;Bed mobility;Gait training   Progress Progressing toward goals   Recommendation   Recommendation Short-term skilled PT   Pt  In bed  with call bell within reach and alarm on at end of PT session

## 2018-06-06 NOTE — OCCUPATIONAL THERAPY NOTE
Occupational Therapy Evaluation     Patient Name: Luis CUELLAR Date: 6/6/2018  Problem List  Patient Active Problem List   Diagnosis    Shortness of breath    Pleural effusion    Hypertension    GERD without esophagitis    Gastritis    Valvular heart disease    Acute on chronic diastolic congestive heart failure (HCC)    Acute encephalopathy    Tricuspid regurgitation    Pulmonary hypertension (HCC)    Mitral valve stenosis    Aortic stenosis    Hypoalbuminemia    Hepatic coma/encephalopathy (HCC)    Hepatic cirrhosis (HCC)    Hyperbilirubinemia    Leukopenia    Neutropenia (HCC)    Transaminitis    Healthcare-associated pneumonia    Mucosal abnormality of stomach    Portal hypertension (HCC)    Left adrenal [facility] ([facility] )    Aneurysm of anterior cerebral artery    Pressure ulcer of left buttock, stage 3 (HCC)    Diastolic CHF ([facility] )    Edema    Hyperlipidemia    Pancreatic cyst    Post-menopausal osteoporosis    Pneumonia    Altered mental status    Increased ammonia level     Past Medical History  Past Medical History:   Diagnosis Date    Breast cancer ([facility] )     Cancer ([facility] )     breast    CHF (congestive heart failure) ([facility] )     Cirrhosis of liver ([facility] )     GERD (gastroesophageal reflux disease)     Hepatic encephalopathy ([facility] )     Hypertension     last assessed 10/5/17    Polyp, sigmoid colon      Past Surgical History  Past Surgical History:   Procedure Laterality Date    COMBINED HYSTEROSCOPY DIAGNOSTIC / D&C      HYSTERECTOMY      unknown    MASTECTOMY Right 1990    MASTECTOMY               06/06/18 0746   Note Type   Note type Eval/Treat   Restrictions/Precautions   Weight Bearing Precautions Per Order No   Other Precautions Contact/isolation; Chair Alarm; Bed Alarm;Multiple lines;Telemetry;O2;Fall Risk   Pain Assessment   Pain Assessment No/denies pain   Home Living   Additional Comments see PT evaluation for details    Prior Function   Comments see PT evaluation for details    Psychosocial   Psychosocial (WDL) WDL   Bed Mobility   Supine to Sit 5  Supervision   Additional items Bedrails   Sit to Supine (pt seated in chair at end of session)   Transfers   Sit to Stand 5  Supervision   Additional items (RW)   Stand to Sit 5  Supervision   Additional items (RW)   Additional Comments pt on 2L O2 throughout session; SpO2 was 91% during standing rest break in hallway   Functional Mobility   Functional Mobility 5  Supervision   Additional Comments pt performed FM c RW in hallway during session; pt with minimal SOB throughout   Additional items Rolling walker   Balance   Static Sitting Good   Dynamic Sitting Good   Static Standing Fair +   Dynamic Standing Fair +   Ambulatory Fair +   Activity Tolerance   Activity Tolerance Patient limited by fatigue   RUE Assessment   RUE Assessment WFL   LUE Assessment   LUE Assessment WFL   Hand Function   Gross Motor Coordination Functional   Fine Motor Coordination Functional   Sensation   Light Touch No apparent deficits   Sharp/Dull No apparent deficits   Cognition   Overall Cognitive Status WFL   Arousal/Participation Alert   Attention Within functional limits   Orientation Level Oriented X4   Memory Within functional limits   Following Commands Follows all commands and directions without difficulty   Assessment   Limitation Decreased ADL status; Decreased UE strength;Decreased endurance;Decreased self-care trans;Decreased high-level ADLs   Assessment pt presents at evaluation performing at overall (S) level with use of RW during FM; recommend STR on d/c as pt is a readmission from previous hospital stay and will benefit from STR prior to return home; recommend continued OT intervention during hospital admission   Goals   Short Term Goal  pt will perform UE strengthening exercises    Long Term Goal #1 pt will perform UB/LB bathing and grooming tasks at (S) level    Long Term Goal #2 pt will perform toilet transfers and adrian hygiene at (S) level    Long Term Goal pt will perform FM c RW at mod (I) level with no rest breaks    Plan   Treatment Interventions ADL retraining;Functional transfer training;UE strengthening/ROM; Endurance training;Patient/family training; Activityengagement   Goal Expiration Date 06/27/18   OT Frequency 3-5x/wk   Recommendation   OT Discharge Recommendation Short Term Rehab   OT - OK to Discharge No   Barthel Index   Feeding 10   Bathing 0   Grooming Score 0   Dressing Score 5   Bladder Score 10   Bowels Score 10   Toilet Use Score 5   Transfers (Bed/Chair) Score 10   Mobility (Level Surface) Score 10   Stairs Score 0   Barthel Index Score 60     Pt will benefit from continued OT services in order to maximize (I) c ADL performance, FM c RW, and improve overall endurance/strength required to complete functional tasks in preparation for d/c  Pt left seated in chair at end of session; all needs within reach; all lines intact; scds connected and turned on

## 2018-06-06 NOTE — RESPIRATORY THERAPY NOTE
Home Oxygen Qualifying Test       Patient name: Dallin Ashby        : 10/24/1930   Date of Test:  2018  Diagnosis:      Home Oxygen Test:    **Medicare Guidelines require item(s) 1-5 on all ambulatory patients or 1 and 2 on on-ambulatory patients  1   Baseline SPO2 on Room Air at rest 84 %  If = or < 88% on room air add O2 via NC and titrate patient  Patient must be ambulated with O2 and titrated to > 88% with exertion  2   SPO2 on Oxygen at rest 91 %  3   SPO2 during exercise on Room Air 79 %  4   SPO2 during exercise on Oxygen  90% at a liter flow of 3 lpm     5   Exercise performed:    [x] Walking     [] Stairs     [x] Duration 5 (min )     [x] Distance 50 (ft )       [x]  Supplemental Home Oxygen is indicated  []  Client does not qualify for home oxygen        Maribell Villela RT

## 2018-06-06 NOTE — PLAN OF CARE
Problem: SLP ADULT - SWALLOWING, IMPAIRED  Goal: Advance to least restrictive diet without signs or symptoms of aspiration for planned discharge setting  See evaluation for individualized goals  Patient will:    1  Tolerate Mechanical soft diet and thin liquids with no S/S of  oral/pharyngeal dysphagia across meals  2  Obtain VBS to r/o aspiration  3  Tolerate Level 3 soft diet and thin liquids with no s/s oral/pharyngeal dysphagia across meals          Outcome: Completed Date Met: 06/06/18

## 2018-06-06 NOTE — ASSESSMENT & PLAN NOTE
-Healthcare associated due to her hospitalization here from 5/27/18 to 5/30/18 for CHF, likely gram-negative pneumonia   -On admission the patient was started on IV Vancomycin and Cefepime  -MRSA nasal culture was negative and IV Vancomycin was discontinued   -Patient initially required 3L of oxygen continuously on admission, on the day of discharge the patient only required oxygen with activity  This is the patient's baseline   -the patient was discharged on Omnicef 300 mg PO BID to complete a 8 day course of antibiotics  -the patient will need to follow up with her PCP for repeat Chest x-ray in 1 month to ensure resolution of pneumonia

## 2018-06-06 NOTE — SPEECH THERAPY NOTE
Speech Language/Pathology    Speech/Language Pathology Progress Note    Patient Name: Ngiel Fernando  FQNID'W Date: 6/6/2018     Problem List  Patient Active Problem List   Diagnosis    Shortness of breath    Pleural effusion    Hypertension    GERD without esophagitis    Gastritis    Valvular heart disease    Acute on chronic diastolic congestive heart failure (HCC)    Acute encephalopathy    Tricuspid regurgitation    Pulmonary hypertension (HCC)    Mitral valve stenosis    Aortic stenosis    Hypoalbuminemia    Hepatic coma/encephalopathy (HCC)    Hepatic cirrhosis (HCC)    Hyperbilirubinemia    Leukopenia    Neutropenia (HCC)    Transaminitis    Healthcare-associated pneumonia    Mucosal abnormality of stomach    Portal hypertension (HCC)    [facility]    Aneurysm of anterior cerebral artery    Pressure ulcer of left buttock, stage 3 (HCC)    Diastolic CHF ([facility]    Edema    Hyperlipidemia    Pancreatic cyst    Post-menopausal osteoporosis    Pneumonia    Altered mental status    Increased ammonia level        Past Medical History  Past Medical History:   Diagnosis Date    Breast cancer ([facility] )     Cancer ([facility] )     breast    CHF (congestive heart failure) (HCC)     Cirrhosis of liver ([facility] )     GERD (gastroesophageal reflux disease)     Hepatic encephalopathy ([facility] )     Hypertension     last assessed 10/5/17    Polyp, sigmoid colon         Past Surgical History  Past Surgical History:   Procedure Laterality Date    COMBINED HYSTEROSCOPY DIAGNOSTIC / D&C      HYSTERECTOMY      unknown    MASTECTOMY Right 1990    MASTECTOMY           Subjective:  Patient is OOB in chair and just finished her lunch  Patient is alert and oriented, pleasant  Objective:  Nursing and patient both state no s/s on upgraded diet of Level 3 soft and thin liquids, since VBS yesterday  Patient also states she isn't having any trouble with her pills   Small pills given with water; large pills crushed in puree  Assessment:  Patient deferred any solids, as she just finished her lunch and was full; stating "everything is good and no trouble"  She did have 5 trials of thin water via straw and she was without s/s  Patient states she is happy with her diet level  Plan/Recommendations:  Recommend continue diet Level 3 soft and thin liquids  Patient is going to SNF for STR  This service is no longer warranted; as patient is tolerating current baseline diet level without s/s across meals

## 2018-06-07 PROCEDURE — 92526 ORAL FUNCTION THERAPY: CPT

## 2018-06-07 PROCEDURE — 97530 THERAPEUTIC ACTIVITIES: CPT

## 2018-06-07 PROCEDURE — 97166 OT EVAL MOD COMPLEX 45 MIN: CPT

## 2018-06-07 PROCEDURE — 97110 THERAPEUTIC EXERCISES: CPT

## 2018-06-07 PROCEDURE — 97116 GAIT TRAINING THERAPY: CPT

## 2018-06-07 PROCEDURE — 92610 EVALUATE SWALLOWING FUNCTION: CPT

## 2018-06-08 PROCEDURE — 97116 GAIT TRAINING THERAPY: CPT

## 2018-06-08 PROCEDURE — 97110 THERAPEUTIC EXERCISES: CPT

## 2018-06-08 PROCEDURE — 97535 SELF CARE MNGMENT TRAINING: CPT

## 2018-06-08 PROCEDURE — 97530 THERAPEUTIC ACTIVITIES: CPT

## 2018-06-09 LAB
BACTERIA BLD CULT: NORMAL
BACTERIA BLD CULT: NORMAL

## 2018-06-09 PROCEDURE — 97530 THERAPEUTIC ACTIVITIES: CPT

## 2018-06-09 PROCEDURE — 97116 GAIT TRAINING THERAPY: CPT

## 2018-06-11 PROCEDURE — 97530 THERAPEUTIC ACTIVITIES: CPT

## 2018-06-11 PROCEDURE — 97116 GAIT TRAINING THERAPY: CPT

## 2018-06-11 PROCEDURE — 97110 THERAPEUTIC EXERCISES: CPT

## 2018-06-12 PROCEDURE — 97116 GAIT TRAINING THERAPY: CPT

## 2018-06-12 PROCEDURE — 97110 THERAPEUTIC EXERCISES: CPT

## 2018-06-12 PROCEDURE — 97530 THERAPEUTIC ACTIVITIES: CPT

## 2018-06-12 PROCEDURE — 92526 ORAL FUNCTION THERAPY: CPT

## 2018-06-13 PROCEDURE — 97110 THERAPEUTIC EXERCISES: CPT

## 2018-06-13 PROCEDURE — 97116 GAIT TRAINING THERAPY: CPT

## 2018-06-13 PROCEDURE — 97530 THERAPEUTIC ACTIVITIES: CPT

## 2018-06-13 PROCEDURE — 92526 ORAL FUNCTION THERAPY: CPT

## 2018-06-13 NOTE — WOUND OSTOMY CARE
Progress Note - Wound   Mag Serra 80 y o  female MRN: [id number]  Unit/Bed#: [facility] 517-01 Encounter: [id number]      Assessment:   Left buttock stage 3 pressure injury, with clean, granular base  Sacrum right buttock resolved  Foam cushion on wheelchair  Briefed, incontinent of urine and bowel at times, per patient  Plan:   1  ROHO cushion  2  Accumax air mattress with air pump  3   Triad paste to left buttock stage 3 pressure injury  Apply pea sized amount twice daily and with pericare  4   Turn and reposition q 2 hours  5   Encourage weight shift q 15 minutes when OOB to chair  6   Weekly follow up with wound care RN    Pressure Ulcer 06/04/18 Buttocks Left (Active)   Staging Stage III 6/13/2018 11:00 AM   Wound Description Granulation tissue 6/13/2018 11:00 AM   Madina-wound Assessment Dry; Other (Comment) 6/13/2018 11:00 AM   Shape oval 6/13/2018 11:00 AM   Wound Length (cm) 0 4 cm 6/13/2018 11:00 AM   Wound Width (cm) 0 9 cm 6/13/2018 11:00 AM   Wound Depth (cm) 0 1 6/13/2018 11:00 AM   Calculated Wound Area (cm^2) 0 36 cm^2 6/13/2018 11:00 AM   Calculated Wound Volume (cm^3) 0 04 cm^3 6/13/2018 11:00 AM   Drainage Amount Scant 6/13/2018 11:00 AM   Drainage Description Serosanguineous 6/13/2018 11:00 AM   Treatment Turn & reposition; Other (Comment) 6/13/2018 11:00 AM   Dressing Other (Comment) 6/13/2018 11:00 AM   Number of days: 9       Shannen Kitchen RN   CWOCN  6/13/2018 11:43

## 2018-06-14 PROCEDURE — 97110 THERAPEUTIC EXERCISES: CPT

## 2018-06-14 PROCEDURE — 92526 ORAL FUNCTION THERAPY: CPT

## 2018-06-14 PROCEDURE — 97535 SELF CARE MNGMENT TRAINING: CPT

## 2018-06-14 PROCEDURE — 97116 GAIT TRAINING THERAPY: CPT

## 2018-06-14 PROCEDURE — 97530 THERAPEUTIC ACTIVITIES: CPT

## 2018-06-15 PROCEDURE — 97110 THERAPEUTIC EXERCISES: CPT

## 2018-06-15 PROCEDURE — 97530 THERAPEUTIC ACTIVITIES: CPT

## 2018-06-15 PROCEDURE — 97535 SELF CARE MNGMENT TRAINING: CPT

## 2018-06-15 PROCEDURE — 97116 GAIT TRAINING THERAPY: CPT

## 2018-06-16 PROCEDURE — 92526 ORAL FUNCTION THERAPY: CPT

## 2018-06-16 PROCEDURE — 97110 THERAPEUTIC EXERCISES: CPT

## 2018-06-16 PROCEDURE — 97530 THERAPEUTIC ACTIVITIES: CPT

## 2018-06-18 PROCEDURE — 92526 ORAL FUNCTION THERAPY: CPT

## 2018-06-18 PROCEDURE — 97116 GAIT TRAINING THERAPY: CPT

## 2018-06-18 PROCEDURE — 97110 THERAPEUTIC EXERCISES: CPT

## 2018-06-19 PROCEDURE — 97110 THERAPEUTIC EXERCISES: CPT

## 2018-06-19 PROCEDURE — 97116 GAIT TRAINING THERAPY: CPT

## 2018-06-19 PROCEDURE — 97530 THERAPEUTIC ACTIVITIES: CPT

## 2018-06-20 PROCEDURE — 97110 THERAPEUTIC EXERCISES: CPT

## 2018-06-20 PROCEDURE — 97535 SELF CARE MNGMENT TRAINING: CPT

## 2018-06-20 PROCEDURE — 97116 GAIT TRAINING THERAPY: CPT

## 2018-06-20 PROCEDURE — 97530 THERAPEUTIC ACTIVITIES: CPT

## 2018-06-20 NOTE — WOUND OSTOMY CARE
Progress Note - Wound   Linnette Labella 80 y o  female MRN: [id number]  Unit/Bed#: -79 Encounter: [id number]      Assessment:   Weekly follow up with wound care RN  Left buttock pressure injury decreased in size  Plan:   1  ROHO cushion  2  Accumax air mattress with air pump  3   Triad paste to left buttock stage 3 pressure injury  Apply pea sized amount twice daily and with pericare  4   Turn and reposition q 2 hours  5   Encourage weight shift q 15 minutes when OOB to chair  Pressure Ulcer 06/04/18 Buttocks Left (Active)   Staging Stage III 6/20/2018  9:00 AM   Wound Description Granulation tissue 6/20/2018  9:00 AM   Madina-wound Assessment Dry; Intact 6/20/2018  9:00 AM   Shape oval 6/20/2018  9:00 AM   Wound Length (cm) 0 5 cm 6/20/2018  9:00 AM   Wound Width (cm) 0 3 cm 6/20/2018  9:00 AM   Wound Depth (cm) 0 1 6/20/2018  9:00 AM   Calculated Wound Area (cm^2) 0 15 cm^2 6/20/2018  9:00 AM   Calculated Wound Volume (cm^3) 0 02 cm^3 6/20/2018  9:00 AM   Change in Wound Size % 50 6/20/2018  9:00 AM   Drainage Amount Scant 6/20/2018  9:00 AM   Drainage Description Serosanguineous 6/20/2018  9:00 AM   Treatment Turn & reposition; Other (ROHO) 6/20/2018  9:00 AM   Dressing Other (Triad paste) 6/20/2018  9:00 AM   Dressing Changed New dressing applied 6/20/2018  9:00 AM   Patient Tolerance Tolerated well 6/20/2018  9:00 AM   Dressing Status Open to air 6/6/2018  8:51 AM   Number of days: [address] RN  CWOCN  6/20/2018 10:32

## 2018-06-21 PROCEDURE — 97530 THERAPEUTIC ACTIVITIES: CPT

## 2018-06-21 PROCEDURE — 97110 THERAPEUTIC EXERCISES: CPT

## 2018-06-21 PROCEDURE — 97116 GAIT TRAINING THERAPY: CPT

## 2018-06-22 PROCEDURE — 97530 THERAPEUTIC ACTIVITIES: CPT

## 2018-06-22 PROCEDURE — 97116 GAIT TRAINING THERAPY: CPT

## 2018-06-22 PROCEDURE — 97110 THERAPEUTIC EXERCISES: CPT

## 2018-06-22 PROCEDURE — 97535 SELF CARE MNGMENT TRAINING: CPT

## 2018-06-23 ENCOUNTER — TRANSITIONAL CARE MANAGEMENT (OUTPATIENT)
Dept: INTERNAL MEDICINE CLINIC | Facility: CLINIC | Age: 83
End: 2018-06-23

## 2018-06-25 ENCOUNTER — TRANSITIONAL CARE MANAGEMENT (OUTPATIENT)
Dept: INTERNAL MEDICINE CLINIC | Facility: CLINIC | Age: 83
End: 2018-06-25

## 2018-06-29 ENCOUNTER — OFFICE VISIT (OUTPATIENT)
Dept: INTERNAL MEDICINE CLINIC | Facility: CLINIC | Age: 83
End: 2018-06-29
Payer: MEDICARE

## 2018-06-29 VITALS
TEMPERATURE: 94.9 F | WEIGHT: 155 LBS | HEART RATE: 88 BPM | OXYGEN SATURATION: 80 % | HEIGHT: 62 IN | BODY MASS INDEX: 28.52 KG/M2

## 2018-06-29 DIAGNOSIS — I05.0 RHEUMATIC MITRAL STENOSIS: ICD-10-CM

## 2018-06-29 DIAGNOSIS — I50.30 DIASTOLIC CONGESTIVE HEART FAILURE, UNSPECIFIED HF CHRONICITY (HCC): ICD-10-CM

## 2018-06-29 DIAGNOSIS — K74.69 OTHER CIRRHOSIS OF LIVER (HCC): Primary | ICD-10-CM

## 2018-06-29 DIAGNOSIS — K76.6 PORTAL HYPERTENSION (HCC): ICD-10-CM

## 2018-06-29 PROCEDURE — 99496 TRANSJ CARE MGMT HIGH F2F 7D: CPT | Performed by: INTERNAL MEDICINE

## 2018-06-29 RX ORDER — LOSARTAN POTASSIUM 50 MG/1
50 TABLET ORAL DAILY
COMMUNITY
End: 2018-07-02 | Stop reason: SDUPTHER

## 2018-06-29 NOTE — PROGRESS NOTES
Assessment/Plan:          Diagnoses and all orders for this visit:    Other cirrhosis of liver (HCC)  -     CBC and Platelet; Future  -     Comprehensive metabolic panel; Future  -     Ambulatory referral to Gastroenterology; Future    Portal hypertension (HCC)  Setup outpt Gi followup  Increase xifaxan back to bid  Continue lcatulose tid but if increasing diarrhea back down to bid    Rheumatic mitral stenosis  Cardio followup due in March/prn  No acute sxs and oxygen is palliative and helping at present    Diastolic congestive heart failure, unspecified HF chronicity (HCC)  Edema and sxs seem better controlled  She monitors weight and has been stable since home  Other orders  -     losartan (COZAAR) 50 mg tablet; Take 50 mg by mouth daily     RTO 1 month   Patient ID: Griselda Julian is a 80 y o  female  HPI   Pt status post hospital stay and str for increasing mental confusion /chf sxs  She is doing ok at home and has homecare and in home aide at times  Her family is supportive  She did not want to stay on 5th floor once rehab completed  She walks with her cane and has home oxygen which she has been using regualrly  She does not have protable oxygen but did ok today getting to appt  Appetite is fair  No chest pain or sob  No falls  She is not very active at home,tires easily  Edema is imrpoved and she has been getting very loose stools with tid lactulose  She does drink boost upt to 2/day  She is alone at night and on one floor but seems stable and no concerns per patient or son regarding this  No chest pain  She has not seen Dr Gary Cee for awhile as outpt  Review of Systems   Constitutional: Positive for activity change and appetite change  Negative for chills and fever  HENT: Negative  Eyes: Negative  Respiratory: Positive for shortness of breath  Negative for wheezing  Cardiovascular: Negative  Negative for chest pain, palpitations and leg swelling  Gastrointestinal: Positive for diarrhea  Negative for abdominal distention and abdominal pain  Endocrine: Negative  Genitourinary: Negative  Musculoskeletal: Positive for arthralgias  Skin: Positive for pallor  Allergic/Immunologic: Negative  Neurological: Positive for weakness  Hematological: Negative  Psychiatric/Behavioral: Negative  Allergies   Allergen Reactions    Amoxicillin      Social History     Social History    Marital status:      Spouse name: N/A    Number of children: N/A    Years of education: N/A     Occupational History    Not on file  Social History Main Topics    Smoking status: Former Smoker     Types: Cigarettes    Smokeless tobacco: Never Used    Alcohol use No    Drug use: No    Sexual activity: No     Other Topics Concern    Not on file     Social History Narrative    Dental care, regularly    Good dental hygiene    Sun protection sunscreen    Uses safety equipment- seatbelts         Past Medical History:   Diagnosis Date    Breast cancer ([place] [address] )     Cancer ([place] [address] )     breast    CHF (congestive heart failure) (HCC)     Cirrhosis of liver ([place] [address] )     GERD (gastroesophageal reflux disease)     Hepatic encephalopathy ([place] [address] )     Hypertension     last assessed 10/5/17    Polyp, sigmoid colon      Past Surgical History:   Procedure Laterality Date    COMBINED HYSTEROSCOPY DIAGNOSTIC / D&C      HYSTERECTOMY      unknown    MASTECTOMY Right 1990    MASTECTOMY         Vitals:    06/29/18 0944   Pulse: 88   Temp: (!) 94 9 °F (34 9 °C)   TempSrc: Tympanic   SpO2: (!) 80%   Weight: 70 3 kg (155 lb)   Height: 5' 2" (1 575 m)     /70     Physical Exam   Constitutional: She is oriented to person, place, and time  She appears well-developed and well-nourished  No distress  HENT:   Head: Normocephalic and atraumatic     Right Ear: External ear normal    Left Ear: External ear normal    Nose: Nose normal    Mouth/Throat: Oropharynx is clear and moist    Dry mucosa   Eyes: Conjunctivae and EOM are normal  Pupils are equal, round, and reactive to light  No scleral icterus  Neck: Normal range of motion  Neck supple  No JVD present  No thyromegaly present  Cardiovascular: Normal rate and intact distal pulses  Murmur heard  irreg irreg   Pulmonary/Chest: Effort normal  No respiratory distress  She has no wheezes  She has rales  She exhibits no tenderness  Abdominal: Soft  Bowel sounds are normal  She exhibits no distension and no mass  There is no tenderness  There is no rebound and no guarding  Musculoskeletal: Normal range of motion  She exhibits edema and deformity  She exhibits no tenderness  One plus ankle edema b/l  nontenderbony deformity lower legs   Lymphadenopathy:     She has no cervical adenopathy  Neurological: She is alert and oriented to person, place, and time  She displays abnormal reflex  No cranial nerve deficit  She exhibits abnormal muscle tone  Coordination abnormal    Uses cane  Needs assist at times to ambulate  Overall weaker appearing   Skin: Skin is warm and dry  She is not diaphoretic  There is pallor  Psychiatric: She has a normal mood and affect  Her behavior is normal  Judgment and thought content normal    Nursing note and vitals reviewed  Vitals:    06/29/18 0944   Pulse: 88   Temp: (!) 94 9 °F (34 9 °C)   TempSrc: Tympanic   SpO2: (!) 80%   Weight: 70 3 kg (155 lb)   Height: 5' 2" (1 575 m)       Transitional Care Management Review:  Luis Patrick is a 80 y o  female here for TCM follow up       During the TCM phone call patient stated:    Date and time hospital follow up call was made:  6/25/2018  9:57 AM  Hospital care reviewed:  Records reviewed  Patient was hopsitalized at:  [facility]  Date of admission:  6/3/18  Date of discharge:  6/23/18  Diagnosis:  CHF, shortness of breath  Disposition:  Rehabilitation center  Were the patients medicaitons reviewed and updated:  Yes  Current symptoms:  None  Post hospital issues: None  Scheduled for follow up?:  Yes  Patients specialists:  Cardiologist  Cardiologist's Name:  Dr Hang Perez  Did you obtain your prescribed medications:  Yes  Do you need help managing your perscriptions or medications:  No  Is transportation to your appointments needed:  Yes  Specify why:  age related transportation issues  I have advised the patient to call PCP with any new or worsening symptoms (please type in name along with any credentials):  McKinstry Reklaim  Living Arrangements:  Children, Family members  Support System:  Family  The type of support provided:  Physical  Do you have social support:  Yes, quite a bit  Are you recieving outpatient services:  No  Are you recieving home care services:  No  Are you using any community resources:  No  Current waiver service:  No  Have you fallen in the last 12 months:  No  Interperter language line required?:  No  Counseling:  Patient             Paige Villa, DO

## 2018-06-29 NOTE — PROGRESS NOTES
Date and time hospital follow up call was made:  6/25/2018  9:57 AM  Hospital care reviewed:  Records reviewed  Patient was hopsitalized at:  [address]  Date of admission:  6/3/18  Date of discharge:  6/23/18  Diagnosis:  CHF, shortness of breath  Disposition:  Rehabilitation center  Were the patients medicaitons reviewed and updated:  Yes  Current symptoms:  None  Post hospital issues:  None  Scheduled for follow up?:  Yes  Patients specialists:  Cardiologist  Cardiologist's Name:  Dr Tonia Hurtado  Did you obtain your prescribed medications:  Yes  Do you need help managing your perscriptions or medications:  No  Is transportation to your appointments needed:  Yes  Specify why:  age related transportation issues  I have advised the patient to call PCP with any new or worsening symptoms (please type in name along with any credentials):  Maribel Flores  Living Arrangements:  Children, Family members  Support System:  Family  The type of support provided:  Physical  Do you have social support:  Yes, quite a bit  Are you recieving outpatient services:  No  Are you recieving home care services:  No  Are you using any community resources:  No  Current waiver service:  No  Have you fallen in the last 12 months:  No  Interperter language line required?:  No  Counseling:  Patient

## 2018-07-02 DIAGNOSIS — I10 ESSENTIAL HYPERTENSION: Primary | ICD-10-CM

## 2018-07-02 RX ORDER — LOSARTAN POTASSIUM 50 MG/1
50 TABLET ORAL DAILY
Qty: 30 TABLET | Refills: 5 | Status: SHIPPED | OUTPATIENT
Start: 2018-07-02 | End: 2018-07-09 | Stop reason: SDUPTHER

## 2018-07-09 DIAGNOSIS — I10 ESSENTIAL HYPERTENSION: ICD-10-CM

## 2018-07-09 RX ORDER — LOSARTAN POTASSIUM 50 MG/1
50 TABLET ORAL DAILY
Qty: 90 TABLET | Refills: 3 | Status: SHIPPED | OUTPATIENT
Start: 2018-07-09 | End: 2018-07-23 | Stop reason: SDUPTHER

## 2018-07-09 RX ORDER — NADOLOL 20 MG/1
20 TABLET ORAL DAILY
Qty: 90 TABLET | Refills: 3 | Status: SHIPPED | OUTPATIENT
Start: 2018-07-09 | End: 2018-08-01 | Stop reason: HOSPADM

## 2018-07-10 ENCOUNTER — TELEPHONE (OUTPATIENT)
Dept: INTERNAL MEDICINE CLINIC | Facility: CLINIC | Age: 83
End: 2018-07-10

## 2018-07-12 ENCOUNTER — LAB (OUTPATIENT)
Dept: LAB | Facility: HOSPITAL | Age: 83
End: 2018-07-12
Attending: INTERNAL MEDICINE
Payer: MEDICARE

## 2018-07-12 DIAGNOSIS — K74.69 OTHER CIRRHOSIS OF LIVER (HCC): ICD-10-CM

## 2018-07-12 LAB
ALBUMIN SERPL BCP-MCNC: 1.9 G/DL (ref 3.5–5)
ALP SERPL-CCNC: 148 U/L (ref 46–116)
ALT SERPL W P-5'-P-CCNC: 35 U/L (ref 12–78)
AMMONIA PLAS-SCNC: 68 UMOL/L (ref 11–35)
ANION GAP SERPL CALCULATED.3IONS-SCNC: 2 MMOL/L (ref 4–13)
AST SERPL W P-5'-P-CCNC: 57 U/L (ref 5–45)
BILIRUB SERPL-MCNC: 1.1 MG/DL (ref 0.2–1)
BUN SERPL-MCNC: 13 MG/DL (ref 5–25)
CALCIUM SERPL-MCNC: 9.1 MG/DL (ref 8.3–10.1)
CHLORIDE SERPL-SCNC: 96 MMOL/L (ref 100–108)
CO2 SERPL-SCNC: 38 MMOL/L (ref 21–32)
CREAT SERPL-MCNC: 0.69 MG/DL (ref 0.6–1.3)
ERYTHROCYTE [DISTWIDTH] IN BLOOD BY AUTOMATED COUNT: 15.3 % (ref 11.6–15.1)
GFR SERPL CREATININE-BSD FRML MDRD: 79 ML/MIN/1.73SQ M
GLUCOSE P FAST SERPL-MCNC: 128 MG/DL (ref 65–99)
HCT VFR BLD AUTO: 41.7 % (ref 34.8–46.1)
HGB BLD-MCNC: 13.8 G/DL (ref 11.5–15.4)
MCH RBC QN AUTO: 31.5 PG (ref 26.8–34.3)
MCHC RBC AUTO-ENTMCNC: 33.1 G/DL (ref 31.4–37.4)
MCV RBC AUTO: 95 FL (ref 82–98)
PLATELET # BLD AUTO: 135 THOUSANDS/UL (ref 149–390)
PMV BLD AUTO: 11.7 FL (ref 8.9–12.7)
POTASSIUM SERPL-SCNC: 4.3 MMOL/L (ref 3.5–5.3)
PROT SERPL-MCNC: 6.6 G/DL (ref 6.4–8.2)
RBC # BLD AUTO: 4.38 MILLION/UL (ref 3.81–5.12)
SODIUM SERPL-SCNC: 136 MMOL/L (ref 136–145)
WBC # BLD AUTO: 4.32 THOUSAND/UL (ref 4.31–10.16)

## 2018-07-12 PROCEDURE — 82140 ASSAY OF AMMONIA: CPT

## 2018-07-12 PROCEDURE — 85027 COMPLETE CBC AUTOMATED: CPT

## 2018-07-12 PROCEDURE — 36415 COLL VENOUS BLD VENIPUNCTURE: CPT

## 2018-07-12 PROCEDURE — 80053 COMPREHEN METABOLIC PANEL: CPT

## 2018-07-16 DIAGNOSIS — K74.60 CIRRHOSIS OF LIVER WITHOUT ASCITES, UNSPECIFIED HEPATIC CIRRHOSIS TYPE (HCC): ICD-10-CM

## 2018-07-16 RX ORDER — LACTULOSE 10 G/15ML
SOLUTION ORAL
Qty: 1892 ML | Refills: 3 | Status: SHIPPED | OUTPATIENT
Start: 2018-07-16 | End: 2018-08-01 | Stop reason: HOSPADM

## 2018-07-23 ENCOUNTER — OFFICE VISIT (OUTPATIENT)
Dept: INTERNAL MEDICINE CLINIC | Facility: CLINIC | Age: 83
End: 2018-07-23
Payer: MEDICARE

## 2018-07-23 VITALS
DIASTOLIC BLOOD PRESSURE: 70 MMHG | WEIGHT: 146.38 LBS | BODY MASS INDEX: 26.94 KG/M2 | SYSTOLIC BLOOD PRESSURE: 128 MMHG | HEIGHT: 62 IN | OXYGEN SATURATION: 81 % | TEMPERATURE: 96.6 F | HEART RATE: 79 BPM

## 2018-07-23 DIAGNOSIS — K74.60 CIRRHOSIS OF LIVER WITHOUT ASCITES, UNSPECIFIED HEPATIC CIRRHOSIS TYPE (HCC): ICD-10-CM

## 2018-07-23 DIAGNOSIS — R79.89 INCREASED AMMONIA LEVEL: ICD-10-CM

## 2018-07-23 DIAGNOSIS — I50.30 DIASTOLIC CONGESTIVE HEART FAILURE, UNSPECIFIED HF CHRONICITY (HCC): Primary | ICD-10-CM

## 2018-07-23 DIAGNOSIS — I10 ESSENTIAL HYPERTENSION: ICD-10-CM

## 2018-07-23 DIAGNOSIS — I06.0 RHEUMATIC AORTIC STENOSIS: ICD-10-CM

## 2018-07-23 PROCEDURE — 99214 OFFICE O/P EST MOD 30 MIN: CPT | Performed by: INTERNAL MEDICINE

## 2018-07-23 RX ORDER — LOSARTAN POTASSIUM 50 MG/1
50 TABLET ORAL DAILY
Qty: 90 TABLET | Refills: 3 | Status: SHIPPED | OUTPATIENT
Start: 2018-07-23 | End: 2018-10-21

## 2018-07-23 NOTE — PATIENT INSTRUCTIONS
Ascites   WHAT YOU NEED TO KNOW:   What is ascites? Ascites is excess fluid in your lower abdomen  The fluid causes swelling  Ascites can signal a more serious problem in your body  What causes ascites? · Liver disease, such as cirrhosis or hepatitis     · Cancer     · Congestive heart failure     · Blood clots in the veins that enter and leave the liver  What are the signs and symptoms of ascites? · Rapid weight gain and swelling     · Swollen abdomen     · Shortness of breath     · Stretch marks and bulging veins on the abdomen     · Nausea     · A feeling of fullness after eating little food  How is ascites diagnosed? Your healthcare provider will ask you to lie on your back while he taps or presses on your abdomen  He will ask about your symptoms and when they started  He will ask about alcohol or IV (intravenous) drug use, sexual activity, and any blood transfusions you have had  Tell him if you have had heart problems or cancer  · Blood and urine tests:  Healthcare providers will test samples of your blood and urine to see how your liver and kidneys are working  · Abdominal ultrasound:  Gel helps your healthcare provider move a sensor over your abdomen during an ultrasound  The sensor uses sound waves to show pictures of your organs on a monitor  · CT scan:  A CT scan uses a computer to take x-rays of the organs and blood vessels in your abdomen  You may be given dye called contrast to help the pictures show up better  Tell the healthcare provider if you are allergic to iodine or seafood  You may also be allergic to the dye  · Ascites fluid test:  Healthcare providers use a needle to take a sample of fluid from your abdomen for testing  This procedure is called a fluid tap or paracentesis  Numbing medicine makes you more comfortable during this test  Tests on the fluid help find the cause of your ascites and screen for infection       · 24-hour urine collection:  You will use a container to hold all of your urine collected over 24 hours  The urine must be kept cold until it is tested  · Other tests:  Tests such as endoscopy or laparoscopy use a scope (tube) with a camera on its tip to see your lower esophagus or liver  Anesthesia medicine will keep you numb or asleep during the procedure  Healthcare providers will look for bleeding or blood clots  A tissue sample (biopsy) may be taken  How is ascites treated? Ascites treatment usually combines medicines with changes to your eating and drinking habits  Other treatments are used if your ascites does not improve or if the condition that caused the ascites is getting worse  · Medicines:      ¨ Diuretics:  Diuretics help decrease the fluid in your abdomen by causing you to urinate more often  You will lose weight as the fluid decreases  Ask your healthcare provider how much weight you should expect to lose each day  ¨ Antibiotics: These medicines are used to prevent or fight infections caused by bacteria  ¨ Vaccines or antiviral medicines:  When ascites is caused by hepatitis (a virus that attacks your liver), antiviral medicines may help prevent more damage to your liver  Healthcare providers may give you vaccines to prevent hepatitis  · Sodium and liquid restriction:      ¨ Sodium:  You must lower the amount of sodium (salt) you eat and drink to reduce the fluid in your abdomen  Sodium is in many foods, even when you cannot taste it  You may need to be on a 2-gram sodium diet, which limits your sodium to less than half a teaspoon each day  Ask your healthcare provider for more information about low-sodium diets  ¨ Liquid:  Healthcare providers may ask you to limit your liquids to about 34 ounces (about 2 pints) a day if you are not losing fluid with a low-sodium diet  · Procedures and surgeries:      ¨ Paracentesis:  Your healthcare provider drains the fluid out of your abdomen through a needle   You will receive numbing medicine before the procedure  Paracentesis can quickly remove quarts of fluid  Paracentesis may be repeated if the ascites does not respond to other treatments  ¨ Transjugular intrahepatic portosystemic shunt: This procedure is also called TIPS  TIPS can treat large ascites when you cannot have paracentesis  Your healthcare provider uses a catheter (plastic tube) to increase blood flow through your liver  This helps to reduce the fluid in your abdomen  ¨ Liver transplant:  Sometimes your liver is so damaged that a liver transplant is the only long-term treatment option  ¨ Peritoneovenous shunt: This procedure drains the fluid into a large vein to be absorbed by the body  The shunt is a tube placed in your abdomen and connected to the vein  Healthcare providers may use this procedure if you cannot have a liver transplant or other ascites treatments  What are the risks of ascites? · The excess fluid may affect your ability to breathe  Your swollen abdomen can make it hard to eat  Hernias (weak areas in the muscles on your abdomen) may form from the pressure of the fluid  Treatment can change your electrolyte (body chemical) balance  Electrolyte changes can cause confusion, drowsiness, and thinking and movement problems that may lead to coma  You may get an infection called spontaneous bacterial peritonitis in your abdomen that can be life-threatening  You will need more medicines or treatments if you develop these problems  · Even with treatment, the condition that caused your ascites may become life-threatening  Bleeding in the esophagus or liver or kidney failure can occur  How do I manage ascites? · Do not drink alcohol:  Alcohol worsens the damage to your liver  Your ascites may improve or even go away after you stop drinking  Do not take medicines that contain alcohol  Ask your healthcare provider for information if you need help to quit drinking alcohol       · Follow your low-sodium plan:  A dietitian can help you create a low-sodium diet  He may suggest lemon juice or herbs to flavor your food  Avoid salted butter or margarine, milk, cheese, and canned or frozen foods that are not made for low-salt diets  Ask your healthcare provider or dietitian before you use salt substitutes  · Write down your daily weight: You will need to track your weight at home so healthcare providers can see if treatment is working  Weigh yourself each day  Ask your healthcare provider how much weight you should be losing  Your healthcare provider will want to know if you are losing too much or too little weight  · Ask about NSAIDs:  NSAIDs are over-the-counter pain and fever medicines  You may not urinate enough to take NSAIDs safely  Ask your healthcare provider if NSAIDs are safe for you  Follow directions carefully  These medicines can cause stomach bleeding or kidney problems if they are not taken correctly  · Limit activity: Too much physical activity may make your ascites worse  Ask your healthcare provider if you have any limits to your normal daily activities  Rarely, bedrest is needed if other health problems develop with ascites  Where can I find more information? · [employer] [facility]  [address] [place] , [address]  Phone: [phone]  Web Address: [facility]  When should I contact my healthcare provider? Contact your healthcare provider if:  · You are losing more or less weight than expected  · You are urinating less than usual      · You feel dizzy or lightheaded  · You develop tiredness, dry mouth, nausea, or vomiting  · You have muscle cramps or twitches  · You have questions or concerns about your condition or care  When should I seek immediate care? Seek care immediately or call 911 if:  · You have a fever  · You feel pain in your abdomen  · You have trouble breathing       · You feel confused, faint, or lose consciousness  · You vomit blood or see blood in your bowel movement  CARE AGREEMENT:   You have the right to help plan your care  Learn about your health condition and how it may be treated  Discuss treatment options with your caregivers to decide what care you want to receive  You always have the right to refuse treatment  The above information is an  only  It is not intended as medical advice for individual conditions or treatments  Talk to your doctor, nurse or pharmacist before following any medical regimen to see if it is safe and effective for you  © 2017 [address]  Information is for End User's use only and may not be sold, redistributed or otherwise used for commercial purposes  All illustrations and images included in CareNotes® are the copyrighted property of [employer]  or Silvio Ramos

## 2018-07-25 ENCOUNTER — TELEPHONE (OUTPATIENT)
Dept: INTERNAL MEDICINE CLINIC | Facility: CLINIC | Age: 83
End: 2018-07-25

## 2018-07-26 ENCOUNTER — APPOINTMENT (EMERGENCY)
Dept: RADIOLOGY | Facility: HOSPITAL | Age: 83
DRG: 293 | End: 2018-07-26
Payer: MEDICARE

## 2018-07-26 ENCOUNTER — APPOINTMENT (EMERGENCY)
Dept: CT IMAGING | Facility: HOSPITAL | Age: 83
DRG: 293 | End: 2018-07-26
Payer: MEDICARE

## 2018-07-26 ENCOUNTER — HOSPITAL ENCOUNTER (INPATIENT)
Facility: HOSPITAL | Age: 83
LOS: 6 days | Discharge: NURSING, REHAB OR LONG-TERM CARE | DRG: 293 | End: 2018-08-01
Attending: EMERGENCY MEDICINE | Admitting: INTERNAL MEDICINE
Payer: MEDICARE

## 2018-07-26 DIAGNOSIS — R26.2 AMBULATORY DYSFUNCTION: ICD-10-CM

## 2018-07-26 DIAGNOSIS — R53.1 GENERALIZED WEAKNESS: Primary | ICD-10-CM

## 2018-07-26 DIAGNOSIS — R79.89 INCREASED AMMONIA LEVEL: ICD-10-CM

## 2018-07-26 DIAGNOSIS — I50.33 ACUTE ON CHRONIC DIASTOLIC CONGESTIVE HEART FAILURE (HCC): ICD-10-CM

## 2018-07-26 DIAGNOSIS — N17.9 AKI (ACUTE KIDNEY INJURY) (HCC): ICD-10-CM

## 2018-07-26 DIAGNOSIS — K74.60 CIRRHOSIS OF LIVER WITHOUT ASCITES, UNSPECIFIED HEPATIC CIRRHOSIS TYPE (HCC): ICD-10-CM

## 2018-07-26 LAB
ALBUMIN SERPL BCP-MCNC: 2 G/DL (ref 3.5–5)
ALP SERPL-CCNC: 157 U/L (ref 46–116)
ALT SERPL W P-5'-P-CCNC: 34 U/L (ref 12–78)
AMMONIA PLAS-SCNC: 59 UMOL/L (ref 11–35)
ANION GAP SERPL CALCULATED.3IONS-SCNC: 3 MMOL/L (ref 4–13)
APTT PPP: 39 SECONDS (ref 24–36)
AST SERPL W P-5'-P-CCNC: 43 U/L (ref 5–45)
BASOPHILS # BLD AUTO: 0.11 THOUSANDS/ΜL (ref 0–0.1)
BASOPHILS NFR BLD AUTO: 2 % (ref 0–1)
BILIRUB SERPL-MCNC: 1.4 MG/DL (ref 0.2–1)
BUN SERPL-MCNC: 23 MG/DL (ref 5–25)
CALCIUM SERPL-MCNC: 9.3 MG/DL (ref 8.3–10.1)
CHLORIDE SERPL-SCNC: 99 MMOL/L (ref 100–108)
CO2 SERPL-SCNC: 35 MMOL/L (ref 21–32)
CREAT SERPL-MCNC: 0.95 MG/DL (ref 0.6–1.3)
EOSINOPHIL # BLD AUTO: 0.09 THOUSAND/ΜL (ref 0–0.61)
EOSINOPHIL NFR BLD AUTO: 2 % (ref 0–6)
ERYTHROCYTE [DISTWIDTH] IN BLOOD BY AUTOMATED COUNT: 15.3 % (ref 11.6–15.1)
GFR SERPL CREATININE-BSD FRML MDRD: 54 ML/MIN/1.73SQ M
GLUCOSE SERPL-MCNC: 123 MG/DL (ref 65–140)
HCT VFR BLD AUTO: 40 % (ref 34.8–46.1)
HGB BLD-MCNC: 13.3 G/DL (ref 11.5–15.4)
INR PPP: 1.27 (ref 0.86–1.17)
LYMPHOCYTES # BLD AUTO: 1.16 THOUSANDS/ΜL (ref 0.6–4.47)
LYMPHOCYTES NFR BLD AUTO: 24 % (ref 14–44)
MAGNESIUM SERPL-MCNC: 2.2 MG/DL (ref 1.6–2.6)
MCH RBC QN AUTO: 32 PG (ref 26.8–34.3)
MCHC RBC AUTO-ENTMCNC: 33.3 G/DL (ref 31.4–37.4)
MCV RBC AUTO: 96 FL (ref 82–98)
MONOCYTES # BLD AUTO: 0.7 THOUSAND/ΜL (ref 0.17–1.22)
MONOCYTES NFR BLD AUTO: 15 % (ref 4–12)
NEUTROPHILS # BLD AUTO: 2.73 THOUSANDS/ΜL (ref 1.85–7.62)
NEUTS SEG NFR BLD AUTO: 57 % (ref 43–75)
PLATELET # BLD AUTO: 176 THOUSANDS/UL (ref 149–390)
PMV BLD AUTO: 12.4 FL (ref 8.9–12.7)
POTASSIUM SERPL-SCNC: 5 MMOL/L (ref 3.5–5.3)
PROT SERPL-MCNC: 7.4 G/DL (ref 6.4–8.2)
PROTHROMBIN TIME: 15.3 SECONDS (ref 11.8–14.2)
RBC # BLD AUTO: 4.15 MILLION/UL (ref 3.81–5.12)
SODIUM SERPL-SCNC: 137 MMOL/L (ref 136–145)
TROPONIN I SERPL-MCNC: <0.02 NG/ML
TSH SERPL DL<=0.05 MIU/L-ACNC: 4.49 UIU/ML (ref 0.36–3.74)
WBC # BLD AUTO: 4.79 THOUSAND/UL (ref 4.31–10.16)

## 2018-07-26 PROCEDURE — 96374 THER/PROPH/DIAG INJ IV PUSH: CPT

## 2018-07-26 PROCEDURE — 80053 COMPREHEN METABOLIC PANEL: CPT | Performed by: PHYSICIAN ASSISTANT

## 2018-07-26 PROCEDURE — 83735 ASSAY OF MAGNESIUM: CPT | Performed by: PHYSICIAN ASSISTANT

## 2018-07-26 PROCEDURE — 85730 THROMBOPLASTIN TIME PARTIAL: CPT | Performed by: PHYSICIAN ASSISTANT

## 2018-07-26 PROCEDURE — 85610 PROTHROMBIN TIME: CPT | Performed by: PHYSICIAN ASSISTANT

## 2018-07-26 PROCEDURE — 71045 X-RAY EXAM CHEST 1 VIEW: CPT

## 2018-07-26 PROCEDURE — 85025 COMPLETE CBC W/AUTO DIFF WBC: CPT | Performed by: PHYSICIAN ASSISTANT

## 2018-07-26 PROCEDURE — 93005 ELECTROCARDIOGRAM TRACING: CPT

## 2018-07-26 PROCEDURE — 36415 COLL VENOUS BLD VENIPUNCTURE: CPT | Performed by: PHYSICIAN ASSISTANT

## 2018-07-26 PROCEDURE — 70450 CT HEAD/BRAIN W/O DYE: CPT

## 2018-07-26 PROCEDURE — 84484 ASSAY OF TROPONIN QUANT: CPT | Performed by: PHYSICIAN ASSISTANT

## 2018-07-26 PROCEDURE — 99222 1ST HOSP IP/OBS MODERATE 55: CPT | Performed by: PHYSICIAN ASSISTANT

## 2018-07-26 PROCEDURE — 82140 ASSAY OF AMMONIA: CPT | Performed by: PHYSICIAN ASSISTANT

## 2018-07-26 PROCEDURE — 84443 ASSAY THYROID STIM HORMONE: CPT | Performed by: PHYSICIAN ASSISTANT

## 2018-07-26 RX ORDER — NADOLOL 20 MG/1
20 TABLET ORAL DAILY
Status: DISCONTINUED | OUTPATIENT
Start: 2018-07-27 | End: 2018-07-27

## 2018-07-26 RX ORDER — ASPIRIN 81 MG/1
81 TABLET, CHEWABLE ORAL DAILY
Status: DISCONTINUED | OUTPATIENT
Start: 2018-07-27 | End: 2018-08-01 | Stop reason: HOSPADM

## 2018-07-26 RX ORDER — PANTOPRAZOLE SODIUM 40 MG/1
40 TABLET, DELAYED RELEASE ORAL
Status: DISCONTINUED | OUTPATIENT
Start: 2018-07-27 | End: 2018-07-30

## 2018-07-26 RX ORDER — MELATONIN
1000 DAILY
Status: DISCONTINUED | OUTPATIENT
Start: 2018-07-27 | End: 2018-08-01 | Stop reason: HOSPADM

## 2018-07-26 RX ORDER — ONDANSETRON 2 MG/ML
4 INJECTION INTRAMUSCULAR; INTRAVENOUS EVERY 6 HOURS PRN
Status: DISCONTINUED | OUTPATIENT
Start: 2018-07-26 | End: 2018-08-01 | Stop reason: HOSPADM

## 2018-07-26 RX ORDER — SODIUM CHLORIDE 9 MG/ML
100 INJECTION, SOLUTION INTRAVENOUS CONTINUOUS
Status: DISCONTINUED | OUTPATIENT
Start: 2018-07-26 | End: 2018-07-27

## 2018-07-26 RX ORDER — LOSARTAN POTASSIUM 50 MG/1
50 TABLET ORAL DAILY
Status: DISCONTINUED | OUTPATIENT
Start: 2018-07-27 | End: 2018-07-31

## 2018-07-26 RX ORDER — POTASSIUM CHLORIDE 20MEQ/15ML
20 LIQUID (ML) ORAL DAILY
Status: DISCONTINUED | OUTPATIENT
Start: 2018-07-27 | End: 2018-07-28

## 2018-07-26 RX ORDER — ONDANSETRON 4 MG/1
4 TABLET, ORALLY DISINTEGRATING ORAL ONCE
Status: DISCONTINUED | OUTPATIENT
Start: 2018-07-26 | End: 2018-07-26

## 2018-07-26 RX ORDER — FUROSEMIDE 40 MG/1
40 TABLET ORAL DAILY
Status: DISCONTINUED | OUTPATIENT
Start: 2018-07-27 | End: 2018-07-28

## 2018-07-26 RX ORDER — ONDANSETRON 2 MG/ML
4 INJECTION INTRAMUSCULAR; INTRAVENOUS ONCE
Status: COMPLETED | OUTPATIENT
Start: 2018-07-26 | End: 2018-07-26

## 2018-07-26 RX ORDER — LACTULOSE 20 G/30ML
30 SOLUTION ORAL 3 TIMES DAILY
Status: DISCONTINUED | OUTPATIENT
Start: 2018-07-26 | End: 2018-07-27

## 2018-07-26 RX ADMIN — LACTULOSE 30 G: 10 SOLUTION ORAL at 22:59

## 2018-07-26 RX ADMIN — SODIUM CHLORIDE 100 ML/HR: 0.9 INJECTION, SOLUTION INTRAVENOUS at 22:57

## 2018-07-26 RX ADMIN — ONDANSETRON 4 MG: 2 INJECTION INTRAMUSCULAR; INTRAVENOUS at 19:16

## 2018-07-26 NOTE — ED NOTES
Unable to obtain IV access at this time Boris Mcdanile PA-C aware       Pegge Care, RN  07/26/18 0618

## 2018-07-26 NOTE — ED PROVIDER NOTES
History  Chief Complaint   Patient presents with    Weakness - Generalized     Patient has had generalized weakness that has been increaseing the past few days  Patient was seen at her PCP today and sent to the ED for evlauation of placement in a rehab facility  80 yr female comes to ED today pov with her son due to "can't take of herself anymore and she's home alone and I have a problem with that"  PCP front office staff did call ahead to notify us that patient would be coming here "for placement" although provider's office note from this week doesn't mention it  Pt had prior hospitalization early June 2018 for pneumonia and hepatic encephalopathy following which she was in rehab 6/6-6/23 on [facility] [place]  Pt is oriented to self and place but not really sure why she is here  She tells me she doesn't remember a lot  She is pleasant and cooperative  She denies pain dizziness vomiting chest pain shortness of breath or cough  She tells me she wears her oxygen  She tells me she has her meds in a daily sun-sat pill container that her son prepares for her  Most history is from patient's son Blake Guzman- she's confused she lives alone she doesn't remember anything she has diarrhea constantly and I don't think she can be alone anymore  Son says pcp recommended she come to ED for evaluation for placement  History provided by:  Patient, medical records and relative (pcp office)      Prior to Admission Medications   Prescriptions Last Dose Informant Patient Reported? Taking?    CORAL CALCIUM PO   Yes No   Sig: Take 1 tablet by mouth daily   Cholecalciferol (VITAMIN D-3) 1000 units CAPS   Yes No   Sig: Take 1 capsule by mouth daily   Multiple Vitamin (MULTIVITAMIN) tablet   Yes No   Sig: Take 1 tablet by mouth daily   aspirin 81 MG tablet   Yes No   Sig: Take 81 mg by mouth daily   co-enzyme Q-10 30 MG capsule   Yes No   Sig: Take 30 mg by mouth daily   esomeprazole (NexIUM) 40 MG capsule   Yes No   Sig: Take 40 mg by mouth every morning before breakfast   furosemide (LASIX) 40 mg tablet   No No   Sig: Take 1 tablet by mouth daily   Patient taking differently: Take 40 mg by mouth daily Take an extra dose for weight increase  Of 3 lbs     lactulose (CHRONULAC) 10 g/15 mL solution 7/26/2018 at 1200  No No   Sig: Take 30ml po three times daily   losartan (COZAAR) 50 mg tablet   No No   Sig: Take 1 tablet (50 mg total) by mouth daily for 90 days   magnesium oxide (MAG-OX) 400 mg   No No   Sig: Take 1 tablet by mouth daily   menthol-zinc oxide (CALMOSEPTINE) 0 44-20 6 % OINT   Yes No   Sig: Apply 1 inch topically 2 (two) times a day   nadolol (CORGARD) 20 mg tablet   No No   Sig: Take 1 tablet (20 mg total) by mouth daily for 90 days   potassium chloride (KLOR-CON) 20 mEq packet   Yes No   Sig: Take 20 mEq by mouth daily   rifaximin (XIFAXAN) 550 mg tablet   Yes No   Sig: Take 550 mg by mouth every 12 (twelve) hours        Facility-Administered Medications: None       Past Medical History:   Diagnosis Date    Breast cancer ([facility] )     Cancer ([place] [address] )     breast    CHF (congestive heart failure) (HCC)     Cirrhosis of liver (HCC)     GERD (gastroesophageal reflux disease)     Hepatic encephalopathy ([place] [address] )     Hypertension     last assessed 10/5/17    Polyp, sigmoid colon        Past Surgical History:   Procedure Laterality Date    COMBINED HYSTEROSCOPY DIAGNOSTIC / D&C      HYSTERECTOMY      unknown    MASTECTOMY Right 1990    MASTECTOMY         Family History   Problem Relation Age of Onset    Hyperlipidemia Son     Hyperlipidemia Family     Coronary artery disease Family     Depression Family     Diabetes Family     Hypertension Family     Hyperlipidemia Other      I have reviewed and agree with the history as documented      Social History   Substance Use Topics    Smoking status: Former Smoker     Types: Cigarettes    Smokeless tobacco: Never Used    Alcohol use No        Review of Systems Constitutional: Positive for fatigue  Negative for activity change, appetite change, chills, diaphoresis, fever and unexpected weight change  HENT: Negative for congestion and sore throat  Respiratory: Negative for cough and shortness of breath  Cardiovascular: Negative for chest pain and leg swelling  Gastrointestinal: Positive for diarrhea  Negative for abdominal distention, abdominal pain, blood in stool and vomiting  Genitourinary: Negative for decreased urine volume, difficulty urinating, pelvic pain and urgency  Musculoskeletal: Negative for back pain and gait problem  Skin: Negative for rash and wound  Neurological: Negative for dizziness, tremors, seizures, syncope, weakness, light-headedness, numbness and headaches  Psychiatric/Behavioral: Positive for confusion  Negative for agitation and decreased concentration  The patient is not nervous/anxious  Physical Exam  Physical Exam   Constitutional: She appears well-developed and well-nourished  No distress  HENT:   Mouth/Throat: Oropharynx is clear and moist    Dry lips   Eyes: Conjunctivae are normal  Pupils are equal, round, and reactive to light  Cardiovascular: Normal rate, regular rhythm and intact distal pulses  Pulmonary/Chest: Effort normal and breath sounds normal  She has no wheezes  She has no rales  Abdominal: Soft  Bowel sounds are normal  She exhibits no distension  There is no tenderness  Musculoskeletal: She exhibits no edema or tenderness  Neurological: She is alert  She is disoriented  She displays no tremor  No cranial nerve deficit or sensory deficit  She exhibits normal muscle tone  She displays no seizure activity  Gait (can stand/pivot requires assist for repositioning and ambulation) abnormal  GCS eye subscore is 4  GCS verbal subscore is 4  GCS motor subscore is 6  No asterixis   Skin: Skin is warm and dry  Capillary refill takes less than 2 seconds  No rash noted  She is not diaphoretic   No erythema  No pallor  Nursing note and vitals reviewed        Vital Signs  ED Triage Vitals   Temperature Pulse Respirations Blood Pressure SpO2   07/26/18 1539 07/26/18 1539 07/26/18 1539 07/26/18 1539 07/26/18 1539   97 6 °F (36 4 °C) (!) 52 20 115/51 98 %      Temp Source Heart Rate Source Patient Position - Orthostatic VS BP Location FiO2 (%)   07/26/18 1539 07/26/18 1539 07/26/18 1539 07/26/18 1539 --   Temporal Monitor Lying Left arm       Pain Score       07/26/18 1700       No Pain           Vitals:    07/27/18 0600 07/27/18 0715 07/27/18 0814 07/27/18 0836   BP: 116/56   107/60   Pulse: (!) 48 (!) 48 (!) 53    Patient Position - Orthostatic VS:    Lying       Visual Acuity  Visual Acuity      Most Recent Value   L Pupil Size (mm)  3   R Pupil Size (mm)  3          ED Medications  Medications   aspirin chewable tablet 81 mg (81 mg Oral Given 7/27/18 0949)   cholecalciferol (VITAMIN D3) tablet 1,000 Units (1,000 Units Oral Given 7/27/18 0950)   co-enzyme Q-10 capsule 30 mg (30 mg Oral Given 7/27/18 0955)   pantoprazole (PROTONIX) EC tablet 40 mg (40 mg Oral Given 7/27/18 0550)   furosemide (LASIX) tablet 40 mg (0 mg Oral Hold 7/27/18 0959)   lactulose 20 g/30 mL oral solution 30 g (30 g Oral Given 7/27/18 1000)   losartan (COZAAR) tablet 50 mg (0 mg Oral Hold 7/27/18 0958)   magnesium oxide (MAG-OX) tablet 400 mg (400 mg Oral Given 7/27/18 0950)   menthol-zinc oxide (CALMOSEPTINE) 0 44-20 6 % ointment (not administered)   potassium chloride 10 % oral solution 20 mEq (20 mEq Oral Given 7/27/18 0949)   sodium chloride 0 9 % infusion (100 mL/hr Intravenous New Bag 7/27/18 0937)   ondansetron (ZOFRAN) injection 4 mg (not administered)   enoxaparin (LOVENOX) subcutaneous injection 40 mg (40 mg Subcutaneous Given 7/27/18 7252)   ondansetron (ZOFRAN) injection 4 mg (4 mg Intravenous Given 7/26/18 1916)       Diagnostic Studies  Results Reviewed     Procedure Component Value Units Date/Time    Comprehensive metabolic panel [[id number]]  (Abnormal) Collected:  07/27/18 0649    Lab Status:  Final result Specimen:  Blood from Arm, Left Updated:  07/27/18 0724     Sodium 137 mmol/L      Potassium 4 6 mmol/L      Chloride 102 mmol/L      CO2 31 mmol/L      Anion Gap 4 mmol/L      BUN 25 mg/dL      Creatinine 0 88 mg/dL      Glucose 95 mg/dL      Calcium 9 0 mg/dL      AST 41 U/L      ALT 32 U/L      Alkaline Phosphatase 149 (H) U/L      Total Protein 6 9 g/dL      Albumin 1 8 (L) g/dL      Total Bilirubin 1 10 (H) mg/dL      eGFR 59 ml/min/1 73sq m     Narrative:         National Kidney Disease Education Program recommendations are as follows:  GFR calculation is accurate only with a steady state creatinine  Chronic Kidney disease less than 60 ml/min/1 73 sq  meters  Kidney failure less than 15 ml/min/1 73 sq  meters      Magnesium [[id number]]  (Normal) Collected:  07/27/18 0649    Lab Status:  Final result Specimen:  Blood from Arm, Left Updated:  07/27/18 0724     Magnesium 2 2 mg/dL     Phosphorus [[id number]]  (Abnormal) Collected:  07/27/18 0649    Lab Status:  Final result Specimen:  Blood from Arm, Left Updated:  07/27/18 0724     Phosphorus 4 3 (H) mg/dL     NT-BNP PRO [[id number]]  (Abnormal) Collected:  07/27/18 0649    Lab Status:  Final result Specimen:  Blood from Arm, Left Updated:  07/27/18 0724     NT-proBNP 1,077 (H) pg/mL     Protime-INR [[id number]]  (Abnormal) Collected:  07/27/18 0649    Lab Status:  Final result Specimen:  Blood from Arm, Left Updated:  07/27/18 0708     Protime 15 4 (H) seconds      INR 1 28 (H)    APTT [[id number]]  (Abnormal) Collected:  07/27/18 0649    Lab Status:  Final result Specimen:  Blood from Arm, Left Updated:  07/27/18 0708     PTT 46 (H) seconds     CBC (With Platelets) [[id number]]  (Abnormal) Collected:  07/27/18 0649    Lab Status:  Final result Specimen:  Blood from Arm, Left Updated:  07/27/18 0700     WBC 4 93 Thousand/uL      RBC 3 81 Million/uL      Hemoglobin 12 1 g/dL      Hematocrit 37 0 %      MCV 97 fL      MCH 31 8 pg      MCHC 32 7 g/dL      RDW 15 5 (H) %      Platelets 785 Thousands/uL      MPV 12 5 fL     Vitamin B12 [[id number]] Collected:  07/27/18 0649    Lab Status: In process Specimen:  Blood from Arm, Left Updated:  07/27/18 0652    Urine Microscopic [[id number]]  (Abnormal) Collected:  07/27/18 0031    Lab Status:  Final result Specimen:  Urine Updated:  07/27/18 0044     RBC, UA None Seen /hpf      WBC, UA 4-10 (A) /hpf      Epithelial Cells Occasional /hpf      Bacteria, UA Occasional /hpf      MUCOUS THREADS Occasional (A)    UA w Reflex to Microscopic w Reflex to Culture [[id number]]  (Abnormal) Collected:  07/27/18 0031    Lab Status:  Final result Specimen:  Urine Updated:  07/27/18 0033     Color, UA Yellow     Clarity, UA Slightly Cloudy     Specific [place], UA 1 010     pH, UA 7 0     Leukocytes, UA Small (A)     Nitrite, UA Negative     Protein, UA Negative mg/dl      Glucose, UA Negative mg/dl      Ketones, UA Negative mg/dl      Urobilinogen, UA 1 0 E U /dl      Bilirubin, UA Negative     Blood, UA Negative    Comprehensive metabolic panel [[id number]]  (Abnormal) Collected:  07/26/18 1625    Lab Status:  Final result Specimen:  Blood from Arm, Left Updated:  07/26/18 1703     Sodium 137 mmol/L      Potassium 5 0 mmol/L      Chloride 99 (L) mmol/L      CO2 35 (H) mmol/L      Anion Gap 3 (L) mmol/L      BUN 23 mg/dL      Creatinine 0 95 mg/dL      Glucose 123 mg/dL      Calcium 9 3 mg/dL      AST 43 U/L      ALT 34 U/L      Alkaline Phosphatase 157 (H) U/L      Total Protein 7 4 g/dL      Albumin 2 0 (L) g/dL      Total Bilirubin 1 40 (H) mg/dL      eGFR 54 ml/min/1 73sq m     Narrative:         National Kidney Disease Education Program recommendations are as follows:  GFR calculation is accurate only with a steady state creatinine  Chronic Kidney disease less than 60 ml/min/1 73 sq  meters  Kidney failure less than 15 ml/min/1 73 sq  meters      TSH [[id number]]  (Abnormal) Collected:  07/26/18 1625    Lab Status:  Final result Specimen:  Blood from Arm, Left Updated:  07/26/18 1703     TSH 3RD GENERATON 4 488 (H) uIU/mL     Narrative:         Patients undergoing fluorescein dye angiography may retain small amounts of fluorescein in the body for 48-72 hours post procedure  Samples containing fluorescein can produce falsely depressed TSH values  If the patient had this procedure,a specimen should be resubmitted post fluorescein clearance            The recommended reference ranges for TSH during pregnancy are as follows:  First trimester 0 1 to 2 5 uIU/mL  Second trimester  0 2 to 3 0 uIU/mL  Third trimester 0 3 to 3 0 uIU/m      Magnesium [[id number]]  (Normal) Collected:  07/26/18 1625    Lab Status:  Final result Specimen:  Blood from Arm, Left Updated:  07/26/18 1703     Magnesium 2 2 mg/dL     Troponin I [[id number]]  (Normal) Collected:  07/26/18 1625    Lab Status:  Final result Specimen:  Blood from Arm, Left Updated:  07/26/18 1700     Troponin I <0 02 ng/mL     Ammonia [[id number]]  (Abnormal) Collected:  07/26/18 1625    Lab Status:  Final result Specimen:  Blood from Arm, Left Updated:  07/26/18 1658     Ammonia 59 (H) umol/L     Protime-INR [[id number]]  (Abnormal) Collected:  07/26/18 1625    Lab Status:  Final result Specimen:  Blood from Arm, Left Updated:  07/26/18 1657     Protime 15 3 (H) seconds      INR 1 27 (H)    APTT [[id number]]  (Abnormal) Collected:  07/26/18 1625    Lab Status:  Final result Specimen:  Blood from Arm, Left Updated:  07/26/18 1657     PTT 39 (H) seconds     CBC and differential [[id number]]  (Abnormal) Collected:  07/26/18 1625    Lab Status:  Final result Specimen:  Blood from Arm, Left Updated:  07/26/18 1635     WBC 4 79 Thousand/uL      RBC 4 15 Million/uL      Hemoglobin 13 3 g/dL      Hematocrit 40 0 %      MCV 96 fL      MCH 32 0 pg      MCHC 33 3 g/dL      RDW 15 3 (H) %      MPV 12 4 fL      Platelets 594 Thousands/uL      Neutrophils Relative 57 %      Lymphocytes Relative 24 %      Monocytes Relative 15 (H) %      Eosinophils Relative 2 %      Basophils Relative 2 (H) %      Neutrophils Absolute 2 73 Thousands/µL      Lymphocytes Absolute 1 16 Thousands/µL      Monocytes Absolute 0 70 Thousand/µL      Eosinophils Absolute 0 09 Thousand/µL      Basophils Absolute 0 11 (H) Thousands/µL                  XR chest 1 view portable   Final Result by Pat Curtis MD (07/26 1911)      Recurrent pulmonary edema with bibasilar pleural effusions  Workstation performed: [id number]         CT head without contrast   Final Result by Eda Willoughby DO (07/26 1716)   No acute intracranial abnormality  Workstation performed: [id number]                    Procedures  ECG 12 Lead Documentation  Date/Time: 7/26/2018 3:46 PM  Performed by: Bibiana Hutchison  Authorized by: Bibiana Hutchison     Indications / Diagnosis:  Weakness  ECG reviewed by me, the ED Provider: yes    Patient location:  ED  Rate:     ECG rate:  54  Rhythm:     Rhythm: sinus bradycardia    Ectopy:     Ectopy: none    QRS:     QRS axis:  Normal  Conduction:     Conduction: abnormal      Abnormal conduction: complete RBBB    ST segments:     ST segments:  Normal  T waves:     T waves: normal             Phone Contacts  ED Phone Contact    ED Course  ED Course as of Jul 27 1010   Thu Jul 26, 2018   1644 WBC: 4 79   1644 Hemoglobin: 13 3   1644 Hematocrit: 40 0   1644 Platelets: 866   7170 INR: (!) 1 27   1717 Protime: (!) 15 3   1717 PTT: (!) 39   1717 Ammonia: (!) 59   1717 Troponin I: <0 02   1717 TSH 3RD GENERATON: (!) 4 488   1717 Magnesium: 2 2   1717 Sodium: 137   1717 Potassium: 5 0   1717 Chloride: (!) 99   1717 CO2: (!) 35   1717 Anion Gap: (!) 3   1717 BUN: 23   1717 Creatinine: 0 95   1718 Previously mid70s  Maybe mild KIMO today eGFR: 54   1816 Pt with 1 episode of vomiting after cxr  zofran ordered  Pt on bedpan right now      1834 Pt unable to urinate    1921 IV established- zofran given  65 Son requesting update if pt is admitted yet- I have reviewed her labs and imaging   Awaiting urine specimen  Awaiting call back to [facility] to discuss admit  1932 Case d/w dionna and kendal zimmerman pa-c for [facility] accepts to full inpatient  CM aware of same from my previous discussion with fransisco ADAMES  Number of Diagnoses or Management Options     Amount and/or Complexity of Data Reviewed  Clinical lab tests: ordered and reviewed  Tests in the radiology section of CPT®: ordered and reviewed  Independent visualization of images, tracings, or specimens: yes    Patient Progress  Patient progress: stable    CritCare Time    Disposition  Final diagnoses:   Generalized weakness   KIMO (acute kidney injury) ([facility] )   Ambulatory dysfunction   Increased ammonia level     Time reflects when diagnosis was documented in both MDM as applicable and the Disposition within this note     Time User Action Codes Description Comment    7/26/2018  7:29 PM Romi Clore Add [R53 1] Generalized weakness     7/26/2018  7:29 PM Romi Clore Add [N17 9] KIMO (acute kidney injury) ([facility] )     7/26/2018  7:30 PM Romi Clore Add [R26 2] Ambulatory dysfunction     7/26/2018  7:30 PM Romi Clore Add [R88 8] Positive lactulose breath hydrogen test     7/26/2018  7:30 PM Faustine Hiss [R88 8] Positive lactulose breath hydrogen test     7/26/2018  7:30 PM Romi Clore Add [R88 8] Positive lactulose breath hydrogen test     7/26/2018  7:30 PM Faustine Hiss [R88 8] Positive lactulose breath hydrogen test     7/26/2018  7:30 PM Romi Clore Add [R79 89] Increased ammonia level       ED Disposition     ED Disposition Condition Comment    Admit  Case was discussed with ANU and the patient's admission status was agreed to be Admission Status: inpatient status to the service of Dr Yvrose Hill           Follow-up Information    None         Patient's Medications   Discharge Prescriptions    No medications on file     No discharge procedures on file      ED Provider  Electronically Signed by           Evin Flores PA-C  07/26/18 [facility] RACHEL Lang  07/27/18 1018

## 2018-07-27 ENCOUNTER — APPOINTMENT (INPATIENT)
Dept: RADIOLOGY | Facility: HOSPITAL | Age: 83
DRG: 293 | End: 2018-07-27
Payer: MEDICARE

## 2018-07-27 PROBLEM — R00.1 BRADYCARDIA: Status: ACTIVE | Noted: 2018-07-27

## 2018-07-27 LAB
ALBUMIN SERPL BCP-MCNC: 1.8 G/DL (ref 3.5–5)
ALP SERPL-CCNC: 149 U/L (ref 46–116)
ALT SERPL W P-5'-P-CCNC: 32 U/L (ref 12–78)
ANION GAP SERPL CALCULATED.3IONS-SCNC: 4 MMOL/L (ref 4–13)
APTT PPP: 46 SECONDS (ref 24–36)
AST SERPL W P-5'-P-CCNC: 41 U/L (ref 5–45)
ATRIAL RATE: 54 BPM
BACTERIA UR QL AUTO: ABNORMAL /HPF
BILIRUB SERPL-MCNC: 1.1 MG/DL (ref 0.2–1)
BILIRUB UR QL STRIP: NEGATIVE
BUN SERPL-MCNC: 25 MG/DL (ref 5–25)
CALCIUM SERPL-MCNC: 9 MG/DL (ref 8.3–10.1)
CHLORIDE SERPL-SCNC: 102 MMOL/L (ref 100–108)
CLARITY UR: ABNORMAL
CO2 SERPL-SCNC: 31 MMOL/L (ref 21–32)
COLOR UR: YELLOW
CREAT SERPL-MCNC: 0.88 MG/DL (ref 0.6–1.3)
ERYTHROCYTE [DISTWIDTH] IN BLOOD BY AUTOMATED COUNT: 15.5 % (ref 11.6–15.1)
GFR SERPL CREATININE-BSD FRML MDRD: 59 ML/MIN/1.73SQ M
GLUCOSE SERPL-MCNC: 95 MG/DL (ref 65–140)
GLUCOSE UR STRIP-MCNC: NEGATIVE MG/DL
HCT VFR BLD AUTO: 37 % (ref 34.8–46.1)
HGB BLD-MCNC: 12.1 G/DL (ref 11.5–15.4)
HGB UR QL STRIP.AUTO: NEGATIVE
INR PPP: 1.28 (ref 0.86–1.17)
KETONES UR STRIP-MCNC: NEGATIVE MG/DL
LEUKOCYTE ESTERASE UR QL STRIP: ABNORMAL
MAGNESIUM SERPL-MCNC: 2.2 MG/DL (ref 1.6–2.6)
MCH RBC QN AUTO: 31.8 PG (ref 26.8–34.3)
MCHC RBC AUTO-ENTMCNC: 32.7 G/DL (ref 31.4–37.4)
MCV RBC AUTO: 97 FL (ref 82–98)
MUCOUS THREADS UR QL AUTO: ABNORMAL
NITRITE UR QL STRIP: NEGATIVE
NON-SQ EPI CELLS URNS QL MICRO: ABNORMAL /HPF
NT-PROBNP SERPL-MCNC: 1077 PG/ML
P AXIS: 24 DEGREES
PH UR STRIP.AUTO: 7 [PH] (ref 4.5–8)
PHOSPHATE SERPL-MCNC: 4.3 MG/DL (ref 2.3–4.1)
PLATELET # BLD AUTO: 163 THOUSANDS/UL (ref 149–390)
PMV BLD AUTO: 12.5 FL (ref 8.9–12.7)
POTASSIUM SERPL-SCNC: 4.6 MMOL/L (ref 3.5–5.3)
PR INTERVAL: 158 MS
PROT SERPL-MCNC: 6.9 G/DL (ref 6.4–8.2)
PROT UR STRIP-MCNC: NEGATIVE MG/DL
PROTHROMBIN TIME: 15.4 SECONDS (ref 11.8–14.2)
QRS AXIS: 66 DEGREES
QRSD INTERVAL: 128 MS
QT INTERVAL: 468 MS
QTC INTERVAL: 443 MS
RBC # BLD AUTO: 3.81 MILLION/UL (ref 3.81–5.12)
RBC #/AREA URNS AUTO: ABNORMAL /HPF
SODIUM SERPL-SCNC: 137 MMOL/L (ref 136–145)
SP GR UR STRIP.AUTO: 1.01 (ref 1–1.03)
T WAVE AXIS: -3 DEGREES
UROBILINOGEN UR QL STRIP.AUTO: 1 E.U./DL
VENTRICULAR RATE: 54 BPM
VIT B12 SERPL-MCNC: 835 PG/ML (ref 100–900)
WBC # BLD AUTO: 4.93 THOUSAND/UL (ref 4.31–10.16)
WBC #/AREA URNS AUTO: ABNORMAL /HPF

## 2018-07-27 PROCEDURE — 99285 EMERGENCY DEPT VISIT HI MDM: CPT

## 2018-07-27 PROCEDURE — 93010 ELECTROCARDIOGRAM REPORT: CPT | Performed by: INTERNAL MEDICINE

## 2018-07-27 PROCEDURE — 36415 COLL VENOUS BLD VENIPUNCTURE: CPT | Performed by: PHYSICIAN ASSISTANT

## 2018-07-27 PROCEDURE — 94760 N-INVAS EAR/PLS OXIMETRY 1: CPT

## 2018-07-27 PROCEDURE — 84100 ASSAY OF PHOSPHORUS: CPT | Performed by: PHYSICIAN ASSISTANT

## 2018-07-27 PROCEDURE — 81001 URINALYSIS AUTO W/SCOPE: CPT | Performed by: PHYSICIAN ASSISTANT

## 2018-07-27 PROCEDURE — 85027 COMPLETE CBC AUTOMATED: CPT | Performed by: PHYSICIAN ASSISTANT

## 2018-07-27 PROCEDURE — G8979 MOBILITY GOAL STATUS: HCPCS | Performed by: PHYSICAL THERAPIST

## 2018-07-27 PROCEDURE — 85730 THROMBOPLASTIN TIME PARTIAL: CPT | Performed by: PHYSICIAN ASSISTANT

## 2018-07-27 PROCEDURE — 94640 AIRWAY INHALATION TREATMENT: CPT

## 2018-07-27 PROCEDURE — G8988 SELF CARE GOAL STATUS: HCPCS

## 2018-07-27 PROCEDURE — 83880 ASSAY OF NATRIURETIC PEPTIDE: CPT | Performed by: PHYSICIAN ASSISTANT

## 2018-07-27 PROCEDURE — 97116 GAIT TRAINING THERAPY: CPT | Performed by: PHYSICAL THERAPIST

## 2018-07-27 PROCEDURE — 97167 OT EVAL HIGH COMPLEX 60 MIN: CPT

## 2018-07-27 PROCEDURE — 94664 DEMO&/EVAL PT USE INHALER: CPT

## 2018-07-27 PROCEDURE — 85610 PROTHROMBIN TIME: CPT | Performed by: PHYSICIAN ASSISTANT

## 2018-07-27 PROCEDURE — 83735 ASSAY OF MAGNESIUM: CPT | Performed by: PHYSICIAN ASSISTANT

## 2018-07-27 PROCEDURE — 97530 THERAPEUTIC ACTIVITIES: CPT

## 2018-07-27 PROCEDURE — G8987 SELF CARE CURRENT STATUS: HCPCS

## 2018-07-27 PROCEDURE — 82607 VITAMIN B-12: CPT | Performed by: PHYSICIAN ASSISTANT

## 2018-07-27 PROCEDURE — 92610 EVALUATE SWALLOWING FUNCTION: CPT

## 2018-07-27 PROCEDURE — 80053 COMPREHEN METABOLIC PANEL: CPT | Performed by: PHYSICIAN ASSISTANT

## 2018-07-27 PROCEDURE — 99232 SBSQ HOSP IP/OBS MODERATE 35: CPT | Performed by: PHYSICIAN ASSISTANT

## 2018-07-27 PROCEDURE — 97163 PT EVAL HIGH COMPLEX 45 MIN: CPT | Performed by: PHYSICAL THERAPIST

## 2018-07-27 PROCEDURE — 71045 X-RAY EXAM CHEST 1 VIEW: CPT

## 2018-07-27 PROCEDURE — G8978 MOBILITY CURRENT STATUS: HCPCS | Performed by: PHYSICAL THERAPIST

## 2018-07-27 RX ORDER — LACTULOSE 20 G/30ML
30 SOLUTION ORAL 4 TIMES DAILY
Status: DISCONTINUED | OUTPATIENT
Start: 2018-07-27 | End: 2018-08-01 | Stop reason: HOSPADM

## 2018-07-27 RX ORDER — FUROSEMIDE 10 MG/ML
20 INJECTION INTRAMUSCULAR; INTRAVENOUS ONCE
Status: COMPLETED | OUTPATIENT
Start: 2018-07-27 | End: 2018-07-27

## 2018-07-27 RX ORDER — ALBUTEROL SULFATE 2.5 MG/3ML
2.5 SOLUTION RESPIRATORY (INHALATION) ONCE
Status: COMPLETED | OUTPATIENT
Start: 2018-07-27 | End: 2018-07-27

## 2018-07-27 RX ORDER — NADOLOL 40 MG/1
20 TABLET ORAL DAILY
Status: DISCONTINUED | OUTPATIENT
Start: 2018-07-27 | End: 2018-07-27

## 2018-07-27 RX ORDER — CARVEDILOL 3.12 MG/1
3.12 TABLET ORAL 2 TIMES DAILY WITH MEALS
Status: DISCONTINUED | OUTPATIENT
Start: 2018-07-27 | End: 2018-08-01 | Stop reason: HOSPADM

## 2018-07-27 RX ADMIN — FUROSEMIDE 20 MG: 10 INJECTION, SOLUTION INTRAVENOUS at 18:46

## 2018-07-27 RX ADMIN — POTASSIUM CHLORIDE 20 MEQ: 20 SOLUTION ORAL at 09:49

## 2018-07-27 RX ADMIN — LACTULOSE 30 G: 10 SOLUTION ORAL at 17:10

## 2018-07-27 RX ADMIN — Medication 30 MG: at 09:55

## 2018-07-27 RX ADMIN — ENOXAPARIN SODIUM 40 MG: 40 INJECTION, SOLUTION INTRAVENOUS; SUBCUTANEOUS at 09:52

## 2018-07-27 RX ADMIN — ALBUTEROL SULFATE 2.5 MG: 2.5 SOLUTION RESPIRATORY (INHALATION) at 18:46

## 2018-07-27 RX ADMIN — PANTOPRAZOLE SODIUM 40 MG: 40 TABLET, DELAYED RELEASE ORAL at 05:50

## 2018-07-27 RX ADMIN — Medication 400 MG: at 09:50

## 2018-07-27 RX ADMIN — ANORECTAL OINTMENT 1 APPLICATION: 15.7; .44; 24; 20.6 OINTMENT TOPICAL at 17:17

## 2018-07-27 RX ADMIN — LACTULOSE 30 G: 10 SOLUTION ORAL at 10:00

## 2018-07-27 RX ADMIN — SODIUM CHLORIDE 100 ML/HR: 0.9 INJECTION, SOLUTION INTRAVENOUS at 09:37

## 2018-07-27 RX ADMIN — VITAMIN D, TAB 1000IU (100/BT) 1000 UNITS: 25 TAB at 09:50

## 2018-07-27 RX ADMIN — ASPIRIN 81 MG 81 MG: 81 TABLET ORAL at 09:49

## 2018-07-27 NOTE — PLAN OF CARE
Problem: SLP ADULT - SWALLOWING, IMPAIRED  Goal: Advance to least restrictive diet without signs or symptoms of aspiration for planned discharge setting  See evaluation for individualized goals  1   Patient will demonstrate tolerance of reg/thin diet      Outcome: Progressing

## 2018-07-27 NOTE — PHYSICIAN ADVISOR
Current patient class: Inpatient  The patient is currently on Hospital Day: 2 at [address]      The patient was admitted to the hospital at [address] on 7/26/18 for the following diagnosis:  Weakness [R53 1]  KIMO (acute kidney injury) ([facility] ) [N17 9]  Generalized weakness [R53 1]  Increased ammonia level [R79 89]  Ambulatory dysfunction [R26 2]       There is documentation in the medical record of an expected length of stay of at least 2 midnights  The patient is therefore expected to satisfy the 2 midnight benchmark and given the 2 midnight presumption is appropriate for INPATIENT ADMISSION  Given this expectation of a satisfying stay, CMS instructs us that the patient is most often appropriate for inpatient admission under part A provided medical necessity is documented in the chart  After review of the relevant documentation, labs, vital signs and test results, the patient is appropriate for INPATIENT ADMISSION  Admission to the hospital as an inpatient is a complex decision making process which requires the practitioner to consider the patients presenting complaint, history and physical examination and all relevant testing  With this in mind, in this case, the patient was deemed appropriate for INPATIENT ADMISSION  After review of the documentation and testing available at the time of the admission I concur with this clinical determination of medical necessity  Rationale is as follows: The patient is a 80 yrs old Female who presented to the ED at 7/26/2018  3:40 PM with a chief complaint of Weakness - Generalized (Patient has had generalized weakness that has been increaseing the past few days  Patient was seen at her PCP today and sent to the ED for evlauation of placement in a rehab facility )     The patient presented with increased weakness for the past few days  The patient had had episodes of diarrhea, likely due to lactulose   The patient is being admitted for weakness The plan of care includes IVF, B12 level, repeat labs, case mgt consultation for placement  This patient may not be safe nor stable for home discharge  This will be further evaluated during this admission  This patient is appropriate for INPATIENT admission       The patients vitals on arrival were ED Triage Vitals   Temperature Pulse Respirations Blood Pressure SpO2   07/26/18 1539 07/26/18 1539 07/26/18 1539 07/26/18 1539 07/26/18 1539   97 6 °F (36 4 °C) (!) 52 20 115/51 98 %      Temp Source Heart Rate Source Patient Position - Orthostatic VS BP Location FiO2 (%)   07/26/18 1539 07/26/18 1539 07/26/18 1539 07/26/18 1539 --   Temporal Monitor Lying Left arm       Pain Score       07/26/18 1700       No Pain           Past Medical History:   Diagnosis Date    [facility] cancer ([facility] )     Cancer ([facility] )     breast    CHF (congestive heart failure) (HCC)     Cirrhosis of liver (HCC)     GERD (gastroesophageal reflux disease)     Hepatic encephalopathy ([facility] )     Hypertension     last assessed 10/5/17    Polyp, sigmoid colon      Past Surgical History:   Procedure Laterality Date    COMBINED HYSTEROSCOPY DIAGNOSTIC / D&C      HYSTERECTOMY      unknown    MASTECTOMY Right 1990    MASTECTOMY             Consults have been placed to:   IP CONSULT TO CASE MANAGEMENT    Vitals:    07/27/18 1300 07/27/18 1330 07/27/18 1430 07/27/18 1537   BP:  105/69 (!) 102/49 114/57   BP Location:  Right arm Right arm Right arm   Pulse: (!) 48 (!) 47 (!) 47 (!) 50   Resp: 18 17 14 17   Temp:    98 3 °F (36 8 °C)   TempSrc:    Temporal   SpO2: 98% 100% 100% 100%   Weight:    69 6 kg (153 lb 7 oz)   Height:    5' 3" (1 6 m)       Most recent labs:    Recent Labs      07/26/18   1625  07/27/18   0649   WBC  4 79  4 93   HGB  13 3  12 1   HCT  40 0  37 0   PLT  176  163   K  5 0  4 6   NA  137  137   CALCIUM  9 3  9 0   BUN  23  25   CREATININE  0 95  0 88   INR  1 27*  1 28*   TROPONINI  <0 02   --    AST  43  41   ALT  34  32   ALKPHOS 157*  149*   BILITOT  1 40*  1 10*       Scheduled Meds:  Current Facility-Administered Medications:  aspirin 81 mg Oral Daily Phu Pedersen PA-C   carvedilol 3 125 mg Oral BID With Meals Vlad Neal PA-C   cholecalciferol 1,000 Units Oral Daily Phu Pedersen PA-C   co-enzyme Q-10 30 mg Oral Daily Phu Pedersen, RACHEL   enoxaparin 40 mg Subcutaneous Daily Phu Pedersen, RACHEL   furosemide 40 mg Oral Daily Phugaldino Pedersen PA-C   lactulose 30 g Oral 4x Daily Malena Gardner PA-C   losartan 50 mg Oral Daily Phu Pedersen PA-C   magnesium oxide 400 mg Oral Daily Phu Pedersen PA-C   menthol-zinc oxide  Topical BID Phu Pedersen PA-C   ondansetron 4 mg Intravenous Q6H PRN Phu Pedersen PA-C   pantoprazole 40 mg Oral Early Morning Phu Pedersen PA-C   potassium chloride 20 mEq Oral Daily Phu Pedersen PA-C   rifaximin 550 mg Oral Q12H [facility] Malena Gardner PA-C     Continuous Infusions:   PRN Meds: ondansetron    Surgical procedures (if appropriate):

## 2018-07-27 NOTE — PLAN OF CARE
Problem: OCCUPATIONAL THERAPY ADULT  Goal: Performs self-care activities at highest level of function for planned discharge setting  See evaluation for individualized goals  Treatment Interventions: ADL retraining, UE strengthening/ROM, Endurance training, Patient/family training, Cognitive reorientation, Activityengagement          See flowsheet documentation for full assessment, interventions and recommendations  Limitation: Decreased ADL status, Decreased Safe judgement during ADL, Decreased UE strength, Decreased endurance, Decreased cognition, Decreased self-care trans, Decreased high-level ADLs     Assessment: Pt is a 80 y o  female seen for OT evaluation s/p admit to [facility] on 7/26/2018 w/ Generalized weakness  Personal factors affecting pt at time of IE include:difficulty performing ADLS, difficulty performing IADLS , decreased initiation and engagement  and health management   Upon evaluation: Pt requires min-(S) level (A) 2* the following deficits impacting occupational performance: weakness, decreased strength, decreased tolerance, impaired GMC, impaired [address], decreased safety awareness and decreased ADL performance    Pt to benefit from continued skilled OT tx while in the hospital to address deficits as defined above and maximize level of functional independence w ADL's and functional mobility  Occupational Performance areas to address include: grooming, bathing/shower, toilet hygiene, dressing, socialization, functional mobility, community mobility, clothing management and social participation  From OT standpoint, recommendation at time of d/c would be short term rehab or SNF placement       OT Discharge Recommendation: Short Term Rehab

## 2018-07-27 NOTE — ASSESSMENT & PLAN NOTE
-No complaints of SOB, no lower extremity edema  -Chest X-ray shows- Recurrent pulmonary edema with bibasilar pleural effusions   -Last echo on 5/29/2018 reviewed EF at 60%  -Check BNP  -Continue home medications  -Supportive care

## 2018-07-27 NOTE — PROGRESS NOTES
Progress Note - Howie Nicholson 10/24/1930, 80 y o  female MRN: [id number]    Unit/Bed#: 678-85 Encounter: [id number]    Primary Care Provider: Lexy Gann DO   Date and time admitted to hospital: 7/26/2018  3:40 PM        * Generalized weakness   [address] in by her son with complaints of weakness  - She admits to increased weakness over past several days  - Son is concern that she is not able to care for herself and needs placement- Denies fever, chills, no leukocytosis  - Head CT shows-No acute intracranial abnormality  -IV normal saline   - B12 normal    -TSH elevated, check Free T3,T4  -Case management consult for placement        Increased ammonia level   Assessment & Plan    -Ammonia level at 59  -This appears to be chronic, however may be contributing to her generalized weakness    - Will titrate lactulose to 4 times daily  Diastolic CHF ([facility] [address] )   Assessment & Plan    -No complaints of SOB, no lower extremity edema  However, Chest X-ray shows- Recurrent pulmonary edema with bibasilar pleural effusions and BNP elevated   -Last echo on 5/29/2018 reviewed EF at 60%  NOT on diuretics at home  Will stop IV fluids  Hepatic cirrhosis (HCC)   Assessment & Plan    - Chronic condition  - Continue Lactulose  - Avoid hepatotoxic agents        GERD without esophagitis   Assessment & Plan    -Continue protonix        Hypertension   Assessment & Plan    - Blood pressure is stable  - Continue home medication        Bradycardia   Assessment & Plan    - mild Bradycardia at rate of 52 bpm present on admission  - However, patient likely on nadolol for portal hypertension    - Will start low dose Coreg with hold parameters  Hyperlipidemia   Assessment & Plan    - Hold statin therapy              VTE Pharmacologic Prophylaxis:   Pharmacologic: Enoxaparin (Lovenox)  Mechanical VTE Prophylaxis in Place: Yes        Time Spent for Care: 30 minutes    More than 50% of total time spent on counseling and coordination of care as described above  Current Length of Stay: 1 day(s)    Current Patient Status: Inpatient   Certification Statement: The patient will continue to require additional inpatient hospital stay due to need for close monitoring, PT/OT, ultimately acute rehab  Discharge Plan / Estimated Discharge Date: TBD based on clinical course  Code Status: Level 1 - Full Code      Subjective:   Patient notes generalized weakness and fatigue  No CP or SOB  Objective:   Vitals:   Temp (24hrs), Av 3 °F (36 8 °C), Min:98 3 °F (36 8 °C), Max:98 3 °F (36 8 °C)    HR:  [45-57] 50  Resp:  [14-22] 17  BP: ()/(44-69) 114/57  SpO2:  [95 %-100 %] 100 %  Body mass index is 27 18 kg/m²  Input and Output Summary (last 24 hours): Intake/Output Summary (Last 24 hours) at 18 1746  Last data filed at 18 1732   Gross per 24 hour   Intake                0 ml   Output                0 ml   Net                0 ml       Physical Exam:     Physical Exam   Constitutional: She is oriented to person, place, and time  She appears well-developed and well-nourished  HENT:   Wears glasses  Cardiovascular: Normal rate, regular rhythm and normal heart sounds  No murmur heard  Pulmonary/Chest: Effort normal and breath sounds normal  No respiratory distress  She has no wheezes  Slightly decreased at the bilateral bases  Abdominal: Soft  Bowel sounds are normal  She exhibits no distension  Musculoskeletal: She exhibits no edema  Neurological: She is alert and oriented to person, place, and time  Skin: No rash noted  She is not diaphoretic  Nursing note and vitals reviewed        Additional Data:   Labs:    Results from last 7 days  Lab Units 18  0649 18  1625   WBC Thousand/uL 4 93 4 79   HEMOGLOBIN g/dL 12 1 13 3   HEMATOCRIT % 37 0 40 0   PLATELETS Thousands/uL 163 176   NEUTROS PCT %  --  57   LYMPHS PCT %  --  24   MONOS PCT %  --  15*   EOS PCT %  -- 2       Results from last 7 days  Lab Units 07/27/18  0649   SODIUM mmol/L 137   POTASSIUM mmol/L 4 6   CHLORIDE mmol/L 102   CO2 mmol/L 31   BUN mg/dL 25   CREATININE mg/dL 0 88   CALCIUM mg/dL 9 0   TOTAL PROTEIN g/dL 6 9   BILIRUBIN TOTAL mg/dL 1 10*   ALK PHOS U/L 149*   ALT U/L 32   AST U/L 41   GLUCOSE RANDOM mg/dL 95       Results from last 7 days  Lab Units 07/27/18  0649   INR  1 28*          * I Have Reviewed All Lab Data Listed Above  * Additional Pertinent Lab Tests Reviewed: All Labs Within Last 24 Hours Reviewed    Imaging:  Imaging Reports Reviewed Today Include: CT head and chest Xray        Recent Cultures (last 7 days):         Last 24 Hours Medication List:     Current Facility-Administered Medications:  aspirin 81 mg Oral Daily Phu Pedersen, RACHEL   carvedilol 3 125 mg Oral BID With Meals Ric Mcgrath PA-C   cholecalciferol 1,000 Units Oral Daily Phu Pedersen, RACHEL   co-enzyme Q-10 30 mg Oral Daily Phu Pedersen, PA-ALESSIA   enoxaparin 40 mg Subcutaneous Daily Phu Pedersen, RACHEL   furosemide 40 mg Oral Daily Phu Pedersen, RACHEL   lactulose 30 g Oral 4x Daily Malena Gardner, PA-ALESSIA   losartan 50 mg Oral Daily Phu Pedersen, PA-ALESSIA   magnesium oxide 400 mg Oral Daily Phu Pedersen, PA-C   menthol-zinc oxide  Topical BID Phu Pedersen, PA-ALESSIA   ondansetron 4 mg Intravenous Q6H PRN Phu Pedersen, PA-ALESSIA   pantoprazole 40 mg Oral Early Morning Phu Pedersen, PA-ALESSIA   potassium chloride 20 mEq Oral Daily Phu Pedersen, RACHEL   rifaximin 550 mg Oral Q12H [facility] Ric Mcgrath PA-C        Today, Patient Was Seen By: Ric Mcgrath PA-C    ** Please Note: Dragon 360 Dictation voice to text software may have been used in the creation of this document   **

## 2018-07-27 NOTE — PHYSICAL THERAPY NOTE
PT Evaluation     07/27/18 1031   Note Type   Note type Eval/Treat   Pain Assessment   Pain Assessment No/denies pain   Pain Score No Pain   Home Living   Type of [address] One level; Able to live on main level with bedroom/bathroom; Performs ADLs on one level  (no MICHAEL )   Bathroom Shower/Tub Walk-in shower   Bathroom Toilet Raised   Bathroom Equipment Shower chair;Grab bars in shower;Grab bars around toilet   [address]  (rollator walker)   Prior Function   Level of [place] Independent with ADLs and functional mobility  ((I) ambulation with SPC)   Lives With Alone   Receives Help From Family;Friend(s)   ADL Assistance Needs assistance   IADLs Needs assistance   Comments family drives   Restrictions/Precautions   Other Precautions Multiple lines;Telemetry;O2;Fall Risk   General   Family/Caregiver Present No   Cognition   Arousal/Participation Alert   Orientation Level Oriented to person;Oriented to place  (month)   Following Commands Follows all commands and directions without difficulty   RLE Assessment   RLE Assessment WFL  (4/5)   LLE Assessment   LLE Assessment WFL  (4/5)   Coordination   Sensation WFL   Light Touch   RLE Light Touch Grossly intact   LLE Light Touch Grossly intact   Bed Mobility   Supine to Sit 4  Minimal assistance   Additional items Assist x 1;Bedrails;Verbal cues   Sit to Supine 4  Minimal assistance   Additional items Assist x 1;Verbal cues   Additional Comments pt on 2L's O2 with vital signs at rest % SpO2 HR 49 /54  after ambulation SpO2 97-99% HR 78  pt requiring verbal cues for safety and direction with unsteadiness during ambulation  Transfers   Sit to Stand (CGA with RW)   Additional items Verbal cues   Stand to Sit (CGA with RW)   Additional items Verbal cues   Stand pivot (CGA with RW)   Additional items Verbal cues   Additional Comments pt with mild unsteadiness in standing requiring RW for ambulation  Pt with unsteadiness during ambulation requiring CGA to min(A) with limited ambulation tolerance   Ambulation/Elevation   Gait pattern Narrow VESTA; Short stride   Gait Assistance 4  Minimal assist  (CGA)   Additional items Assist x 1   Assistive Device Rolling walker   Distance 100ft with RW CGA to min(A)x1 with mild unsteadiness, decreased endurance and increrased fall risk   Balance   Static Sitting Good   Dynamic Sitting Good   Static Standing Fair +  (with RW)   Dynamic Standing Fair  (with RW)   Ambulatory Fair  (with RW)   Endurance Deficit   Endurance Deficit Yes   Endurance Deficit Description limited ambulation tolerance due to weakness fatigue and unsteadiness   Activity Tolerance   Activity Tolerance Patient limited by fatigue   Assessment   Prognosis Good   Problem List Decreased strength;Decreased endurance; Impaired balance;Decreased mobility; Decreased safety awareness;Decreased cognition   Assessment Pt is an 80year old female presenting with generalized weakness decreased safety awareness balance endurance and functional mobility requiring CGA to min(A) for all bed mobility transfers and ambulation  Pt requiring verbal cues for safety and direction and is unsafe to return home due to unsteadiness and increased risk for falls  Pt is in need of continued activity in PT to improve all impairments and functional deficits to maximize current LOF  Pt will require short term rehab when medically stable for discharge  Goals   Patient Goals To return home   LTG Expiration Date 08/10/18   Long Term Goal #1 (S) with all bed mobility and transfers with RW   Long Term Goal #2 Pt will ambulate 150ft with RW household distance with (S) no LOB to ensure safe return home which is pt's goal    Plan   Treatment/Interventions Functional transfer training;LE strengthening/ROM; Therapeutic exercise; Endurance training;Patient/family training;Bed mobility;Gait training   PT Frequency (3-5x/wk)   Recommendation Recommendation Short-term skilled PT   PT - OK to Discharge Yes   Seen in ED no SCD's  Pt returned to supine on stretcher with call bell in reach

## 2018-07-27 NOTE — ASSESSMENT & PLAN NOTE
-Bradycardia at rate of 52 bpm  present on admission  -blood pressure stable, no chest pain or SOB  -Telemetry monitoring  -Hold beta blocker  -Continue IV fluids

## 2018-07-27 NOTE — ASSESSMENT & PLAN NOTE
-No complaints of SOB, no lower extremity edema  However, Chest X-ray shows- Recurrent pulmonary edema with bibasilar pleural effusions and BNP elevated   -Last echo on 5/29/2018 reviewed EF at 60%  NOT on diuretics at home  Will stop IV fluids

## 2018-07-27 NOTE — PLAN OF CARE
Problem: SLP ADULT - SWALLOWING, IMPAIRED  Goal: Initial SLP swallow eval performed  Outcome: Completed Date Met: 07/27/18

## 2018-07-27 NOTE — SPEECH THERAPY NOTE
Speech Language/Pathology  Speech/Language Pathology  Assessment    Patient Name: Rebekah REYNOLDS Date: 7/27/2018     Problem List  Patient Active Problem List   Diagnosis    Shortness of breath    Pleural effusion    Hypertension    GERD without esophagitis    Gastritis    Valvular heart disease    Acute on chronic diastolic congestive heart failure (HCC)    Acute encephalopathy    Tricuspid regurgitation    Pulmonary hypertension (HCC)    Mitral valve stenosis    Aortic stenosis    Hypoalbuminemia    Hepatic coma/encephalopathy (HCC)    Hepatic cirrhosis (HCC)    Hyperbilirubinemia    Leukopenia    Neutropenia (HCC)    Transaminitis    Healthcare-associated pneumonia    Mucosal abnormality of stomach    [facility] ([facility] [address] [facility]    Left adrenal mass ([facility] [address] )    Aneurysm of anterior cerebral artery    Pressure ulcer of left buttock, stage 3 (HCC)    Diastolic CHF ([facility] [address] )    Edema    Hyperlipidemia    Pancreatic cyst    Post-menopausal osteoporosis    Pneumonia    Altered mental status    Increased ammonia level    Generalized weakness    Bradycardia     Past Medical History  Past Medical History:   Diagnosis Date    Breast cancer ([facility] [address] )     Cancer [facility]     breast    CHF (congestive heart failure) (HCC)     Cirrhosis of liver ([facility] [address] )     GERD (gastroesophageal reflux disease)     Hepatic encephalopathy ([facility]     Hypertension     last assessed 10/5/17    Polyp, sigmoid colon      Past Surgical History  Past Surgical History:   Procedure Laterality Date    COMBINED HYSTEROSCOPY DIAGNOSTIC / D&C      HYSTERECTOMY      unknown    MASTECTOMY Right 1990    MASTECTOMY          07/27/18 1600   Patient Information   Past Medical History GERD, hx of pneumonia   Swallow Information   Current Risks for Dysphagia & Aspiration Other (Comment)  (Confusion)   Current Diet Regular; Thin liquid   Baseline Diet Regular; Thin liquids   Baseline Assessment   Behavior/Cognition Alert; Cooperative   Patient Positioning Partially reclined  (In bed )   Consistencies Assessed and Performance   Materials Admnistered Thin liquid   Oral Stage WFL   Phargngeal Stage WFL   Summary   Swallow Summary Patient was very confused and disoriented to situation upon clinician arrival   Patient laying flat on her bed, head off the side of the pillow, covers at her feet, and patient reporting "can you cover me up?"  Patient repositioned to pillow, centered on bed, legs lowered and covered as well as sat up in bed  Patient tolerated thin via straw but unable to assess solids 2' confusion  Patient will be followed as she gets a bed on the floor  Patient was admitted for evaluation of placement  Recommendations   Risk for Aspiration Mild   Recommendations Consider oral diet   Diet Solid Recommendation Regular consistency   Diet Liquid Recommendation Thin liquid   Recommended Form of Meds Whole; With thin liquid; With puree   General Precautions Aspiration precautions;Minimize distractions;Upright as possible for all oral intake;Remain upright for 45 mins after meals; Supervision with meals   Results Reviewed with RN;PT/Family/Caregiver   Treatment Recommendations   Duration of treatment (Determine safety of PO diet )   Follow up treatments Assure diet tolerance   Speech Therapy Prognosis   Prognosis Guarded

## 2018-07-27 NOTE — PLAN OF CARE
Problem: Potential for Falls  Goal: Patient will remain free of falls  INTERVENTIONS:  - Assess patient frequently for physical needs  -  Identify cognitive and physical deficits and behaviors that affect risk of falls    -  [place] fall precautions as indicated by assessment   - Educate patient/family on patient safety including physical limitations  - Instruct patient to call for assistance with activity based on assessment  - Modify environment to reduce risk of injury  - Consider OT/PT consult to assist with strengthening/mobility   Outcome: Progressing      Problem: Prexisting or High Potential for Compromised Skin Integrity  Goal: Skin integrity is maintained or improved  INTERVENTIONS:  - Identify patients at risk for skin breakdown  - Assess and monitor skin integrity  - Assess and monitor nutrition and hydration status  - Monitor labs (i e  albumin)  - Assess for incontinence   - Turn and reposition patient  - Assist with mobility/ambulation  - Relieve pressure over bony prominences  - Avoid friction and shearing  - Provide appropriate hygiene as needed including keeping skin clean and dry  - Evaluate need for skin moisturizer/barrier cream  - Collaborate with interdisciplinary team (i e  Nutrition, Rehabilitation, etc )   - Patient/family teaching   Outcome: Progressing      Problem: PAIN - ADULT  Goal: Verbalizes/displays adequate comfort level or baseline comfort level  Interventions:  - Encourage patient to monitor pain and request assistance  - Assess pain using appropriate pain scale  - Administer analgesics based on type and severity of pain and evaluate response  - Implement non-pharmacological measures as appropriate and evaluate response  - Consider cultural and social influences on pain and pain management  - Notify physician/advanced practitioner if interventions unsuccessful or patient reports new pain  Outcome: Progressing      Problem: INFECTION - ADULT  Goal: Absence or prevention of progression during hospitalization  INTERVENTIONS:  - Assess and monitor for signs and symptoms of infection  - Monitor lab/diagnostic results  - Monitor all insertion sites, i e  indwelling lines, tubes, and drains  - Monitor endotracheal (as able) and nasal secretions for changes in amount and color  - [place] appropriate cooling/warming therapies per order  - Administer medications as ordered  - Instruct and encourage patient and family to use good hand hygiene technique  - Identify and instruct in appropriate isolation precautions for identified infection/condition  Outcome: Progressing      Problem: SAFETY ADULT  Goal: Maintain or return to baseline ADL function  INTERVENTIONS:  -  Assess patient's ability to carry out ADLs; assess patient's baseline for ADL function and identify physical deficits which impact ability to perform ADLs (bathing, care of mouth/teeth, toileting, grooming, dressing, etc )  - Assess/evaluate cause of self-care deficits   - Assess range of motion  - Assess patient's mobility; develop plan if impaired  - Assess patient's need for assistive devices and provide as appropriate  - Encourage maximum independence but intervene and supervise when necessary  ¯ Involve family in performance of ADLs  ¯ Assess for home care needs following discharge   ¯ Request OT consult to assist with ADL evaluation and planning for discharge  ¯ Provide patient education as appropriate  Outcome: Progressing    Goal: Maintain or return mobility status to optimal level  INTERVENTIONS:  - Assess patient's baseline mobility status (ambulation, transfers, stairs, etc )    - Identify cognitive and physical deficits and behaviors that affect mobility  - Identify mobility aids required to assist with transfers and/or ambulation (gait belt, sit-to-stand, lift, walker, cane, etc )  - [place] fall precautions as indicated by assessment  - Record patient progress and toleration of activity level on Mobility SBAR; progress patient to next Phase/Stage  - Instruct patient to call for assistance with activity based on assessment  - Request Rehabilitation consult to assist with strengthening/weightbearing, etc   Outcome: Progressing      Problem: DISCHARGE PLANNING  Goal: Discharge to home or other facility with appropriate resources  INTERVENTIONS:  - Identify barriers to discharge w/patient and caregiver  - Arrange for needed discharge resources and transportation as appropriate  - Identify discharge learning needs (meds, wound care, etc )  - Arrange for interpretive services to assist at discharge as needed  - Refer to Case Management Department for coordinating discharge planning if the patient needs post-hospital services based on physician/advanced practitioner order or complex needs related to functional status, cognitive ability, or social support system  Outcome: Progressing      Problem: Knowledge Deficit  Goal: Patient/family/caregiver demonstrates understanding of disease process, treatment plan, medications, and discharge instructions  Complete learning assessment and assess knowledge base    Interventions:  - Provide teaching at level of understanding  - Provide teaching via preferred learning methods  Outcome: Progressing

## 2018-07-27 NOTE — ASSESSMENT & PLAN NOTE
-Brought in by her son with complaints of weakness  -Son is concern that she is not able to care for herself and needs placement  -She admits to increased weakness over past several days  -Denies fever, chills, no leukocytosis  -Head CT shows-No acute intracranial abnormality   -Admit to Med/surg  -IV normal saline  -Check B12  -TSH elevated, check Free T3,T4  -AM labs  -Case management consult for placement  -Supportive care

## 2018-07-27 NOTE — SOCIAL WORK
Case management met with the patient to assess the prior level of function and form a safe d/c plan  The patient was given and I reviewed the case management  discharge checklist with the patient  The patient is aware that the d/c planning starts on the day of admission and that if she  has any questions or needs they  is to contact case management  I spoke with the patient and she does not want to go to [facility] and she told me that she will go to the fifth floor for rehab I sent a referral to them and they have no bed   The patient is at home with her son and daughter and she is home alone during the week  I spoke with the patients family and he told me that she was never at [facility] and she is getting it mixed up with hometown  They asked me to keep the patient until a bed opens on the fifth floor and I told him that we cannot  He will talk to his sister and get back to me

## 2018-07-27 NOTE — ASSESSMENT & PLAN NOTE
- mild Bradycardia at rate of 52 bpm present on admission  - However, patient likely on nadolol for portal hypertension    - Will start low dose Coreg with hold parameters

## 2018-07-27 NOTE — ASSESSMENT & PLAN NOTE
- Brought in by her son with complaints of weakness  - She admits to increased weakness over past several days  - Son is concern that she is not able to care for herself and needs placement- Denies fever, chills, no leukocytosis  - Head CT shows-No acute intracranial abnormality    -IV normal saline   - B12 normal    -TSH elevated, check Free T3,T4  -Case management consult for placement

## 2018-07-27 NOTE — ASSESSMENT & PLAN NOTE
-Ammonia level at 59  -This appears to be chronic, however may be contributing to her generalized weakness    - Will titrate lactulose to 4 times daily

## 2018-07-27 NOTE — ED NOTES
Patient dangling at bedside eating lunch  Offering no complaints, call bell in reach  Patient door left open        Mckayla Devine RN  07/27/18 9978

## 2018-07-27 NOTE — H&P
[facility] Internal Medicine  H&P- Calospato Menezes 10/24/1930, 80 y o  female MRN: [id number]    Unit/Bed#: [facility] Encounter: [id number]    Primary Care Provider: Mariay Butler DO   Date and time admitted to hospital: 7/26/2018  3:40 PM        * Generalized weakness   Assessment & Plan    -Brought in by her so with complaints of weakness  -Son is concern that she is not able to care for herself and needs placement  -She admits to increased weakness over past several days  -Denies fever, chills, no leukocytosis  -Head CT shows-No acute intracranial abnormality   -Admit to Med/surg  -IV normal saline  -Check B12  -TSH elevated, check Free T3,T4  -AM labs  -Case management consult for placement  -Supportive care        Diastolic CHF [facility])   Assessment & Plan    -No complaints of SOB, no lower extremity edema  -Chest X-ray shows- Recurrent pulmonary edema with bibasilar pleural effusions   -Last echo on 5/29/2018 reviewed EF at 60%  -Check BNP  -Continue home medications  -Supportive care        Increased ammonia level   Assessment & Plan    -Ammonia level at 59  -This appears to be chronic as iot has been elevated over past 8 months  -Probably secondary to chronic liver disease  -Continue lactulose        Hepatic cirrhosis (HCC)   Assessment & Plan    -Chronic condition  -Continue Lactulose  -Avoid hepatotoxic agents        GERD without esophagitis   Assessment & Plan    -Continue protonix        Hypertension   Assessment & Plan    -Blood pressure is stable  -Continue home medication        Hyperlipidemia   Assessment & Plan    -Hold statin therapy                  VTE Prophylaxis: Enoxaparin (Lovenox)  / sequential compression device   Code Status: level 1 Full code  POLST: POLST form is not discussed and not completed at this time  Discussion with family: None    Anticipated Length of Stay:  Patient will be admitted on an Inpatient basis with an anticipated length of stay of  > 2 midnights     Justification for Hospital Stay: Generalized weakness  Total Time for Visit, including Counseling / Coordination of Care: 30 minutes  Greater than 50% of this total time spent on direct patient counseling and coordination of care  Chief Complaint:   Generalized weakness    History of Present Illness:    Randy Olvera is a 80 y o  female who presents to ER with complaints of generalized weakness for the past few days  She was seen by her PCP today and was sent to ER for evaluation and possible placement to rehab facility  Her son is concerned that she lives alone and  is not able to care for herself  She denies chest pain SOB, abdominal pain, fever, chills  N/V  She does admit to episodes of diarrhea, which she attributes to her taking daily lactulose  Review of Systems:    Review of Systems   Constitutional: Positive for appetite change and fatigue  HENT: Negative for sinus pressure, sore throat, trouble swallowing and voice change  Eyes: Negative for photophobia, pain and visual disturbance  Respiratory: Negative for chest tightness, shortness of breath and wheezing  Cardiovascular: Negative for chest pain, palpitations and leg swelling  Gastrointestinal: Positive for diarrhea  Negative for abdominal pain, nausea and vomiting  Endocrine: Negative  Genitourinary: Negative for dysuria, flank pain, frequency and hematuria  Musculoskeletal: Positive for gait problem  Skin: Negative for color change, pallor and rash  Allergic/Immunologic: Negative  Neurological: Positive for weakness  Negative for dizziness, syncope, light-headedness and headaches  Hematological: Negative  Psychiatric/Behavioral: Negative for agitation, confusion and sleep disturbance  The patient is not nervous/anxious          Past Medical and Surgical History:     Past Medical History:   Diagnosis Date    Breast cancer ([facility] )     Cancer ([facility] )     breast    CHF (congestive heart failure) ([facility] )     Cirrhosis of liver ([facility] )  GERD (gastroesophageal reflux disease)     Hepatic encephalopathy (HCC)     Hypertension     last assessed 10/5/17    Polyp, sigmoid colon        Past Surgical History:   Procedure Laterality Date    COMBINED HYSTEROSCOPY DIAGNOSTIC / D&C      HYSTERECTOMY      unknown    MASTECTOMY Right 1990    MASTECTOMY         Meds/Allergies:    Prior to Admission medications    Medication Sig Start Date End Date Taking?  Authorizing Provider   aspirin 81 MG tablet Take 81 mg by mouth daily    Historical Provider, MD   Cholecalciferol (VITAMIN D-3) 1000 units CAPS Take 1 capsule by mouth daily    Historical Provider, MD   co-enzyme Q-10 30 MG capsule Take 30 mg by mouth daily    Historical Provider, MD   CORAL CALCIUM PO Take 1 tablet by mouth daily    Historical Provider, MD   esomeprazole (NexIUM) 40 MG capsule Take 40 mg by mouth every morning before breakfast    Historical Provider, MD   furosemide (LASIX) 40 mg tablet Take 1 tablet by mouth daily  Patient taking differently: Take 40 mg by mouth daily Take an extra dose for weight increase  Of 3 lbs   3/22/17   [employer], DO   lactulose (CHRONULAC) 10 g/15 mL solution Take 30ml po three times daily 7/16/18 8/15/18  Lexy Gann DO   losartan (COZAAR) 50 mg tablet Take 1 tablet (50 mg total) by mouth daily for 90 days 7/23/18 10/21/18  Lexy Gann DO   magnesium oxide (MAG-OX) 400 mg Take 1 tablet by mouth daily 3/21/17   [employer] DO   menthol-zinc oxide (CALMOSEPTINE) 0 44-20 6 % OINT Apply 1 inch topically 2 (two) times a day 10/5/17   Historical Provider, MD   Multiple Vitamin (MULTIVITAMIN) tablet Take 1 tablet by mouth daily    Historical Provider, MD   nadolol (CORGARD) 20 mg tablet Take 1 tablet (20 mg total) by mouth daily for 90 days 7/9/18 10/7/18  Lexy Gann DO   potassium chloride (KLOR-CON) 20 mEq packet Take 20 mEq by mouth daily    Historical Provider, MD   rifaximin (XIFAXAN) 550 mg tablet Take 550 mg by mouth every 12 (twelve) hours      Historical Provider, MD     I have reviewed home medications using allscripts  Allergies: Allergies   Allergen Reactions    Amoxicillin        Social History:     Marital Status:    Occupation: Retired  Patient Pre-hospital Living Situation: Lives alone  Patient Pre-hospital Level of Mobility: Active  Patient Pre-hospital Diet Restrictions: None reported  Substance Use History:   History   Alcohol Use No     History   Smoking Status    Former Smoker    Types: Cigarettes   Smokeless Tobacco    Never Used     History   Drug Use No       Family History:    non-contributory    Physical Exam:     Vitals:   Blood Pressure: 167/68 (07/26/18 1834)  Pulse: 57 (07/26/18 1834)  Temperature: 97 6 °F (36 4 °C) (07/26/18 1539)  Temp Source: Temporal (07/26/18 1539)  Respirations: 22 (07/26/18 1834)  Weight - Scale: 69 5 kg (153 lb 3 5 oz) (07/26/18 1545)  SpO2: 96 % (07/26/18 1834)    Physical Exam   Constitutional: She is oriented to person, place, and time  No distress  HENT:   Head: Normocephalic and atraumatic  Eyes: EOM are normal  Pupils are equal, round, and reactive to light  Right eye exhibits no discharge  Left eye exhibits no discharge  No scleral icterus  Neck: Normal range of motion  Neck supple  No JVD present  Cardiovascular: Regular rhythm and intact distal pulses  Bradycardic   Pulmonary/Chest: Effort normal and breath sounds normal  No stridor  No respiratory distress  She has no rales  Abdominal: Soft  Bowel sounds are normal  She exhibits no distension  There is no tenderness  Musculoskeletal: Normal range of motion  She exhibits no edema, tenderness or deformity  Neurological: She is oriented to person, place, and time  Skin: Skin is warm and dry  No rash noted  No erythema  No pallor  Psychiatric: She has a normal mood and affect  Vitals reviewed  Additional Data:     Lab Results: I have personally reviewed pertinent reports  Results from last 7 days  Lab Units 07/26/18  1625   WBC Thousand/uL 4 79   HEMOGLOBIN g/dL 13 3   HEMATOCRIT % 40 0   PLATELETS Thousands/uL 176   NEUTROS PCT % 57   LYMPHS PCT % 24   MONOS PCT % 15*   EOS PCT % 2       Results from last 7 days  Lab Units 07/26/18  1625   SODIUM mmol/L 137   POTASSIUM mmol/L 5 0   CHLORIDE mmol/L 99*   CO2 mmol/L 35*   BUN mg/dL 23   CREATININE mg/dL 0 95   CALCIUM mg/dL 9 3   TOTAL PROTEIN g/dL 7 4   BILIRUBIN TOTAL mg/dL 1 40*   ALK PHOS U/L 157*   ALT U/L 34   AST U/L 43   GLUCOSE RANDOM mg/dL 123       Results from last 7 days  Lab Units 07/26/18  1625   INR  1 27*               Imaging: I have personally reviewed pertinent reports  XR chest 1 view portable   Final Result by Mable Cid MD (07/26 1911)      Recurrent pulmonary edema with bibasilar pleural effusions  Workstation performed: [id number]         CT head without contrast   Final Result by Helena Kwon DO (07/26 1716)   No acute intracranial abnormality  Workstation performed: [id number]             EKG, Pathology, and Other Studies Reviewed on Admission:   · EKG: Sinus bradycardia at rate of 54 bpm    Allscripts / Epic Records Reviewed: Yes     ** Please Note: This note has been constructed using a voice recognition system   **

## 2018-07-27 NOTE — OCCUPATIONAL THERAPY NOTE
Occupational Therapy Evaluation     Patient Name: Sahsa Romero  BEMXQ'E Date: 7/27/2018  Problem List  Patient Active Problem List   Diagnosis    Shortness of breath    Pleural effusion    Hypertension    GERD without esophagitis    Gastritis    Valvular heart disease    Acute on chronic diastolic congestive heart failure (HCC)    Acute encephalopathy    Tricuspid regurgitation    Pulmonary hypertension (HCC)    Mitral valve stenosis    Aortic stenosis    Hypoalbuminemia    Hepatic coma/encephalopathy (HCC)    Hepatic cirrhosis (HCC)    Hyperbilirubinemia    Leukopenia    Neutropenia (HCC)    Transaminitis    Healthcare-associated pneumonia    Mucosal abnormality of stomach    [facility]    Left adrenal mass ([facility] )    Aneurysm of anterior cerebral artery    Pressure ulcer of left buttock, stage 3 (HCC)    Diastolic CHF ([facility]    Edema    Hyperlipidemia    Pancreatic cyst    Post-menopausal osteoporosis    Pneumonia    Altered mental status    Increased ammonia level    Generalized weakness    Bradycardia     Past Medical History  Past Medical History:   Diagnosis Date    Breast cancer ([facility] )     Cancer ([facility] )     breast    CHF (congestive heart failure) (HCC)     Cirrhosis of liver ([facility] )     GERD (gastroesophageal reflux disease)     Hepatic encephalopathy ([facility] )     Hypertension     last assessed 10/5/17    Polyp, sigmoid colon      Past Surgical History  Past Surgical History:   Procedure Laterality Date    COMBINED HYSTEROSCOPY DIAGNOSTIC / D&C      HYSTERECTOMY      unknown    MASTECTOMY Right 1990    MASTECTOMY             07/27/18 1055   Note Type   Note type Eval/Treat   Restrictions/Precautions   Weight Bearing Precautions Per Order No   Other Precautions Multiple lines;Telemetry; Fall Risk;O2   Pain Assessment   Pain Assessment No/denies pain   Home Living   Additional Comments see PT evaluation for details   Prior Function   Level of [place] Needs assistance with ADLs and functional mobility; Needs assistance with IADLs   Lives With Alone   Receives Help From Family;Home health;Friend(s)   ADL Assistance Needs assistance   IADLs Needs assistance   Falls in the last 6 months 1 to 4   Comments family drives   Psychosocial   Psychosocial (WDL) WDL   Subjective   Subjective "I don't want to put my socks on someone does that for me at home"   ADL   Where Assessed Edge of bed   LB Dressing Assistance 1  Total Assistance   LB Dressing Deficit Don/doff R sock; Don/doff L sock   Additional Comments pt did not attempt and stated "I don't want to"    Bed Mobility   Supine to Sit 4  Minimal assistance   Additional items Assist x 1;Bedrails;Verbal cues   Sit to Supine 4  Minimal assistance   Additional items Assist x 1;Bedrails;Verbal cues   Additional Comments pt on 2Ls with SPO2 ranging %   Transfers   Sit to Stand 5  Supervision   Additional items Verbal cues  (RW)   Stand to Sit 5  Supervision   Additional items Verbal cues  (RW)   Functional Mobility   Functional Mobility 5  Supervision   Additional Comments pt performed FM with RW at (S) level; pt with limited endurance this session and weakness and fatigue observed   Additional items Rolling walker   Balance   Static Sitting Good   Dynamic Sitting Good   Static Standing Fair +   Dynamic Standing Fair   Ambulatory Fair -   Activity Tolerance   Activity Tolerance Patient limited by fatigue   RUE Assessment   RUE Assessment X  (4/5 grossly; WFL AROM)   LUE Assessment   LUE Assessment X  (4/5 grossly; WFL AROM)   Hand Function   Gross Motor Coordination Functional   Fine Motor Coordination Functional   Sensation   Light Touch No apparent deficits   Sharp/Dull No apparent deficits   Cognition   Overall Cognitive Status Impaired   Arousal/Participation Alert   Attention Attends with cues to redirect   Orientation Level Oriented to person;Oriented to place; Disoriented to time;Disoriented to situation   Memory Decreased long term memory;Decreased short term memory   Following Commands Follows all commands and directions without difficulty   Assessment   Limitation Decreased ADL status; Decreased Safe judgement during ADL;Decreased UE strength;Decreased endurance;Decreased cognition;Decreased self-care trans;Decreased high-level ADLs   Assessment Pt is a 80 y o  female seen for OT evaluation s/p admit to [facility] on 7/26/2018 w/ Generalized weakness  Personal factors affecting pt at time of IE include:difficulty performing ADLS, difficulty performing IADLS , decreased initiation and engagement  and health management   Upon evaluation: Pt requires min-(S) level (A) 2* the following deficits impacting occupational performance: weakness, decreased strength, decreased tolerance, impaired GMC, impaired [address], decreased safety awareness and decreased ADL performance    Pt to benefit from continued skilled OT tx while in the hospital to address deficits as defined above and maximize level of functional independence w ADL's and functional mobility  Occupational Performance areas to address include: grooming, bathing/shower, toilet hygiene, dressing, socialization, functional mobility, community mobility, clothing management and social participation  From OT standpoint, recommendation at time of d/c would be short term rehab or SNF placement  Goals   Patient Goals to return home   Short Term Goal  pt will increase independence with toilet transfers and toilet hygiene to (S) level   Long Term Goal #1 pt will increase functional endurance and functional mobiilty with RW to mod (I) level    Long Term Goal #2 pt will be A&Ox3 during session    Long Term Goal pt will increase UB bathing and grooming tasks to (S) level    Plan   Treatment Interventions ADL retraining;UE strengthening/ROM; Endurance training;Patient/family training;Cognitive reorientation; Activityengagement   Goal Expiration Date 08/10/18   OT Frequency 3-5x/wk   Recommendation   OT Discharge Recommendation Short Term Rehab   Barthel Index   Feeding 5   Bathing 0   Grooming Score 0   Dressing Score 5   Bladder Score 5   Bowels Score 5   Toilet Use Score 5   Transfers (Bed/Chair) Score 10   Mobility (Level Surface) Score 10   Stairs Score 0   Barthel Index Score 45     Pt will benefit from continued OT services in order to maximize (I) c ADL performance, FM c RW, and improve overall endurance/strength required to complete functional tasks in preparation for d/c  Pt left supine in bed at end of session; all needs within reach; all lines intact

## 2018-07-28 PROBLEM — E87.5 HYPERKALEMIA: Status: ACTIVE | Noted: 2018-07-28

## 2018-07-28 LAB
ALBUMIN SERPL BCP-MCNC: 2 G/DL (ref 3.5–5)
ALP SERPL-CCNC: 156 U/L (ref 46–116)
ALT SERPL W P-5'-P-CCNC: 40 U/L (ref 12–78)
AMMONIA PLAS-SCNC: 134 UMOL/L (ref 11–35)
ANION GAP SERPL CALCULATED.3IONS-SCNC: 0 MMOL/L (ref 4–13)
ANION GAP SERPL CALCULATED.3IONS-SCNC: 7 MMOL/L (ref 4–13)
AST SERPL W P-5'-P-CCNC: 61 U/L (ref 5–45)
BASOPHILS # BLD AUTO: 0.04 THOUSANDS/ΜL (ref 0–0.1)
BASOPHILS NFR BLD AUTO: 1 % (ref 0–1)
BILIRUB SERPL-MCNC: 1.2 MG/DL (ref 0.2–1)
BUN SERPL-MCNC: 22 MG/DL (ref 5–25)
BUN SERPL-MCNC: 23 MG/DL (ref 5–25)
CALCIUM SERPL-MCNC: 8.7 MG/DL (ref 8.3–10.1)
CALCIUM SERPL-MCNC: 8.8 MG/DL (ref 8.3–10.1)
CHLORIDE SERPL-SCNC: 102 MMOL/L (ref 100–108)
CHLORIDE SERPL-SCNC: 102 MMOL/L (ref 100–108)
CO2 SERPL-SCNC: 30 MMOL/L (ref 21–32)
CO2 SERPL-SCNC: 34 MMOL/L (ref 21–32)
CREAT SERPL-MCNC: 0.82 MG/DL (ref 0.6–1.3)
CREAT SERPL-MCNC: 0.84 MG/DL (ref 0.6–1.3)
EOSINOPHIL # BLD AUTO: 0.1 THOUSAND/ΜL (ref 0–0.61)
EOSINOPHIL NFR BLD AUTO: 1 % (ref 0–6)
ERYTHROCYTE [DISTWIDTH] IN BLOOD BY AUTOMATED COUNT: 15.4 % (ref 11.6–15.1)
GFR SERPL CREATININE-BSD FRML MDRD: 63 ML/MIN/1.73SQ M
GFR SERPL CREATININE-BSD FRML MDRD: 65 ML/MIN/1.73SQ M
GLUCOSE SERPL-MCNC: 88 MG/DL (ref 65–140)
GLUCOSE SERPL-MCNC: 90 MG/DL (ref 65–140)
HCT VFR BLD AUTO: 38.6 % (ref 34.8–46.1)
HGB BLD-MCNC: 12.4 G/DL (ref 11.5–15.4)
LYMPHOCYTES # BLD AUTO: 0.84 THOUSANDS/ΜL (ref 0.6–4.47)
LYMPHOCYTES NFR BLD AUTO: 12 % (ref 14–44)
MCH RBC QN AUTO: 31.6 PG (ref 26.8–34.3)
MCHC RBC AUTO-ENTMCNC: 32.1 G/DL (ref 31.4–37.4)
MCV RBC AUTO: 98 FL (ref 82–98)
MONOCYTES # BLD AUTO: 1.12 THOUSAND/ΜL (ref 0.17–1.22)
MONOCYTES NFR BLD AUTO: 16 % (ref 4–12)
NEUTROPHILS # BLD AUTO: 5.14 THOUSANDS/ΜL (ref 1.85–7.62)
NEUTS SEG NFR BLD AUTO: 71 % (ref 43–75)
PLATELET # BLD AUTO: 180 THOUSANDS/UL (ref 149–390)
PMV BLD AUTO: 13.2 FL (ref 8.9–12.7)
POTASSIUM SERPL-SCNC: 5.5 MMOL/L (ref 3.5–5.3)
POTASSIUM SERPL-SCNC: 5.5 MMOL/L (ref 3.5–5.3)
PROT SERPL-MCNC: 7.1 G/DL (ref 6.4–8.2)
RBC # BLD AUTO: 3.93 MILLION/UL (ref 3.81–5.12)
SODIUM SERPL-SCNC: 136 MMOL/L (ref 136–145)
SODIUM SERPL-SCNC: 139 MMOL/L (ref 136–145)
WBC # BLD AUTO: 7.24 THOUSAND/UL (ref 4.31–10.16)

## 2018-07-28 PROCEDURE — 94762 N-INVAS EAR/PLS OXIMTRY CONT: CPT

## 2018-07-28 PROCEDURE — 82140 ASSAY OF AMMONIA: CPT | Performed by: PHYSICIAN ASSISTANT

## 2018-07-28 PROCEDURE — 99232 SBSQ HOSP IP/OBS MODERATE 35: CPT | Performed by: PHYSICIAN ASSISTANT

## 2018-07-28 PROCEDURE — 80053 COMPREHEN METABOLIC PANEL: CPT | Performed by: PHYSICIAN ASSISTANT

## 2018-07-28 PROCEDURE — 92526 ORAL FUNCTION THERAPY: CPT

## 2018-07-28 PROCEDURE — 80048 BASIC METABOLIC PNL TOTAL CA: CPT | Performed by: PHYSICIAN ASSISTANT

## 2018-07-28 PROCEDURE — 85025 COMPLETE CBC W/AUTO DIFF WBC: CPT | Performed by: PHYSICIAN ASSISTANT

## 2018-07-28 PROCEDURE — 94760 N-INVAS EAR/PLS OXIMETRY 1: CPT

## 2018-07-28 RX ORDER — FUROSEMIDE 10 MG/ML
40 INJECTION INTRAMUSCULAR; INTRAVENOUS
Status: DISCONTINUED | OUTPATIENT
Start: 2018-07-28 | End: 2018-07-29

## 2018-07-28 RX ADMIN — LACTULOSE 30 G: 10 SOLUTION ORAL at 09:31

## 2018-07-28 RX ADMIN — ASPIRIN 81 MG 81 MG: 81 TABLET ORAL at 09:16

## 2018-07-28 RX ADMIN — POTASSIUM CHLORIDE 20 MEQ: 20 SOLUTION ORAL at 09:18

## 2018-07-28 RX ADMIN — LACTULOSE 30 G: 10 SOLUTION ORAL at 12:56

## 2018-07-28 RX ADMIN — VITAMIN D, TAB 1000IU (100/BT) 1000 UNITS: 25 TAB at 09:13

## 2018-07-28 RX ADMIN — ANORECTAL OINTMENT: 15.7; .44; 24; 20.6 OINTMENT TOPICAL at 09:42

## 2018-07-28 RX ADMIN — Medication 30 MG: at 09:17

## 2018-07-28 RX ADMIN — FUROSEMIDE 40 MG: 10 INJECTION, SOLUTION INTRAVENOUS at 09:39

## 2018-07-28 RX ADMIN — LACTULOSE 30 G: 10 SOLUTION ORAL at 18:02

## 2018-07-28 RX ADMIN — ANORECTAL OINTMENT: 15.7; .44; 24; 20.6 OINTMENT TOPICAL at 18:00

## 2018-07-28 RX ADMIN — RIFAXIMIN 550 MG: 550 TABLET ORAL at 09:15

## 2018-07-28 RX ADMIN — RIFAXIMIN 550 MG: 550 TABLET ORAL at 21:51

## 2018-07-28 RX ADMIN — CARVEDILOL 3.12 MG: 3.12 TABLET, FILM COATED ORAL at 17:01

## 2018-07-28 RX ADMIN — LOSARTAN POTASSIUM 50 MG: 50 TABLET ORAL at 09:28

## 2018-07-28 RX ADMIN — Medication 400 MG: at 09:29

## 2018-07-28 RX ADMIN — ENOXAPARIN SODIUM 40 MG: 40 INJECTION, SOLUTION INTRAVENOUS; SUBCUTANEOUS at 09:16

## 2018-07-28 RX ADMIN — CARVEDILOL 3.12 MG: 3.12 TABLET, FILM COATED ORAL at 09:14

## 2018-07-28 RX ADMIN — FUROSEMIDE 40 MG: 10 INJECTION, SOLUTION INTRAVENOUS at 17:01

## 2018-07-28 RX ADMIN — LACTULOSE 30 G: 10 SOLUTION ORAL at 21:53

## 2018-07-28 NOTE — OCCUPATIONAL THERAPY NOTE
OccupationalTherapy Progress Note     Patient Name: Rosmery Keene  TPLFM'K Date: 7/28/2018      Pt deferred at this time until after speech eval because she needed a drink and was not ready to do anything until after she had a drink  Will attempt a second time as able

## 2018-07-28 NOTE — SPEECH THERAPY NOTE
Speech Language/Pathology    Speech/Language Pathology Progress Note    Patient Name: Carl Morse  HSVMS'I Date: 7/28/2018     Problem List  Patient Active Problem List   Diagnosis    Shortness of breath    Pleural effusion    Hypertension    GERD without esophagitis    Gastritis    Valvular heart disease    Acute on chronic diastolic congestive heart failure (HCC)    Acute encephalopathy    Tricuspid regurgitation    Pulmonary hypertension (HCC)    Mitral valve stenosis    Aortic stenosis    Hypoalbuminemia    Hepatic coma/encephalopathy (HCC)    Hepatic cirrhosis (HCC)    Hyperbilirubinemia    Leukopenia    Neutropenia (HCC)    Transaminitis    Healthcare-associated pneumonia    Mucosal abnormality of stomach    Portal hypertension (HCC)    Left adrenal mass ([address]    Aneurysm of anterior cerebral artery    Pressure ulcer of left buttock, stage 3 (HCC)    Diastolic CHF ([address]    Edema    Hyperlipidemia    Pancreatic cyst    Post-menopausal osteoporosis    Pneumonia    Altered mental status    Increased ammonia level    Generalized weakness    Bradycardia        Past Medical History  Past Medical History:   Diagnosis Date    Breast cancer ([address] )     Cancer ([address] )     breast    CHF (congestive heart failure) (HCC)     Cirrhosis of liver ([address] )     GERD (gastroesophageal reflux disease)     Hepatic encephalopathy ([address] )     Hypertension     last assessed 10/5/17    Polyp, sigmoid colon         Past Surgical History  Past Surgical History:   Procedure Laterality Date    COMBINED HYSTEROSCOPY DIAGNOSTIC / D&C      HYSTERECTOMY      unknown    MASTECTOMY Right 1990    MASTECTOMY           Subjective:  Patient present in room, in bed with breakfast tray at bedside upon arrival   Reportedly last night patient had an episode of "choking" which prompted further speech therapy consultation (patient was evaluated initially yesterday afternoon in the ED prior to admission )  Today, NSG reporting patient took all pills x10 and had overt cough x1 approximately 5 minutes after all pills were completed  This cough did not persist   Patient's lungs were clear at this time but restricted  Evaluation began with soft solids and thin via straw  Objective:  Patient observed consuming 1/2 piece of [place] Toast (soft), orange juice by straw (complete), Ensure via straw (complete), 1/2 milk carton via straw, and 1/2 cranberry juice via straw  Assessment:  Across all PO trials patient demonstrated a mildly weak swallow but overall her timing and coordination felt WFL  Patient did demonstrate GERD symptoms across most PO trials, an audible gurgling sound from the UES characteristic of GERD and mild regurgitation with secondary swallow i'ly by patient  Upon completion of final thin via straw trials, patient began choking presenting with deep red/purple face, wet vocal quality and weakened cough  Patient sat up to 90 degrees in bed and leaned forward by the clinician, encouraged to cough and spit  Patient able to cough and speak throughout event however congestion remained  NSG was called, upon arrival NSG determined patient's lungs sounded debried, with labored breathing and reduced airway  Respiratory therapy arrived and patient was placed on pulse ox 24/7, patient's O2 ranged from 96-98 throughout episode  Resp thearpy attempted suction but was unable to clear sputum  Patient able to clear two large sputum boluses i'ly with cues by clinician  It is suspected that the patient is demonstrating s/s of silent aspiration as no symptoms appeared until 20 minutes into PO trials  Call placed to patient's PA-C, see recs  Plan/Recommendations:  Patient will have VBS completed Monday 7/30 ASAP to determine safest PO intake for placement to acute rehab facility  Patient is able to tolerate pleasure feeds until that time of puree and honey by 1/2 tsp with 1:1 supervision seated at 90 degrees

## 2018-07-28 NOTE — PROGRESS NOTES
I was alerted by PCA that this patient seemed to be choking  After assessment, the patients O2 saturation was 94%  She was able to make an audible cough and also was able to talk  After the episode she sounded congested  I notified Matthew Malin of the event  See new orders  The patients vitals are stable and she is laying in bed comfortable with O2 intact and with suction set up at bedside  She was made NPO per Matthew Malin, speech consult order was also placed  Will continue to monitor patient

## 2018-07-28 NOTE — ASSESSMENT & PLAN NOTE
-Continue protonix  - Significant regurgitation per speech therapy, causing dysphagia  - will keep patient NPO except meds and sips / pleasure feeds, and order video swallow study

## 2018-07-28 NOTE — ASSESSMENT & PLAN NOTE
- Brought in by her son with complaints of weakness, ongoing for a few weeks    - Son is concern that she is not able to care for herself and needs placement- Denies fever, chills, no leukocytosis  - likely multifactorial and due to elevated ammonia levels and acute CHF, but likely also chronic deconditioning   - Head CT shows-No acute intracranial abnormality   - B12 normal    -TSH elevated, check Free T3,T4  -Case management consult for placement

## 2018-07-28 NOTE — ASSESSMENT & PLAN NOTE
Will monitor closely  Recheck CMP at 1200 noon today  Anticipate this will improve with IV diuresis

## 2018-07-28 NOTE — RESPIRATORY THERAPY NOTE
Pt  Was desaturating to 84% on Room air placed pt on 3 lpm via NC  Pt  O2 sat only came up to 86%  Increased to 4 lpm sat was 90%    Overnight pulse ox study to continue on 4 lpm

## 2018-07-28 NOTE — SOCIAL WORK
The patients family did not get back to me and they are aware that there is no bed on the fifth floor at this time and that the patient may be ready for d/c on Sunday they have not called me with a d/c plan

## 2018-07-28 NOTE — PROGRESS NOTES
Progress Note - [facility] 10/24/1930, 80 y o  female MRN: [id number]    Unit/Bed#: 381-90 Encounter: [id number]    Primary Care Provider: Lexy Gann DO   Date and time admitted to hospital: 7/26/2018  3:40 PM        * Generalized weakness   Assessment & Plan    - Brought in by her son with complaints of weakness, ongoing for a few weeks  - Son is concern that she is not able to care for herself and needs placement- Denies fever, chills, no leukocytosis  - likely multifactorial and due to elevated ammonia levels and acute CHF, but likely also chronic deconditioning   - Head CT shows-No acute intracranial abnormality   - B12 normal    -TSH elevated, check Free T3,T4  -Case management consult for placement        Increased ammonia level   Assessment & Plan    See plan for hepatic cirrhosis  Will follow ammonia levels daily  Hepatic cirrhosis (HCC)   Assessment & Plan    - Chronic condition  - Continue Lactulose, but will increase to 30g QID instead of TID due to increased ammonia levels  - It appears a dose of lactulose was held overnight due to NPO status and concern for safety  It's imperative to continue lactulose despite NPO, discussed with nursing  May need to change to enema if patient becomes too confused or severe dysphagia  - Avoid hepatotoxic agents        GERD without esophagitis   Assessment & Plan    -Continue protonix  - Significant regurgitation per speech therapy, causing dysphagia  - will keep patient NPO except meds and sips / pleasure feeds, and order video swallow study  Hypertension   Assessment & Plan    - Blood pressure is stable  - Continue home medication        Bradycardia   Assessment & Plan    - mild Bradycardia at rate of 52 bpm present on admission  - However, patient likely on nadolol for portal hypertension    - Will start low dose Coreg with hold parameters          Hyperlipidemia   Assessment & Plan    - Hold statin therapy        Hyperkalemia Assessment & Plan    Will monitor closely  Recheck CMP at 1200 noon today  Anticipate this will improve with IV diuresis  Acute on chronic diastolic congestive heart failure ([facility] [address] )   Assessment & Plan    - will continue IV lasix BID  - continue daily weights and monitor inake and output closely  VTE Pharmacologic Prophylaxis:   Pharmacologic: Enoxaparin (Lovenox)  Mechanical VTE Prophylaxis in Place: Yes        Time Spent for Care: 30 minutes  More than 50% of total time spent on counseling and coordination of care as described above  Current Length of Stay: 2 day(s)    Current Patient Status: Inpatient   Certification Statement: The patient will continue to require additional inpatient hospital stay due to IV diuretics  Discharge Plan / Estimated Discharge Date: TBD      Code Status: Level 3 - DNAR and DNI      Subjective:   Patient feels tired today, notes some confusion  She also still notes shortness of breath  Overnight she had acute shortness of breath and requiring increased oxygen needs (on 2L at home and now on 4L per NC)  Objective:   Vitals:   Temp (24hrs), Av 9 °F (36 6 °C), Min:97 3 °F (36 3 °C), Max:98 3 °F (36 8 °C)    HR:  [47-76] 55  Resp:  [14-20] 19  BP: (100-120)/(49-69) 110/51  SpO2:  [84 %-100 %] 96 %  Body mass index is 27 73 kg/m²  Input and Output Summary (last 24 hours): Intake/Output Summary (Last 24 hours) at 18 1232  Last data filed at 18 0900   Gross per 24 hour   Intake                0 ml   Output              550 ml   Net             -550 ml       Physical Exam:     Physical Exam   Constitutional: She is oriented to person, place, and time  She appears well-developed and well-nourished  No distress  HENT:   Mouth/Throat: Mucous membranes are dry  Cardiovascular: Normal rate and normal heart sounds  No murmur heard  Pulmonary/Chest: She has rales (bilateral bases extending midway up the lung fields  )  Musculoskeletal: She exhibits edema  Neurological: She is alert and oriented to person, place, and time  Skin: Skin is warm and dry  She is not diaphoretic  Psychiatric: She has a normal mood and affect  Nursing note and vitals reviewed  Additional Data:   Labs:    Results from last 7 days  Lab Units 07/28/18  0527   WBC Thousand/uL 7 24   HEMOGLOBIN g/dL 12 4   HEMATOCRIT % 38 6   PLATELETS Thousands/uL 180   NEUTROS PCT % 71   LYMPHS PCT % 12*   MONOS PCT % 16*   EOS PCT % 1       Results from last 7 days  Lab Units 07/28/18  0527 07/27/18  0649   SODIUM mmol/L 136 137   POTASSIUM mmol/L 5 5* 4 6   CHLORIDE mmol/L 102 102   CO2 mmol/L 34* 31   BUN mg/dL 22 25   CREATININE mg/dL 0 82 0 88   CALCIUM mg/dL 8 7 9 0   TOTAL PROTEIN g/dL  --  6 9   BILIRUBIN TOTAL mg/dL  --  1 10*   ALK PHOS U/L  --  149*   ALT U/L  --  32   AST U/L  --  41   GLUCOSE RANDOM mg/dL 90 95       Results from last 7 days  Lab Units 07/27/18  0649   INR  1 28*       * I Have Reviewed All Lab Data Listed Above  * Additional Pertinent Lab Tests Reviewed: All Labs Within Last 24 Hours Reviewed    Imaging:  Imaging Reports Reviewed Today Include: Chest xray from yesterday evening          Recent Cultures (last 7 days):           Last 24 Hours Medication List:     Current Facility-Administered Medications:  aspirin 81 mg Oral Daily Phu Pedersen PA-C   carvedilol 3 125 mg Oral BID With Meals Savannah Gomes PA-C   cholecalciferol 1,000 Units Oral Daily Phu Pedersen PA-C   co-enzyme Q-10 30 mg Oral Daily Phu Pedersen PA-C   enoxaparin 40 mg Subcutaneous Daily Phu Pedersen PA-C   furosemide 40 mg Intravenous BID (diuretic) Malena Gardner PA-C   lactulose 30 g Oral 4x Daily Malena Gardner PA-C   losartan 50 mg Oral Daily Phu Pedersen PA-C   magnesium oxide 400 mg Oral Daily Phu Pedersen PA-C   menthol-zinc oxide  Topical BID Phu Pedersen PA-C   ondansetron 4 mg Intravenous Q6H PRN Phu Pedersen PA-C   pantoprazole 40 mg Oral Early Morning Phu RACHEL Pedersen   rifaximin 550 mg Oral Q12H [address] ASHLEY, RACHEL        Today, Patient Was Seen By: Rik Walker PA-C    ** Please Note: Dragon 360 Dictation voice to text software may have been used in the creation of this document   **

## 2018-07-28 NOTE — ASSESSMENT & PLAN NOTE
- Chronic condition  - Continue Lactulose, but will increase to 30g QID instead of TID due to increased ammonia levels  - It appears a dose of lactulose was held overnight due to NPO status and concern for safety  It's imperative to continue lactulose despite NPO, discussed with nursing  May need to change to enema if patient becomes too confused or severe dysphagia    - Avoid hepatotoxic agents

## 2018-07-29 LAB
AMMONIA PLAS-SCNC: 34 UMOL/L (ref 11–35)
ANION GAP SERPL CALCULATED.3IONS-SCNC: 4 MMOL/L (ref 4–13)
BASOPHILS # BLD AUTO: 0.02 THOUSANDS/ΜL (ref 0–0.1)
BASOPHILS NFR BLD AUTO: 0 % (ref 0–1)
BUN SERPL-MCNC: 25 MG/DL (ref 5–25)
CALCIUM SERPL-MCNC: 9 MG/DL (ref 8.3–10.1)
CHLORIDE SERPL-SCNC: 105 MMOL/L (ref 100–108)
CO2 SERPL-SCNC: 32 MMOL/L (ref 21–32)
CREAT SERPL-MCNC: 0.88 MG/DL (ref 0.6–1.3)
EOSINOPHIL # BLD AUTO: 0.15 THOUSAND/ΜL (ref 0–0.61)
EOSINOPHIL NFR BLD AUTO: 3 % (ref 0–6)
ERYTHROCYTE [DISTWIDTH] IN BLOOD BY AUTOMATED COUNT: 15.2 % (ref 11.6–15.1)
GFR SERPL CREATININE-BSD FRML MDRD: 59 ML/MIN/1.73SQ M
GLUCOSE SERPL-MCNC: 97 MG/DL (ref 65–140)
HCT VFR BLD AUTO: 39.9 % (ref 34.8–46.1)
HGB BLD-MCNC: 12.7 G/DL (ref 11.5–15.4)
LYMPHOCYTES # BLD AUTO: 1.03 THOUSANDS/ΜL (ref 0.6–4.47)
LYMPHOCYTES NFR BLD AUTO: 22 % (ref 14–44)
MCH RBC QN AUTO: 31.8 PG (ref 26.8–34.3)
MCHC RBC AUTO-ENTMCNC: 31.8 G/DL (ref 31.4–37.4)
MCV RBC AUTO: 100 FL (ref 82–98)
MONOCYTES # BLD AUTO: 0.79 THOUSAND/ΜL (ref 0.17–1.22)
MONOCYTES NFR BLD AUTO: 17 % (ref 4–12)
NEUTROPHILS # BLD AUTO: 2.8 THOUSANDS/ΜL (ref 1.85–7.62)
NEUTS SEG NFR BLD AUTO: 59 % (ref 43–75)
PLATELET # BLD AUTO: 153 THOUSANDS/UL (ref 149–390)
PMV BLD AUTO: 12.5 FL (ref 8.9–12.7)
POTASSIUM SERPL-SCNC: 4.6 MMOL/L (ref 3.5–5.3)
RBC # BLD AUTO: 4 MILLION/UL (ref 3.81–5.12)
SODIUM SERPL-SCNC: 141 MMOL/L (ref 136–145)
WBC # BLD AUTO: 4.79 THOUSAND/UL (ref 4.31–10.16)

## 2018-07-29 PROCEDURE — 80048 BASIC METABOLIC PNL TOTAL CA: CPT | Performed by: PHYSICIAN ASSISTANT

## 2018-07-29 PROCEDURE — 99232 SBSQ HOSP IP/OBS MODERATE 35: CPT | Performed by: PHYSICIAN ASSISTANT

## 2018-07-29 PROCEDURE — 85025 COMPLETE CBC W/AUTO DIFF WBC: CPT | Performed by: PHYSICIAN ASSISTANT

## 2018-07-29 PROCEDURE — 82140 ASSAY OF AMMONIA: CPT | Performed by: PHYSICIAN ASSISTANT

## 2018-07-29 PROCEDURE — 94760 N-INVAS EAR/PLS OXIMETRY 1: CPT

## 2018-07-29 PROCEDURE — 94762 N-INVAS EAR/PLS OXIMTRY CONT: CPT

## 2018-07-29 RX ORDER — ALBUTEROL SULFATE 2.5 MG/3ML
2.5 SOLUTION RESPIRATORY (INHALATION) EVERY 6 HOURS PRN
Status: DISCONTINUED | OUTPATIENT
Start: 2018-07-29 | End: 2018-08-01 | Stop reason: HOSPADM

## 2018-07-29 RX ORDER — FUROSEMIDE 40 MG/1
40 TABLET ORAL DAILY
Status: DISCONTINUED | OUTPATIENT
Start: 2018-07-30 | End: 2018-07-31

## 2018-07-29 RX ORDER — FUROSEMIDE 10 MG/ML
40 INJECTION INTRAMUSCULAR; INTRAVENOUS
Status: COMPLETED | OUTPATIENT
Start: 2018-07-29 | End: 2018-07-29

## 2018-07-29 RX ADMIN — LACTULOSE 30 G: 10 SOLUTION ORAL at 13:27

## 2018-07-29 RX ADMIN — CARVEDILOL 3.12 MG: 3.12 TABLET, FILM COATED ORAL at 17:32

## 2018-07-29 RX ADMIN — PANTOPRAZOLE SODIUM 40 MG: 40 TABLET, DELAYED RELEASE ORAL at 06:23

## 2018-07-29 RX ADMIN — VITAMIN D, TAB 1000IU (100/BT) 1000 UNITS: 25 TAB at 10:26

## 2018-07-29 RX ADMIN — LACTULOSE 30 G: 10 SOLUTION ORAL at 10:25

## 2018-07-29 RX ADMIN — RIFAXIMIN 550 MG: 550 TABLET ORAL at 21:21

## 2018-07-29 RX ADMIN — LACTULOSE 30 G: 10 SOLUTION ORAL at 21:21

## 2018-07-29 RX ADMIN — LACTULOSE 30 G: 10 SOLUTION ORAL at 17:32

## 2018-07-29 RX ADMIN — Medication 400 MG: at 10:26

## 2018-07-29 RX ADMIN — RIFAXIMIN 550 MG: 550 TABLET ORAL at 10:26

## 2018-07-29 RX ADMIN — FUROSEMIDE 40 MG: 10 INJECTION, SOLUTION INTRAVENOUS at 17:32

## 2018-07-29 RX ADMIN — ANORECTAL OINTMENT: 15.7; .44; 24; 20.6 OINTMENT TOPICAL at 10:27

## 2018-07-29 RX ADMIN — ASPIRIN 81 MG 81 MG: 81 TABLET ORAL at 10:25

## 2018-07-29 RX ADMIN — ENOXAPARIN SODIUM 40 MG: 40 INJECTION, SOLUTION INTRAVENOUS; SUBCUTANEOUS at 10:26

## 2018-07-29 RX ADMIN — ANORECTAL OINTMENT: 15.7; .44; 24; 20.6 OINTMENT TOPICAL at 17:43

## 2018-07-29 RX ADMIN — Medication 30 MG: at 10:26

## 2018-07-29 NOTE — ASSESSMENT & PLAN NOTE
- Continue protonix  - Significant regurgitation per speech therapy, causing dysphagia  - will keep patient NPO except meds and sips / pleasure feeds, and order video swallow study

## 2018-07-29 NOTE — ASSESSMENT & PLAN NOTE
- mild Bradycardia at rate of 52 bpm present on admission, dipping into the 40s early during hospital stay  -  patient likely on nadolol for portal hypertension    - Will start low dose Coreg with hold parameters, seems to be improving heart rate trends

## 2018-07-29 NOTE — RESPIRATORY THERAPY NOTE
RT Protocol Note  Nigel Fernando 80 y o  female MRN: [id number]  Unit/Bed#: 093-13 Encounter: [id number]    Assessment    Principal Problem:    Generalized weakness  Active Problems:    Hypertension    GERD without esophagitis    Acute on chronic diastolic congestive heart failure (HCC)    Hepatic cirrhosis (HCC)    Hyperlipidemia    Increased ammonia level    Bradycardia    Hyperkalemia      Home Pulmonary Medications:     Past Medical History:   Diagnosis Date    Breast cancer ([place] [address] )     Cancer ([place] [address] )     breast    CHF (congestive heart failure) (HCC)     Cirrhosis of liver ([place] [address] )     GERD (gastroesophageal reflux disease)     Hepatic encephalopathy ([place] [address] )     Hypertension     last assessed 10/5/17    Polyp, sigmoid colon      Social History     Social History    Marital status:      Spouse name: N/A    Number of children: N/A    Years of education: N/A     Social History Main Topics    Smoking status: Former Smoker     Types: Cigarettes    Smokeless tobacco: Never Used    Alcohol use No    Drug use: No    Sexual activity: No     Other Topics Concern    None     Social History Narrative    None       Subjective  "NO, I AM BREATHING OK RIGHT NOW"  Objective  PT WAS ABLE TO REPEAT ALPHABET UP TO LETTER "G"  & WAS FREE FROM ANY S&S OFSOB  Physical Exam:   CPOX=94%, HR=48/MIN  Vitals:  Blood pressure 101/50, pulse 61, temperature 97 7 °F (36 5 °C), temperature source Temporal, resp  rate 17, height 5' 3" (1 6 m), weight 71 6 kg (157 lb 13 6 oz), SpO2 98 %  Imaging and other studies:   07/27/2018  CHEST      INDICATION:   sob      COMPARISON:  July 26, 2018     EXAM PERFORMED/VIEWS:  XR CHEST PORTABLE  Single image     FINDINGS:  Lungs are suboptimally aerated  Small-moderate pleural effusions, similar prior exam   Midlung and lower lung atelectatic changes also very similar  Perihilar infiltrates  Right apical pleural thickening or apical pleural fluid      Cardiomegaly  Vascular congestion and peribronchial thickening suggesting diffuse pulmonary edema  Atherosclerotic aorta      Bony structures grossly intact      No pneumothorax or free air  Numerous EKG leads in place  Plan    Respiratory Plan: Mild Distress pathway      CPOX IN PLACE    PRN ALBUTEROL/WHEEZES ORDERED     PROTOCOL BEDSIDE EVALUATION/ASSESSMENT COMPLETED, SINCE  NEVER COMPLETED 07/27/2018 @ 11:54

## 2018-07-29 NOTE — ASSESSMENT & PLAN NOTE
- will continue IV lasix today, start PO lasix tomorrow  - continue daily weights and monitor inake and output closely  - not much change in weight and net output is -550    - lung sounds improved but still with bibasilar rales and mild tachypnea   Oxygen needs decreased from 4L --> 2L per NC

## 2018-07-29 NOTE — PROGRESS NOTES
Progress Note - Mary Arana 10/24/1930, 80 y o  female MRN: [id number]    Unit/Bed#: 122-39 Encounter: [id number]    Primary Care Provider: Kilo Walker DO   Date and time admitted to hospital: 7/26/2018  3:40 PM        * Generalized weakness   Assessment & Plan    - Brought in by her son with complaints of weakness, ongoing for a few weeks  - Son is concern that she is not able to care for herself and needs placement- Denies fever, chills, no leukocytosis  - likely multifactorial and due to elevated ammonia levels / worsening hepatic encephalopathy, and acute CHF, but likely also chronic deconditioning   - Head CT shows-No acute intracranial abnormality   - B12 normal    -TSH elevated, check Free T3,T4  -Case management consult for probable SNF vs  Short term rehab placement        Increased ammonia level   Assessment & Plan    See plan for hepatic cirrhosis  Will follow ammonia levels daily  Hepatic cirrhosis (HCC)   Assessment & Plan    - Chronic condition  - Continue Lactulose, but will increase to 30g QID instead of TID due to increased ammonia levels  -  It's imperative to continue lactulose despite NPO, discussed with nursing  May need to change to enema if patient becomes too confused or severe dysphagia  - continue rifaximin   - Avoid hepatotoxic agents  - Consider GI consultation  GERD without esophagitis   Assessment & Plan    - Continue protonix  - Significant regurgitation per speech therapy, causing dysphagia  - will keep patient NPO except meds and sips / pleasure feeds, and order video swallow study  Hypertension   Assessment & Plan    - Blood pressure is stable  - Continue home medication  - Patients nadolol changed to low dose coreg and bradycardia seems to be improving after this change  Bradycardia   Assessment & Plan    - mild Bradycardia at rate of 52 bpm present on admission, dipping into the 40s early during hospital stay    -  patient likely on nadolol for portal hypertension    - Will start low dose Coreg with hold parameters, seems to be improving heart rate trends  Hyperlipidemia   Assessment & Plan    - Hold statin therapy due to elevated LFTs  - may be more harmful than helpful at this point  Hyperkalemia   Assessment & Plan    Resolved today  Will monitor closely  Follow CMP daily  Acute on chronic diastolic congestive heart failure ([facility] [address] )   Assessment & Plan    - will continue IV lasix today, start PO lasix tomorrow  - continue daily weights and monitor inake and output closely  - not much change in weight and net output is -550    - lung sounds improved but still with bibasilar rales and mild tachypnea  Oxygen needs decreased from 4L --> 2L per NC  VTE Pharmacologic Prophylaxis:   Pharmacologic: Enoxaparin (Lovenox)  Mechanical VTE Prophylaxis in Place: Yes        Time Spent for Care: 30 minutes  More than 50% of total time spent on counseling and coordination of care as described above  Current Length of Stay: 3 day(s)    Current Patient Status: Inpatient   Certification Statement: The patient will continue to require additional inpatient hospital stay due to need for IV lasix    Discharge Plan / Estimated Discharge Date: likely 1-2 days to nursing home vs  Rehab  Code Status: Level 3 - DNAR and DNI      Subjective:   Patient is still feeling generally weak, feels mildly confused and drowsy at times  No CP  Denies SOB today  Objective:   Vitals:   Temp (24hrs), Av 4 °F (36 3 °C), Min:97 2 °F (36 2 °C), Max:97 7 °F (36 5 °C)    HR:  [49-64] 49  Resp:  [17-18] 18  BP: ()/(50-53) 97/50  SpO2:  [96 %-99 %] 97 %  Body mass index is 27 96 kg/m²  Input and Output Summary (last 24 hours):     No intake or output data in the 24 hours ending 18 1010    Physical Exam:     Physical Exam   Constitutional: She is oriented to person, place, and time  She appears well-developed   She appears distressed (mildly tachypneic during conversation )  HENT:   Mouth/Throat: Oropharynx is clear and moist    Cardiovascular: Normal rate, regular rhythm and normal heart sounds  No murmur heard  Pulmonary/Chest: Effort normal  She has no wheezes  She has rales (bibasilar rales - right worse than left  )  Abdominal: Soft  Probable ascites  Neurological: She is alert and oriented to person, place, and time  No cranial nerve deficit  Slightly slow with speech and to respond to questioning  Skin: Skin is warm and dry  Nursing note and vitals reviewed  Additional Data:   Labs:      Results from last 7 days  Lab Units 07/29/18  0557   WBC Thousand/uL 4 79   HEMOGLOBIN g/dL 12 7   HEMATOCRIT % 39 9   PLATELETS Thousands/uL 153   NEUTROS PCT % 59   LYMPHS PCT % 22   MONOS PCT % 17*   EOS PCT % 3       Results from last 7 days  Lab Units 07/29/18  0557 07/28/18  1652   SODIUM mmol/L 141 139   POTASSIUM mmol/L 4 6 5 5*   CHLORIDE mmol/L 105 102   CO2 mmol/L 32 30   BUN mg/dL 25 23   CREATININE mg/dL 0 88 0 84   CALCIUM mg/dL 9 0 8 8   TOTAL PROTEIN g/dL  --  7 1   BILIRUBIN TOTAL mg/dL  --  1 20*   ALK PHOS U/L  --  156*   ALT U/L  --  40   AST U/L  --  61*   GLUCOSE RANDOM mg/dL 97 88       Results from last 7 days  Lab Units 07/27/18  0649   INR  1 28*       * I Have Reviewed All Lab Data Listed Above  * Additional Pertinent Lab Tests Reviewed:  All Labs Within Last 24 Hours Reviewed          Recent Cultures (last 7 days):           Last 24 Hours Medication List:     Current Facility-Administered Medications:  albuterol 2 5 mg Nebulization Q6H PRN Nelson Tejeda DO   aspirin 81 mg Oral Daily Phu Pedersen PA-C   carvedilol 3 125 mg Oral BID With Meals Shilpa Sewell PA-C   cholecalciferol 1,000 Units Oral Daily Phu Pedersen PA-C   co-enzyme Q-10 30 mg Oral Daily Phu Pedersen PA-C   enoxaparin 40 mg Subcutaneous Daily Phu Pedersen PA-C   furosemide 40 mg Intravenous BID (diuretic) Shilpa Sewell PA-C [START ON 7/30/2018] furosemide 40 mg Oral Daily Malena Gardner PA-C   lactulose 30 g Oral 4x Daily Malena Gardner PA-C   losartan 50 mg Oral Daily Phu Pedersen PA-C   magnesium oxide 400 mg Oral Daily Phu Pedersen PA-C   menthol-zinc oxide  Topical BID Phu Pedersen PA-C   ondansetron 4 mg Intravenous Q6H PRN Phu Pedersen PA-C   pantoprazole 40 mg Oral Early Morning Phu Pedersen PA-C   rifaximin 550 mg Oral Q12H [facility] Katharine Ng PA-C        Today, Patient Was Seen By: Katharine Ng PA-C    ** Please Note: Dragon 360 Dictation voice to text software may have been used in the creation of this document   **

## 2018-07-29 NOTE — ASSESSMENT & PLAN NOTE
- Blood pressure is stable  - Continue home medication  - Patients nadolol changed to low dose coreg and bradycardia seems to be improving after this change

## 2018-07-30 ENCOUNTER — APPOINTMENT (INPATIENT)
Dept: RADIOLOGY | Facility: HOSPITAL | Age: 83
DRG: 293 | End: 2018-07-30
Payer: MEDICARE

## 2018-07-30 LAB
ALBUMIN SERPL BCP-MCNC: 2 G/DL (ref 3.5–5)
ALP SERPL-CCNC: 170 U/L (ref 46–116)
ALT SERPL W P-5'-P-CCNC: 33 U/L (ref 12–78)
AMMONIA PLAS-SCNC: 28 UMOL/L (ref 11–35)
ANION GAP SERPL CALCULATED.3IONS-SCNC: 2 MMOL/L (ref 4–13)
AST SERPL W P-5'-P-CCNC: 54 U/L (ref 5–45)
BASOPHILS # BLD AUTO: 0.17 THOUSANDS/ΜL (ref 0–0.1)
BASOPHILS NFR BLD AUTO: 3 % (ref 0–1)
BILIRUB SERPL-MCNC: 1.2 MG/DL (ref 0.2–1)
BUN SERPL-MCNC: 19 MG/DL (ref 5–25)
CALCIUM SERPL-MCNC: 9 MG/DL (ref 8.3–10.1)
CHLORIDE SERPL-SCNC: 105 MMOL/L (ref 100–108)
CO2 SERPL-SCNC: 36 MMOL/L (ref 21–32)
CREAT SERPL-MCNC: 0.86 MG/DL (ref 0.6–1.3)
EOSINOPHIL # BLD AUTO: 0.12 THOUSAND/ΜL (ref 0–0.61)
EOSINOPHIL NFR BLD AUTO: 2 % (ref 0–6)
ERYTHROCYTE [DISTWIDTH] IN BLOOD BY AUTOMATED COUNT: 15.2 % (ref 11.6–15.1)
GFR SERPL CREATININE-BSD FRML MDRD: 61 ML/MIN/1.73SQ M
GLUCOSE SERPL-MCNC: 103 MG/DL (ref 65–140)
HCT VFR BLD AUTO: 43.7 % (ref 34.8–46.1)
HGB BLD-MCNC: 13.6 G/DL (ref 11.5–15.4)
LYMPHOCYTES # BLD AUTO: 0.6 THOUSANDS/ΜL (ref 0.6–4.47)
LYMPHOCYTES NFR BLD AUTO: 12 % (ref 14–44)
MCH RBC QN AUTO: 31.8 PG (ref 26.8–34.3)
MCHC RBC AUTO-ENTMCNC: 31.1 G/DL (ref 31.4–37.4)
MCV RBC AUTO: 102 FL (ref 82–98)
MONOCYTES # BLD AUTO: 0.67 THOUSAND/ΜL (ref 0.17–1.22)
MONOCYTES NFR BLD AUTO: 13 % (ref 4–12)
NEUTROPHILS # BLD AUTO: 3.66 THOUSANDS/ΜL (ref 1.85–7.62)
NEUTS SEG NFR BLD AUTO: 70 % (ref 43–75)
PLATELET # BLD AUTO: 140 THOUSANDS/UL (ref 149–390)
PMV BLD AUTO: 12.2 FL (ref 8.9–12.7)
POTASSIUM SERPL-SCNC: 4.4 MMOL/L (ref 3.5–5.3)
PROT SERPL-MCNC: 7.7 G/DL (ref 6.4–8.2)
RBC # BLD AUTO: 4.28 MILLION/UL (ref 3.81–5.12)
SODIUM SERPL-SCNC: 143 MMOL/L (ref 136–145)
WBC # BLD AUTO: 5.22 THOUSAND/UL (ref 4.31–10.16)

## 2018-07-30 PROCEDURE — 99232 SBSQ HOSP IP/OBS MODERATE 35: CPT | Performed by: PHYSICIAN ASSISTANT

## 2018-07-30 PROCEDURE — 92611 MOTION FLUOROSCOPY/SWALLOW: CPT | Performed by: SPEECH-LANGUAGE PATHOLOGIST

## 2018-07-30 PROCEDURE — 80053 COMPREHEN METABOLIC PANEL: CPT | Performed by: PHYSICIAN ASSISTANT

## 2018-07-30 PROCEDURE — 82140 ASSAY OF AMMONIA: CPT | Performed by: PHYSICIAN ASSISTANT

## 2018-07-30 PROCEDURE — C9113 INJ PANTOPRAZOLE SODIUM, VIA: HCPCS | Performed by: INTERNAL MEDICINE

## 2018-07-30 PROCEDURE — 74230 X-RAY XM SWLNG FUNCJ C+: CPT

## 2018-07-30 PROCEDURE — 97116 GAIT TRAINING THERAPY: CPT

## 2018-07-30 PROCEDURE — 97530 THERAPEUTIC ACTIVITIES: CPT

## 2018-07-30 PROCEDURE — 85025 COMPLETE CBC W/AUTO DIFF WBC: CPT | Performed by: PHYSICIAN ASSISTANT

## 2018-07-30 RX ORDER — PANTOPRAZOLE SODIUM 40 MG/1
40 INJECTION, POWDER, FOR SOLUTION INTRAVENOUS
Status: DISCONTINUED | OUTPATIENT
Start: 2018-07-30 | End: 2018-08-01 | Stop reason: HOSPADM

## 2018-07-30 RX ADMIN — PANTOPRAZOLE SODIUM 40 MG: 40 INJECTION, POWDER, FOR SOLUTION INTRAVENOUS at 05:59

## 2018-07-30 RX ADMIN — FUROSEMIDE 40 MG: 40 TABLET ORAL at 09:25

## 2018-07-30 RX ADMIN — VITAMIN D, TAB 1000IU (100/BT) 1000 UNITS: 25 TAB at 09:25

## 2018-07-30 RX ADMIN — ASPIRIN 81 MG 81 MG: 81 TABLET ORAL at 09:25

## 2018-07-30 RX ADMIN — RIFAXIMIN 550 MG: 550 TABLET ORAL at 09:24

## 2018-07-30 RX ADMIN — LACTULOSE 30 G: 10 SOLUTION ORAL at 13:33

## 2018-07-30 RX ADMIN — Medication 30 MG: at 09:28

## 2018-07-30 RX ADMIN — LOSARTAN POTASSIUM 50 MG: 50 TABLET ORAL at 09:25

## 2018-07-30 RX ADMIN — LACTULOSE 30 G: 10 SOLUTION ORAL at 21:22

## 2018-07-30 RX ADMIN — ANORECTAL OINTMENT: 15.7; .44; 24; 20.6 OINTMENT TOPICAL at 17:03

## 2018-07-30 RX ADMIN — Medication 400 MG: at 09:25

## 2018-07-30 RX ADMIN — CARVEDILOL 3.12 MG: 3.12 TABLET, FILM COATED ORAL at 09:24

## 2018-07-30 RX ADMIN — ENOXAPARIN SODIUM 40 MG: 40 INJECTION, SOLUTION INTRAVENOUS; SUBCUTANEOUS at 09:26

## 2018-07-30 RX ADMIN — RIFAXIMIN 550 MG: 550 TABLET ORAL at 21:23

## 2018-07-30 RX ADMIN — ANORECTAL OINTMENT: 15.7; .44; 24; 20.6 OINTMENT TOPICAL at 09:06

## 2018-07-30 RX ADMIN — LACTULOSE 30 G: 10 SOLUTION ORAL at 17:06

## 2018-07-30 NOTE — ASSESSMENT & PLAN NOTE
- Brought in by her son with complaints of weakness, ongoing for a few weeks    - Son is concern that she is not able to care for herself and needs placement- Denies fever, chills, no leukocytosis  - likely multifactorial and due to elevated ammonia levels / worsening hepatic encephalopathy, and acute CHF, but likely also chronic deconditioning   - Head CT shows-No acute intracranial abnormality   - B12 normal    -TSH elevated, check Free T3,T4  -Case management consult for probable SNF vs  Short term rehab placement

## 2018-07-30 NOTE — ASSESSMENT & PLAN NOTE
- start PO lasix today  - continue daily weights and monitor inake and output closely  - not much change in weight and net output is -740    - lung sounds improved but still with bibasilar rales and mild tachypnea     - Oxygen needs decreased from 4L --> 2L per NC

## 2018-07-30 NOTE — PROCEDURES
Video Swallow Study      Patient Name: Ashlee Bullock  EFDVI'M Date: 7/30/2018        Past Medical History  Past Medical History:   Diagnosis Date    [facility] [address] )     [facility]     bre[facility]    CHF (congestive heart failure) (HCC)     [facility]     GERD (gastroesophageal reflux disease)     Hepatic encephalopathy ([facility] )     Hypertension     last assessed 10/5/17    Polyp, sigmoid colon         Past Surgical History  Past Surgical History:   Procedure Laterality Date    COMBINED HYSTEROSCOPY DIAGNOSTIC / D&C      HYSTERECTOMY      unknown    MASTECTOMY Right 1990    MASTECTOMY           General Information:  General Information  Ordering Physician: Dr Yonatan Prieto  Date of Evaluation: 07/30/18  Type of Study: Repeat MBS (Last VBS June/2018)  Diet Prior to this Study: NPO since 7/28/2018  Prior to that she was on regular diet and thin liquids  Past Medical History: SOB, GERD, gastritis, pneumonia, weakness, AMS  Additional History: Patient's previous VBS in June 2018 was without aspiration; and she was placed on recommended diet level 3 (soft) and thin liquids  She had STR after that , then returned home; and had subsequent mental status change again  While IP; she had bedside eval with no s/s and recommended thin liquids  Overnight the patient had choking episode and was placed NPO until new VBS  Limitations: Fatigues with time, Pt confused  Positioning: Upright in hausted chair; lateral view  Materials Adminstered: Puree, Nectar thick liquid, Thin liquid, Soft/minced, Soft solid food, Hard solid food, Mixed food/liquid      Oral Stage:        Oral Stage Performance  Oral Stage Performance: Midly impaired  Oral Phase - Comment  Oral Phase - Comment: Patient with mildly extended mastication with regular and dense soft solids and with reduced oral control of thin liquids  No oral residue   WFL with puree and mechanical soft foods, mixed consistencies, and with nectar thick liquids  Pharyngeal Stage:        Pharnygeal Stage  Pharyngeal Stage Performance: Mild impaired, Moderate impaired  Aspiration Response: Immediate cough response  Swallow Mechanics  Swallowing Initiation was[de-identified] Mildly delayed  Hyloaryngeal Excursion was[de-identified] Adequate  Epiglottic Inversion was[de-identified] Present  Tongue Drive was[de-identified] Mildly weak  Pharyngeal Constriction was[de-identified] Adequate  Cricopharyngeal Opening/Relaxation was[de-identified] Adequate  Zenkers Diverticulum: Noted (difficult to view; very small)  Pharyngeal Phase - Comment  Pharyngeal Comment: Patient presents with mild delay in swallow initiation and resulted in penetration and aspiration of thin liquid with cough response  When patient used chin tuck; there was penetration without aspiration  Patient requiring multiple cues to use strategies secondary to confusion/lethargy  Patient presents with mildly weak TBR with mild stasis in valleculae, which clears with second swallow and/or liquid txe  No penetration or aspiration of nectar thick liquids or with any solid presentations, or with mixed consistencies  Small Zenkers diverticulum observed  Esophageal Stage:  Esophageal Stage  Delayed Clearance : Distal esophagus       Assessment Summary:  Assessment Summary  Assessment Summary[de-identified] Patient is known to SLP; and she presents today with more confusion than last seen in June of 2018  Patient is receptive to sips of thin liquids; with penetration of spoon sip and penetration and aspiration of straw sip  Use of chin tuck with cup sip was without aspiration; and patient needing verbal and visual cue to follow  Patient is receptive to straw and cup of nectar without oral or pharyngeal s/s  Spoon presentations of puree and mechanical soft solids were WFL  Bite of soft, spoon presentation of soft solid, and bite of regular solid were with extended mastication and piecemeal swallow  Patient required liquid tex to complete mastication of hard solid   Patient presents with  mild delay in swallow initiation with thin liquids resulting in pen/asp with cough response  No pen/asp of nectar thick liquids or any solids in VBS  Small zenkers diverticulum observed with trace residue at completion of VBS  Diagnosis/Prognosis:  Diagnosis/Prognosis  Dysphagia Diagnosis: Mild oral stage dysphagia, Mild to moderate pharyngeal stage dysphagia  Prognosis for Safe Diet Advancement: Guarded  Barriers to Reach Goals: Cognitive deficits, Age  Individuals Consulted  Results/Reviewed with Recommendations: Patient, RN, MD (Nurse reported results t pt's daughter on phone/ST present )      Recommendations:  Recommendations/Treat  Primary Recommendations: Oral feeding  Diet Texture: Dysphagia 2 mechanical soft  Liquid Consistency: Nectar thick  Liquid Administration: Cup, Straw  Medication Administration: Medication crushed, Medication in puree  Suggested Postioning: Upright/ 90 degrees  Suggested Precautions: Aspiration precautions, Remain upright 45 degrees after meals, Cue for small bites/sips, Small frequent meals, Reflux precautions, Feed fully upright position  Strategies: Double swallow, Alternate food/liquid, Small bites/sips (chin tuck)  Dysphagia Treatment Recommendations: Yes, By VBS results  Further Evaluation by: GI  Consider Follow-up VBS: If patient is able to complete therapy

## 2018-07-30 NOTE — SOCIAL WORK
I spoke with the patient and she told me that she does not want to go to rehab I told her that she has to tell her son and her daughter this  She said that she will   Then I went back in and she told me ok but she only wants to go to the fifth floor  I made a referral again and I spoke with the admissions director and he is aware and they are reviewing it again to see if there is an appropriate bed  She will be short term and from the skilled she will go to the [facility]  The plan will be possibly tomorrow   The patient was evaluated by speech with nectar thick liquids and a mechanical soft diet  I spoke with The patients daughter King Lenny [phone] and she is aware of the plan and [facility] admissions from [address] [place] [address] will be out tomorrow to evaluate the patient for assisted living

## 2018-07-30 NOTE — PHYSICAL THERAPY NOTE
PT treatment Note      07/30/18 1135   Restrictions/Precautions   Other Precautions (Bed Alarm; Chair Alarm;Multiple lines;Telemetry;O2;Fall Risk)   Subjective   Subjective States she is not feeling well, c/o abdominal dicomfort but willing to try ambulation  Bed Mobility   Additional Comments see OT note for bed mobility   Transfers   Sit to Stand 4  Minimal assistance   Additional items Assist x 2;Bedrails;Verbal cues; Increased time required   Stand to Sit 4  Minimal assistance   Additional items Assist x 2;Armrests; Verbal cues; Increased time required   Stand pivot 4  Minimal assistance   Additional items Assist x 2;Verbal cues   Ambulation/Elevation   Gait pattern (Narrow VESTA; Short stride)   Gait Assistance 4  Minimal assist   Additional items Assist x 1   Assistive Device Rolling walker   Distance 5'   Balance   Static Sitting Good   Dynamic Sitting Good   Static Standing Fair   Dynamic Standing Antony Lui [id number] -  (with RW)   Endurance Deficit   Endurance Deficit Yes   Activity Tolerance   Activity Tolerance Patient limited by fatigue   Assessment   Prognosis Good   Problem List Decreased strength;Decreased endurance; Impaired balance;Decreased mobility; Decreased safety awareness   Assessment Requiring increased assist with tx/ambulation 2* increased weakness decreased safety awareness balance endurance and functional mobility  Ambulation very limited this session  Pt is in need of continued activity in PT to improve all impairments and functional deficits to maximize current LOF  Pt will require short term rehab when medically stable for discharge  Plan   Treatment/Interventions Functional transfer training;LE strengthening/ROM; Therapeutic exercise; Endurance training;Bed mobility;Gait training   Progress Slow progress, decreased activity tolerance   Recommendation   Recommendation Short-term skilled PT   Pt   OOB with call bell within reach, scd's connected and turned on and alarm on at end of PT session   Discussed current mobility status and POC with Frieda Cuevas, PT

## 2018-07-30 NOTE — OCCUPATIONAL THERAPY NOTE
Occupational Therapy Progress Note     Patient Name: Karly Cotto  [id number] Date: 7/30/2018  Problem List  Patient Active Problem List   Diagnosis    Shortness of breath    Pleural effusion    Hypertension    GERD without esophagitis    Gastritis    Valvular heart disease    Acute on chronic diastolic congestive heart failure (HCC)    Acute encephalopathy    Tricuspid regurgitation    Pulmonary hypertension (HCC)    Mitral valve stenosis    Aortic stenosis    Hypoalbuminemia    Hepatic coma/encephalopathy (HCC)    Hepatic cirrhosis (HCC)    Hyperbilirubinemia    Leukopenia    Neutropenia (HCC)    Transaminitis    Healthcare-associated pneumonia    Mucosal abnormality of stomach    Portal hypertension ([address] )    Left adrenal mass [facility] )    Aneurysm of anterior cerebral artery    Pressure ulcer of left buttock, stage 3 (HCC)    Diastolic CHF ([facility] [facility] )    Edema    Hyperlipidemia    Pancreatic cyst    Post-menopausal osteoporosis    Pneumonia    Altered mental status    Increased ammonia level    Generalized weakness    Bradycardia    Hyperkalemia           07/30/18 1116   Restrictions/Precautions   Weight Bearing Precautions Per Order No   Other Precautions Bed Alarm; Chair Alarm;Multiple lines;Telemetry;O2;Fall Risk   Pain Assessment   Pain Assessment No/denies pain   Bed Mobility   Rolling L 4  Minimal assistance   Additional items Assist x 1   Supine to Sit 4  Minimal assistance   Additional items Assist x 1;Verbal cues; Increased time required   Sit to Supine 4  Minimal assistance   Additional items Assist x 1;Verbal cues; Increased time required   Additional Comments pt on 2L O2 during session with minimal SOB; pt requires increased time to complete all tasks; see PTA note for tranfers and FM this session   Cognition   Overall Cognitive Status Impaired   Arousal/Participation Alert   Attention Attends with cues to redirect   Orientation Level Oriented to person   Memory Decreased long term memory;Decreased short term memory   Following Commands Follows one step commands without difficulty   Activity Tolerance   Activity Tolerance Patient limited by fatigue   Assessment   Assessment continued OT intervention recommended at this time in order to enhance (I) c ADL performance and FM endurance prior to d/c; recommend STR, however pt is refusing to go to STR and wishes to return home       Pt left seated in chair at end of session; all needs within reach; all lines intact; scds connected and turned on  Pt will benefit from continued OT services in order to maximize (I) c ADL performance, FM c RW, and improve overall endurance/strength required to complete functional tasks in preparation for d/c

## 2018-07-30 NOTE — PLAN OF CARE
Problem: PHYSICAL THERAPY ADULT  Goal: Performs mobility at highest level of function for planned discharge setting  See evaluation for individualized goals  Outcome: Not Progressing  Prognosis: Good  Problem List: Decreased strength, Decreased endurance, Impaired balance, Decreased mobility, Decreased safety awareness  Assessment: Requiring increased assist with tx/ambulation 2* increased weakness decreased safety awareness balance endurance and functional mobility  Ambulation very limited this session  Pt is in need of continued activity in PT to improve all impairments and functional deficits to maximize current LOF  Pt will require short term rehab when medically stable for discharge  Recommendation: Short-term skilled PT     PT - OK to Discharge: Yes    See flowsheet documentation for full assessment

## 2018-07-30 NOTE — ASSESSMENT & PLAN NOTE
- mild Bradycardia at rate of 52 bpm present on admission, dipping into the 40s early during hospital stay  -  patient likely on nadolol for portal hypertension    - started on low dose Coreg with hold parameters on 7/29/18   -patient noted to be tachycardic this morning which resolved after receiving Coreg and is currently in the 80's on monitor

## 2018-07-30 NOTE — PROGRESS NOTES
Progress Note - Dewayne Schmidt 10/24/1930, 80 y o  female MRN: [id number]    Unit/Bed#: 267-05 Encounter: [id number]    Primary Care Provider: Yesika Portillo DO   Date and time admitted to hospital: 7/26/2018  3:40 PM        * Generalized weakness   Assessment & Plan    - Brought in by her son with complaints of weakness, ongoing for a few weeks  - Son is concern that she is not able to care for herself and needs placement- Denies fever, chills, no leukocytosis  - likely multifactorial and due to elevated ammonia levels / worsening hepatic encephalopathy, and acute CHF, but likely also chronic deconditioning   - Head CT shows-No acute intracranial abnormality   - B12 normal    -TSH elevated, check Free T3,T4  -Case management consult for probable SNF vs  Short term rehab placement        Increased ammonia level   Assessment & Plan    See plan for hepatic cirrhosis  Will follow ammonia levels daily  Hepatic cirrhosis (HCC)   Assessment & Plan    - Chronic condition  - Continue Lactulose at increased dose of 30g QID    -  It's imperative to continue lactulose despite NPO, discussed with nursing  May need to change to enema if patient becomes too confused or severe dysphagia  - continue rifaximin   - Avoid hepatotoxic agents  - Consider GI consultation  GERD without esophagitis   Assessment & Plan    - Continue protonix  - Significant regurgitation per speech therapy, causing dysphagia  - video swallow study today 7/30/18   -Mechanical soft diet with nectar thick liquids per speech/video swallow  Hypertension   Assessment & Plan    - Blood pressure is stable  - Continue home medication  - Patients nadolol changed to low dose coreg and bradycardia seems to be improving after this change  Bradycardia   Assessment & Plan    - mild Bradycardia at rate of 52 bpm present on admission, dipping into the 40s early during hospital stay    -  patient likely on nadolol for portal hypertension    - started on low dose Coreg with hold parameters on 18   -patient noted to be tachycardic this morning which resolved after receiving Coreg and is currently in the 80's on monitor  Hyperlipidemia   Assessment & Plan    - Hold statin therapy due to elevated LFTs  - may be more harmful than helpful at this point  Hyperkalemia   Assessment & Plan    Resolved today  Will monitor closely  Follow CMP daily  Acute on chronic diastolic congestive heart failure ([address] )   Assessment & Plan    - start PO lasix today  - continue daily weights and monitor inake and output closely  - not much change in weight and net output is -740    - lung sounds improved but still with bibasilar rales and mild tachypnea  - Oxygen needs decreased from 4L --> 2L per NC  VTE Pharmacologic Prophylaxis:   Pharmacologic: Enoxaparin (Lovenox)  Mechanical VTE Prophylaxis in Place: Yes    Patient Centered Rounds: I have performed bedside rounds with nursing staff today  Discussions with Specialists or Other Care Team Provider: nursing, CM, and attending    Education and Discussions with Family / Patient: n/a    Time Spent for Care: 30 minutes  More than 50% of total time spent on counseling and coordination of care as described above  Current Length of Stay: 4 day(s)    Current Patient Status: Inpatient   Certification Statement: The patient will continue to require additional inpatient hospital stay due to placement to STR  Discharge Plan: waiting patient and family decision on choice for STR  Code Status: Level 3 - DNAR and DNI      Subjective: The patient was seen and examined  The patient denies any complaints    Objective:     Vitals:   Temp (24hrs), Av 4 °F (36 3 °C), Min:96 9 °F (36 1 °C), Max:98 °F (36 7 °C)    HR:  [] 128  Resp:  [17-18] 17  BP: (109-113)/(52-71) 113/71  SpO2:  [96 %-99 %] 99 %  Body mass index is 27 18 kg/m²       Input and Output Summary (last 24 hours): Intake/Output Summary (Last 24 hours) at 07/30/18 1432  Last data filed at 07/30/18 0601   Gross per 24 hour   Intake              360 ml   Output              550 ml   Net             -190 ml       Physical Exam:     Physical Exam   Constitutional: She is oriented to person, place, and time  Vital signs are normal  She appears well-developed and well-nourished  She is active and cooperative  Cardiovascular: Normal rate and regular rhythm  Pulmonary/Chest: Effort normal  She has no wheezes  She has no rhonchi  She has rales in the right lower field and the left lower field  Abdominal: Soft  Normal appearance and bowel sounds are normal  There is no tenderness  Neurological: She is alert and oriented to person, place, and time  No cranial nerve deficit  Skin: Skin is warm, dry and intact  Nursing note and vitals reviewed  Additional Data:     Labs:      Results from last 7 days  Lab Units 07/30/18  0512   WBC Thousand/uL 5 22   HEMOGLOBIN g/dL 13 6   HEMATOCRIT % 43 7   PLATELETS Thousands/uL 140*   NEUTROS PCT % 70   LYMPHS PCT % 12*   MONOS PCT % 13*   EOS PCT % 2       Results from last 7 days  Lab Units 07/30/18  0512   SODIUM mmol/L 143   POTASSIUM mmol/L 4 4   CHLORIDE mmol/L 105   CO2 mmol/L 36*   BUN mg/dL 19   CREATININE mg/dL 0 86   CALCIUM mg/dL 9 0   TOTAL PROTEIN g/dL 7 7   BILIRUBIN TOTAL mg/dL 1 20*   ALK PHOS U/L 170*   ALT U/L 33   AST U/L 54*   GLUCOSE RANDOM mg/dL 103       Results from last 7 days  Lab Units 07/27/18  0649   INR  1 28*                 * I Have Reviewed All Lab Data Listed Above  * Additional Pertinent Lab Tests Reviewed:  All Labs Within Last 24 Hours Reviewed    Imaging:    Imaging Reports Reviewed Today Include: none  Imaging Personally Reviewed by Myself Includes:  none    Recent Cultures (last 7 days):           Last 24 Hours Medication List:     Current Facility-Administered Medications:  albuterol 2 5 mg Nebulization Q6H PRN Daralyn Shallow DO Leo   aspirin 81 mg Oral Daily Phugaldino Pedersen PA-C   carvedilol 3 125 mg Oral BID With Meals Leon Zendejas PA-C   cholecalciferol 1,000 Units Oral Daily Phu Pedersen, RACHEL   co-enzyme Q-10 30 mg Oral Daily [facility]   enoxaparin 40 mg Subcutaneous Daily [facility], RACHEL   furosemide 40 mg Oral Daily [facility], RACHEL   lactulose 30 g Oral 4x Daily [facility], RACHEL   losartan 50 mg Oral Daily [facility], RACHEL   magnesium oxide 400 mg Oral Daily [facility], RACHEL   menthol-zinc oxide  Topical BID [facility]   ondansetron 4 mg Intravenous Q6H PRN Phu PedersenRACHEL springer   pantoprazole 40 mg Intravenous Q24H [facility] Fallon Bains MD   rifaximin 550 mg Oral Q12H [facility] Leon Zendejas PA-C        Today, Patient Was Seen By: Karly Han PA-C    ** Please Note: Dictation voice to text software may have been used in the creation of this document   **

## 2018-07-30 NOTE — ASSESSMENT & PLAN NOTE
- Continue protonix  - Significant regurgitation per speech therapy, causing dysphagia  - video swallow study today 7/30/18   -Mechanical soft diet with nectar thick liquids per speech/video swallow

## 2018-07-30 NOTE — CASE MANAGEMENT
Continued Stay Review    Date: 07/30/18    Vital Signs: /71 (BP Location: Left arm)   Pulse (!) 128   Temp (!) 96 9 °F (36 1 °C) (Temporal)   Resp 17   Ht 5' 3" (1 6 m)   Wt 69 6 kg (153 lb 7 oz)   LMP  (LMP Unknown)   SpO2 99%   BMI 27 18 kg/m²     Medications:   Scheduled Meds:   Current Facility-Administered Medications:  albuterol 2 5 mg Nebulization Q6H PRN   aspirin 81 mg Oral Daily   carvedilol 3 125 mg Oral BID With Meals   cholecalciferol 1,000 Units Oral Daily   co-enzyme Q-10 30 mg Oral Daily   enoxaparin 40 mg Subcutaneous Daily   furosemide 40 mg Oral Daily   lactulose 30 g Oral 4x Daily   losartan 50 mg Oral Daily   magnesium oxide 400 mg Oral Daily   menthol-zinc oxide  Topical BID   ondansetron 4 mg Intravenous Q6H PRN   pantoprazole 40 mg Intravenous Q24H ZACH   rifaximin 550 mg Oral Q12H [address]     Continuous Infusions:    PRN Meds:   albuterol    ondansetron    Abnormal Labs/Diagnostic Results:   PLATELETS 185*   LYMPHS PCT 12*   MONOS PCT 13*     CO2 36*   BILIRUBIN TOTAL 1 20*   ALK PHOS 170*   AST 54*     Age/Sex: 80 y o  female     Assessment/Plan:   * Generalized weakness   Assessment & Plan     - Brought in by her son with complaints of weakness, ongoing for a few weeks  - Son is concern that she is not able to care for herself and needs placement- Denies fever, chills, no leukocytosis  - likely multifactorial and due to elevated ammonia levels / worsening hepatic encephalopathy, and acute CHF, but likely also chronic deconditioning   - Head CT shows-No acute intracranial abnormality   - B12 normal    -TSH elevated, check Free T3,T4  -Case management consult for probable SNF vs  Short term rehab placement          Increased ammonia level   Assessment & Plan     See plan for hepatic cirrhosis     Will follow ammonia levels daily            Hepatic cirrhosis (HCC)   Assessment & Plan     - Chronic condition  - Continue Lactulose at increased dose of 30g QID    -  It's imperative to continue lactulose despite NPO, discussed with nursing  May need to change to enema if patient becomes too confused or severe dysphagia  - continue rifaximin   - Avoid hepatotoxic agents  - Consider GI consultation            GERD without esophagitis   Assessment & Plan     - Continue protonix  - Significant regurgitation per speech therapy, causing dysphagia  - video swallow study today 7/30/18   -Mechanical soft diet with nectar thick liquids per speech/video swallow           Hypertension   Assessment & Plan     - Blood pressure is stable  - Continue home medication  - Patients nadolol changed to low dose coreg and bradycardia seems to be improving after this change            Bradycardia   Assessment & Plan     - mild Bradycardia at rate of 52 bpm present on admission, dipping into the 40s early during hospital stay  -  patient likely on nadolol for portal hypertension    - started on low dose Coreg with hold parameters on 7/29/18   -patient noted to be tachycardic this morning which resolved after receiving Coreg and is currently in the 80's on monitor           Hyperlipidemia   Assessment & Plan     - Hold statin therapy due to elevated LFTs  - may be more harmful than helpful at this point            Hyperkalemia   Assessment & Plan     Resolved today  Will monitor closely  Follow CMP daily           Acute on chronic diastolic congestive heart failure (HCC)   Assessment & Plan     - start PO lasix today  - continue daily weights and monitor inake and output closely  - not much change in weight and net output is -740    - lung sounds improved but still with bibasilar rales and mild tachypnea  - Oxygen needs decreased from 4L --> 2L per NC                 VTE Pharmacologic Prophylaxis:   Pharmacologic: Enoxaparin (Lovenox)  Mechanical VTE Prophylaxis in Place:  Yes     Current Patient Status: Inpatient   Certification Statement: The patient will continue to require additional inpatient hospital stay due to placement to STR      Discharge Plan: waiting patient and family decision on choice for STR  Thank you,  Alexandrea Aqq  [facility] Utilization Review Department  Phone: [phone]; Fax [phone]  ATTENTION: The Network Utilization Review Department is now centralized for our 9 Facilities  Make a note that we have a new phone and fax numbers for our Department  Please call with any questions or concerns to [phone] and carefully follow the prompts so that you are directed to the right person  All voicemails are confidential  Fax any determinations, approvals, denials, and requests for initial or continue stay review clinical to [phone]  Due to HIGH CALL volume, it would be easier if you could please send faxed requests to expedite your requests and in part, help us provide discharge notifications faster

## 2018-07-30 NOTE — ASSESSMENT & PLAN NOTE
- Chronic condition  - Continue Lactulose at increased dose of 30g QID    -  It's imperative to continue lactulose despite NPO, discussed with nursing  May need to change to enema if patient becomes too confused or severe dysphagia  - continue rifaximin   - Avoid hepatotoxic agents  - Consider GI consultation

## 2018-07-31 ENCOUNTER — APPOINTMENT (INPATIENT)
Dept: CT IMAGING | Facility: HOSPITAL | Age: 83
DRG: 293 | End: 2018-07-31
Payer: MEDICARE

## 2018-07-31 ENCOUNTER — APPOINTMENT (INPATIENT)
Dept: RADIOLOGY | Facility: HOSPITAL | Age: 83
DRG: 293 | End: 2018-07-31
Payer: MEDICARE

## 2018-07-31 LAB
ALBUMIN SERPL BCP-MCNC: 1.4 G/DL (ref 3.5–5)
ALP SERPL-CCNC: 129 U/L (ref 46–116)
ALT SERPL W P-5'-P-CCNC: 30 U/L (ref 12–78)
ANION GAP SERPL CALCULATED.3IONS-SCNC: 4 MMOL/L (ref 4–13)
AST SERPL W P-5'-P-CCNC: 56 U/L (ref 5–45)
BASOPHILS # BLD AUTO: 0.02 THOUSANDS/ΜL (ref 0–0.1)
BASOPHILS NFR BLD AUTO: 0 % (ref 0–1)
BILIRUB SERPL-MCNC: 0.9 MG/DL (ref 0.2–1)
BUN SERPL-MCNC: 18 MG/DL (ref 5–25)
CALCIUM SERPL-MCNC: 6.8 MG/DL (ref 8.3–10.1)
CHLORIDE SERPL-SCNC: 110 MMOL/L (ref 100–108)
CO2 SERPL-SCNC: 30 MMOL/L (ref 21–32)
CREAT SERPL-MCNC: 0.63 MG/DL (ref 0.6–1.3)
EOSINOPHIL # BLD AUTO: 0.18 THOUSAND/ΜL (ref 0–0.61)
EOSINOPHIL NFR BLD AUTO: 3 % (ref 0–6)
ERYTHROCYTE [DISTWIDTH] IN BLOOD BY AUTOMATED COUNT: 15.4 % (ref 11.6–15.1)
GFR SERPL CREATININE-BSD FRML MDRD: 81 ML/MIN/1.73SQ M
GLUCOSE SERPL-MCNC: 79 MG/DL (ref 65–140)
HCT VFR BLD AUTO: 39.8 % (ref 34.8–46.1)
HGB BLD-MCNC: 12.6 G/DL (ref 11.5–15.4)
LYMPHOCYTES # BLD AUTO: 0.84 THOUSANDS/ΜL (ref 0.6–4.47)
LYMPHOCYTES NFR BLD AUTO: 14 % (ref 14–44)
MCH RBC QN AUTO: 32.1 PG (ref 26.8–34.3)
MCHC RBC AUTO-ENTMCNC: 31.7 G/DL (ref 31.4–37.4)
MCV RBC AUTO: 102 FL (ref 82–98)
MONOCYTES # BLD AUTO: 0.69 THOUSAND/ΜL (ref 0.17–1.22)
MONOCYTES NFR BLD AUTO: 12 % (ref 4–12)
NEUTROPHILS # BLD AUTO: 4.12 THOUSANDS/ΜL (ref 1.85–7.62)
NEUTS SEG NFR BLD AUTO: 70 % (ref 43–75)
PLATELET # BLD AUTO: 159 THOUSANDS/UL (ref 149–390)
PMV BLD AUTO: 12.2 FL (ref 8.9–12.7)
POTASSIUM SERPL-SCNC: 4 MMOL/L (ref 3.5–5.3)
PROT SERPL-MCNC: 5.8 G/DL (ref 6.4–8.2)
RBC # BLD AUTO: 3.92 MILLION/UL (ref 3.81–5.12)
SODIUM SERPL-SCNC: 144 MMOL/L (ref 136–145)
WBC # BLD AUTO: 5.85 THOUSAND/UL (ref 4.31–10.16)

## 2018-07-31 PROCEDURE — 97116 GAIT TRAINING THERAPY: CPT

## 2018-07-31 PROCEDURE — 94760 N-INVAS EAR/PLS OXIMETRY 1: CPT

## 2018-07-31 PROCEDURE — 85025 COMPLETE CBC W/AUTO DIFF WBC: CPT | Performed by: PHYSICIAN ASSISTANT

## 2018-07-31 PROCEDURE — 71250 CT THORAX DX C-: CPT

## 2018-07-31 PROCEDURE — 94762 N-INVAS EAR/PLS OXIMTRY CONT: CPT

## 2018-07-31 PROCEDURE — 71045 X-RAY EXAM CHEST 1 VIEW: CPT

## 2018-07-31 PROCEDURE — 99232 SBSQ HOSP IP/OBS MODERATE 35: CPT | Performed by: PHYSICIAN ASSISTANT

## 2018-07-31 PROCEDURE — C9113 INJ PANTOPRAZOLE SODIUM, VIA: HCPCS | Performed by: INTERNAL MEDICINE

## 2018-07-31 PROCEDURE — 80053 COMPREHEN METABOLIC PANEL: CPT | Performed by: PHYSICIAN ASSISTANT

## 2018-07-31 PROCEDURE — 92526 ORAL FUNCTION THERAPY: CPT | Performed by: SPEECH-LANGUAGE PATHOLOGIST

## 2018-07-31 PROCEDURE — 97530 THERAPEUTIC ACTIVITIES: CPT

## 2018-07-31 PROCEDURE — 97110 THERAPEUTIC EXERCISES: CPT

## 2018-07-31 RX ORDER — FUROSEMIDE 10 MG/ML
40 INJECTION INTRAMUSCULAR; INTRAVENOUS
Status: DISCONTINUED | OUTPATIENT
Start: 2018-07-31 | End: 2018-08-01 | Stop reason: HOSPADM

## 2018-07-31 RX ADMIN — CARVEDILOL 3.12 MG: 3.12 TABLET, FILM COATED ORAL at 15:44

## 2018-07-31 RX ADMIN — LACTULOSE 30 G: 10 SOLUTION ORAL at 09:39

## 2018-07-31 RX ADMIN — LACTULOSE 30 G: 10 SOLUTION ORAL at 17:15

## 2018-07-31 RX ADMIN — Medication 30 MG: at 09:44

## 2018-07-31 RX ADMIN — ANORECTAL OINTMENT: 15.7; .44; 24; 20.6 OINTMENT TOPICAL at 17:17

## 2018-07-31 RX ADMIN — LACTULOSE 30 G: 10 SOLUTION ORAL at 12:26

## 2018-07-31 RX ADMIN — FUROSEMIDE 40 MG: 10 INJECTION, SOLUTION INTRAVENOUS at 15:44

## 2018-07-31 RX ADMIN — LACTULOSE 30 G: 10 SOLUTION ORAL at 21:26

## 2018-07-31 RX ADMIN — ANORECTAL OINTMENT: 15.7; .44; 24; 20.6 OINTMENT TOPICAL at 09:45

## 2018-07-31 RX ADMIN — ASPIRIN 81 MG 81 MG: 81 TABLET ORAL at 09:44

## 2018-07-31 RX ADMIN — VITAMIN D, TAB 1000IU (100/BT) 1000 UNITS: 25 TAB at 09:44

## 2018-07-31 RX ADMIN — ENOXAPARIN SODIUM 40 MG: 40 INJECTION, SOLUTION INTRAVENOUS; SUBCUTANEOUS at 09:38

## 2018-07-31 RX ADMIN — RIFAXIMIN 550 MG: 550 TABLET ORAL at 09:44

## 2018-07-31 RX ADMIN — RIFAXIMIN 550 MG: 550 TABLET ORAL at 21:26

## 2018-07-31 RX ADMIN — PANTOPRAZOLE SODIUM 40 MG: 40 INJECTION, POWDER, FOR SOLUTION INTRAVENOUS at 09:39

## 2018-07-31 RX ADMIN — Medication 400 MG: at 09:44

## 2018-07-31 NOTE — PLAN OF CARE
Problem: PHYSICAL THERAPY ADULT  Goal: Performs mobility at highest level of function for planned discharge setting  See evaluation for individualized goals  Outcome: Not Progressing  Prognosis: Good  Problem List: Decreased strength, Decreased endurance, Impaired balance, Decreased mobility, Decreased safety awareness, Decreased cognition  Assessment: Pt  continues to require increased assist with tx/ambulation 2* increased weakness decreased safety awareness balance endurance and functional mobility  Ambulation very limited this session  Pt is in need of continued activity in PT to improve all impairments and functional deficits to maximize current LOF  Pt will require short term rehab when medically stable for discharge  Recommendation: Short-term skilled PT     PT - OK to Discharge: Yes    See flowsheet documentation for full assessment

## 2018-07-31 NOTE — ASSESSMENT & PLAN NOTE
- started on PO lasix on 7/30/18   - not much change in weight and net output is -500   - repeat Chest x-ray showed worsening CHF  - will check CT chest   - will switch PO lasix to IV 40 mg BID today 7/31/18    - continue daily weights and monitor inake and output closely     - Oxygen needs decreased from 4L --> 2L per NC

## 2018-07-31 NOTE — OCCUPATIONAL THERAPY NOTE
OT Note     07/31/18 1338   Restrictions/Precautions   Other Precautions O2;Telemetry; Fall Risk   ADL   Toileting Assistance  4  Minimal Assistance   Toileting Deficit Use of bedpan/urinal setup   Toileting Comments Nsg to A on completion   Bed Mobility   Rolling R 4  Minimal assistance   Additional items Bedrails;Assist x 1   Sit to Supine 3  Moderate assistance   Additional items Assist x 2; Increased time required;LE management   Additional Comments A and vc to position self for comfort and alignment  Then requests use of bedpan   Transfers   Sit to Stand 4  Minimal assistance   Additional items Assist x 2;Armrests; Increased time required;Verbal cues   Stand to Sit 4  Minimal assistance   Additional items Assist x 2;Verbal cues   Functional Mobility   Functional Mobility 4  Minimal assistance   Additional Comments A x 2 to complete txfr recliner to EOB   Additional items Rolling walker   Activity Tolerance   Activity Tolerance Patient limited by fatigue;Treatment limited secondary to medical complications (Comment)   Plan   Treatment Interventions Functional transfer training; Endurance training;Cognitive reorientation

## 2018-07-31 NOTE — PHYSICAL THERAPY NOTE
PT treatment Note      07/31/18 1140   Restrictions/Precautions   Other Precautions (Multiple lines;Telemetry;O2;Fall Risk)   Subjective   Subjective c/o weakness  Agreeble to PT after encouragement  Bed Mobility   Supine to Sit 4  Minimal assistance   Additional items Assist x 2;HOB elevated; Bedrails; Increased time required;Verbal cues;LE management   Transfers   Sit to Stand 4  Minimal assistance   Additional items Assist x 2;Bedrails;Verbal cues   Stand to Sit 4  Minimal assistance   Additional items Assist x 2;Armrests; Verbal cues   Stand pivot 4  Minimal assistance   Additional items Assist x 2;Verbal cues   Ambulation/Elevation   Gait pattern Shuffling   Gait Assistance 4  Minimal assist   Additional items Assist x 2;Verbal cues   Assistive Device Rolling walker   Distance 3' bed to chair   Balance   Static Sitting Good   Dynamic Sitting Good   Static Standing Fair   Dynamic Standing Fair   Ambulatory Fair  (with RW)   Endurance Deficit   Endurance Deficit Yes   Activity Tolerance   Activity Tolerance Patient limited by fatigue   Exercises   Heelslides Supine;15 reps;AAROM   Hip Abduction Supine;15 reps;AAROM   Hip Adduction Supine;15 reps;AAROM   Ankle Pumps Supine;20 reps;AAROM   Assessment   Prognosis Good   Problem List Decreased strength;Decreased endurance; Impaired balance;Decreased mobility; Decreased safety awareness;Decreased cognition   Assessment Pt  continues to require increased assist with tx/ambulation 2* increased weakness decreased safety awareness balance endurance and functional mobility  Ambulation very limited this session  Pt is in need of continued activity in PT to improve all impairments and functional deficits to maximize current LOF  Pt will require short term rehab when medically stable for discharge  Plan   Treatment/Interventions Functional transfer training;LE strengthening/ROM; Therapeutic exercise; Endurance training;Bed mobility;Gait training   Progress Slow progress, decreased activity tolerance   Recommendation   Recommendation Short-term skilled PT   Pt  OOB    with call bell within reach, scd's connected and turned on and alarm on at end of PT session  Discussed with Sue Aiken, PT today's treatment and patient's current level of function for care coordination

## 2018-07-31 NOTE — ASSESSMENT & PLAN NOTE
- Continue protonix  - Significant regurgitation per speech therapy, causing dysphagia  - video swallow study today 7/30/18   -Mechanical soft diet  Patient was initially placed on nectar thick liquids per speech/video swallow, however since patient continues to cough she was placed on honey thick liquids

## 2018-07-31 NOTE — PLAN OF CARE
Problem: SLP ADULT - SWALLOWING, IMPAIRED  Goal: Advance to least restrictive diet without signs or symptoms of aspiration for planned discharge setting  See evaluation for individualized goals  1   Patient will tolerate mechanical soft diet and honey thick liquids without s/s   2  Patient will use chin tuck with nectar liquids without s/s with verbal cue by SLP          Outcome: Progressing

## 2018-07-31 NOTE — SPEECH THERAPY NOTE
Speech Language/Pathology    Speech/Language Pathology Progress Note    Patient Name: Griselda Julian  [facility] Date: 7/31/2018     Problem List  Patient Active Problem List   Diagnosis    Shortness of breath    Pleural effusion    Hypertension    GERD without esophagitis    Gastritis    Valvular heart disease    Acute on chronic diastolic congestive heart failure (HCC)    Acute encephalopathy    Tricuspid regurgitation    Pulmonary hypertension (HCC)    Mitral valve stenosis    Aortic stenosis    Hypoalbuminemia    Hepatic coma/encephalopathy (HCC)    Hepatic cirrhosis (HCC)    Hyperbilirubinemia    Leukopenia    Neutropenia (HCC)    Transaminitis    Healthcare-associated pneumonia    Mucosal abnormality of stomach    [facility] hypertension [facility]    Left adrenal mass ([facility]    Aneurysm of anterior cerebral artery    Pressure ulcer of left buttock, stage 3 (HCC)    Diastolic CHF (HCC)    Edema    Hyperlipidemia    Pancreatic cyst    Post-menopausal osteoporosis    Pneumonia    Altered mental status    Increased ammonia level    Generalized weakness    Bradycardia    Hyperkalemia        Past Medical History  Past Medical History:   Diagnosis Date    Breast cancer ([facility]     Cancer ([facility]     breast    CHF (congestive heart failure) (HCC)     Cirrhosis of liver ([facility]     GERD (gastroesophageal reflux disease)     Hepatic encephalopathy ([facility]     Hypertension     last assessed 10/5/17    Polyp, sigmoid colon         Past Surgical History  Past Surgical History:   Procedure Laterality Date    COMBINED HYSTEROSCOPY DIAGNOSTIC / D&C      HYSTERECTOMY      unknown    MASTECTOMY Right 1990    MASTECTOMY           Subjective:  Patient is upright in bed; and she is oriented to place, but asking why she can't have regular water  Objective:  RN reports that patient had a choking episode with nectar thick drink this morning   This is the first episode since her diet level change after yesterday's VBS  Patient does remember the episode; and SLP re educated patient about the video study yesterday, then the patient recalled  Assessment:  Two trials of nectar with SLP, one with straw one with cup sip were with cough response; but not choking or eye tearing or red face with difficulty breathing  Trials of honey thick via cup for control were WNL; and patient verbalized "that is better"  No cough or throat clear  Trials of nectar cup sip with verbal cues for chin tuck were with delayed cough response  Plan/Recommendations:  Recommended for change to honey thick liquids secondary to new episode and s/s with nectar thick liquids  Patient with h/o esophageal reflux and noted Zenkers in VBS  Discussed with RN for possible GI consult  Continue on caseload

## 2018-07-31 NOTE — PLAN OF CARE
Problem: OCCUPATIONAL THERAPY ADULT  Goal: Performs self-care activities at highest level of function for planned discharge setting  See evaluation for individualized goals  Treatment Interventions: ADL retraining, UE strengthening/ROM, Endurance training, Patient/family training, Cognitive reorientation, Activityengagement          See flowsheet documentation for full assessment, interventions and recommendations       Outcome: Not Progressing  Limitation: Decreased ADL status, Decreased Safe judgement during ADL, Decreased UE strength, Decreased endurance, Decreased cognition, Decreased self-care trans, Decreased high-level ADLs     Assessment: continued OT intervention recommended at this time in order to enhance (I) c ADL performance and FM endurance prior to d/c; recommend STR, however pt is refusing to go to STR and wishes to return home     OT Discharge Recommendation: Short Term Rehab

## 2018-07-31 NOTE — PROGRESS NOTES
Progress Note - Mary Arana 10/24/1930, 80 y o  female MRN: [id number]    Unit/Bed#: 686-15 Encounter: [id number]    Primary Care Provider: Kilo Walker DO   Date and time admitted to hospital: 7/26/2018  3:40 PM        * Generalized weakness   Assessment & Plan    - Brought in by her son with complaints of weakness, ongoing for a few weeks  - Son is concern that she is not able to care for herself and needs placement- Denies fever, chills, no leukocytosis  - likely multifactorial and due to elevated ammonia levels / worsening hepatic encephalopathy, and acute CHF, but likely also chronic deconditioning   - Head CT shows-No acute intracranial abnormality   - B12 normal    -TSH elevated, check Free T3,T4  -Case management consult for probable SNF vs  Short term rehab placement        Increased ammonia level   Assessment & Plan    See plan for hepatic cirrhosis  Will follow ammonia levels  Hepatic cirrhosis (HCC)   Assessment & Plan    - Chronic condition  - Continue Lactulose at increased dose of 30g QID    -  It's imperative to continue lactulose despite NPO, discussed with nursing  May need to change to enema if patient becomes too confused or severe dysphagia  - continue rifaximin   - Avoid hepatotoxic agents  - Consider GI consultation  GERD without esophagitis   Assessment & Plan    - Continue protonix  - Significant regurgitation per speech therapy, causing dysphagia  - video swallow study today 7/30/18   -Mechanical soft diet  Patient was initially placed on nectar thick liquids per speech/video swallow, however since patient continues to cough she was placed on honey thick liquids  Hypertension   Assessment & Plan    - patient is hypotensive today with blood pressure in the 12'B systolic   - will discontinue Cozaar    - will continue low dose coreg with hold parameters     - on 7/27/18 the patients nadolol was changed to low dose coreg due to bradycardia which has resolved  Bradycardia   Assessment & Plan    - mild Bradycardia at rate of 52 bpm present on admission, dipping into the 40s early during hospital stay  - Patient's nadolol was held and started on low dose Coreg with hold parameters on 18   - bradycardia has resolved        Hyperlipidemia   Assessment & Plan    - Hold statin therapy due to elevated LFTs  - may be more harmful than helpful at this point  Hyperkalemia   Assessment & Plan    Resolved  Will monitor closely  Follow CMP daily  Acute on chronic diastolic congestive heart failure ([facility] [address] )   Assessment & Plan    - started on PO lasix on 18   - not much change in weight and net output is -500   - repeat Chest x-ray showed worsening CHF  - will check CT chest   - will switch PO lasix to IV 40 mg BID today 18    - continue daily weights and monitor inake and output closely  - Oxygen needs decreased from 4L --> 2L per NC  VTE Pharmacologic Prophylaxis:   Pharmacologic: Enoxaparin (Lovenox)  Mechanical VTE Prophylaxis in Place: Yes    Patient Centered Rounds: I have performed bedside rounds with nursing staff today  Discussions with Specialists or Other Care Team Provider: nursing, CM, and attending    Education and Discussions with Family / Patient: n/a    Time Spent for Care: 30 minutes  More than 50% of total time spent on counseling and coordination of care as described above  Current Length of Stay: 5 day(s)    Current Patient Status: Inpatient   Certification Statement: The patient will continue to require additional inpatient hospital stay due to need for IV lasix  Discharge Plan: TBD    Code Status: Level 3 - DNAR and DNI      Subjective: The patient was seen and examined  The patient denies any complaints      Objective:     Vitals:   Temp (24hrs), Av 6 °F (36 4 °C), Min:97 2 °F (36 2 °C), Max:98 °F (36 7 °C)    HR:  [57-84] 84  Resp:  [17-18] 18  BP: ()/(38-58) 116/58  SpO2:  [99 %-100 %] 100 %  Body mass index is 27 18 kg/m²  Input and Output Summary (last 24 hours): Intake/Output Summary (Last 24 hours) at 07/31/18 1431  Last data filed at 07/30/18 1700   Gross per 24 hour   Intake              120 ml   Output                0 ml   Net              120 ml       Physical Exam:     Physical Exam   Constitutional: She is oriented to person, place, and time  She appears well-developed and well-nourished  She is active and cooperative  Cardiovascular: Normal rate and regular rhythm  Murmur heard  Pulmonary/Chest: Effort normal  She has no wheezes  She has no rhonchi  She has rales in the right lower field and the left lower field  Abdominal: Soft  Normal appearance and bowel sounds are normal  There is no tenderness  Neurological: She is alert and oriented to person, place, and time  No cranial nerve deficit  Skin: Skin is warm, dry and intact  Nursing note and vitals reviewed  Additional Data:     Labs:      Results from last 7 days  Lab Units 07/31/18  0630   WBC Thousand/uL 5 85   HEMOGLOBIN g/dL 12 6   HEMATOCRIT % 39 8   PLATELETS Thousands/uL 159   NEUTROS PCT % 70   LYMPHS PCT % 14   MONOS PCT % 12   EOS PCT % 3       Results from last 7 days  Lab Units 07/31/18  0630   SODIUM mmol/L 144   POTASSIUM mmol/L 4 0   CHLORIDE mmol/L 110*   CO2 mmol/L 30   BUN mg/dL 18   CREATININE mg/dL 0 63   CALCIUM mg/dL 6 8*   TOTAL PROTEIN g/dL 5 8*   BILIRUBIN TOTAL mg/dL 0 90   ALK PHOS U/L 129*   ALT U/L 30   AST U/L 56*   GLUCOSE RANDOM mg/dL 79       Results from last 7 days  Lab Units 07/27/18  0649   INR  1 28*                 * I Have Reviewed All Lab Data Listed Above  * Additional Pertinent Lab Tests Reviewed:  All Labs Within Last 24 Hours Reviewed    Imaging:    Imaging Reports Reviewed Today Include: chest x-ray  Imaging Personally Reviewed by Myself Includes:  none    Recent Cultures (last 7 days):           Last 24 Hours Medication List:     Current Facility-Administered Medications:  albuterol 2 5 mg Nebulization Q6H PRN Ramírez Castellanos DO   aspirin 81 mg Oral Daily Phu Pedersen PA-C   carvedilol 3 125 mg Oral BID With Meals Kim Baeza PA-C   cholecalciferol 1,000 Units Oral Daily Phu Pedersen PA-C   co-enzyme Q-10 30 mg Oral Daily Phu Pedersen PA-C   enoxaparin 40 mg Subcutaneous Daily Phu Pedersen PA-C   furosemide 40 mg Intravenous BID (diuretic) Raven Stoddard PA-C   lactulose 30 g Oral 4x Daily Malena Gardner PA-C   magnesium oxide 400 mg Oral Daily Phu Pedersen PA-C   menthol-zinc oxide  Topical BID Phu Pedersen PA-C   ondansetron 4 mg Intravenous Q6H PRN Phu Pedersen PA-C   pantoprazole 40 mg Intravenous Q24H [address] Milla Ryan MD   rifaximin 550 mg Oral Q12H [address] Kim Baeza PA-C        Today, Patient Was Seen By: Raven Stoddard PA-C    ** Please Note: Dictation voice to text software may have been used in the creation of this document   **

## 2018-07-31 NOTE — PROGRESS NOTES
Pt had episode of coughing lasting approximately 2-3Min after taking nectar thick liquids this morning    Spoke to Vitor's therapist regarding this episode   Presently evaluating pt at bedside

## 2018-07-31 NOTE — ASSESSMENT & PLAN NOTE
- mild Bradycardia at rate of 52 bpm present on admission, dipping into the 40s early during hospital stay    - Patient's nadolol was held and started on low dose Coreg with hold parameters on 7/27/18   - bradycardia has resolved

## 2018-07-31 NOTE — RESPIRATORY THERAPY NOTE
Spoke with Isaias Castrejon regarding pt's Continuous pulse oximetry, being bother some to pt and difficulty maintaining proper readings due to cold fingers  Cash gave verbal OK to discontinue pt's continuous pulse oximetry

## 2018-07-31 NOTE — ASSESSMENT & PLAN NOTE
- patient is hypotensive today with blood pressure in the 53'I systolic   - will discontinue Cozaar    - will continue low dose coreg with hold parameters  - on 7/27/18 the patients nadolol was changed to low dose coreg due to bradycardia which has resolved

## 2018-07-31 NOTE — PROGRESS NOTES
C/o decreased appetite refusing lunch tray but agreed to eat the fruit   Requesting liquids  Tolerating honey thick liquids without any problem

## 2018-07-31 NOTE — SOCIAL WORK
Natanael Ambriz from [facility] was going to come out to see the patient and she cannot come she will come out in the am

## 2018-07-31 NOTE — SOCIAL WORK
The patient is accepted on the fifth floor  She is not medically ready today I will call her daughter and let her know  I called Hernesto Rocha the patients daughter and she is aware

## 2018-08-01 ENCOUNTER — TRANSITIONAL CARE MANAGEMENT (OUTPATIENT)
Dept: INTERNAL MEDICINE CLINIC | Facility: CLINIC | Age: 83
End: 2018-08-01

## 2018-08-01 ENCOUNTER — HOSPITAL ENCOUNTER (INPATIENT)
Facility: HOSPITAL | Age: 83
LOS: 26 days | Discharge: HOME HEALTH | DRG: 092 | End: 2018-08-27
Attending: INTERNAL MEDICINE | Admitting: INTERNAL MEDICINE
Payer: MEDICARE

## 2018-08-01 VITALS
TEMPERATURE: 97.3 F | HEIGHT: 63 IN | RESPIRATION RATE: 17 BRPM | OXYGEN SATURATION: 98 % | HEART RATE: 69 BPM | WEIGHT: 149.69 LBS | SYSTOLIC BLOOD PRESSURE: 117 MMHG | DIASTOLIC BLOOD PRESSURE: 75 MMHG | BODY MASS INDEX: 26.52 KG/M2

## 2018-08-01 DIAGNOSIS — D69.6 THROMBOCYTOPENIA (HCC): ICD-10-CM

## 2018-08-01 DIAGNOSIS — I50.33 ACUTE ON CHRONIC DIASTOLIC CONGESTIVE HEART FAILURE (HCC): Primary | ICD-10-CM

## 2018-08-01 DIAGNOSIS — I07.1 TRICUSPID VALVE INSUFFICIENCY, UNSPECIFIED ETIOLOGY: ICD-10-CM

## 2018-08-01 DIAGNOSIS — E83.39 HYPERPHOSPHATEMIA: ICD-10-CM

## 2018-08-01 DIAGNOSIS — K74.60 CIRRHOSIS OF LIVER WITHOUT ASCITES, UNSPECIFIED HEPATIC CIRRHOSIS TYPE (HCC): ICD-10-CM

## 2018-08-01 DIAGNOSIS — G93.40 ACUTE ENCEPHALOPATHY: ICD-10-CM

## 2018-08-01 LAB
ALBUMIN SERPL BCP-MCNC: 1.8 G/DL (ref 3.5–5)
ALP SERPL-CCNC: 167 U/L (ref 46–116)
ALT SERPL W P-5'-P-CCNC: 41 U/L (ref 12–78)
ANION GAP SERPL CALCULATED.3IONS-SCNC: 4 MMOL/L (ref 4–13)
AST SERPL W P-5'-P-CCNC: 52 U/L (ref 5–45)
BASOPHILS # BLD AUTO: 0.02 THOUSANDS/ΜL (ref 0–0.1)
BASOPHILS NFR BLD AUTO: 0 % (ref 0–1)
BILIRUB SERPL-MCNC: 1.3 MG/DL (ref 0.2–1)
BUN SERPL-MCNC: 17 MG/DL (ref 5–25)
CALCIUM SERPL-MCNC: 8.2 MG/DL (ref 8.3–10.1)
CHLORIDE SERPL-SCNC: 100 MMOL/L (ref 100–108)
CO2 SERPL-SCNC: 37 MMOL/L (ref 21–32)
CREAT SERPL-MCNC: 0.79 MG/DL (ref 0.6–1.3)
EOSINOPHIL # BLD AUTO: 0.22 THOUSAND/ΜL (ref 0–0.61)
EOSINOPHIL NFR BLD AUTO: 5 % (ref 0–6)
ERYTHROCYTE [DISTWIDTH] IN BLOOD BY AUTOMATED COUNT: 15.1 % (ref 11.6–15.1)
GFR SERPL CREATININE-BSD FRML MDRD: 68 ML/MIN/1.73SQ M
GLUCOSE SERPL-MCNC: 92 MG/DL (ref 65–140)
HCT VFR BLD AUTO: 42.1 % (ref 34.8–46.1)
HGB BLD-MCNC: 13.1 G/DL (ref 11.5–15.4)
LYMPHOCYTES # BLD AUTO: 0.72 THOUSANDS/ΜL (ref 0.6–4.47)
LYMPHOCYTES NFR BLD AUTO: 15 % (ref 14–44)
MCH RBC QN AUTO: 31.3 PG (ref 26.8–34.3)
MCHC RBC AUTO-ENTMCNC: 31.1 G/DL (ref 31.4–37.4)
MCV RBC AUTO: 101 FL (ref 82–98)
MONOCYTES # BLD AUTO: 0.67 THOUSAND/ΜL (ref 0.17–1.22)
MONOCYTES NFR BLD AUTO: 14 % (ref 4–12)
NEUTROPHILS # BLD AUTO: 3.21 THOUSANDS/ΜL (ref 1.85–7.62)
NEUTS SEG NFR BLD AUTO: 66 % (ref 43–75)
PLATELET # BLD AUTO: 169 THOUSANDS/UL (ref 149–390)
PMV BLD AUTO: 12.2 FL (ref 8.9–12.7)
POTASSIUM SERPL-SCNC: 4 MMOL/L (ref 3.5–5.3)
PROT SERPL-MCNC: 7.2 G/DL (ref 6.4–8.2)
RBC # BLD AUTO: 4.18 MILLION/UL (ref 3.81–5.12)
SODIUM SERPL-SCNC: 141 MMOL/L (ref 136–145)
T3 SERPL-MCNC: 0.9 NG/ML (ref 0.6–1.8)
T4 FREE SERPL-MCNC: 1.23 NG/DL (ref 0.76–1.46)
WBC # BLD AUTO: 4.84 THOUSAND/UL (ref 4.31–10.16)

## 2018-08-01 PROCEDURE — 85025 COMPLETE CBC W/AUTO DIFF WBC: CPT | Performed by: PHYSICIAN ASSISTANT

## 2018-08-01 PROCEDURE — 97116 GAIT TRAINING THERAPY: CPT

## 2018-08-01 PROCEDURE — 99239 HOSP IP/OBS DSCHRG MGMT >30: CPT | Performed by: PHYSICIAN ASSISTANT

## 2018-08-01 PROCEDURE — 84480 ASSAY TRIIODOTHYRONINE (T3): CPT | Performed by: PHYSICIAN ASSISTANT

## 2018-08-01 PROCEDURE — 84439 ASSAY OF FREE THYROXINE: CPT | Performed by: PHYSICIAN ASSISTANT

## 2018-08-01 PROCEDURE — C9113 INJ PANTOPRAZOLE SODIUM, VIA: HCPCS | Performed by: INTERNAL MEDICINE

## 2018-08-01 PROCEDURE — 97530 THERAPEUTIC ACTIVITIES: CPT

## 2018-08-01 PROCEDURE — 97166 OT EVAL MOD COMPLEX 45 MIN: CPT

## 2018-08-01 PROCEDURE — 80053 COMPREHEN METABOLIC PANEL: CPT | Performed by: PHYSICIAN ASSISTANT

## 2018-08-01 RX ORDER — CARVEDILOL 3.12 MG/1
3.12 TABLET ORAL 2 TIMES DAILY WITH MEALS
Qty: 60 TABLET | Refills: 0
Start: 2018-08-01

## 2018-08-01 RX ORDER — FUROSEMIDE 40 MG/1
40 TABLET ORAL 2 TIMES DAILY
Refills: 0
Start: 2018-08-01

## 2018-08-01 RX ORDER — LACTULOSE 20 G/30ML
30 SOLUTION ORAL 4 TIMES DAILY
Qty: 236 ML | Refills: 0
Start: 2018-08-01

## 2018-08-01 RX ADMIN — ANORECTAL OINTMENT: 15.7; .44; 24; 20.6 OINTMENT TOPICAL at 08:57

## 2018-08-01 RX ADMIN — ENOXAPARIN SODIUM 40 MG: 40 INJECTION, SOLUTION INTRAVENOUS; SUBCUTANEOUS at 08:56

## 2018-08-01 RX ADMIN — FUROSEMIDE 40 MG: 10 INJECTION, SOLUTION INTRAVENOUS at 08:56

## 2018-08-01 RX ADMIN — VITAMIN D, TAB 1000IU (100/BT) 1000 UNITS: 25 TAB at 08:56

## 2018-08-01 RX ADMIN — RIFAXIMIN 550 MG: 550 TABLET ORAL at 08:56

## 2018-08-01 RX ADMIN — CARVEDILOL 3.12 MG: 3.12 TABLET, FILM COATED ORAL at 08:56

## 2018-08-01 RX ADMIN — LACTULOSE 30 G: 10 SOLUTION ORAL at 08:55

## 2018-08-01 RX ADMIN — ASPIRIN 81 MG 81 MG: 81 TABLET ORAL at 08:56

## 2018-08-01 RX ADMIN — PANTOPRAZOLE SODIUM 40 MG: 40 INJECTION, POWDER, FOR SOLUTION INTRAVENOUS at 08:56

## 2018-08-01 RX ADMIN — Medication 30 MG: at 08:56

## 2018-08-01 RX ADMIN — Medication 400 MG: at 08:56

## 2018-08-01 NOTE — ASSESSMENT & PLAN NOTE
- patient was mildly hypotensive with blood pressure in the 94'E systolic at times  - discontinue Cozaar    - nadolol discontinued and low dose coreg added instead with hold parameters  - Patient also had asymptomatic bradycardia, which has resolved since this medication adjustment

## 2018-08-01 NOTE — ASSESSMENT & PLAN NOTE
- Continue protonix  - Significant regurgitation per speech therapy, causing dysphagia  - video swallow study showed mild oropharyngeal and moderate pharyngeal dysphagia, small zenker's diverticulum noted  -Mechanical soft diet recommended    Patient was initially placed on nectar thick liquids per speech/video swallow, however since patient continues to cough she was placed on honey thick liquids

## 2018-08-01 NOTE — ASSESSMENT & PLAN NOTE
Patient was treated with IV lasix BID for 2 days, then this was transitioned back to PO lasix once daily and observed  The patient seemed to be doing worse, so IV lasix was added back BID again  Would recommend discharge on lasix 40mg BID  Will need BMP followed closely as well as daily weights

## 2018-08-01 NOTE — OCCUPATIONAL THERAPY NOTE
OT Note     08/01/18 1242   Restrictions/Precautions   Other Precautions O2;Fall Risk;Multiple lines   ADL   Toileting Assistance  3  Moderate Assistance   Toileting Deficit Use of bedpan/urinal setup   Toileting Comments Nsg to A with completion   Bed Mobility   Sit to Supine 3  Moderate assistance   Additional items Assist x 2; Increased time required;Verbal cues   Transfers   Sit to Stand 4  Minimal assistance   Additional items Assist x 2;Armrests; Verbal cues   Stand to Sit 4  Minimal assistance   Additional items Assist x 2;Verbal cues   Functional Mobility   Functional Mobility 4  Minimal assistance   Additional Comments Completes txfr recliner to EOB, short shuffling steps and cues for safety   Additional items Rolling walker   Cognition   Orientation Level Oriented to person   Activity Tolerance   Activity Tolerance Treatment limited secondary to medical complications (Comment)   Assessment   Assessment Pt presenting with limited endurance thru txfrs and mobility with increased risk of falls  Will benefit from continued OT services in order to return to highest LOF as per POC     Recommendation   Recommendation (Continue OT services )

## 2018-08-01 NOTE — ASSESSMENT & PLAN NOTE
- Brought in by her son with complaints of weakness, ongoing for a few weeks  - likely multifactorial and due to elevated ammonia levels / worsening hepatic encephalopathy, and acute CHF, but likely also chronic deconditioning   - Head CT shows-No acute intracranial abnormality   - B12 normal  TSH elevated - T3, T4 pending, although this is likely not the main cause of generalized weakness  - Ultimate plan is for Short term rehab placement with transition to back home vs  Possible need for SNF

## 2018-08-01 NOTE — NURSING NOTE
Discharged to [facility] in stable condition with oxygen at 2L NC

## 2018-08-01 NOTE — DISCHARGE INSTRUCTIONS
Patient will need close follow up arranged with her cardiologist as well as gastroenterologist     Recommend incentive spirometry every 1 hour while awake  Patient will need encouraged to use

## 2018-08-01 NOTE — SOCIAL WORK
Pt is being discharged to [facility]  Pt transported via w/c with all belongings  Case Management reviewed discharge planning process including the following: identifying help that is needed at home, pt's preference for discharge needs and Meds at [facility]  Reviewed with Pt that any member of the healthcare team can answer questions regarding : medications, jmportance of recognizing  Signs and symptoms of any  medical problems  Case Management also encouraged pt to follow up with all recommended appointments after discharge

## 2018-08-01 NOTE — PHYSICAL THERAPY NOTE
PT Treatment Note      08/01/18 1031   Restrictions/Precautions   Other Precautions (O2;Fall Risk;Multiple lines)   General   Family/Caregiver Present No   Subjective   Subjective Agreeable to ambulation  Needs to use BSC   Bed Mobility   Supine to Sit 4  Minimal assistance   Additional items Assist x 2;HOB elevated; Bedrails; Increased time required;Verbal cues;LE management   Transfers   Sit to Stand 4  Minimal assistance   Additional items Assist x 2;Armrests; Verbal cues   Stand to Sit 4  Minimal assistance   Additional items Assist x 2;Armrests; Verbal cues   Stand pivot 4  Minimal assistance   Additional items Assist x 2;Verbal cues   Toilet transfer 4  Minimal assistance   Additional items Assist x 2;Commode;Armrests; Verbal cues   Ambulation/Elevation   Gait pattern Shuffling   Gait Assistance 4  Minimal assist   Additional items Assist x 2   Assistive Device Rolling walker   Distance 3', 20'   Balance   Static Sitting Fair +   Dynamic Sitting Fair +   Static Standing Fair   Dynamic Standing [address]  (with RW)   Endurance Deficit   Endurance Deficit Yes   Activity Tolerance   Activity Tolerance Patient limited by fatigue   Assessment   Prognosis Good   Problem List Decreased strength;Decreased endurance; Impaired balance;Decreased mobility; Decreased safety awareness;Decreased cognition   Assessment Tolerated ambulation with RW short distance at (min x2) level of function  Pt, continue to work toward ambulation and return to home  goals  Pt  is in need of continued activity in PT to improve all impairments and functional deficits to maximize current LOF  Pt will require short term rehab when medically stable for discharge  Plan   Treatment/Interventions Functional transfer training;LE strengthening/ROM; Therapeutic exercise; Endurance training;Bed mobility;Gait training   Progress Slow progress, decreased activity tolerance   Recommendation   Recommendation Short-term skilled PT   Pt   OOB   with call bell within reach, scd's connected and turned on and alarm on at end of PT session  Discussed with John Paul Dietz, PT today's treatment and patient's current level of function for care coordination

## 2018-08-01 NOTE — DISCHARGE SUMMARY
Discharge- Sasha Romero 10/24/1930, 80 y o  female MRN: [id number]    Unit/Bed#: 041-25 Encounter: [id number]    Primary Care Provider: Nette Whitman DO   Date and time admitted to hospital: 7/26/2018  3:40 PM        Acute on chronic diastolic congestive heart failure [facility])   Assessment & Plan    Patient was treated with IV lasix BID for 2 days, then this was transitioned back to PO lasix once daily and observed  The patient seemed to be doing worse, so IV lasix was added back BID again  Would recommend discharge on lasix 40mg BID  Will need BMP followed closely as well as daily weights  * Generalized weakness   Assessment & Plan    - Brought in by her son with complaints of weakness, ongoing for a few weeks  - likely multifactorial and due to elevated ammonia levels / worsening hepatic encephalopathy, and acute CHF, but likely also chronic deconditioning   - Head CT shows-No acute intracranial abnormality   - B12 normal  TSH elevated - T3, T4 pending, although this is likely not the main cause of generalized weakness  - Ultimate plan is for Short term rehab placement with transition to back home vs  Possible need for SNF  Increased ammonia level   Assessment & Plan    See plan for hepatic cirrhosis  follow ammonia levels as needed  Hepatic cirrhosis (HCC)   Assessment & Plan    - Chronic condition  - Continue Lactulose at increased dose of 30g QID  - continue rifaximin   - Avoid hepatotoxic agents  - Will need close outpatient GI follow up  GERD without esophagitis   Assessment & Plan    - Continue protonix  - Significant regurgitation per speech therapy, causing dysphagia  - video swallow study showed mild oropharyngeal and moderate pharyngeal dysphagia, small zenker's diverticulum noted  -Mechanical soft diet recommended    Patient was initially placed on nectar thick liquids per speech/video swallow, however since patient continues to cough she was placed on honey thick liquids  Hypertension   Assessment & Plan    - patient was mildly hypotensive with blood pressure in the 96'B systolic at times  - discontinue Cozaar    - nadolol discontinued and low dose coreg added instead with hold parameters  - Patient also had asymptomatic bradycardia, which has resolved since this medication adjustment  Bradycardia   Assessment & Plan    - mild Bradycardia at rate of 52 bpm present on admission, dipping into the 40s early during hospital stay  - Patient's nadolol was held and started on low dose Coreg with hold parameters on 7/27/18   - bradycardia has now resolved        Hyperlipidemia   Assessment & Plan    - Hold statin therapy due to elevated LFTs  - may be more harmful than helpful at this point  Hyperkalemia   Assessment & Plan    - Resolved  - will need outpatient monitoring with BMP given increased lasix dosing  Discharging Physician / Practitioner: Speedy Hawk PA-C  PCP: Aaron Nj DO  Admission Date: 7/26/18  Discharge Date: 08/01/18    Resolved Problems  Date Reviewed: 8/1/2018    None          Consultations During Hospital Stay:  · none    Procedures Performed:     · none    Significant Findings / Test Results:   Xr Chest Portable  Result Date: 7/31/2018  Impression: 1  Findings of worsening volume overload with increasing interstitial and alveolar edema  2   Moderate left pleural effusion is unchanged well small right pleural effusion has increased slightly  3   Stable basilar compressive atelectasis  Workstation performed: [id number]     Xr Chest Portable  Result Date: 7/27/2018  Impression: Pulmonary edema, similar prior exam most likely CHF  Workstation performed: [id number]     Xr Chest 1 View Portable  Result Date: 7/26/2018  Impression: Recurrent pulmonary edema with bibasilar pleural effusions   Workstation performed: [id number]     Ct Head Without Contrast  Result Date: 7/26/2018  Impression: No acute intracranial abnormality  Workstation performed: [id number]     Ct Chest Wo Contrast  Result Date: 7/31/2018  · Impression: 1  Stable partially loculated pleural effusions, left greater than right, with overlying consolidation likely representing atelectasis  2   Mild upper lobe groundglass opacity likely representing pulmonary edema  Workstation performed: [id number]     Ammonia 7/28 - 134; 7/30 - 28    Incidental Findings:   · none    Test Results Pending at Discharge (will require follow up):   · T3, T4 levels     Outpatient Tests Requested:  · none    Complications:  none    Reason for Admission: Generalized weakness / hepatic encephalopathy / Acute on chronic diastolic CHF  Hospital Course:   [facility] is a 80 y o  female patient who originally presented to the hospital on 7/26/2018 due to  Weakness - Generalized (Patient has had generalized weakness that has been increaseing the past few days  Patient was seen at her PCP today and sent to the ED for evlauation of placement in a rehab facility )  Please see H&P for further details  Overall, the patient slowly improved after IV diuresis and increased dose of her lactulose  She was at her baseline needs for supplemental oxygen at discharge  She had also lost a net total of 4lbs during this admission  Medication dosing was adjusted with increased dose of PO lasix to BID, as well as lactulose to QID  She seems to be doing well on these changes  Also, Nadolol was discontinued and coreg initiated, which seems to be causing less bradycardia  Overall, she will be discharged to short term rehab, and needs close follow up with both cardiology and gastroenterology when discharged back home  Please see above list of diagnoses and related plan for additional information  Condition at Discharge: good     Discharge Day Visit / Exam:     Subjective:  Patient feels very well today  No SOB  No CP  No confusion or headache   She feels better than when she was admitted  Vitals: Blood Pressure: 117/75 (08/01/18 0721)  Pulse: 69 (08/01/18 0721)  Temperature: (!) 97 3 °F (36 3 °C) (08/01/18 0721)  Temp Source: Temporal (08/01/18 0721)  Respirations: 17 (08/01/18 0721)  Height: 5' 3" (160 cm) (07/27/18 1537)  Weight - Scale: 67 9 kg (149 lb 11 1 oz) (08/01/18 0556)  SpO2: 98 % (08/01/18 0721)  Exam:   Physical Exam   Constitutional: She is oriented to person, place, and time  She appears well-developed  HENT:   Head: Normocephalic  Cardiovascular: Normal rate and normal heart sounds  No murmur heard  Pulmonary/Chest: Effort normal  No respiratory distress  She has no wheezes  Decreased breath sounds at the bilateral bases  Abdominal: Soft  Bowel sounds are normal    Musculoskeletal: She exhibits no edema  Neurological: She is alert and oriented to person, place, and time  Skin: Skin is warm and dry  She is not diaphoretic  Psychiatric: She has a normal mood and affect  Nursing note and vitals reviewed  Discharge instructions/Information to patient and family:   See after visit summary for information provided to patient and family  Provisions for Follow-Up Care:  See after visit summary for information related to follow-up care and any pertinent home health orders  Disposition:   Acute Rehab at [facility]    Planned Readmission: no     Discharge Statement:  I spent 45 minutes discharging the patient  This time was spent on the day of discharge  I had direct contact with the patient on the day of discharge  Greater than 50% of the total time was spent examining patient, answering all patient questions, arranging and discussing plan of care with patient as well as directly providing post-discharge instructions  Additional time then spent on discharge activities  Discharge Medications:  See after visit summary for reconciled discharge medications provided to patient and family        ** Please Note: This note has been constructed using a voice recognition system **

## 2018-08-01 NOTE — ASSESSMENT & PLAN NOTE
- Chronic condition  - Continue Lactulose at increased dose of 30g QID  - continue rifaximin   - Avoid hepatotoxic agents  - Will need close outpatient GI follow up

## 2018-08-01 NOTE — ASSESSMENT & PLAN NOTE
- mild Bradycardia at rate of 52 bpm present on admission, dipping into the 40s early during hospital stay    - Patient's nadolol was held and started on low dose Coreg with hold parameters on 7/27/18   - bradycardia has now resolved

## 2018-08-02 PROCEDURE — 97535 SELF CARE MNGMENT TRAINING: CPT

## 2018-08-02 PROCEDURE — 97530 THERAPEUTIC ACTIVITIES: CPT

## 2018-08-02 PROCEDURE — 97162 PT EVAL MOD COMPLEX 30 MIN: CPT

## 2018-08-02 PROCEDURE — 97110 THERAPEUTIC EXERCISES: CPT

## 2018-08-03 PROCEDURE — 97116 GAIT TRAINING THERAPY: CPT

## 2018-08-03 PROCEDURE — 97110 THERAPEUTIC EXERCISES: CPT

## 2018-08-03 PROCEDURE — 97530 THERAPEUTIC ACTIVITIES: CPT

## 2018-08-04 PROCEDURE — 92610 EVALUATE SWALLOWING FUNCTION: CPT

## 2018-08-04 PROCEDURE — 97110 THERAPEUTIC EXERCISES: CPT

## 2018-08-04 PROCEDURE — 92526 ORAL FUNCTION THERAPY: CPT

## 2018-08-05 PROCEDURE — 92526 ORAL FUNCTION THERAPY: CPT

## 2018-08-06 PROCEDURE — 97535 SELF CARE MNGMENT TRAINING: CPT

## 2018-08-06 PROCEDURE — 97110 THERAPEUTIC EXERCISES: CPT

## 2018-08-06 PROCEDURE — 92526 ORAL FUNCTION THERAPY: CPT

## 2018-08-06 PROCEDURE — 97116 GAIT TRAINING THERAPY: CPT

## 2018-08-07 PROCEDURE — 97110 THERAPEUTIC EXERCISES: CPT

## 2018-08-07 PROCEDURE — 97116 GAIT TRAINING THERAPY: CPT

## 2018-08-07 PROCEDURE — 97530 THERAPEUTIC ACTIVITIES: CPT

## 2018-08-08 PROCEDURE — 97530 THERAPEUTIC ACTIVITIES: CPT

## 2018-08-08 PROCEDURE — 97116 GAIT TRAINING THERAPY: CPT

## 2018-08-08 PROCEDURE — 97535 SELF CARE MNGMENT TRAINING: CPT

## 2018-08-08 PROCEDURE — 92526 ORAL FUNCTION THERAPY: CPT

## 2018-08-08 PROCEDURE — 97110 THERAPEUTIC EXERCISES: CPT

## 2018-08-09 PROCEDURE — 92526 ORAL FUNCTION THERAPY: CPT

## 2018-08-09 PROCEDURE — 97110 THERAPEUTIC EXERCISES: CPT

## 2018-08-09 PROCEDURE — 97530 THERAPEUTIC ACTIVITIES: CPT

## 2018-08-10 LAB
ALBUMIN SERPL BCP-MCNC: 1.6 G/DL (ref 3.5–5)
ALP SERPL-CCNC: 163 U/L (ref 46–116)
ALT SERPL W P-5'-P-CCNC: 31 U/L (ref 12–78)
AMMONIA PLAS-SCNC: 37 UMOL/L (ref 11–35)
ANION GAP SERPL CALCULATED.3IONS-SCNC: -1 MMOL/L (ref 4–13)
AST SERPL W P-5'-P-CCNC: 37 U/L (ref 5–45)
BASOPHILS # BLD AUTO: 0.02 THOUSANDS/ΜL (ref 0–0.1)
BASOPHILS NFR BLD AUTO: 0 % (ref 0–1)
BILIRUB SERPL-MCNC: 1.2 MG/DL (ref 0.2–1)
BUN SERPL-MCNC: 17 MG/DL (ref 5–25)
CALCIUM SERPL-MCNC: 8.4 MG/DL (ref 8.3–10.1)
CHLORIDE SERPL-SCNC: 94 MMOL/L (ref 100–108)
CO2 SERPL-SCNC: 39 MMOL/L (ref 21–32)
CREAT SERPL-MCNC: 1.1 MG/DL (ref 0.6–1.3)
EOSINOPHIL # BLD AUTO: 0.18 THOUSAND/ΜL (ref 0–0.61)
EOSINOPHIL NFR BLD AUTO: 4 % (ref 0–6)
ERYTHROCYTE [DISTWIDTH] IN BLOOD BY AUTOMATED COUNT: 14.1 % (ref 11.6–15.1)
GFR SERPL CREATININE-BSD FRML MDRD: 45 ML/MIN/1.73SQ M
GLUCOSE SERPL-MCNC: 79 MG/DL (ref 65–140)
HCT VFR BLD AUTO: 37.8 % (ref 34.8–46.1)
HGB BLD-MCNC: 12.7 G/DL (ref 11.5–15.4)
LYMPHOCYTES # BLD AUTO: 1.88 THOUSANDS/ΜL (ref 0.6–4.47)
LYMPHOCYTES NFR BLD AUTO: 36 % (ref 14–44)
MCH RBC QN AUTO: 31.6 PG (ref 26.8–34.3)
MCHC RBC AUTO-ENTMCNC: 33.6 G/DL (ref 31.4–37.4)
MCV RBC AUTO: 94 FL (ref 82–98)
MONOCYTES # BLD AUTO: 0.66 THOUSAND/ΜL (ref 0.17–1.22)
MONOCYTES NFR BLD AUTO: 13 % (ref 4–12)
NEUTROPHILS # BLD AUTO: 2.43 THOUSANDS/ΜL (ref 1.85–7.62)
NEUTS SEG NFR BLD AUTO: 47 % (ref 43–75)
PLATELET # BLD AUTO: 127 THOUSANDS/UL (ref 149–390)
PMV BLD AUTO: 12 FL (ref 8.9–12.7)
POTASSIUM SERPL-SCNC: 3.9 MMOL/L (ref 3.5–5.3)
PROT SERPL-MCNC: 6.4 G/DL (ref 6.4–8.2)
RBC # BLD AUTO: 4.02 MILLION/UL (ref 3.81–5.12)
SODIUM SERPL-SCNC: 132 MMOL/L (ref 136–145)
WBC # BLD AUTO: 5.17 THOUSAND/UL (ref 4.31–10.16)

## 2018-08-10 PROCEDURE — 97530 THERAPEUTIC ACTIVITIES: CPT

## 2018-08-10 PROCEDURE — 80053 COMPREHEN METABOLIC PANEL: CPT | Performed by: INTERNAL MEDICINE

## 2018-08-10 PROCEDURE — 97110 THERAPEUTIC EXERCISES: CPT

## 2018-08-10 PROCEDURE — 97116 GAIT TRAINING THERAPY: CPT

## 2018-08-10 PROCEDURE — 82140 ASSAY OF AMMONIA: CPT | Performed by: INTERNAL MEDICINE

## 2018-08-10 PROCEDURE — 97535 SELF CARE MNGMENT TRAINING: CPT

## 2018-08-10 PROCEDURE — 85025 COMPLETE CBC W/AUTO DIFF WBC: CPT | Performed by: INTERNAL MEDICINE

## 2018-08-11 PROCEDURE — 97110 THERAPEUTIC EXERCISES: CPT

## 2018-08-13 PROCEDURE — 97530 THERAPEUTIC ACTIVITIES: CPT

## 2018-08-13 PROCEDURE — 97110 THERAPEUTIC EXERCISES: CPT

## 2018-08-13 PROCEDURE — 97535 SELF CARE MNGMENT TRAINING: CPT

## 2018-08-13 PROCEDURE — 92526 ORAL FUNCTION THERAPY: CPT

## 2018-08-13 PROCEDURE — 97116 GAIT TRAINING THERAPY: CPT

## 2018-08-14 PROCEDURE — 97535 SELF CARE MNGMENT TRAINING: CPT

## 2018-08-14 PROCEDURE — 97530 THERAPEUTIC ACTIVITIES: CPT

## 2018-08-14 PROCEDURE — 97110 THERAPEUTIC EXERCISES: CPT

## 2018-08-14 PROCEDURE — 97116 GAIT TRAINING THERAPY: CPT

## 2018-08-15 PROCEDURE — 97535 SELF CARE MNGMENT TRAINING: CPT

## 2018-08-15 PROCEDURE — 97110 THERAPEUTIC EXERCISES: CPT

## 2018-08-15 PROCEDURE — 92526 ORAL FUNCTION THERAPY: CPT

## 2018-08-15 PROCEDURE — 97116 GAIT TRAINING THERAPY: CPT

## 2018-08-15 PROCEDURE — 97530 THERAPEUTIC ACTIVITIES: CPT

## 2018-08-16 PROCEDURE — 97110 THERAPEUTIC EXERCISES: CPT

## 2018-08-16 PROCEDURE — 97535 SELF CARE MNGMENT TRAINING: CPT

## 2018-08-16 PROCEDURE — 97116 GAIT TRAINING THERAPY: CPT

## 2018-08-17 LAB
ANION GAP SERPL CALCULATED.3IONS-SCNC: -2 MMOL/L (ref 4–13)
BUN SERPL-MCNC: 11 MG/DL (ref 5–25)
CALCIUM SERPL-MCNC: 8.5 MG/DL (ref 8.3–10.1)
CHLORIDE SERPL-SCNC: 93 MMOL/L (ref 100–108)
CO2 SERPL-SCNC: 38 MMOL/L (ref 21–32)
CREAT SERPL-MCNC: 1.04 MG/DL (ref 0.6–1.3)
GFR SERPL CREATININE-BSD FRML MDRD: 48 ML/MIN/1.73SQ M
GLUCOSE P FAST SERPL-MCNC: 79 MG/DL (ref 65–99)
GLUCOSE SERPL-MCNC: 79 MG/DL (ref 65–140)
POTASSIUM SERPL-SCNC: 4.3 MMOL/L (ref 3.5–5.3)
SODIUM SERPL-SCNC: 129 MMOL/L (ref 136–145)

## 2018-08-17 PROCEDURE — 97110 THERAPEUTIC EXERCISES: CPT

## 2018-08-17 PROCEDURE — 97535 SELF CARE MNGMENT TRAINING: CPT

## 2018-08-17 PROCEDURE — 97530 THERAPEUTIC ACTIVITIES: CPT

## 2018-08-17 PROCEDURE — 97116 GAIT TRAINING THERAPY: CPT

## 2018-08-17 PROCEDURE — 92526 ORAL FUNCTION THERAPY: CPT

## 2018-08-17 PROCEDURE — 80048 BASIC METABOLIC PNL TOTAL CA: CPT | Performed by: NURSE PRACTITIONER

## 2018-08-18 PROCEDURE — 97110 THERAPEUTIC EXERCISES: CPT

## 2018-08-20 PROCEDURE — 92526 ORAL FUNCTION THERAPY: CPT

## 2018-08-20 PROCEDURE — 97110 THERAPEUTIC EXERCISES: CPT

## 2018-08-20 PROCEDURE — 97530 THERAPEUTIC ACTIVITIES: CPT

## 2018-08-20 PROCEDURE — 97535 SELF CARE MNGMENT TRAINING: CPT

## 2018-08-20 PROCEDURE — 97116 GAIT TRAINING THERAPY: CPT

## 2018-08-21 PROCEDURE — 97530 THERAPEUTIC ACTIVITIES: CPT

## 2018-08-21 PROCEDURE — 97110 THERAPEUTIC EXERCISES: CPT

## 2018-08-21 PROCEDURE — 92526 ORAL FUNCTION THERAPY: CPT

## 2018-08-21 PROCEDURE — 97535 SELF CARE MNGMENT TRAINING: CPT

## 2018-08-21 PROCEDURE — 97116 GAIT TRAINING THERAPY: CPT

## 2018-08-22 PROBLEM — E83.39 HYPERPHOSPHATEMIA: Status: ACTIVE | Noted: 2018-08-22

## 2018-08-22 PROBLEM — D69.6 THROMBOCYTOPENIA (HCC): Status: ACTIVE | Noted: 2018-08-22

## 2018-08-22 PROCEDURE — 97535 SELF CARE MNGMENT TRAINING: CPT

## 2018-08-22 PROCEDURE — 97530 THERAPEUTIC ACTIVITIES: CPT

## 2018-08-22 PROCEDURE — 97110 THERAPEUTIC EXERCISES: CPT

## 2018-08-22 PROCEDURE — 97116 GAIT TRAINING THERAPY: CPT

## 2018-08-23 PROCEDURE — 97110 THERAPEUTIC EXERCISES: CPT

## 2018-08-23 PROCEDURE — 97530 THERAPEUTIC ACTIVITIES: CPT

## 2018-08-23 PROCEDURE — 97116 GAIT TRAINING THERAPY: CPT

## 2018-08-24 LAB
AMMONIA PLAS-SCNC: 19 UMOL/L (ref 11–35)
ANION GAP SERPL CALCULATED.3IONS-SCNC: -2 MMOL/L (ref 4–13)
BASOPHILS # BLD AUTO: 0.03 THOUSANDS/ΜL (ref 0–0.1)
BASOPHILS NFR BLD AUTO: 1 % (ref 0–1)
BUN SERPL-MCNC: 6 MG/DL (ref 5–25)
CALCIUM SERPL-MCNC: 8.3 MG/DL (ref 8.3–10.1)
CHLORIDE SERPL-SCNC: 93 MMOL/L (ref 100–108)
CO2 SERPL-SCNC: 39 MMOL/L (ref 21–32)
CREAT SERPL-MCNC: 0.8 MG/DL (ref 0.6–1.3)
EOSINOPHIL # BLD AUTO: 0.18 THOUSAND/ΜL (ref 0–0.61)
EOSINOPHIL NFR BLD AUTO: 4 % (ref 0–6)
ERYTHROCYTE [DISTWIDTH] IN BLOOD BY AUTOMATED COUNT: 13.2 % (ref 11.6–15.1)
GFR SERPL CREATININE-BSD FRML MDRD: 67 ML/MIN/1.73SQ M
GLUCOSE SERPL-MCNC: 82 MG/DL (ref 65–140)
HCT VFR BLD AUTO: 34.6 % (ref 34.8–46.1)
HGB BLD-MCNC: 11.5 G/DL (ref 11.5–15.4)
IMM GRANULOCYTES # BLD AUTO: 0.02 THOUSAND/UL (ref 0–0.2)
IMM GRANULOCYTES NFR BLD AUTO: 1 % (ref 0–2)
LYMPHOCYTES # BLD AUTO: 1.31 THOUSANDS/ΜL (ref 0.6–4.47)
LYMPHOCYTES NFR BLD AUTO: 30 % (ref 14–44)
MCH RBC QN AUTO: 31.8 PG (ref 26.8–34.3)
MCHC RBC AUTO-ENTMCNC: 33.2 G/DL (ref 31.4–37.4)
MCV RBC AUTO: 96 FL (ref 82–98)
MONOCYTES # BLD AUTO: 0.73 THOUSAND/ΜL (ref 0.17–1.22)
MONOCYTES NFR BLD AUTO: 17 % (ref 4–12)
NEUTROPHILS # BLD AUTO: 2.08 THOUSANDS/ΜL (ref 1.85–7.62)
NEUTS SEG NFR BLD AUTO: 47 % (ref 43–75)
NRBC BLD AUTO-RTO: 0 /100 WBCS
PHOSPHATE SERPL-MCNC: 3.3 MG/DL (ref 2.3–4.1)
PLATELET # BLD AUTO: 149 THOUSANDS/UL (ref 149–390)
PMV BLD AUTO: 10.8 FL (ref 8.9–12.7)
POTASSIUM SERPL-SCNC: 4.2 MMOL/L (ref 3.5–5.3)
RBC # BLD AUTO: 3.62 MILLION/UL (ref 3.81–5.12)
SODIUM SERPL-SCNC: 130 MMOL/L (ref 136–145)
WBC # BLD AUTO: 4.35 THOUSAND/UL (ref 4.31–10.16)

## 2018-08-24 PROCEDURE — 85025 COMPLETE CBC W/AUTO DIFF WBC: CPT | Performed by: INTERNAL MEDICINE

## 2018-08-24 PROCEDURE — 97110 THERAPEUTIC EXERCISES: CPT

## 2018-08-24 PROCEDURE — 97530 THERAPEUTIC ACTIVITIES: CPT

## 2018-08-24 PROCEDURE — 84100 ASSAY OF PHOSPHORUS: CPT | Performed by: INTERNAL MEDICINE

## 2018-08-24 PROCEDURE — 82140 ASSAY OF AMMONIA: CPT | Performed by: INTERNAL MEDICINE

## 2018-08-24 PROCEDURE — 80048 BASIC METABOLIC PNL TOTAL CA: CPT | Performed by: INTERNAL MEDICINE

## 2018-08-24 PROCEDURE — 97535 SELF CARE MNGMENT TRAINING: CPT

## 2018-08-24 PROCEDURE — 97116 GAIT TRAINING THERAPY: CPT

## 2018-08-25 PROCEDURE — 92526 ORAL FUNCTION THERAPY: CPT

## 2018-08-25 PROCEDURE — 97110 THERAPEUTIC EXERCISES: CPT

## 2018-08-25 PROCEDURE — 97530 THERAPEUTIC ACTIVITIES: CPT

## 2018-08-26 PROCEDURE — 92526 ORAL FUNCTION THERAPY: CPT

## 2018-09-05 NOTE — SPEECH THERAPY NOTE
Speech Language/Pathology    VBS completed today  Full report completed  Recommend diet level 2 mechanical soft and nectar thick liquids  Patient needs help with tray set up  Follow aspiration and reflux precautions  A-T Advancement Flap Text: The defect edges were debeveled with a #15 scalpel blade.  Given the location of the defect, shape of the defect and the proximity to free margins an A-T advancement flap was deemed most appropriate.  Using a sterile surgical marker, an appropriate advancement flap was drawn incorporating the defect and placing the expected incisions within the relaxed skin tension lines where possible.    The area thus outlined was incised deep to adipose tissue with a #15 scalpel blade.  The skin margins were undermined to an appropriate distance in all directions utilizing iris scissors.

## 2018-09-11 ENCOUNTER — TELEPHONE (OUTPATIENT)
Dept: INTERNAL MEDICINE CLINIC | Facility: CLINIC | Age: 83
End: 2018-09-11

## 2018-09-11 DIAGNOSIS — E87.6 HYPOKALEMIA: Primary | ICD-10-CM

## 2018-09-11 RX ORDER — POTASSIUM BICARBONATE 782 MG/1
TABLET, EFFERVESCENT ORAL
Qty: 30 EACH | Refills: 0 | Status: SHIPPED | OUTPATIENT
Start: 2018-09-11

## 2018-09-11 NOTE — TELEPHONE ENCOUNTER
Pharmacy states that the pt wants to go back to taking coral calcium but they need an order for it  And also the potassium(Effor-K) dissolve 1 daily

## 2018-09-15 ENCOUNTER — APPOINTMENT (EMERGENCY)
Dept: CT IMAGING | Facility: HOSPITAL | Age: 83
End: 2018-09-15
Payer: MEDICARE

## 2018-09-15 ENCOUNTER — HOSPITAL ENCOUNTER (OUTPATIENT)
Facility: HOSPITAL | Age: 83
Setting detail: OBSERVATION
Discharge: HOSPICE | End: 2018-09-18
Attending: EMERGENCY MEDICINE | Admitting: FAMILY MEDICINE
Payer: MEDICARE

## 2018-09-15 ENCOUNTER — APPOINTMENT (EMERGENCY)
Dept: RADIOLOGY | Facility: HOSPITAL | Age: 83
End: 2018-09-15
Payer: MEDICARE

## 2018-09-15 DIAGNOSIS — R41.82 CHANGE IN MENTAL STATUS: Primary | ICD-10-CM

## 2018-09-15 DIAGNOSIS — R53.1 GENERALIZED WEAKNESS: ICD-10-CM

## 2018-09-15 DIAGNOSIS — K72.90 HEPATIC ENCEPHALOPATHY (HCC): ICD-10-CM

## 2018-09-15 DIAGNOSIS — K74.60 CIRRHOSIS OF LIVER WITHOUT ASCITES, UNSPECIFIED HEPATIC CIRRHOSIS TYPE (HCC): ICD-10-CM

## 2018-09-15 DIAGNOSIS — E87.1 HYPONATREMIA: ICD-10-CM

## 2018-09-15 LAB
ALBUMIN SERPL BCP-MCNC: 1.7 G/DL (ref 3.5–5)
ALP SERPL-CCNC: 196 U/L (ref 46–116)
ALT SERPL W P-5'-P-CCNC: 37 U/L (ref 12–78)
AMMONIA PLAS-SCNC: 108 UMOL/L (ref 11–35)
ANION GAP SERPL CALCULATED.3IONS-SCNC: -3 MMOL/L (ref 4–13)
APTT PPP: 38 SECONDS (ref 24–36)
AST SERPL W P-5'-P-CCNC: 68 U/L (ref 5–45)
BASOPHILS # BLD AUTO: 0.01 THOUSANDS/ΜL (ref 0–0.1)
BASOPHILS NFR BLD AUTO: 0 % (ref 0–1)
BILIRUB SERPL-MCNC: 1.5 MG/DL (ref 0.2–1)
BILIRUB UR QL STRIP: NEGATIVE
BUN SERPL-MCNC: 23 MG/DL (ref 5–25)
CALCIUM SERPL-MCNC: 9.2 MG/DL (ref 8.3–10.1)
CHLORIDE SERPL-SCNC: 94 MMOL/L (ref 100–108)
CLARITY UR: CLEAR
CO2 SERPL-SCNC: 39 MMOL/L (ref 21–32)
COLOR UR: YELLOW
CREAT SERPL-MCNC: 1.24 MG/DL (ref 0.6–1.3)
EOSINOPHIL # BLD AUTO: 0.06 THOUSAND/ΜL (ref 0–0.61)
EOSINOPHIL NFR BLD AUTO: 1 % (ref 0–6)
ERYTHROCYTE [DISTWIDTH] IN BLOOD BY AUTOMATED COUNT: 13 % (ref 11.6–15.1)
GFR SERPL CREATININE-BSD FRML MDRD: 39 ML/MIN/1.73SQ M
GLUCOSE SERPL-MCNC: 106 MG/DL (ref 65–140)
GLUCOSE UR STRIP-MCNC: NEGATIVE MG/DL
HCT VFR BLD AUTO: 38.6 % (ref 34.8–46.1)
HGB BLD-MCNC: 12.7 G/DL (ref 11.5–15.4)
HGB UR QL STRIP.AUTO: NEGATIVE
IMM GRANULOCYTES # BLD AUTO: 0.03 THOUSAND/UL (ref 0–0.2)
IMM GRANULOCYTES NFR BLD AUTO: 0 % (ref 0–2)
INR PPP: 1.46 (ref 0.86–1.17)
KETONES UR STRIP-MCNC: NEGATIVE MG/DL
LACTATE SERPL-SCNC: 1.6 MMOL/L (ref 0.5–2)
LEUKOCYTE ESTERASE UR QL STRIP: NEGATIVE
LYMPHOCYTES # BLD AUTO: 1.07 THOUSANDS/ΜL (ref 0.6–4.47)
LYMPHOCYTES NFR BLD AUTO: 14 % (ref 14–44)
MAGNESIUM SERPL-MCNC: 1.9 MG/DL (ref 1.6–2.6)
MCH RBC QN AUTO: 30.6 PG (ref 26.8–34.3)
MCHC RBC AUTO-ENTMCNC: 32.9 G/DL (ref 31.4–37.4)
MCV RBC AUTO: 93 FL (ref 82–98)
MONOCYTES # BLD AUTO: 0.58 THOUSAND/ΜL (ref 0.17–1.22)
MONOCYTES NFR BLD AUTO: 8 % (ref 4–12)
NEUTROPHILS # BLD AUTO: 5.69 THOUSANDS/ΜL (ref 1.85–7.62)
NEUTS SEG NFR BLD AUTO: 77 % (ref 43–75)
NITRITE UR QL STRIP: NEGATIVE
NRBC BLD AUTO-RTO: 0 /100 WBCS
PH UR STRIP.AUTO: 6.5 [PH] (ref 4.5–8)
PLATELET # BLD AUTO: 147 THOUSANDS/UL (ref 149–390)
PMV BLD AUTO: 11.4 FL (ref 8.9–12.7)
POTASSIUM SERPL-SCNC: 4.5 MMOL/L (ref 3.5–5.3)
PROT SERPL-MCNC: 6.7 G/DL (ref 6.4–8.2)
PROT UR STRIP-MCNC: NEGATIVE MG/DL
PROTHROMBIN TIME: 17 SECONDS (ref 11.8–14.2)
RBC # BLD AUTO: 4.15 MILLION/UL (ref 3.81–5.12)
SODIUM SERPL-SCNC: 130 MMOL/L (ref 136–145)
SP GR UR STRIP.AUTO: 1.01 (ref 1–1.03)
TSH SERPL DL<=0.05 MIU/L-ACNC: 3.75 UIU/ML (ref 0.36–3.74)
UROBILINOGEN UR QL STRIP.AUTO: 1 E.U./DL
WBC # BLD AUTO: 7.44 THOUSAND/UL (ref 4.31–10.16)

## 2018-09-15 PROCEDURE — 85025 COMPLETE CBC W/AUTO DIFF WBC: CPT | Performed by: EMERGENCY MEDICINE

## 2018-09-15 PROCEDURE — 70450 CT HEAD/BRAIN W/O DYE: CPT

## 2018-09-15 PROCEDURE — 84443 ASSAY THYROID STIM HORMONE: CPT | Performed by: EMERGENCY MEDICINE

## 2018-09-15 PROCEDURE — 85730 THROMBOPLASTIN TIME PARTIAL: CPT | Performed by: EMERGENCY MEDICINE

## 2018-09-15 PROCEDURE — 99285 EMERGENCY DEPT VISIT HI MDM: CPT

## 2018-09-15 PROCEDURE — 36415 COLL VENOUS BLD VENIPUNCTURE: CPT | Performed by: EMERGENCY MEDICINE

## 2018-09-15 PROCEDURE — 83735 ASSAY OF MAGNESIUM: CPT | Performed by: EMERGENCY MEDICINE

## 2018-09-15 PROCEDURE — 81003 URINALYSIS AUTO W/O SCOPE: CPT | Performed by: EMERGENCY MEDICINE

## 2018-09-15 PROCEDURE — 87040 BLOOD CULTURE FOR BACTERIA: CPT | Performed by: EMERGENCY MEDICINE

## 2018-09-15 PROCEDURE — 99219 PR INITIAL OBSERVATION CARE/DAY 50 MINUTES: CPT | Performed by: FAMILY MEDICINE

## 2018-09-15 PROCEDURE — 71046 X-RAY EXAM CHEST 2 VIEWS: CPT

## 2018-09-15 PROCEDURE — 85610 PROTHROMBIN TIME: CPT | Performed by: EMERGENCY MEDICINE

## 2018-09-15 PROCEDURE — 93005 ELECTROCARDIOGRAM TRACING: CPT

## 2018-09-15 PROCEDURE — 96360 HYDRATION IV INFUSION INIT: CPT

## 2018-09-15 PROCEDURE — 80053 COMPREHEN METABOLIC PANEL: CPT | Performed by: EMERGENCY MEDICINE

## 2018-09-15 PROCEDURE — 83605 ASSAY OF LACTIC ACID: CPT | Performed by: EMERGENCY MEDICINE

## 2018-09-15 PROCEDURE — 82140 ASSAY OF AMMONIA: CPT | Performed by: EMERGENCY MEDICINE

## 2018-09-15 RX ORDER — PANTOPRAZOLE SODIUM 20 MG/1
20 TABLET, DELAYED RELEASE ORAL
Status: DISCONTINUED | OUTPATIENT
Start: 2018-09-15 | End: 2018-09-15

## 2018-09-15 RX ORDER — LACTULOSE 20 G/30ML
30 SOLUTION ORAL 4 TIMES DAILY
Status: DISCONTINUED | OUTPATIENT
Start: 2018-09-15 | End: 2018-09-15

## 2018-09-15 RX ORDER — LOSARTAN POTASSIUM 50 MG/1
50 TABLET ORAL DAILY
Status: DISCONTINUED | OUTPATIENT
Start: 2018-09-15 | End: 2018-09-15

## 2018-09-15 RX ORDER — ASPIRIN 81 MG/1
81 TABLET, CHEWABLE ORAL DAILY
Status: DISCONTINUED | OUTPATIENT
Start: 2018-09-15 | End: 2018-09-18 | Stop reason: HOSPADM

## 2018-09-15 RX ORDER — CARVEDILOL 3.12 MG/1
3.12 TABLET ORAL 2 TIMES DAILY WITH MEALS
Status: DISCONTINUED | OUTPATIENT
Start: 2018-09-15 | End: 2018-09-15

## 2018-09-15 RX ORDER — FUROSEMIDE 40 MG/1
40 TABLET ORAL 2 TIMES DAILY
Status: DISCONTINUED | OUTPATIENT
Start: 2018-09-15 | End: 2018-09-15

## 2018-09-15 RX ORDER — SODIUM CHLORIDE 9 MG/ML
100 INJECTION, SOLUTION INTRAVENOUS ONCE
Status: DISCONTINUED | OUTPATIENT
Start: 2018-09-15 | End: 2018-09-15

## 2018-09-15 RX ADMIN — SODIUM CHLORIDE 250 ML: 0.9 INJECTION, SOLUTION INTRAVENOUS at 08:00

## 2018-09-15 NOTE — SOCIAL WORK
The family wants a referral to [facility]te and Ej Jackson is on call and she will be here within the hour to evaluate the patient for hospice

## 2018-09-15 NOTE — ED NOTES
Discussion with Dr Blanca Manzo regarding patient/family conversation  I explained they were not sure if they wanted aminia treated at this time; it was explained that it was treatable with lactulose  There was no conversation about Hospice with my self or Dr Blanca Manzo while in my presence and while patient was in the ED       Suad Soni RN  09/15/18 3609

## 2018-09-15 NOTE — SOCIAL WORK
I spoke with the yevgeniytients family and they are agreeable to the hospice consult and I called the hospice and I sent a referral to them for hospice

## 2018-09-15 NOTE — ASSESSMENT & PLAN NOTE
Patient was ordered lactulose enema in the ED   However upon arrival to the medical floor accompanied by her son and daughter they elected to placed the patient on hospice  Do not wish to have her suffer in state that her quality of life has deteriorated past year  Will discontinue nonessential medications  Consult case management to assist with hospice

## 2018-09-15 NOTE — PROGRESS NOTES
The patient's family are agreeable to hospice on discharge  She does not meet criteria for inpatient hospice  She will be discharged tomorrow to [facility] for hospice

## 2018-09-15 NOTE — PROGRESS NOTES
Admit from ER to Room 423  Opens eyes when called but very brief  Non verbal   Family at bedside  Dr Deshaun Estrada vs and examined  Spoke to family regarding plan of care   Hospice to be condulted

## 2018-09-15 NOTE — ED NOTES
Pt looks in direction of voice when her name is called; with prompting she gives one word responses       Suad Soni RN  09/15/18 2525

## 2018-09-15 NOTE — ED PROCEDURE NOTE
PROCEDURE  ECG 12 Lead Documentation  Date/Time: 9/15/2018 7:42 AM  Performed by: Mark Colon  Authorized by: Mark Colon     Indications / Diagnosis:  Weakness   ECG reviewed by me, the ED Provider: yes    Patient location:  ED  Previous ECG:     Previous ECG:  Unavailable  Interpretation:     Interpretation: non-specific    Rate:     ECG rate:  62    ECG rate assessment: normal    Rhythm:     Rhythm: atrial fibrillation    ST segments:     ST segments:  Non-specific         Shannen Roy DO  09/15/18 1371

## 2018-09-15 NOTE — ED PROVIDER NOTES
History  Chief Complaint   Patient presents with    Altered Mental Status     found by staff unresponsive     42-year-old female presents from nursing facility with change in mental status  Patient was last seen normal last evening prior to going to bed  This morning when the patient was evaluated by the nursing staff she was less responsive than usual   At the nursing home the patient would open her eyes but would not follow commands  Upon arrival to the emerged department the patient is talking and will move all 4 extremities  She does look tired but she offers no complaints at this time  She currently denies chest pain shortness breath headaches or other concerns  History provided by:  Patient  Altered Mental Status   Presenting symptoms: confusion and partial responsiveness    Presenting symptoms: no behavior changes and no combativeness    Severity:  Mild  Most recent episode: Today  Episode history:  Single  Duration: Unknown  Timing:  Intermittent  Progression:  Waxing and waning  Context: dementia    Context: not alcohol use, not drug use and not head injury    Associated symptoms: no abdominal pain, normal movement, no agitation, no bladder incontinence, no decreased appetite and no headaches        Prior to Admission Medications   Prescriptions Last Dose Informant Patient Reported? Taking?    Cholecalciferol (VITAMIN D-3) 1000 units CAPS 9/14/2018 at Unknown time  Yes Yes   Sig: Take 1 capsule by mouth daily   EFFER-K 20 MEQ TBEF Unknown at Unknown time  No No   Sig: DISSOLVE AND DRINK ONE PACKET INSERT 8OZ FLUID ONCE A DAY   Multiple Vitamin (MULTIVITAMIN) tablet   Yes No   Sig: Take 1 tablet by mouth daily   Multiple Vitamins-Minerals (CORAL CALCIUM PLUS) CAPS   Yes No   Sig: Take by mouth   aspirin 81 MG tablet 9/14/2018 at Unknown time  Yes Yes   Sig: Take 81 mg by mouth daily   carvedilol (COREG) 3 125 mg tablet 9/14/2018 at Unknown time  No Yes   Sig: Take 1 tablet (3 125 mg total) by mouth 2 (two) times a day with meals   co-enzyme Q-10 30 MG capsule 9/14/2018 at Unknown time  Yes Yes   Sig: Take 30 mg by mouth daily   esomeprazole (NexIUM) 40 MG capsule 9/14/2018 at Unknown time  Yes Yes   Sig: Take 40 mg by mouth every morning before breakfast   furosemide (LASIX) 40 mg tablet 9/14/2018 at Unknown time  No Yes   Sig: Take 1 tablet (40 mg total) by mouth 2 (two) times a day   lactulose 20 g/30 mL   No No   Sig: Take 45 mL (30 g total) by mouth 4 (four) times a day   losartan (COZAAR) 50 mg tablet   No No   Sig: Take 1 tablet (50 mg total) by mouth daily for 90 days   magnesium oxide (MAG-OX) 400 mg 9/14/2018 at Unknown time  No Yes   Sig: Take 1 tablet by mouth daily   menthol-zinc oxide (CALMOSEPTINE) 0 44-20 6 % OINT   Yes No   Sig: Apply 1 inch topically 2 (two) times a day   rifaximin (XIFAXAN) 550 mg tablet 9/14/2018 at Unknown time  Yes Yes   Sig: Take 550 mg by mouth every 12 (twelve) hours        Facility-Administered Medications: None       Past Medical History:   Diagnosis Date    [facility] cancer ([facility] )     Cancer ([facility] )     breast    CHF (congestive heart failure) (HCC)     Cirrhosis of liver ([facility] )     GERD (gastroesophageal reflux disease)     Hepatic encephalopathy ([facility] )     Hypertension     last assessed 10/5/17    Polyp, sigmoid colon        Past Surgical History:   Procedure Laterality Date    COMBINED HYSTEROSCOPY DIAGNOSTIC / D&C      HYSTERECTOMY      unknown    MASTECTOMY Right 1990    MASTECTOMY         Family History   Problem Relation Age of Onset    Hyperlipidemia Son     Hyperlipidemia Family     Coronary artery disease Family     Depression Family     Diabetes Family     Hypertension Family     Hyperlipidemia Other      I have reviewed and agree with the history as documented      Social History   Substance Use Topics    Smoking status: Former Smoker     Types: Cigarettes    Smokeless tobacco: Never Used    Alcohol use No        Review of Systems Constitutional: Negative for activity change, appetite change, chills and decreased appetite  HENT: Negative for congestion, dental problem, drooling and ear discharge  Eyes: Negative for pain, discharge and itching  Respiratory: Negative for apnea, choking and chest tightness  Gastrointestinal: Negative for abdominal pain  Endocrine: Negative for cold intolerance, heat intolerance and polydipsia  Genitourinary: Negative for bladder incontinence, difficulty urinating, dyspareunia, dysuria and enuresis  Musculoskeletal: Negative for arthralgias, back pain and gait problem  Skin: Negative for color change and pallor  Allergic/Immunologic: Negative for environmental allergies and food allergies  Neurological: Negative for dizziness, facial asymmetry and headaches  Hematological: Negative for adenopathy  Does not bruise/bleed easily  Psychiatric/Behavioral: Positive for confusion  Negative for agitation  All other systems reviewed and are negative  Physical Exam  Physical Exam   Constitutional: Vital signs are normal  She is easily aroused  Non-toxic appearance  She has a sickly appearance  HENT:   Head: Normocephalic  Head is without raccoon's eyes and without Lee's sign  Right Ear: External ear normal    Left Ear: External ear normal    Nose: Nose normal    Mouth/Throat: Uvula is midline  Eyes: Pupils are equal, round, and reactive to light  Right eye exhibits no discharge  Left eye exhibits no discharge  Neck: Normal range of motion  No tracheal deviation present  No thyromegaly present  Cardiovascular: Normal rate, regular rhythm and normal heart sounds  Pulmonary/Chest: Effort normal  No respiratory distress  She has no wheezes  She has no rales  Abdominal: Soft  She exhibits no distension  There is no tenderness  There is no guarding  Musculoskeletal: Normal range of motion  She exhibits edema  She exhibits no deformity     +1 peripheral edema bilaterally Neurological: She is alert and easily aroused  No cranial nerve deficit  Coordination normal    Skin: Skin is warm  Capillary refill takes less than 2 seconds  No erythema  No pallor  Psychiatric: She has a normal mood and affect  Her speech is not rapid and/or pressured  She is slowed  Cognition and memory are impaired  She does not express impulsivity  She is attentive  Vitals reviewed  Vital Signs  ED Triage Vitals   Temperature Pulse Respirations Blood Pressure SpO2   09/15/18 0959 09/15/18 0702 09/15/18 0702 09/15/18 0715 09/15/18 0702   98 4 °F (36 9 °C) 56 16 136/64 (!) 88 %      Temp Source Heart Rate Source Patient Position - Orthostatic VS BP Location FiO2 (%)   09/15/18 0959 09/15/18 0702 09/15/18 0702 09/15/18 0702 --   Temporal Monitor Lying Left arm       Pain Score       09/15/18 0900       No Pain           Vitals:    09/15/18 0800 09/15/18 0845 09/15/18 0900 09/15/18 0959   BP: 136/62  (!) 90/44 123/53   Pulse: 62 (!) 52 (!) 52 (!) 50   Patient Position - Orthostatic VS:    Lying       Visual Acuity  Visual Acuity      Most Recent Value   L Pupil Size (mm)  3   R Pupil Size (mm)  3   L Pupil Shape  Round   R Pupil Shape  Round          ED Medications  Medications   lactulose retention enema 200 g (200 g Rectal Not Given 9/15/18 0914)   aspirin chewable tablet 81 mg (81 mg Oral Not Given 9/15/18 1142)   sodium chloride 0 9 % bolus 250 mL (0 mL Intravenous Stopped 9/15/18 0914)       Diagnostic Studies  Results Reviewed     Procedure Component Value Units Date/Time    TSH [[id number]]  (Abnormal) Collected:  09/15/18 0737    Lab Status:  Final result Specimen:  Blood from Arm, Left Updated:  09/15/18 0816     TSH 3RD GENERATON 3 749 (H) uIU/mL     Narrative:         Patients undergoing fluorescein dye angiography may retain small amounts of fluorescein in the body for 48-72 hours post procedure  Samples containing fluorescein can produce falsely depressed TSH values   If the patient had this procedure,a specimen should be resubmitted post fluorescein clearance  The recommended reference ranges for TSH during pregnancy are as follows:  First trimester 0 1 to 2 5 uIU/mL  Second trimester  0 2 to 3 0 uIU/mL  Third trimester 0 3 to 3 0 uIU/m      Comprehensive metabolic panel [[id number]]  (Abnormal) Collected:  09/15/18 0737    Lab Status:  Final result Specimen:  Blood from Arm, Left Updated:  09/15/18 0816     Sodium 130 (L) mmol/L      Potassium 4 5 mmol/L      Chloride 94 (L) mmol/L      CO2 39 (H) mmol/L      ANION GAP -3 (L) mmol/L      BUN 23 mg/dL      Creatinine 1 24 mg/dL      Glucose 106 mg/dL      Calcium 9 2 mg/dL      AST 68 (H) U/L      ALT 37 U/L      Alkaline Phosphatase 196 (H) U/L      Total Protein 6 7 g/dL      Albumin 1 7 (L) g/dL      Total Bilirubin 1 50 (H) mg/dL      eGFR 39 ml/min/1 73sq m     Narrative:         National Kidney Disease Education Program recommendations are as follows:  GFR calculation is accurate only with a steady state creatinine  Chronic Kidney disease less than 60 ml/min/1 73 sq  meters  Kidney failure less than 15 ml/min/1 73 sq  meters  Magnesium [[id number]]  (Normal) Collected:  09/15/18 0737    Lab Status:  Final result Specimen:  Blood from Arm, Left Updated:  09/15/18 0816     Magnesium 1 9 mg/dL     Lactic acid, plasma [[id number]]  (Normal) Collected:  09/15/18 0737    Lab Status:  Final result Specimen:  Blood from Arm, Left Updated:  09/15/18 0811     LACTIC ACID 1 6 mmol/L     Narrative:         Result may be elevated if tourniquet was used during collection      Protime-INR [[id number]]  (Abnormal) Collected:  09/15/18 0737    Lab Status:  Final result Specimen:  Blood from Arm, Left Updated:  09/15/18 0808     Protime 17 0 (H) seconds      INR 1 46 (H)    APTT [[id number]]  (Abnormal) Collected:  09/15/18 0737    Lab Status:  Final result Specimen:  Blood from Arm, Left Updated:  09/15/18 0808     PTT 38 (H) seconds     Ammonia [[id number]] (Abnormal) Collected:  09/15/18 0743    Lab Status:  Final result Specimen:  Blood from Arm, Left Updated:  09/15/18 0807     Ammonia 108 (H) umol/L     UA w Reflex to Microscopic w Reflex to Culture [[id number]] Collected:  09/15/18 0737    Lab Status:  Final result Specimen:  Urine from Urine, Straight Cath Updated:  09/15/18 0754     Color, UA Yellow     Clarity, UA Clear     Specific Gravity, UA 1 015     pH, UA 6 5     Leukocytes, UA Negative     Nitrite, UA Negative     Protein, UA Negative mg/dl      Glucose, UA Negative mg/dl      Ketones, UA Negative mg/dl      Urobilinogen, UA 1 0 E U /dl      Bilirubin, UA Negative     Blood, UA Negative    CBC and differential [[id number]]  (Abnormal) Collected:  09/15/18 0737    Lab Status:  Final result Specimen:  Blood from Arm, Left Updated:  09/15/18 0753     WBC 7 44 Thousand/uL      RBC 4 15 Million/uL      Hemoglobin 12 7 g/dL      Hematocrit 38 6 %      MCV 93 fL      MCH 30 6 pg      MCHC 32 9 g/dL      RDW 13 0 %      MPV 11 4 fL      Platelets 457 (L) Thousands/uL      nRBC 0 /100 WBCs      Neutrophils Relative 77 (H) %      Immat GRANS % 0 %      Lymphocytes Relative 14 %      Monocytes Relative 8 %      Eosinophils Relative 1 %      Basophils Relative 0 %      Neutrophils Absolute 5 69 Thousands/µL      Immature Grans Absolute 0 03 Thousand/uL      Lymphocytes Absolute 1 07 Thousands/µL      Monocytes Absolute 0 58 Thousand/µL      Eosinophils Absolute 0 06 Thousand/µL      Basophils Absolute 0 01 Thousands/µL     Blood culture #1 [[id number]] Collected:  09/15/18 0737    Lab Status: In process Specimen:  Blood from Arm, Left Updated:  09/15/18 0747    Blood culture #2 [[id number]] Collected:  09/15/18 0743    Lab Status: In process Specimen:  Blood from Arm, Left Updated:  09/15/18 0747                 XR chest 2 views   ED Interpretation by Tawana Alejo DO (09/15 3568)   Bilateral pleural effusions      Final Result by Samira Hidalgo MD (09/15 1024)      1  Small to moderate-sized bilateral pleural effusions, stable  2   Probable underlying pulmonary edema, slightly improved  Workstation performed: [id number]         CT head without contrast   Final Result by Shalonda Beltran MD (09/15 1474)      No acute intracranial abnormality  Chronic microangiopathic changes  Workstation performed: [id number]                    Procedures  Procedures       Phone Contacts  ED Phone Contact    ED Course         HEART Risk Score      Most Recent Value   History  1 Filed at: 09/15/2018 0742   ECG  1 Filed at: 09/15/2018 0733   Age  2 Filed at: 09/15/2018 5002   Risk Factors  1 Filed at: 09/15/2018 0742   Troponin  0 Filed at: 09/15/2018 0742   Heart Score Risk Calculator   History  1 Filed at: 09/15/2018 0742   ECG  1 Filed at: 09/15/2018 1164   Age  2 Filed at: 09/15/2018 1986   Risk Factors  1 Filed at: 09/15/2018 0742   Troponin  0 Filed at: 09/15/2018 2580   HEART Score  5 Filed at: 09/15/2018 0742   HEART Score  5 Filed at: 09/15/2018 7557                            MDM  Number of Diagnoses or Management Options  Change in mental status:   Cirrhosis of liver without ascites, unspecified hepatic cirrhosis type [facility]):   Generalized weakness:   Hepatic encephalopathy ([facility] ): Hyponatremia:   Diagnosis management comments: Patient has unknown time of onset of symptoms   Secondary this patient cannot be considered stroke alert  Differential diagnosis 1  CVA 2 UTI 3 pneumonia 4 electrolyte imbalance  Patient lives in assisted living facility in is normally high functioning  The patient has an acute and correctable medical condition that being hepatic encephalopathy  Patient's previous ammonia level was 19 and is now 109  At no point during that period of time that I have been taking care of this patient did the family mention that they wanted her on hospice nor did they mentioned to the nurse was taking care of the patient               Amount and/or Complexity of Data Reviewed  Clinical lab tests: reviewed  Tests in the radiology section of CPT®: reviewed  Tests in the medicine section of CPT®: reviewed    Risk of Complications, Morbidity, and/or Mortality  Presenting problems: high  Diagnostic procedures: high  Management options: high      CritCare Time    Disposition  Final diagnoses:   Change in mental status   Generalized weakness   Hepatic encephalopathy ([facility] )   Cirrhosis of liver without ascites, unspecified hepatic cirrhosis type ([place] [address] )   Hyponatremia     Time reflects when diagnosis was documented in both MDM as applicable and the Disposition within this note     Time User Action Codes Description Comment    9/15/2018  7:17 AM Pearletha Bye Add [R41 82] Change in mental status     9/15/2018  7:17 AM Pearletha Bye Add [R53 1] Generalized weakness     9/15/2018  8:23 AM Pearletha Bye Add [K72 90] Hepatic encephalopathy ([facility] )     9/15/2018  8:24 AM Pearletha Bye Add [K74 60] Cirrhosis of liver without ascites, unspecified hepatic cirrhosis type ([place] [address] )     9/15/2018  8:24 AM Pearletha Bye Add [E87 1] Hyponatremia       ED Disposition     ED Disposition Condition Comment    Admit          Follow-up Information    None         Current Discharge Medication List      CONTINUE these medications which have NOT CHANGED    Details   aspirin 81 MG tablet Take 81 mg by mouth daily      carvedilol (COREG) 3 125 mg tablet Take 1 tablet (3 125 mg total) by mouth 2 (two) times a day with meals  Qty: 60 tablet, Refills: 0    Associated Diagnoses: Acute on chronic diastolic congestive heart failure (HCC)      Cholecalciferol (VITAMIN D-3) 1000 units CAPS Take 1 capsule by mouth daily      co-enzyme Q-10 30 MG capsule Take 30 mg by mouth daily      esomeprazole (NexIUM) 40 MG capsule Take 40 mg by mouth every morning before breakfast      furosemide (LASIX) 40 mg tablet Take 1 tablet (40 mg total) by mouth 2 (two) times a day  Refills: 0    Associated Diagnoses: Acute on chronic diastolic congestive heart failure (HCC)      magnesium oxide (MAG-OX) 400 mg Take 1 tablet by mouth daily  Qty: 30 tablet, Refills: 0    Comments: Monitor the patient's magnesium level while on this supplementation      rifaximin (XIFAXAN) 550 mg tablet Take 550 mg by mouth every 12 (twelve) hours        EFFER-K 20 MEQ TBEF DISSOLVE AND DRINK ONE PACKET INSERT 8OZ FLUID ONCE A DAY  Qty: 30 each, Refills: 0    Associated Diagnoses: Hypokalemia      lactulose 20 g/30 mL Take 45 mL (30 g total) by mouth 4 (four) times a day  Qty: 236 mL, Refills: 0    Associated Diagnoses: Cirrhosis of liver without ascites, unspecified hepatic cirrhosis type (HCC)      losartan (COZAAR) 50 mg tablet Take 1 tablet (50 mg total) by mouth daily for 90 days  Qty: 90 tablet, Refills: 3    Associated Diagnoses: Essential hypertension      menthol-zinc oxide (CALMOSEPTINE) 0 44-20 6 % OINT Apply 1 inch topically 2 (two) times a day      Multiple Vitamin (MULTIVITAMIN) tablet Take 1 tablet by mouth daily      Multiple Vitamins-Minerals (CORAL CALCIUM PLUS) CAPS Take by mouth           No discharge procedures on file      ED Provider  Electronically Signed by           Mary Moreno DO  09/15/18 7820 Shannan Alvarado DO  09/15/18 1210

## 2018-09-15 NOTE — H&P
H&P Exam - Eugenio  80 y o  female MRN: [id number]    Unit/Bed#: 424-01 Encounter: [id number]    Hepatic encephalopathy [facility])   Assessment & Plan    Patient was ordered lactulose enema in the ED   However upon arrival to the medical floor accompanied by her son and daughter they elected to placed the patient on hospice  Do not wish to have her suffer and state that her quality of life has deteriorated over the  past year  Will discontinue nonessential medications  Consult case management to assist with hospice            History of Present Illness   Patient is an 70-year-old female from [facility] who presents to the emergency room with a complaint of altered mental status  The patient has a son, daughter, son-in-law are present at bedside  They are able to relate a history that the patient has been having worsening cognitive decline over the past year  Son in law was present last evening and stated that her mental status has significantly deteriorated  Patient has had multiple hospitalizations and son states that her quality of life has deteriorated and she was previously a do not resuscitate  I had an extensive conversation with the son / daughter / Son in law (physician in [place]) who wish to have the patient placed on hospice and do not wish to pursue medical treatment      Review of Systems   Unable to perform ROS: Mental status change       Historical Information   Past Medical History:   Diagnosis Date    [facility] ([facility] )     Cancer ([facility] )     breast    CHF (congestive heart failure) (HCC)     Cirrhosis of liver (HCC)     GERD (gastroesophageal reflux disease)     Hepatic encephalopathy ([facility] )     Hypertension     last assessed 10/5/17    Polyp, sigmoid colon      Past Surgical History:   Procedure Laterality Date    COMBINED HYSTEROSCOPY DIAGNOSTIC / D&C      HYSTERECTOMY      unknown    MASTECTOMY Right 1990    MASTECTOMY       Social History   History Alcohol Use No     History   Drug Use No     History   Smoking Status    Former Smoker    Types: Cigarettes   Smokeless Tobacco    Never Used     Family History: non-contributory    Meds/Allergies   all medications and allergies reviewed  Allergies   Allergen Reactions    Amoxicillin        Objective   First Vitals:   Blood Pressure: 136/64 (09/15/18 0715)  Pulse: 56 (09/15/18 0702)  Temperature: 98 4 °F (36 9 °C) (09/15/18 0959)  Temp Source: Temporal (09/15/18 0959)  Respirations: 16 (09/15/18 0702)  Height: 5' 5" (165 1 cm) (09/15/18 0959)  Weight - Scale: 71 4 kg (157 lb 6 5 oz) (09/15/18 0702)  SpO2: (!) 88 % (2 l NC applied) (09/15/18 0702)    Current Vitals:   Blood Pressure: 123/53 (09/15/18 0959)  Pulse: (!) 50 (09/15/18 0959)  Temperature: 98 4 °F (36 9 °C) (09/15/18 0959)  Temp Source: Temporal (09/15/18 0959)  Respirations: 16 (09/15/18 0959)  Height: 5' 5" (165 1 cm) (09/15/18 0959)  Weight - Scale: 71 6 kg (157 lb 13 6 oz) (09/15/18 0959)  SpO2: 100 % (09/15/18 0959)      Intake/Output Summary (Last 24 hours) at 09/15/18 1057  Last data filed at 09/15/18 0914   Gross per 24 hour   Intake              250 ml   Output              300 ml   Net              -50 ml       Invasive Devices     Peripheral Intravenous Line            Peripheral IV 09/15/18 Left Hand less than 1 day                Physical Exam   Constitutional: No distress  HENT:   Head: Normocephalic and atraumatic  Eyes: Scleral icterus is present  Neck: Neck supple  No JVD present  Cardiovascular: Normal rate  Pulmonary/Chest: Effort normal and breath sounds normal  No respiratory distress  Abdominal: Soft  Bowel sounds are normal  She exhibits no distension  There is no tenderness  Musculoskeletal: She exhibits edema  Neurological:   Resting comfortably  Responds to pain  Non focal exam    Skin: Skin is warm and dry         Lab Results:   Lab Results   Component Value Date    WBC 7 44 09/15/2018    HGB 12 7 09/15/2018 HCT 38 6 09/15/2018    MCV 93 09/15/2018     (L) 09/15/2018       Imaging:   XR chest 2 views   ED Interpretation   Bilateral pleural effusions      Final Result      1  Small to moderate-sized bilateral pleural effusions, stable  2   Probable underlying pulmonary edema, slightly improved  Workstation performed: [id number]         CT head without contrast   Final Result      No acute intracranial abnormality  Chronic microangiopathic changes                    Workstation performed: [id number]           EKG, Pathology, and Other Studies: sinus    Code Status: Level 3 - DNAR and DNI  Advance Directive and Living Will: Yes    Power of :    POLST:      Counseling / Coordination of Care:

## 2018-09-16 PROCEDURE — 99217 PR OBSERVATION CARE DISCHARGE MANAGEMENT: CPT | Performed by: FAMILY MEDICINE

## 2018-09-16 RX ORDER — MORPHINE SULFATE 2 MG/ML
2 INJECTION, SOLUTION INTRAMUSCULAR; INTRAVENOUS
Status: DISCONTINUED | OUTPATIENT
Start: 2018-09-16 | End: 2018-09-18 | Stop reason: HOSPADM

## 2018-09-16 RX ADMIN — MORPHINE SULFATE 2 MG: 2 INJECTION, SOLUTION INTRAMUSCULAR; INTRAVENOUS at 23:36

## 2018-09-16 NOTE — PLAN OF CARE
Problem: DISCHARGE PLANNING - CARE MANAGEMENT  Goal: Discharge to post-acute care or home with appropriate resources  INTERVENTIONS:  - Conduct assessment to determine patient/family and health care team treatment goals, and need for post-acute services based on payer coverage, community resources, and patient preferences, and barriers to discharge  - Address psychosocial, clinical, and financial barriers to discharge as identified in assessment in conjunction with the patient/family and health care team  - Arrange appropriate level of post-acute services according to patient's   needs and preference and payer coverage in collaboration with the physician and health care team  - Communicate with and update the patient/family, physician, and health care team regarding progress on the discharge plan  - Arrange appropriate transportation to post-acute venues  Transfer to hospice inpatient vs assisted living hospice

## 2018-09-16 NOTE — SOCIAL WORK
The patient does not meet criteria for inpatient hospice and the son is aware he is agreeable to the d/c back to [facility] tomorrow and Devorah Smith is aware of the possible return on hospice  The return would not be possible today as they would not be able to get the equipment to the assisted living and also comfort meds as they do not have pharmacy capability on the weekend

## 2018-09-16 NOTE — PLAN OF CARE
Problem: Prexisting or High Potential for Compromised Skin Integrity  Goal: Skin integrity is maintained or improved  INTERVENTIONS:  - Identify patients at risk for skin breakdown  - Assess and monitor skin integrity    - Monitor labs (i e  albumin)  - Assess for incontinence   - Turn and reposition patient  - Assist with mobility/ambulation  - Relieve pressure over bony prominences  - Avoid friction and shearing  - Provide appropriate hygiene as needed including keeping skin clean and dry  - Evaluate need for skin moisturizer/barrier cream    - Patient/family teaching   Outcome: Not Progressing      Problem: Potential for Falls  Goal: Patient will remain free of falls  INTERVENTIONS:  - Assess patient frequently for physical needs  -  Identify cognitive and physical deficits and behaviors that affect risk of falls    -  [place] fall precautions as indicated by assessment   - Educate patient/family on patient safety including physical limitations  - Instruct patient to call for assistance with activity based on assessment  - Modify environment to reduce risk of injury  - Consider OT/PT consult to assist with strengthening/mobility   Outcome: Progressing

## 2018-09-16 NOTE — CASE MANAGEMENT
Initial Clinical Review    Admission: Date/Time/Statement: OBS  9/15  0838    Orders Placed This Encounter   Procedures    Place in Observation (expected length of stay for this patient is less than two midnights)     Standing Status:   Standing     Number of Occurrences:   1     Order Specific Question:   Admitting Physician     Answer:   Michelle Ham     Order Specific Question:   Level of Care     Answer:   Med Surg [16]         ED: Date/Time/Mode of Arrival:   ED Arrival Information     Expected Arrival Acuity Means of Arrival Escorted By Service Admission Type    - 9/15/2018 06:58 Emergent Ambulance [address] [facility] General Medicine Emergency    Arrival Complaint    unresponsive          Chief Complaint:   Chief Complaint   Patient presents with    Altered Mental Status     found by staff unresponsive       History of Illness:Patient is an 80-year-old female from [facility] who presents to the emergency room with a complaint of altered mental status  The patient has a son, daughter, son-in-law are present at bedside  They are able to relate a history that the patient has been having worsening cognitive decline over the past year  Son in law was present last evening and stated that her mental status has significantly deteriorated    Patient has had multiple hospitalizations and son states that her quality of life has deteriorated and she was previously a do not resuscitate      I had an extensive conversation with the son / daughter / Son in law (physician in [place]) who wish to have the patient placed on hospice and do not wish to pursue medical treatment        ED Vital Signs:   ED Triage Vitals   Temperature Pulse Respirations Blood Pressure SpO2   09/15/18 0959 09/15/18 0702 09/15/18 0702 09/15/18 0715 09/15/18 0702   98 4 °F (36 9 °C) 56 16 136/64 (!) 88 %      Temp Source Heart Rate Source Patient Position - Orthostatic VS BP Location FiO2 (%)   09/15/18 0959 09/15/18 0702 09/15/18 0702 09/15/18 0702 --   Temporal Monitor Lying Left arm       Pain Score       09/15/18 0900       No Pain        Wt Readings from Last 1 Encounters:   09/15/18 71 6 kg (157 lb 13 6 oz)       Vital Signs (abnormal):   09/15/18 0959  98 4 °F (36 9 °C)   50  16  123/53  --  100 %  Nasal cannula  Lying   09/15/18 0900  --   52  15   90/44  --  100 %  --  --   Comment rows:   OBSERV: Family at bedside; refused lactulose "for now" wants to speak to additional family memebers prior to any treatment at 09/15/18 0900   09/15/18 0845  --   52  16  --  --  100 %  --  --   09/15/18 0800  --  62  19  136/62  --  100 %  Nasal cannula  --   Comment rows:   OBSERV: pt obtunded; IV patent & secure  VSS at 09/15/18 0800   09/15/18 0731  --  --  --  --  --  --  Nasal cannula           Abnormal Labs/Diagnostic Test Results:   Sodium 130 (L) 136 - 145 mmol/L     Potassium 4 5 3 5 - 5 3 mmol/L     Chloride 94 (L) 100 - 108 mmol/L     CO2 39 (H) 21 - 32 mmol/L     ANION GAP -3 (L) 4 - 13 mmol/L    ast  68, alk phos 196, alb  1 7, total bili  1 50  PT INR  17 0 1 46, ptt 38, ammonia  108, plt  147  CXR -      1   Small to moderate-sized bilateral pleural effusions, stable  2   Probable underlying pulmonary edema, slightly improved  CT head- wnl    ED Treatment:   Medication Administration from 09/15/2018 0658 to 09/15/2018 9547       Date/Time Order Dose Route Action Action by Comments     09/15/2018 0914 sodium chloride 0 9 % bolus 250 mL 0 mL Intravenous Stopped Ellen Soni RN      09/15/2018 0800 sodium chloride 0 9 % bolus 250 mL 250 mL Intravenous New Bag Ellen Soni RN      09/15/2018 0914 lactulose retention enema 200 g 200 g Rectal Not Given Tamiko Soni RN           Past Medical/Surgical History:    Active Ambulatory Problems     Diagnosis Date Noted    Shortness of breath 03/10/2017    Pleural effusion 03/10/2017    Hypertension 03/10/2017    GERD without esophagitis 03/10/2017    Gastritis 03/10/2017    Valvular heart disease 03/11/2017    Acute on chronic diastolic congestive heart failure (HCC) 03/11/2017    Acute encephalopathy 03/17/2017    Tricuspid regurgitation 03/18/2017    Pulmonary hypertension ([facility] 03/18/2017    Mitral valve stenosis 03/18/2017    Aortic stenosis 03/18/2017    Hypoalbuminemia 03/18/2017    Hepatic encephalopathy ([facility] ) 03/18/2017    Hepatic cirrhosis ([facility] ) 03/18/2017    Hyperbilirubinemia 03/18/2017    Leukopenia 03/18/2017    Neutropenia (HCC) 03/18/2017    Transaminitis 03/18/2017    Healthcare-associated pneumonia 03/18/2017    Mucosal abnormality of stomach 03/19/2017    Portal hypertension ([facility] ) 03/20/2017    Left adrenal mass ([facility] ) 03/21/2017    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    Pressure ulcer of left buttock, stage 3 ([facility] ) 66/42/3098    Diastolic CHF ([facility] ) 62/86/0544    Edema 07/26/2012    Hyperlipidemia 07/25/2012    Pancreatic cyst 08/01/2017    Post-menopausal osteoporosis 07/25/2012    Pneumonia 06/04/2018    Altered mental status 06/04/2018    Increased ammonia level 06/04/2018    Generalized weakness 07/26/2018    Bradycardia 07/27/2018    Hyperkalemia 07/28/2018    Hyperphosphatemia 08/22/2018    Thrombocytopenia ([facility] ) 08/22/2018     Past Medical History:   Diagnosis Date    Breast cancer ([facility] )     Cancer ([facility] )     CHF (congestive heart failure) ([facility] )     Cirrhosis of liver ([facility] )     GERD (gastroesophageal reflux disease)     Hepatic encephalopathy (HCC)     Hypertension     Polyp, sigmoid colon        Admitting Diagnosis: Hepatic encephalopathy (HCC) [K72 90]  Hyponatremia [E87 1]  Altered mental status [R41 82]  Change in mental status [R41 82]  Generalized weakness [R53 1]  Cirrhosis of liver without ascites, unspecified hepatic cirrhosis type ([facility] ) [K74 60]    Age/Sex: 80 y o  female    Assessment/Plan:   Hepatic encephalopathy ([facility] )   Assessment & Plan     Patient was ordered lactulose enema in the ED   However upon arrival to the medical floor accompanied by her son and daughter they elected to placed the patient on hospice  Do not wish to have her suffer and state that her quality of life has deteriorated over the  past year  Will discontinue nonessential medications  Consult case management to assist with hospice                Admission Orders:  Scheduled Meds:   Current Facility-Administered Medications:  aspirin 81 mg Oral Daily Lewis Michel MD   lactulose 200 g Rectal Once Melchor Yañez DO     Continuous Infusions:    PRN Meds:     NPO   Neuro checks   SCD  Hospice consult

## 2018-09-16 NOTE — DISCHARGE SUMMARY
Discharge Summary - Nigel Fernando 80 y o  female MRN: [id number]    Unit/Bed#: 424-01 Encounter: [id number]    Admission Date:   Admission Orders     Ordered        09/15/18 0838  Place in Observation (expected length of stay for this patient is less than two midnights)  Once               Admitting Diagnosis: Hepatic encephalopathy ([facility] ) [K72 90]  Hyponatremia [E87 1]  Altered mental status [R41 82]  Change in mental status [R41 82]  Generalized weakness [R53 1]  Cirrhosis of liver without ascites, unspecified hepatic cirrhosis type ([facility] ) [K74 60]    HPI: Patient is an 44-year-old female from [facility] who presents to the emergency room with a complaint of altered mental status  The patient has a son, daughter, son-in-law are present at bedside  They are able to relate a history that the patient has been having worsening cognitive decline over the past year  Son in law was present last evening and stated that her mental status has significantly deteriorated  Patient has had multiple hospitalizations and son states that her quality of life has deteriorated and she was previously a do not resuscitate      I had an extensive conversation with the son / daughter / Son in law (physician in [place]) who wish to have the patient placed on hospice and do not wish to pursue medical treatment        Procedures Performed:   Orders Placed This Encounter   Procedures    ED ECG Documentation Only       Summary of Hospital Course:     Hepatic encephalopathy / End Stage Liver Disease   Upon arrival to the medical floor patient's family members elected patient placed on hospice due to worsening quality of life  Her mental status I continue to deteriorate over the last year and they stated it was not her wishes to live like this  Nonessential medications were discontinued, consultation with hospice obtained, and 420 patient was not eligible for inpatient hospice    She will be discharged to [facility] assisted living where hospice will be initiated      addendum 9-17-18  Patient was not discharged on 9/16/18 due to difficulty arranging equipment at her residense  She did not qualify for IP hospice on 9/17 and will be discharged with hospice at her assisted living [facility])    Significant Findings, Care, Treatment and Services Provided:     Complications:     Discharge Diagnosis: End Stage Liver disease   Hepatic Encephalopathy    Resolved Problems  Date Reviewed: 8/1/2018    None          Condition at Discharge: stable         Discharge instructions/Information to patient and family:   See after visit summary for information provided to patient and family  Provisions for Follow-Up Care:  See after visit summary for information related to follow-up care and any pertinent home health orders  PCP: Lexy Gann DO    Disposition: [facility] for Hospice    Planned Readmission: No    Discharge Statement   I spent 25 minutes discharging the patient  This time was spent on the day of discharge  I had direct contact with the patient on the day of discharge  Additional documentation is required if more than 30 minutes were spent on discharge  Discharge Medications:  See after visit summary for reconciled discharge medications provided to patient and family

## 2018-09-16 NOTE — HOSPICE NOTE
Hospice evaluation   the patient is minimally responsive   comfortable at rest is not requiring any prn medications for symptom management    Kathleen Hawk o2 via nc continues   spoke with dr Kirk Libman and dr Thomas Ny   the patient does not meet gip criteria   will be admitted to hospice upon return to [facility]   will [facility] from [facility] in anticipation of return to [place] tomorrow    Will reevaluate for gip if condition changes    The pts son is at bedside   agrees with poc

## 2018-09-16 NOTE — SOCIAL WORK
I called [facility] and they are calling their director to see if the patient can return today and I sent a referral to the Hospice requesting hospice at [facility]

## 2018-09-17 ENCOUNTER — TELEPHONE (OUTPATIENT)
Dept: INTERNAL MEDICINE CLINIC | Facility: CLINIC | Age: 83
End: 2018-09-17

## 2018-09-17 RX ORDER — LORAZEPAM 2 MG/ML
1 CONCENTRATE ORAL EVERY 8 HOURS PRN
Qty: 30 ML | Refills: 0 | Status: SHIPPED | OUTPATIENT
Start: 2018-09-17 | End: 2018-09-27

## 2018-09-17 RX ORDER — MORPHINE SULFATE 100 MG/5ML
10 SOLUTION ORAL EVERY 2 HOUR PRN
Qty: 240 ML | Refills: 0 | Status: SHIPPED | OUTPATIENT
Start: 2018-09-17 | End: 2018-09-27

## 2018-09-17 NOTE — NURSING NOTE
Spoke with Constance Campuzano at [facility] and explained that patient was not transported to their facility at the scheduled time due to [address] that was sent was not capable of transporting patient with oxygen  Explained that Case Management here at [facility] is attempting to schedule transport with another service company  Constance Campuzano will inform proper personnel of this situation

## 2018-09-17 NOTE — HOSPICE NOTE
Family requested hospice evaluation for potential inpatient status as they prefer pt not be moved  Pt is alert today remains confused VS 92/50 HR 92 rr16 temp 98  Pt has no complaints of pain or discomfort at this time and received one dose of Morphine yesterday  Has no symptoms requiring intervention  Pt is not inpatient appropriate per medicare guidelines  Is approved for home hospice upon discharge   Discussed with [facility] and Dr Radha Vazquez is attempting referral to 5th floor  [facility] Notified of DME needs for [facility] and will not be able to deliver until later today possibly 4pm    Will update [facility] staff with more definitive time  Nahid Jose Luis Singh made aware and is in agreement to 5th floor preferably  Accepts return to [facility] if absolutely necessary

## 2018-09-17 NOTE — TELEPHONE ENCOUNTER
Can you let him know the nurse will contact me after reevaluation but it sounds like Russ Bhandari status has changed and may qualify for care there

## 2018-09-17 NOTE — SOCIAL WORK
The patient does not meet inpatient hospice and  She will be transferred to the [facility] and she will be pickup at 5 pm med stat bls and they will sign her up with hospice  After she gets there  The electronic scripts that hospice requested went to the [facility] pharmacy and they are aware the medical necessity for the transport was filled out

## 2018-09-17 NOTE — TELEPHONE ENCOUNTER
Suellen Long asked if you could call him regarding his mother   [phone] States hospice is there and needs to speak with you

## 2018-09-17 NOTE — NURSING NOTE
Spoke with Kenny Naidu RN to inform that patient is still here at [facility] due to conflict with [facility]oma[facility] that was sent to transport this patient to [facility]  Explained that Rob Harrison from Case Management is attempting to schedule another transport company who will be equipped to transport this patient with oxygen  Also then spoke to Kj Alvarez, patient's son and explained current situation to him  Navid Hoskins is requesting that we call him at [phone] when patient leaves [facility] in route to [facility]

## 2018-09-17 NOTE — NURSING NOTE
Called and informed Ligia Contreras, patient's son, that his mother will not be transported to [facility] but will be transported there tomorrow, however, no definite time has been set

## 2018-09-17 NOTE — NURSING NOTE
Patient was to be transported to [address] [place] [address] today at 1700  Transport Service arrived on 4th Floor and determined they could not transport the patient due to  Patient's need for oxygen  Transport states they are only a transport service van and are not equipped for oxygen administration  Jimmie Sunshine and Dr Jarrod Kelly made aware of current situation

## 2018-09-18 ENCOUNTER — TRANSITIONAL CARE MANAGEMENT (OUTPATIENT)
Dept: INTERNAL MEDICINE CLINIC | Facility: CLINIC | Age: 83
End: 2018-09-18

## 2018-09-18 VITALS
TEMPERATURE: 97.5 F | DIASTOLIC BLOOD PRESSURE: 55 MMHG | SYSTOLIC BLOOD PRESSURE: 117 MMHG | OXYGEN SATURATION: 97 % | HEART RATE: 78 BPM | WEIGHT: 157.85 LBS | RESPIRATION RATE: 18 BRPM | HEIGHT: 65 IN | BODY MASS INDEX: 26.3 KG/M2

## 2018-09-18 RX ADMIN — ASPIRIN 81 MG 81 MG: 81 TABLET ORAL at 08:16

## 2018-09-18 NOTE — HOSPICE NOTE
Eval of pt at request of son for possible GIP  Pt has improved today Much more alert  Confused but holsing brief conversation  Denies pain or discomfort  No agiatation  No symptom control required  Pt does not qualify for inpatient status per medicare guidelines  Arranged transport to [facility] with [facility]  States they will be here  In 45 minutes  Enma Raya Made aware and son Armando Kelly who is accepting of same  Update to nurse Tanna Rollins

## 2018-09-18 NOTE — SOCIAL WORK
The patient was d/c to [facility] and the hospice nurse is aware  She will be admitted on to hospice when she gets to the facility   Her family is aware

## 2018-09-18 NOTE — PROGRESS NOTES
The patient was scheduled to be discharged on 9/17/18 to [place] with [facility]  This was delayed due to issues with transportation  The patient was discharged today 9/18/18

## 2018-09-19 LAB
ATRIAL RATE: 62 BPM
QRS AXIS: 94 DEGREES
QRSD INTERVAL: 128 MS
QT INTERVAL: 420 MS
QTC INTERVAL: 426 MS
T WAVE AXIS: -21 DEGREES
VENTRICULAR RATE: 62 BPM

## 2018-09-19 PROCEDURE — 93010 ELECTROCARDIOGRAM REPORT: CPT | Performed by: INTERNAL MEDICINE

## 2018-09-20 LAB
BACTERIA BLD CULT: NORMAL
BACTERIA BLD CULT: NORMAL

## 2020-12-15 NOTE — TELEPHONE ENCOUNTER
Daughter asking for referral to Home care due to Wound in her sacral area between her buttocks  States her  is a doctor and it looks like it is a stage 2 wound  Unable to get her into the office so req  Home care? Comment: Pt showed me a photo of a recent outbreak that appears to depict clustered vesicles at this site.  Currently there is only postinflammatory erythema. Detail Level: Simple

## 2021-06-08 NOTE — ED PROVIDER NOTES
History  Chief Complaint   Patient presents with    Abnormal Lab     Abnormal lab and Sob      Patient is an 66-year-old female sent in by the primary care doctor with low sodium  The patient also notes some worsening fatigue for the past several days  Of note, the patient is a difficult historian and tends to minimize her symptoms, I did obtain some more history from the daughter who was in the room  The daughter notes worsening fatigue, shortness of breath today  No chest pain, lightheadedness or dizziness  No focal neurological symptoms  No cough/fevers, chills, sweats to suggest pneumonia  No dysuria or hematuria  On exam her lungs sound good  She is comfortable in no respiratory distress  Abdomen soft, nontender  She does have lower extremity edema, 1+  Medical decision-makin-year-old female, fatigued, shortness of breath, bilateral lower extremity edema and a history of heart failure  No calf tenderness  Will perform cardiac workup, check sodium  Prior to Admission Medications   Prescriptions Last Dose Informant Patient Reported? Taking?    CORAL CALCIUM PO   Yes Yes   Sig: Take 1 tablet by mouth daily   Cholecalciferol (VITAMIN D-3) 1000 units CAPS   Yes Yes   Sig: Take 1 capsule by mouth daily   Multiple Vitamin (MULTIVITAMIN) tablet   Yes Yes   Sig: Take 1 tablet by mouth daily   aspirin 81 MG tablet   Yes Yes   Sig: Take 81 mg by mouth daily   co-enzyme Q-10 30 MG capsule   Yes No   Sig: Take 30 mg by mouth daily   esomeprazole (NexIUM) 40 MG capsule   Yes Yes   Sig: Take 40 mg by mouth every morning before breakfast   furosemide (LASIX) 40 mg tablet   No Yes   Sig: Take 1 tablet by mouth daily   Patient taking differently: Take 40 mg by mouth daily Take an extra dose for weight increase  Of 3 lbs     lactulose (CHRONULAC) 10 g/15 mL solution   No Yes   Sig: Take 30 mL by mouth 3 (three) times a day   magnesium oxide (MAG-OX) 400 mg   No Yes   Sig: Take 1 tablet by mouth daily   nadolol (CORGARD) 40 mg tablet   No No   Sig: Take 1 tablet by mouth daily   Patient taking differently: Take 20 mg by mouth daily     potassium chloride (KLOR-CON) 20 mEq packet   Yes Yes   Sig: Take 20 mEq by mouth daily   rifaximin (XIFAXAN) 550 mg tablet   No Yes   Sig: Take 1 tablet by mouth every 12 (twelve) hours   valsartan (DIOVAN) 80 mg tablet   No Yes   Sig: Take 1 tablet by mouth daily      Facility-Administered Medications: None       Past Medical History:   Diagnosis Date    Cancer ([address] )     breast    GERD (gastroesophageal reflux disease)     Hypertension        Past Surgical History:   Procedure Laterality Date    COMBINED HYSTEROSCOPY DIAGNOSTIC / D&C      HYSTERECTOMY      MASTECTOMY Right     MASTECTOMY         History reviewed  No pertinent family history  I have reviewed and agree with the history as documented  Social History   Substance Use Topics    Smoking status: Former Smoker    Smokeless tobacco: Never Used    Alcohol use No        Review of Systems   Constitutional: Positive for fatigue  Negative for chills and fever  HENT: Negative for ear discharge and facial swelling  Respiratory: Positive for shortness of breath  Negative for chest tightness and wheezing  Cardiovascular: Negative for chest pain and palpitations  Gastrointestinal: Negative for abdominal pain, diarrhea and vomiting  Genitourinary: Negative for dysuria and hematuria  Musculoskeletal: Negative for arthralgias and neck stiffness  Skin: Negative for color change and rash  Allergic/Immunologic: Negative  Neurological: Negative for tremors, seizures and syncope  Psychiatric/Behavioral: Negative for hallucinations and suicidal ideas  All other systems reviewed and are negative        Physical Exam  ED Triage Vitals [11/04/17 1254]   Temperature Pulse Respirations Blood Pressure SpO2   98 5 °F (36 9 °C) 94 (!) 24 158/67 96 %      Temp Source Heart Rate Source Patient Position - Orthostatic VS BP Location FiO2 (%)   Temporal Monitor Lying Left arm --      Pain Score       No Pain           Orthostatic Vital Signs  Vitals:    11/04/17 1545 11/04/17 1740 11/04/17 2312 11/05/17 0722   BP: 113/53 140/63 109/53 114/56   Pulse: 79 91 79 72   Patient Position - Orthostatic VS:  Lying Lying Sitting       Physical Exam   Constitutional: She is oriented to person, place, and time  She appears well-developed and well-nourished  HENT:   Head: Normocephalic and atraumatic  Right Ear: External ear normal    Left Ear: External ear normal    Eyes: Conjunctivae and EOM are normal    Neck: Normal range of motion  Neck supple  No JVD present  No tracheal deviation present  Cardiovascular: Normal rate, regular rhythm and normal heart sounds  Pulmonary/Chest: Effort normal  No respiratory distress  She has no wheezes  She has no rales  Abdominal: Soft  Bowel sounds are normal  There is no tenderness  There is no rebound and no guarding  Musculoskeletal: She exhibits edema  She exhibits no tenderness  Neurological: She is alert and oriented to person, place, and time  Skin: Skin is warm and dry  No rash noted  No erythema  Psychiatric: She has a normal mood and affect  Thought content normal    Nursing note and vitals reviewed        ED Medications  Medications - No data to display    Diagnostic Studies  Results Reviewed     Procedure Component Value Units Date/Time    BNP [[id number]]  (Abnormal) Collected:  11/04/17 1545    Lab Status:  Final result Specimen:  Blood Updated:  11/04/17 2316     NT-proBNP 490 (H) pg/mL     Troponin I [[id number]]  (Normal) Collected:  11/04/17 1419    Lab Status:  Final result Specimen:  Blood from Arm, Left Updated:  11/04/17 1448     Troponin I 0 02 ng/mL     Narrative:         [employer] Chemistry analyzer 99% cutoff is > 0 04 ng/mL in network labs    o cTnI 99% cutoff is useful only when applied to patients in the clinical setting of myocardial ischemia  o cTnI 99% cutoff should be interpreted in the context of clinical history, ECG findings and possibly cardiac imaging to establish correct diagnosis  o cTnI 99% cutoff may be suggestive but clearly not indicative of a coronary event without the clinical setting of myocardial ischemia  Comprehensive metabolic panel [[id number]]  (Abnormal) Collected:  11/04/17 1419    Lab Status:  Final result Specimen:  Blood from Arm, Left Updated:  11/04/17 1444     Sodium 133 (L) mmol/L      Potassium 4 6 mmol/L      Chloride 98 (L) mmol/L      CO2 32 mmol/L      Anion Gap 3 (L) mmol/L      BUN 11 mg/dL      Creatinine 0 60 mg/dL      Glucose 80 mg/dL      Calcium 9 8 mg/dL      AST 49 (H) U/L      ALT 30 U/L      Alkaline Phosphatase 124 (H) U/L      Total Protein 8 0 g/dL      Albumin 2 6 (L) g/dL      Total Bilirubin 1 00 mg/dL      eGFR 82 ml/min/1 73sq m     Narrative:         National Kidney Disease Education Program recommendations are as follows:  GFR calculation is accurate only with a steady state creatinine  Chronic Kidney disease less than 60 ml/min/1 73 sq  meters  Kidney failure less than 15 ml/min/1 73 sq  meters  Urine Microscopic [[id number]]  (Abnormal) Collected:  11/04/17 1350    Lab Status:  Final result Specimen:  Urine from Urine, Clean Catch Updated:  11/04/17 1443     RBC, UA 0-1 (A) /hpf      WBC, UA None Seen /hpf      Epithelial Cells Occasional /hpf      Bacteria, UA None Seen /hpf     Protime-INR [[id number]]  (Abnormal) Collected:  11/04/17 1419    Lab Status:  Final result Specimen:  Blood from Arm, Left Updated:  11/04/17 1438     Protime 15 3 (H) seconds      INR 1 22 (H)    APTT [[id number]]  (Abnormal) Collected:  11/04/17 1419    Lab Status:  Final result Specimen:  Blood from Arm, Left Updated:  11/04/17 1438     PTT 44 (H) seconds     Narrative:          Therapeutic Heparin Range = 60-90 seconds    UA w Reflex to Microscopic w Reflex to Culture [[id number]]  (Normal) Collected:  11/04/17 1350    Lab Status:  Final result Specimen:  Urine from Urine, Clean Catch Updated:  11/04/17 1427     Color, UA Yellow     Clarity, UA Clear     Specific Gravity, UA 1 010     pH, UA 7 5     Leukocytes, UA Negative     Nitrite, UA Negative     Protein, UA Negative mg/dl      Glucose, UA Negative mg/dl      Ketones, UA Negative mg/dl      Urobilinogen, UA 0 2 E U /dl      Bilirubin, UA Negative     Blood, UA Trace-Intact    CBC and differential [[id number]]  (Abnormal) Collected:  11/04/17 1419    Lab Status:  Final result Specimen:  Blood from Arm, Left Updated:  11/04/17 1427     WBC 5 06 Thousand/uL      RBC 4 85 Million/uL      Hemoglobin 14 5 g/dL      Hematocrit 44 0 %      MCV 91 fL      MCH 29 9 pg      MCHC 33 0 g/dL      RDW 14 1 %      MPV 11 0 fL      Platelets 406 Thousands/uL      Neutrophils Relative 55 %      Lymphocytes Relative 29 %      Monocytes Relative 14 (H) %      Eosinophils Relative 2 %      Basophils Relative 0 %      Neutrophils Absolute 2 73 Thousands/µL      Lymphocytes Absolute 1 48 Thousands/µL      Monocytes Absolute 0 73 Thousand/µL      Eosinophils Absolute 0 10 Thousand/µL      Basophils Absolute 0 02 Thousands/µL     [place] draw [[id number]] Collected:  11/04/17 1419    Lab Status: In process Specimen:  Blood Updated:  11/04/17 1424    Narrative: The following orders were created for panel order [place] draw  Procedure                               Abnormality         Status                     ---------                               -----------         ------                     [place] Snuffer top on GPIL[[id number]]                                  In process                   Please view results for these tests on the individual orders                   VAS lower limb venous duplex study, complete bilateral   Final Result by Jenniffer Avitia DO (11/05 1421)      XR chest 2 views   Final Result by Sumaya Ibarra MD (11/04 1514)   Interstitial and vascular congestion with bibasilar airspace opacities and moderate pleural effusions  Workstation performed: [id number]                    Procedures  Procedures       Phone Contacts  ED Phone Contact    ED Course  ED Course as of Nov 06 1506   Sat Nov 04, 2017   1416 EKG rate of 89, sinus, no STEMI, right bundle branch block  Flipped Ts in 2, AV foot  1533 Impression     Interstitial and vascular congestion with bibasilar airspace opacities and moderate pleural effusions  MDM  CritCare Time    Disposition  Final diagnoses:   Shortness of breath   Hyponatremia   Pleural effusion     Time reflects when diagnosis was documented in both MDM as applicable and the Disposition within this note     Time User Action Codes Description Comment    11/4/2017  3:42 PM Vadim Neelye, [address] [R06 02] Shortness of breath     11/4/2017  3:42 PM Deveron Deuel, [address] [E87 1] Hyponatremia     11/4/2017  3:42 PM Luh Freitas Add [J90] Pleural effusion     11/5/2017 11:01 AM Jose Morrell Add [I50 33] Acute on chronic diastolic congestive heart failure [facility])       ED Disposition     ED Disposition Condition Comment    Admit  Case was discussed with mk and the patient's admission status was agreed to be Admission Status: observation status to the service of Dr Marilou Ordonez           Follow-up Information     Follow up With Specialties Details Why [facility], DO Internal Medicine   [address],  [address] [facility]  [phone]          Discharge Medication List as of 11/6/2017  8:49 AM      CONTINUE these medications which have NOT CHANGED    Details   aspirin 81 MG tablet Take 81 mg by mouth daily, Until Discontinued, Historical Med      Cholecalciferol (VITAMIN D-3) 1000 units CAPS Take 1 capsule by mouth daily, Until Discontinued, Historical Med      CORAL CALCIUM PO Take 1 tablet by mouth daily, Historical Med      esomeprazole (NexIUM) 40 MG capsule Take 40 mg by mouth every morning before 0 breakfast, Until Discontinued, Historical Med      furosemide (LASIX) 40 mg tablet Take 1 tablet by mouth daily, Starting 3/22/2017, Until Discontinued, No Print      lactulose (CHRONULAC) 10 g/15 mL solution Take 30 mL by mouth 3 (three) times a day, Starting 3/21/2017, Until Discontinued, No Print      magnesium oxide (MAG-OX) 400 mg Take 1 tablet by mouth daily, Starting 3/21/2017, Until Discontinued, No Print      Multiple Vitamin (MULTIVITAMIN) tablet Take 1 tablet by mouth daily, Until Discontinued, Historical Med      potassium chloride (KLOR-CON) 20 mEq packet Take 20 mEq by mouth daily, Until Discontinued, Historical Med      rifaximin (XIFAXAN) 550 mg tablet Take 1 tablet by mouth every 12 (twelve) hours, Starting 3/21/2017, Until Discontinued, No Print      valsartan (DIOVAN) 80 mg tablet Take 1 tablet by mouth daily, Starting 3/22/2017, Until Discontinued, No Print      co-enzyme Q-10 30 MG capsule Take 30 mg by mouth daily, Until Discontinued, Historical Med      nadolol (CORGARD) 40 mg tablet Take 1 tablet by mouth daily, Starting 3/22/2017, Until Discontinued, No Print             Outpatient Discharge Orders  Ambulatory Referral to Home Health   Standing Status: Future  Standing Exp   Date: 05/05/18     Discharge Diet     Activity as tolerated     Call provider for:  persistent nausea or vomiting     Call provider for:  severe uncontrolled pain     Call provider for:  redness, tenderness, or signs of infection (pain, swelling, redness, odor or green/yellow discharge around incision site)     Call provider for: active or persistent bleeding     Call provider for:  difficulty breathing, headache or visual disturbances     Call provider for:  hives     Call provider for:  persistent dizziness or light-headedness     Call provider for:  extreme fatigue         ED Provider  Electronically Signed by           Angel Francois MD  11/06/17 2179

## 2022-10-18 NOTE — NURSING NOTE
10/18/2022  S/p LTHA. On anticoagulation per ortho for DVT PPx. On analgesics. PT/OT evaluations.    Called [facility] to inform them that patient will not be transported to their facility tonight but will be transported there tomorrow, however, no definite time has been set for transport

## 2023-08-04 NOTE — NURSING NOTE
Patient discharged to 5th Floor Acute Rehab at [facility] in stable condition  Copy of patient's chart, after visit summary and summary of care sent with patient upon discharge  Report called in to Donnell Lyles, [address] [place] [address] on 5th Floor by Juan Luis Thornton, ROCIO  Patient transported to 5th floor via wheelchair by [facility]  no

## 2023-09-05 NOTE — PROGRESS NOTES
Assessment/Plan:         Diagnoses and all orders for this visit:    Diastolic congestive heart failure, unspecified HF chronicity ([place] [address] )  Patient wears oxygen continuously  Home PT    Essential hypertension  -     losartan (COZAAR) 50 mg tablet; Take 1 tablet (50 mg total) by mouth daily for 90 days  Refill sent to pharmacy  No added salt    Cirrhosis of liver without ascites, unspecified hepatic cirrhosis type (HCC)  Lactulose TID, ammonia level reviewed with patient    Increased ammonia level  Lactulose tid    Rheumatic aortic stenosis  Continuous oxygen, continue present Rx    Rto 2 months           Patient ID: Karly Cotto is a 80 y o  female  HPI   Pt doing ok  She is taking lactulose regularly - three times/day  No falls  Appetite fair but drinks Boost twice/day  Wears oxygen continuous  No chest pain ,soto  No pain  No diarrhea but loose stools  She has in home help and likes homecare     The following portions of the patient's history were reviewed and updated as appropriate:   Past Medical History:   Diagnosis Date    Breast cancer ([place] [address] )     Cancer ([place] [address] )     breast    CHF (congestive heart failure) ([place] [address] )     Cirrhosis of liver ([place] [address] )     GERD (gastroesophageal reflux disease)     Hepatic encephalopathy ([place] [address] )     Hypertension     last assessed 10/5/17    Polyp, sigmoid colon      Past Surgical History:   Procedure Laterality Date    COMBINED HYSTEROSCOPY DIAGNOSTIC / D&C      HYSTERECTOMY      unknown    MASTECTOMY Right 1990    MASTECTOMY       Social History     Social History    Marital status:      Spouse name: N/A    Number of children: N/A    Years of education: N/A     Occupational History    Not on file       Social History Main Topics    Smoking status: Former Smoker     Types: Cigarettes    Smokeless tobacco: Never Used    Alcohol use No    Drug use: No    Sexual activity: No     Other Topics Concern    Not on file     Social History Narrative    Dental care, What Type Of Note Output Would You Prefer (Optional)?: Bullet Format How Severe Are Your Spot(S)?: mild regularly    Good dental hygiene    Sun protection sunscreen    Uses safety equipment- seatbelts         Allergies   Allergen Reactions    Amoxicillin          Review of Systems   Constitutional: Positive for activity change  HENT: Negative  Eyes: Negative  Respiratory: Positive for shortness of breath  Cardiovascular: Positive for leg swelling  Gastrointestinal: Positive for abdominal distention  Endocrine: Negative  Genitourinary: Negative  Musculoskeletal: Positive for arthralgias  Skin: Negative  Allergic/Immunologic: Negative  Neurological: Negative for dizziness and light-headedness  Hematological: Negative  Psychiatric/Behavioral: Positive for sleep disturbance  /70   Pulse 79   Temp (!) 96 6 °F (35 9 °C) (Tympanic)   Ht 5' 2" (1 575 m)   Wt 66 4 kg (146 lb 6 oz)   LMP  (LMP Unknown)   SpO2 (!) 81%   BMI 26 77 kg/m²      Physical Exam   Constitutional: She is oriented to person, place, and time  She appears well-developed and well-nourished  No distress  HENT:   Head: Normocephalic and atraumatic  Right Ear: External ear normal    Left Ear: External ear normal    Nose: Nose normal    Mouth/Throat: Oropharynx is clear and moist  No oropharyngeal exudate  Eyes: Conjunctivae and EOM are normal  Pupils are equal, round, and reactive to light  No scleral icterus  Neck: Normal range of motion  Neck supple  No JVD present  Cardiovascular: Normal rate, regular rhythm and intact distal pulses  Murmur heard  Pulmonary/Chest: Effort normal  No respiratory distress  She has no wheezes  She has rales  She exhibits no tenderness  Abdominal: Soft  Bowel sounds are normal  She exhibits distension  There is no tenderness  There is no rebound and no guarding  Musculoskeletal: Normal range of motion  She exhibits edema and deformity  She exhibits no tenderness  Lymphadenopathy:     She has no cervical adenopathy     Neurological: She is alert and oriented Have Your Spot(S) Been Treated In The Past?: has not been treated to person, place, and time  She has normal reflexes  She displays normal reflexes  No cranial nerve deficit  She exhibits abnormal muscle tone  Coordination abnormal    Skin: Skin is warm and dry  She is not diaphoretic  Psychiatric: She has a normal mood and affect  Her behavior is normal  Judgment and thought content normal    Nursing note and vitals reviewed    Alert and oriented Conversant , appropriate Hpi Title: Evaluation of Skin Lesions

## 2024-03-22 NOTE — ASSESSMENT & PLAN NOTE
- Brought in by her son with complaints of weakness, ongoing for a few weeks    - Son is concern that she is not able to care for herself and needs placement- Denies fever, chills, no leukocytosis  - likely multifactorial and due to elevated ammonia levels / worsening hepatic encephalopathy, and acute CHF, but likely also chronic deconditioning   - Head CT shows-No acute intracranial abnormality   - B12 normal    -TSH elevated, check Free T3,T4  -Case management consult for probable SNF vs  Short term rehab placement Debbie Woodard, APRN Heather Boss RN  Caller: Unspecified (Today,  8:43 AM)  Thank you for update, no further recs    Noted   Heather HUERTA RN BSN, CHFN, PCCN-K